# Patient Record
Sex: FEMALE | Race: WHITE | NOT HISPANIC OR LATINO | Employment: UNEMPLOYED | URBAN - NONMETROPOLITAN AREA
[De-identification: names, ages, dates, MRNs, and addresses within clinical notes are randomized per-mention and may not be internally consistent; named-entity substitution may affect disease eponyms.]

---

## 2017-01-16 ENCOUNTER — OFFICE VISIT (OUTPATIENT)
Dept: CARDIAC SURGERY | Facility: CLINIC | Age: 62
End: 2017-01-16

## 2017-01-16 VITALS
OXYGEN SATURATION: 98 % | DIASTOLIC BLOOD PRESSURE: 89 MMHG | HEART RATE: 106 BPM | SYSTOLIC BLOOD PRESSURE: 127 MMHG | HEIGHT: 61 IN | BODY MASS INDEX: 31.83 KG/M2 | WEIGHT: 168.6 LBS

## 2017-01-16 DIAGNOSIS — K21.9 GASTROESOPHAGEAL REFLUX DISEASE WITHOUT ESOPHAGITIS: ICD-10-CM

## 2017-01-16 DIAGNOSIS — I10 BENIGN ESSENTIAL HTN: ICD-10-CM

## 2017-01-16 DIAGNOSIS — K44.9 HIATAL HERNIA: Primary | ICD-10-CM

## 2017-01-16 DIAGNOSIS — R10.13 EPIGASTRIC PAIN: ICD-10-CM

## 2017-01-16 DIAGNOSIS — J43.1 PANLOBULAR EMPHYSEMA (HCC): ICD-10-CM

## 2017-01-16 DIAGNOSIS — F32.4 MAJOR DEPRESSIVE DISORDER WITH SINGLE EPISODE, IN PARTIAL REMISSION (HCC): ICD-10-CM

## 2017-01-16 PROCEDURE — 99205 OFFICE O/P NEW HI 60 MIN: CPT | Performed by: THORACIC SURGERY (CARDIOTHORACIC VASCULAR SURGERY)

## 2017-01-16 NOTE — LETTER
January 16, 2017     Ge Jean MD  [address] [place] [id number]    Patient: Mary Nelson   YOB: 1955   Date of Visit: 1/16/2017       Dear Dr. Ted MD:    Thank you for referring Mary Nelson to me for evaluation. Below are the relevant portions of my assessment and plan of care.    If you have questions, please do not hesitate to call me. I look forward to following Mary along with you.         Sincerely,        Yony Mcmillan MD        CC: MD Yony Gupta MD  1/16/2017 12:54 PM  Signed  1/16/2017    Mary Nelson  1955      Chief Complaint:  Abdominal pain, hiatal hernia  HPI:      PCP:  Ge Jean MD  GI:  Dr Hernandez    61 y.o. female with hypertension, hiatal hernia, COPD, GERD, depression..  smokes 1/2 PPD.  Moderate to severe abdominal pain, nausea, vomiting, dysphagia times many years, progressive over past year.  Bleeding ulcer 1995.  EGD demonstrated hiatal hernia.  Mild leg swelling. no recent bleeding, weight loss, or diarrhea.  No TIA, stroke, MI, claudication.  No other associated signs, symptoms or modifying factors.    5/2009 CT Abdomen:  Small hiatal hernia  6/2016 Colonoscopy:  Internal and external hemorrhoids  6/2016 EGD:  Mildly severe esophagitis.  Gastritis.  Normal duodenum.  Medium-sized hiatal hernia.  10/2016 CT Abdomen:  Large central hiatal hernia (40% stomach in chest)    11/2015 JOAO:  EF 60%.  Early diastolic dysfunction.  Trace MR and TR.  11/2015 Lexiscan:  EF 85% no ischemia.    The following portions of the patient's history were reviewed and updated as appropriate: allergies, current medications, past family history, past medical history, past social history, past surgical history and problem list.  Recent images independently reviewed.  Available laboratory values reviewed.    PMH:  Past Medical History   Diagnosis Date   • COPD (chronic obstructive  pulmonary disease)    • Depression    • Epigastric pain    • GERD (gastroesophageal reflux disease)    • Hiatal hernia    • HTN (hypertension)        ALLERGIES:  Allergies   Allergen Reactions   • Codeine          MEDICATIONS:    Current Outpatient Prescriptions:   •  albuterol (VENTOLIN HFA) 108 (90 BASE) MCG/ACT inhaler, Inhale 2 puffs., Disp: , Rfl:   •  AMITRIPTYLINE HCL PO, Take  by mouth daily., Disp: , Rfl:   •  citalopram (CELEXA) 40 MG tablet, Take 40 mg by mouth., Disp: , Rfl:   •  DICLOFENAC PO, Take  by mouth., Disp: , Rfl:   •  losartan (COZAAR) 25 MG tablet, Take 25 mg by mouth daily., Disp: , Rfl:   •  lovastatin (MEVACOR) 20 MG tablet, Take 20 mg by mouth., Disp: , Rfl:   •  omeprazole (priLOSEC) 40 MG capsule, , Disp: , Rfl:   •  risperiDONE (RISPERDAL) 0.25 MG tablet, Take 0.25 mg by mouth., Disp: , Rfl:   •  sucralfate (CARAFATE) 1 GM/10ML suspension, Take 10 mL by mouth 4 (Four) Times a Day., Disp: 4 g, Rfl: 5  •  TIZANIDINE HCL PO, Take  by mouth., Disp: , Rfl:   •  TRAZODONE HCL PO, Take  by mouth., Disp: , Rfl:     Review of Systems   Review of Systems   Constitution: Positive for malaise/fatigue. Negative for night sweats and weight loss.   HENT: Negative for hearing loss, hoarse voice and stridor.    Eyes: Negative for vision loss in left eye, vision loss in right eye and visual disturbance.   Cardiovascular: Positive for chest pain. Negative for leg swelling and palpitations.   Respiratory: Positive for cough and shortness of breath. Negative for hemoptysis.    Hematologic/Lymphatic: Negative for adenopathy and bleeding problem. Bruises/bleeds easily.   Skin: Negative for color change, poor wound healing and rash.   Musculoskeletal: Negative for arthritis, back pain, muscle weakness and neck pain.   Gastrointestinal: Positive for bloating, abdominal pain, dysphagia, heartburn, nausea and vomiting.   Neurological: Positive for dizziness. Negative for numbness and seizures.    Psychiatric/Behavioral: Positive for depression. Negative for altered mental status and memory loss. The patient is nervous/anxious.        Physical Exam   Physical Exam   Constitutional: She is oriented to person, place, and time. She is active and cooperative. She does not appear ill. No distress.   HENT:   Head: Atraumatic.   Right Ear: Hearing normal.   Left Ear: Hearing normal.   Nose: No nasal deformity. No epistaxis.   Mouth/Throat: She does not have dentures. Normal dentition.   Eyes: Conjunctivae and lids are normal. Right pupil is round and reactive. Left pupil is round and reactive.   Neck: No JVD present. Carotid bruit is not present. No tracheal deviation present. No thyroid mass and no thyromegaly present.   Cardiovascular: Normal rate and regular rhythm.    No murmur heard.  Pulses:       Carotid pulses are 2+ on the right side, and 2+ on the left side.       Radial pulses are 2+ on the right side, and 2+ on the left side.        Dorsalis pedis pulses are 2+ on the right side, and 2+ on the left side.        Posterior tibial pulses are 2+ on the right side, and 2+ on the left side.   Pulmonary/Chest: Effort normal and breath sounds normal.   Abdominal: Soft. She exhibits no distension and no mass. There is no splenomegaly or hepatomegaly. There is generalized tenderness.   Musculoskeletal: She exhibits no deformity.   Gait normal.    Lymphadenopathy:     She has no cervical adenopathy.        Right: No supraclavicular adenopathy present.        Left: No supraclavicular adenopathy present.   Neurological: She is alert and oriented to person, place, and time. She has normal strength.   Skin: Skin is warm and dry. No cyanosis or erythema. No pallor.   No venous staining   Psychiatric: She has a normal mood and affect. Her speech is normal. Judgment and thought content normal.     Results for BRADLEY WALKER ALFONSO (MRN [id number]) as of 1/16/2017 12:39   Ref. Range 11/19/2015 05:35   Creatinine Latest  Ref Range: 0.5 - 1.0 mg/dl 0.8   BUN Latest Ref Range: 7 - 21 mg/dl 12     ASSESSMENT:  Diagnoses and all orders for this visit:    Hiatal hernia  -     FL esophagram complete; Future  -     Ambulatory Referral to General Surgery    Epigastric pain    Panlobular emphysema    Gastroesophageal reflux disease without esophagitis    Benign essential HTN    Major depressive disorder with single episode, in partial remission    PLAN:  Detailed discussion with Ms Nelson regarding her situation and options.  Abdominal pain, difficulty swallowing, vomiting.  significant GERD.  evaluation for repair of hiatal hernia and anti-reflux procedure is advisable (open combined thoracic/abdominal vs laparoscopic).    Risks:  bleeding, infection, transfusion, pulmonary, renal dysfunction.  Benefits:  relief of symptoms, reduction in bleeding recurrence.  Options:  continued medical therapy discussed.  Understands and wishes to proceed.    Cine esophagram (esophageal function)  Appt Dr Wilson    Recommended regular physical activity, progressive walking program.  Continue current medications as directed.  Will Obtain relevant old records.    Thank you for the opportunity to participate in this patient's care.    Copy to primary care provider.    EMR Dragon/Transcription disclaimer:   Much of this encounter note is an electronic transcription/translation of spoken language to printed text. The electronic translation of spoken language may permit erroneous, or at times, nonsensical words or phrases to be inadvertently transcribed; Although I have reviewed the note for such errors, some may still exist.

## 2017-01-16 NOTE — Clinical Note
January 16, 2017     Ge Jean MD  [address] [place] [id number]    Patient: Mary Nelson   YOB: 1955   Date of Visit: 1/16/2017       Dear Dr. Ted MD:    Mary Nelson was in my office today. Below are the relevant portions of my assessment and plan of care.           If you have questions, please do not hesitate to call me. I look forward to following Mary along with you.         Sincerely,        Yony Mcmillan MD        CC: No Recipients

## 2017-01-16 NOTE — PROGRESS NOTES
1/16/2017    Mary Nelson  1955      Chief Complaint:  Abdominal pain, hiatal hernia  HPI:      PCP:  Ge Jean MD  GI:  Dr Hernandez    61 y.o. female with hypertension, hiatal hernia, COPD, GERD, depression..  smokes 1/2 PPD.  Moderate to severe abdominal pain, nausea, vomiting, dysphagia times many years, progressive over past year.  Bleeding ulcer 1995.  EGD demonstrated hiatal hernia.  Mild leg swelling. no recent bleeding, weight loss, or diarrhea.  No TIA, stroke, MI, claudication.  No other associated signs, symptoms or modifying factors.    5/2009 CT Abdomen:  Small hiatal hernia  6/2016 Colonoscopy:  Internal and external hemorrhoids  6/2016 EGD:  Mildly severe esophagitis.  Gastritis.  Normal duodenum.  Medium-sized hiatal hernia.  10/2016 CT Abdomen:  Large central hiatal hernia (40% stomach in chest)    11/2015 JOAO:  EF 60%.  Early diastolic dysfunction.  Trace MR and TR.  11/2015 Lexiscan:  EF 85% no ischemia.    The following portions of the patient's history were reviewed and updated as appropriate: allergies, current medications, past family history, past medical history, past social history, past surgical history and problem list.  Recent images independently reviewed.  Available laboratory values reviewed.    PMH:  Past Medical History   Diagnosis Date   • COPD (chronic obstructive pulmonary disease)    • Depression    • Epigastric pain    • GERD (gastroesophageal reflux disease)    • Hiatal hernia    • HTN (hypertension)        ALLERGIES:  Allergies   Allergen Reactions   • Codeine          MEDICATIONS:    Current Outpatient Prescriptions:   •  albuterol (VENTOLIN HFA) 108 (90 BASE) MCG/ACT inhaler, Inhale 2 puffs., Disp: , Rfl:   •  AMITRIPTYLINE HCL PO, Take  by mouth daily., Disp: , Rfl:   •  citalopram (CELEXA) 40 MG tablet, Take 40 mg by mouth., Disp: , Rfl:   •  DICLOFENAC PO, Take  by mouth., Disp: , Rfl:   •  losartan (COZAAR) 25 MG tablet, Take 25 mg by mouth  daily., Disp: , Rfl:   •  lovastatin (MEVACOR) 20 MG tablet, Take 20 mg by mouth., Disp: , Rfl:   •  omeprazole (priLOSEC) 40 MG capsule, , Disp: , Rfl:   •  risperiDONE (RISPERDAL) 0.25 MG tablet, Take 0.25 mg by mouth., Disp: , Rfl:   •  sucralfate (CARAFATE) 1 GM/10ML suspension, Take 10 mL by mouth 4 (Four) Times a Day., Disp: 4 g, Rfl: 5  •  TIZANIDINE HCL PO, Take  by mouth., Disp: , Rfl:   •  TRAZODONE HCL PO, Take  by mouth., Disp: , Rfl:     Review of Systems   Review of Systems   Constitution: Positive for malaise/fatigue. Negative for night sweats and weight loss.   HENT: Negative for hearing loss, hoarse voice and stridor.    Eyes: Negative for vision loss in left eye, vision loss in right eye and visual disturbance.   Cardiovascular: Positive for chest pain. Negative for leg swelling and palpitations.   Respiratory: Positive for cough and shortness of breath. Negative for hemoptysis.    Hematologic/Lymphatic: Negative for adenopathy and bleeding problem. Bruises/bleeds easily.   Skin: Negative for color change, poor wound healing and rash.   Musculoskeletal: Negative for arthritis, back pain, muscle weakness and neck pain.   Gastrointestinal: Positive for bloating, abdominal pain, dysphagia, heartburn, nausea and vomiting.   Neurological: Positive for dizziness. Negative for numbness and seizures.   Psychiatric/Behavioral: Positive for depression. Negative for altered mental status and memory loss. The patient is nervous/anxious.        Physical Exam   Physical Exam   Constitutional: She is oriented to person, place, and time. She is active and cooperative. She does not appear ill. No distress.   HENT:   Head: Atraumatic.   Right Ear: Hearing normal.   Left Ear: Hearing normal.   Nose: No nasal deformity. No epistaxis.   Mouth/Throat: She does not have dentures. Normal dentition.   Eyes: Conjunctivae and lids are normal. Right pupil is round and reactive. Left pupil is round and reactive.   Neck: No JVD  present. Carotid bruit is not present. No tracheal deviation present. No thyroid mass and no thyromegaly present.   Cardiovascular: Normal rate and regular rhythm.    No murmur heard.  Pulses:       Carotid pulses are 2+ on the right side, and 2+ on the left side.       Radial pulses are 2+ on the right side, and 2+ on the left side.        Dorsalis pedis pulses are 2+ on the right side, and 2+ on the left side.        Posterior tibial pulses are 2+ on the right side, and 2+ on the left side.   Pulmonary/Chest: Effort normal and breath sounds normal.   Abdominal: Soft. She exhibits no distension and no mass. There is no splenomegaly or hepatomegaly. There is generalized tenderness.   Musculoskeletal: She exhibits no deformity.   Gait normal.    Lymphadenopathy:     She has no cervical adenopathy.        Right: No supraclavicular adenopathy present.        Left: No supraclavicular adenopathy present.   Neurological: She is alert and oriented to person, place, and time. She has normal strength.   Skin: Skin is warm and dry. No cyanosis or erythema. No pallor.   No venous staining   Psychiatric: She has a normal mood and affect. Her speech is normal. Judgment and thought content normal.     Results for WALKER NELSON (MRN [id number]) as of 1/16/2017 12:39   Ref. Range 11/19/2015 05:35   Creatinine Latest Ref Range: 0.5 - 1.0 mg/dl 0.8   BUN Latest Ref Range: 7 - 21 mg/dl 12     ASSESSMENT:  Diagnoses and all orders for this visit:    Hiatal hernia  -     FL esophagram complete; Future  -     Ambulatory Referral to General Surgery    Epigastric pain    Panlobular emphysema    Gastroesophageal reflux disease without esophagitis    Benign essential HTN    Major depressive disorder with single episode, in partial remission    PLAN:  Detailed discussion with Ms Nelson regarding her situation and options.  Abdominal pain, difficulty swallowing, vomiting.  significant GERD.  evaluation for repair of hiatal hernia and  anti-reflux procedure is advisable (open combined thoracic/abdominal vs laparoscopic).    Risks:  bleeding, infection, transfusion, pulmonary, renal dysfunction.  Benefits:  relief of symptoms, reduction in bleeding recurrence.  Options:  continued medical therapy discussed.  Understands and wishes to proceed.    Cine esophagram (esophageal function)  Appt Dr Wilson    Recommended regular physical activity, progressive walking program.  Continue current medications as directed.  Will Obtain relevant old records.    Thank you for the opportunity to participate in this patient's care.    Copy to primary care provider.    EMR Dragon/Transcription disclaimer:   Much of this encounter note is an electronic transcription/translation of spoken language to printed text. The electronic translation of spoken language may permit erroneous, or at times, nonsensical words or phrases to be inadvertently transcribed; Although I have reviewed the note for such errors, some may still exist.

## 2017-01-23 ENCOUNTER — HOSPITAL ENCOUNTER (OUTPATIENT)
Dept: GENERAL RADIOLOGY | Facility: HOSPITAL | Age: 62
Discharge: HOME | End: 2017-01-23
Admitting: THORACIC SURGERY (CARDIOTHORACIC VASCULAR SURGERY)

## 2017-01-23 ENCOUNTER — APPOINTMENT (OUTPATIENT)
Dept: GENERAL RADIOLOGY | Facility: HOSPITAL | Age: 62
End: 2017-01-23

## 2017-01-23 DIAGNOSIS — K44.9 HIATAL HERNIA: ICD-10-CM

## 2017-01-23 PROCEDURE — 74220 X-RAY XM ESOPHAGUS 1CNTRST: CPT

## 2017-01-26 ENCOUNTER — CONSULT (OUTPATIENT)
Dept: SURGERY | Facility: CLINIC | Age: 62
End: 2017-01-26

## 2017-01-26 ENCOUNTER — APPOINTMENT (OUTPATIENT)
Dept: PREADMISSION TESTING | Facility: HOSPITAL | Age: 62
End: 2017-01-26

## 2017-01-26 VITALS
DIASTOLIC BLOOD PRESSURE: 84 MMHG | WEIGHT: 170 LBS | BODY MASS INDEX: 32.1 KG/M2 | HEIGHT: 61 IN | SYSTOLIC BLOOD PRESSURE: 110 MMHG

## 2017-01-26 DIAGNOSIS — K44.9 HIATAL HERNIA WITH GASTROESOPHAGEAL REFLUX: ICD-10-CM

## 2017-01-26 DIAGNOSIS — K21.9 HIATAL HERNIA WITH GASTROESOPHAGEAL REFLUX: Primary | ICD-10-CM

## 2017-01-26 DIAGNOSIS — K44.9 HIATAL HERNIA WITH GASTROESOPHAGEAL REFLUX: Primary | ICD-10-CM

## 2017-01-26 DIAGNOSIS — K21.9 HIATAL HERNIA WITH GASTROESOPHAGEAL REFLUX: ICD-10-CM

## 2017-01-26 LAB
ANION GAP SERPL CALCULATED.3IONS-SCNC: 9 MMOL/L (ref 5–15)
BUN BLD-MCNC: 9 MG/DL (ref 7–21)
BUN/CREAT SERPL: 11.7 (ref 7–25)
CALCIUM SPEC-SCNC: 9.1 MG/DL (ref 8.4–10.2)
CHLORIDE SERPL-SCNC: 103 MMOL/L (ref 95–110)
CO2 SERPL-SCNC: 27 MMOL/L (ref 22–31)
CREAT BLD-MCNC: 0.77 MG/DL (ref 0.5–1)
DEPRECATED RDW RBC AUTO: 44.7 FL (ref 36.4–46.3)
ERYTHROCYTE [DISTWIDTH] IN BLOOD BY AUTOMATED COUNT: 14.2 % (ref 11.5–14.5)
GFR SERPL CREATININE-BSD FRML MDRD: 77 ML/MIN/1.73 (ref 51–120)
GLUCOSE BLD-MCNC: 94 MG/DL (ref 60–100)
HCT VFR BLD AUTO: 36.3 % (ref 35–45)
HGB BLD-MCNC: 11.6 G/DL (ref 12–15.5)
MCH RBC QN AUTO: 27.9 PG (ref 26.5–34)
MCHC RBC AUTO-ENTMCNC: 32 G/DL (ref 31.4–36)
MCV RBC AUTO: 87.3 FL (ref 80–98)
MRSA DNA SPEC QL NAA+PROBE: NEGATIVE
PLATELET # BLD AUTO: 224 10*3/MM3 (ref 150–450)
PMV BLD AUTO: 11 FL (ref 8–12)
POTASSIUM BLD-SCNC: 4 MMOL/L (ref 3.5–5.1)
RBC # BLD AUTO: 4.16 10*6/MM3 (ref 3.77–5.16)
SODIUM BLD-SCNC: 139 MMOL/L (ref 137–145)
WBC NRBC COR # BLD: 7.72 10*3/MM3 (ref 3.2–9.8)

## 2017-01-26 PROCEDURE — 93010 ELECTROCARDIOGRAM REPORT: CPT | Performed by: INTERNAL MEDICINE

## 2017-01-26 PROCEDURE — 36415 COLL VENOUS BLD VENIPUNCTURE: CPT

## 2017-01-26 PROCEDURE — 85027 COMPLETE CBC AUTOMATED: CPT | Performed by: SURGERY

## 2017-01-26 PROCEDURE — 93005 ELECTROCARDIOGRAM TRACING: CPT

## 2017-01-26 PROCEDURE — 80048 BASIC METABOLIC PNL TOTAL CA: CPT | Performed by: SURGERY

## 2017-01-26 PROCEDURE — 87641 MR-STAPH DNA AMP PROBE: CPT | Performed by: SURGERY

## 2017-01-26 PROCEDURE — 99204 OFFICE O/P NEW MOD 45 MIN: CPT | Performed by: SURGERY

## 2017-01-26 RX ORDER — SODIUM CHLORIDE 9 MG/ML
100 INJECTION, SOLUTION INTRAVENOUS CONTINUOUS
Status: CANCELLED | OUTPATIENT
Start: 2017-01-26

## 2017-01-26 RX ORDER — SODIUM CHLORIDE 0.9 % (FLUSH) 0.9 %
1-10 SYRINGE (ML) INJECTION AS NEEDED
Status: CANCELLED | OUTPATIENT
Start: 2017-01-26

## 2017-01-26 NOTE — MR AVS SNAPSHOT
Mary Nelson   2017 12:00 PM   Appointment    Provider:  MICHELLE Saha   Department:  [facility]   Dept Phone:  [phone]                Your Full Care Plan           To Do List     2017 11:00 AM     Appointment with Loco Martinez MD at [facility]STRO[facility] ([phone])   Arrive 15 minutes prior to appointment.   [address] [place] [address] [address]  [facility] [address] [place] [phone]            Your Updated Medication List          This list is accurate as of: 17 12:50 PM.  Always use your most recent med list.                AMITRIPTYLINE HCL PO       CELEXA 40 MG tablet   Generic drug:  citalopram       DICLOFENAC PO       losartan 25 MG tablet   Commonly known as:  COZAAR       lovastatin 20 MG tablet   Commonly known as:  MEVACOR       omeprazole 40 MG capsule   Commonly known as:  priLOSEC       RISPERDAL 0.25 MG tablet   Generic drug:  risperiDONE       sucralfate 1 GM/10ML suspension   Commonly known as:  CARAFATE   Take 10 mL by mouth 4 (Four) Times a Day.       TIZANIDINE HCL PO       TRAZODONE HCL PO       VENTOLIN  (90 BASE) MCG/ACT inhaler   Generic drug:  albuterol               LRN Signup     [facility] LRN allows you to send messages to your doctor, view your test results, renew your prescriptions, schedule appointments, and more. To sign up, go to [employer] and click on the Sign Up Now link in the New User? box. Enter your LRN Activation Code exactly as it appears below along with the last four digits of your Social Security Number and your Date of Birth () to complete the sign-up process. If you do not sign up before the expiration date, you must request a new code.    LRN Activation Code: [email or web address]  Expires: 2017 12:50 PM    If you have questions, you can email [email or web address] or call [phone] to talk to our LRN staff.  Remember, MyChart is NOT to be used for urgent needs. For medical emergencies, dial 911.               Other Info from Your Visit           Allergies     Codeine Swelling      Vital Signs     Smoking Status                   Current Every Day Smoker             Discharge Instructions       .pat  .chg       SYMPTOMS OF A STROKE    Call 911 or have someone take you to the Emergency Department if you have any of the following:    · Sudden numbness or weakness of your face, arm or leg especially on one side of the body  · Sudden confusion, diffiiculty speaking or trouble understanding   · Changes in your vision or loss of sight in one eye  · Sudden severe headache with no known cause  · sudden dizziness, trouble walking, loss of balance or coordination    It is important to seek emergency care right away if you have further stroke symptoms. If you get emergency help quickly, the powerful clot-dissolving medicines can reduce the disabilities caused by a stroke.     For more information:    American Stroke Association  8-197-8-STROKE  www.strokeassociation.org           IF YOU SMOKE OR USE TOBACCO PLEASE READ THE FOLLOWING:    Why is smoking bad for me?  Smoking increases the risk of heart disease, lung disease, vascular disease, stroke, and cancer.     If you smoke, STOP!    If you would like more information on quitting smoking, please visit the [employer] website: [email or web address]   This link will provide additional resources including the QUIT line and the Beat the Pack support groups.     For more information:    American Cancer Society  ([phone] 616-7745    American Heart Association  7-726-354-7761

## 2017-01-31 NOTE — PROGRESS NOTES
CHIEF COMPLAINT:    Reflux, upper abdominal discomfort-referred by Dr. Mcmillan    HISTORY OF PRESENT ILLNESS:    Mary Nelson is a 61 y.o. female who is referred by Dr. Mcmillan for a large hiatal hernia.  She was initially sent to him for evaluation but her felt that laparoscopic trans-abdominal repair was more appropriate.    She describes 8 years of heartburn with pain in the epigastrium and burning after meals.  Over the past 1 year this has progressed to occasional regurgitation of food as well.  She has no radiation of the pain and although acid reducing medication was initially effective it has not helped with the regurgitation or pain now.  She has undergone EGD showing hiatal hernia and had CT demonstrating what appears to be a para-esophageal hernia.  She also recently had an UGI done showing the same.  Given this she would like to undergo surgical repair.    Past Medical History   Diagnosis Date   • Arthritis    • COPD (chronic obstructive pulmonary disease)    • Depression    • Epigastric pain    • GERD (gastroesophageal reflux disease)    • Head injury 1990   • Hiatal hernia    • HTN (hypertension)        Past Surgical History   Procedure Laterality Date   • Endoscopy and colonoscopy  06/27/2016     External and internal hemorrhoids. No specimens collected   • Transesophageal echocardiogram ([facility])  11/18/2015     With color flow-Ef of 60%.Early diastolic dysfunction.Normal RV size and function.Trace to mild mitral and trace tricuspid regurg.No pericardial effusion.   • Esophagoscopy / egd  06/27/2016     With biopsy-Mildly severe esophagitis. Gastritis. Normal examined duodenum. Biopsied. Medium-sized hiatus hernia. Several biopsies were obtained in the lower third of the esophagus. Several biopsies were obtained in the gastric antrum   • Colonoscopy  06/27/2016   • Tubal abdominal ligation  1984         Current Outpatient Prescriptions:   •  albuterol (VENTOLIN HFA) 108 (90 BASE) MCG/ACT  inhaler, Inhale 2 puffs., Disp: , Rfl:   •  AMITRIPTYLINE HCL PO, Take 20 mg by mouth Every Night., Disp: , Rfl:   •  citalopram (CELEXA) 40 MG tablet, Take 40 mg by mouth., Disp: , Rfl:   •  DICLOFENAC PO, Take  by mouth Daily., Disp: , Rfl:   •  losartan (COZAAR) 25 MG tablet, Take 25 mg by mouth daily., Disp: , Rfl:   •  lovastatin (MEVACOR) 20 MG tablet, Take 20 mg by mouth., Disp: , Rfl:   •  omeprazole (priLOSEC) 40 MG capsule, , Disp: , Rfl:   •  risperiDONE (RISPERDAL) 0.25 MG tablet, Take 0.25 mg by mouth Daily., Disp: , Rfl:   •  sucralfate (CARAFATE) 1 GM/10ML suspension, Take 10 mL by mouth 4 (Four) Times a Day., Disp: 4 g, Rfl: 5  •  TIZANIDINE HCL PO, Take  by mouth 2 (Two) Times a Day As Needed., Disp: , Rfl:   •  TRAZODONE HCL PO, Take  by mouth Every Night. Dosage unknown, Disp: , Rfl:     Allergies   Allergen Reactions   • Codeine Swelling       Family History   Problem Relation Age of Onset   • Hypertension Other        Social History     Social History   • Marital status:      Spouse name: N/A   • Number of children: N/A   • Years of education: N/A     Occupational History   • Not on file.     Social History Main Topics   • Smoking status: Current Every Day Smoker     Packs/day: 1.00     Years: 43.00     Types: Cigarettes   • Smokeless tobacco: Never Used   • Alcohol use No   • Drug use: No   • Sexual activity: Defer     Other Topics Concern   • Not on file     Social History Narrative       Review of Systems   Constitutional: Negative for appetite change, chills, fever and unexpected weight change.   HENT: Positive for trouble swallowing. Negative for hearing loss and nosebleeds.    Eyes: Negative for visual disturbance.   Respiratory: Positive for shortness of breath. Negative for apnea, cough, choking, chest tightness, wheezing and stridor.    Cardiovascular: Negative for chest pain, palpitations and leg swelling.   Gastrointestinal: Positive for constipation, nausea and vomiting.  "Negative for abdominal distention, abdominal pain, blood in stool and diarrhea.        Regurgitation   Endocrine: Negative for cold intolerance, heat intolerance, polydipsia, polyphagia and polyuria.   Genitourinary: Negative for difficulty urinating, dysuria, frequency, hematuria and urgency.   Musculoskeletal: Positive for arthralgias and back pain. Negative for myalgias and neck pain.   Skin: Negative for color change, pallor and rash.   Allergic/Immunologic: Negative for immunocompromised state.   Neurological: Positive for headaches. Negative for dizziness, seizures, syncope, light-headedness and numbness.   Hematological: Negative for adenopathy.   Psychiatric/Behavioral: Negative for suicidal ideas. The patient is not nervous/anxious.        Objective     Visit Vitals   • /84   • Ht 61\" (154.9 cm)   • Wt 170 lb (77.1 kg)   • BMI 32.12 kg/m2       Physical Exam   Constitutional: She is oriented to person, place, and time. She appears well-developed and well-nourished. No distress.   HENT:   Head: Normocephalic and atraumatic.   Eyes: Conjunctivae and EOM are normal. Pupils are equal, round, and reactive to light. Right eye exhibits no discharge. Left eye exhibits no discharge.   Neck: Normal range of motion. Neck supple. No JVD present. No tracheal deviation present. No thyromegaly present.   Cardiovascular: Normal rate, regular rhythm and normal heart sounds.  Exam reveals no gallop and no friction rub.    No murmur heard.  Pulmonary/Chest: Effort normal and breath sounds normal. No respiratory distress. She has no wheezes. She has no rales. She exhibits no tenderness.   Abdominal: Soft. She exhibits no distension and no mass. There is no tenderness. There is no rebound and no guarding. No hernia.   Musculoskeletal: Normal range of motion. She exhibits no edema, tenderness or deformity.   Neurological: She is alert and oriented to person, place, and time. No cranial nerve deficit.   Skin: Skin is warm " and dry. No rash noted. She is not diaphoretic. No erythema. No pallor.   Psychiatric: She has a normal mood and affect. Her behavior is normal. Judgment and thought content normal.       DIAGNOSTIC DATA:    Reviewed CT showing paraesophageal hernia, UGI showing the same, EGD showing esophagitis and hiatal hernia as well.  Preop labs ordred.    ASSESSMENT:    Paraesophageal hernia with reflux    PLAN:    We had an extensive discussion today regarding operative repair of her hiatal hernia.  Risks and benefits of Laparoscopic Hiatal hernia repair with Nissen fundoplication and possible open operation were discussed and she wishes to proceed.  Scheduled for 2/1/17.          This document has been electronically signed by Ced Wilson MD on January 31, 2017 3:39 PM

## 2017-02-01 ENCOUNTER — ANESTHESIA (OUTPATIENT)
Dept: PERIOP | Facility: HOSPITAL | Age: 62
End: 2017-02-01

## 2017-02-01 ENCOUNTER — HOSPITAL ENCOUNTER (OUTPATIENT)
Facility: HOSPITAL | Age: 62
Discharge: HOME | End: 2017-02-03
Attending: SURGERY | Admitting: SURGERY

## 2017-02-01 ENCOUNTER — ANESTHESIA EVENT (OUTPATIENT)
Dept: PERIOP | Facility: HOSPITAL | Age: 62
End: 2017-02-01

## 2017-02-01 DIAGNOSIS — K21.9 HIATAL HERNIA WITH GASTROESOPHAGEAL REFLUX: ICD-10-CM

## 2017-02-01 DIAGNOSIS — K44.9 HIATAL HERNIA WITH GASTROESOPHAGEAL REFLUX: ICD-10-CM

## 2017-02-01 PROCEDURE — 25010000002 HYDROMORPHONE PER 4 MG: Performed by: SURGERY

## 2017-02-01 PROCEDURE — 25010000002 MIDAZOLAM PER 1 MG: Performed by: NURSE ANESTHETIST, CERTIFIED REGISTERED

## 2017-02-01 PROCEDURE — 25010000002 ONDANSETRON PER 1 MG: Performed by: NURSE ANESTHETIST, CERTIFIED REGISTERED

## 2017-02-01 PROCEDURE — 25010000002 FENTANYL CITRATE (PF) 100 MCG/2ML SOLUTION: Performed by: NURSE ANESTHETIST, CERTIFIED REGISTERED

## 2017-02-01 PROCEDURE — 94640 AIRWAY INHALATION TREATMENT: CPT

## 2017-02-01 PROCEDURE — 25010000002 PROPOFOL 10 MG/ML EMULSION: Performed by: NURSE ANESTHETIST, CERTIFIED REGISTERED

## 2017-02-01 PROCEDURE — 25010000002 NEOSTIGMINE PER 0.5 MG: Performed by: NURSE ANESTHETIST, CERTIFIED REGISTERED

## 2017-02-01 PROCEDURE — 94799 UNLISTED PULMONARY SVC/PX: CPT

## 2017-02-01 PROCEDURE — 25010000002 SUCCINYLCHOLINE PER 20 MG: Performed by: NURSE ANESTHETIST, CERTIFIED REGISTERED

## 2017-02-01 PROCEDURE — 25010000002 PHENYLEPHRINE PER 1 ML: Performed by: NURSE ANESTHETIST, CERTIFIED REGISTERED

## 2017-02-01 PROCEDURE — 43281 LAP PARAESOPHAG HERN REPAIR: CPT | Performed by: SURGERY

## 2017-02-01 PROCEDURE — 25010000002 DEXAMETHASONE PER 1 MG: Performed by: NURSE ANESTHETIST, CERTIFIED REGISTERED

## 2017-02-01 PROCEDURE — 25010000002 HYDROMORPHONE PER 4 MG: Performed by: NURSE ANESTHETIST, CERTIFIED REGISTERED

## 2017-02-01 PROCEDURE — 25010000003 CEFAZOLIN PER 500 MG: Performed by: SURGERY

## 2017-02-01 RX ORDER — ONDANSETRON 2 MG/ML
INJECTION INTRAMUSCULAR; INTRAVENOUS AS NEEDED
Status: DISCONTINUED | OUTPATIENT
Start: 2017-02-01 | End: 2017-02-01 | Stop reason: SURG

## 2017-02-01 RX ORDER — DIPHENHYDRAMINE HYDROCHLORIDE 50 MG/ML
12.5 INJECTION INTRAMUSCULAR; INTRAVENOUS
Status: DISCONTINUED | OUTPATIENT
Start: 2017-02-01 | End: 2017-02-01 | Stop reason: HOSPADM

## 2017-02-01 RX ORDER — NALOXONE HCL 0.4 MG/ML
0.2 VIAL (ML) INJECTION AS NEEDED
Status: DISCONTINUED | OUTPATIENT
Start: 2017-02-01 | End: 2017-02-01 | Stop reason: HOSPADM

## 2017-02-01 RX ORDER — RISPERIDONE 0.25 MG/1
0.25 TABLET ORAL 2 TIMES DAILY
Status: DISCONTINUED | OUTPATIENT
Start: 2017-02-01 | End: 2017-02-03 | Stop reason: HOSPADM

## 2017-02-01 RX ORDER — MORPHINE SULFATE 2 MG/ML
2 INJECTION, SOLUTION INTRAMUSCULAR; INTRAVENOUS
Status: DISCONTINUED | OUTPATIENT
Start: 2017-02-01 | End: 2017-02-01 | Stop reason: HOSPADM

## 2017-02-01 RX ORDER — SUCCINYLCHOLINE CHLORIDE 20 MG/ML
INJECTION INTRAMUSCULAR; INTRAVENOUS AS NEEDED
Status: DISCONTINUED | OUTPATIENT
Start: 2017-02-01 | End: 2017-02-01 | Stop reason: SURG

## 2017-02-01 RX ORDER — PROMETHAZINE HYDROCHLORIDE 25 MG/ML
12.5 INJECTION, SOLUTION INTRAMUSCULAR; INTRAVENOUS EVERY 4 HOURS PRN
Status: DISCONTINUED | OUTPATIENT
Start: 2017-02-01 | End: 2017-02-03 | Stop reason: HOSPADM

## 2017-02-01 RX ORDER — LIDOCAINE HYDROCHLORIDE 20 MG/ML
INJECTION, SOLUTION INFILTRATION; PERINEURAL AS NEEDED
Status: DISCONTINUED | OUTPATIENT
Start: 2017-02-01 | End: 2017-02-01 | Stop reason: SURG

## 2017-02-01 RX ORDER — DEXAMETHASONE SODIUM PHOSPHATE 4 MG/ML
8 INJECTION, SOLUTION INTRA-ARTICULAR; INTRALESIONAL; INTRAMUSCULAR; INTRAVENOUS; SOFT TISSUE ONCE AS NEEDED
Status: DISCONTINUED | OUTPATIENT
Start: 2017-02-01 | End: 2017-02-01 | Stop reason: HOSPADM

## 2017-02-01 RX ORDER — METOCLOPRAMIDE HYDROCHLORIDE 5 MG/ML
10 INJECTION INTRAMUSCULAR; INTRAVENOUS ONCE AS NEEDED
Status: DISCONTINUED | OUTPATIENT
Start: 2017-02-01 | End: 2017-02-01 | Stop reason: HOSPADM

## 2017-02-01 RX ORDER — SODIUM CHLORIDE 0.9 % (FLUSH) 0.9 %
1-10 SYRINGE (ML) INJECTION AS NEEDED
Status: DISCONTINUED | OUTPATIENT
Start: 2017-02-01 | End: 2017-02-01 | Stop reason: HOSPADM

## 2017-02-01 RX ORDER — ACETAMINOPHEN 650 MG/1
650 SUPPOSITORY RECTAL ONCE AS NEEDED
Status: DISCONTINUED | OUTPATIENT
Start: 2017-02-01 | End: 2017-02-01 | Stop reason: HOSPADM

## 2017-02-01 RX ORDER — CITALOPRAM 40 MG/1
40 TABLET ORAL DAILY
Status: DISCONTINUED | OUTPATIENT
Start: 2017-02-01 | End: 2017-02-03 | Stop reason: HOSPADM

## 2017-02-01 RX ORDER — FENTANYL CITRATE 50 UG/ML
INJECTION, SOLUTION INTRAMUSCULAR; INTRAVENOUS AS NEEDED
Status: DISCONTINUED | OUTPATIENT
Start: 2017-02-01 | End: 2017-02-01 | Stop reason: SURG

## 2017-02-01 RX ORDER — BUPIVACAINE HYDROCHLORIDE AND EPINEPHRINE 5; 5 MG/ML; UG/ML
INJECTION, SOLUTION EPIDURAL; INTRACAUDAL; PERINEURAL AS NEEDED
Status: DISCONTINUED | OUTPATIENT
Start: 2017-02-01 | End: 2017-02-03 | Stop reason: HOSPADM

## 2017-02-01 RX ORDER — ALBUTEROL SULFATE 90 UG/1
2 AEROSOL, METERED RESPIRATORY (INHALATION)
Status: DISCONTINUED | OUTPATIENT
Start: 2017-02-01 | End: 2017-02-01 | Stop reason: CLARIF

## 2017-02-01 RX ORDER — HYDRALAZINE HYDROCHLORIDE 20 MG/ML
5 INJECTION INTRAMUSCULAR; INTRAVENOUS
Status: DISCONTINUED | OUTPATIENT
Start: 2017-02-01 | End: 2017-02-01 | Stop reason: HOSPADM

## 2017-02-01 RX ORDER — ONDANSETRON 2 MG/ML
4 INJECTION INTRAMUSCULAR; INTRAVENOUS EVERY 6 HOURS PRN
Status: DISCONTINUED | OUTPATIENT
Start: 2017-02-01 | End: 2017-02-03 | Stop reason: HOSPADM

## 2017-02-01 RX ORDER — ACETAMINOPHEN 325 MG/1
650 TABLET ORAL EVERY 4 HOURS PRN
Status: DISCONTINUED | OUTPATIENT
Start: 2017-02-01 | End: 2017-02-03 | Stop reason: HOSPADM

## 2017-02-01 RX ORDER — ACETAMINOPHEN 325 MG/1
650 TABLET ORAL ONCE AS NEEDED
Status: DISCONTINUED | OUTPATIENT
Start: 2017-02-01 | End: 2017-02-01 | Stop reason: HOSPADM

## 2017-02-01 RX ORDER — DEXAMETHASONE SODIUM PHOSPHATE 4 MG/ML
INJECTION, SOLUTION INTRA-ARTICULAR; INTRALESIONAL; INTRAMUSCULAR; INTRAVENOUS; SOFT TISSUE AS NEEDED
Status: DISCONTINUED | OUTPATIENT
Start: 2017-02-01 | End: 2017-02-01 | Stop reason: SURG

## 2017-02-01 RX ORDER — PRAVASTATIN SODIUM 20 MG
20 TABLET ORAL DAILY
Status: DISCONTINUED | OUTPATIENT
Start: 2017-02-01 | End: 2017-02-03 | Stop reason: HOSPADM

## 2017-02-01 RX ORDER — LABETALOL HYDROCHLORIDE 5 MG/ML
5 INJECTION, SOLUTION INTRAVENOUS
Status: DISCONTINUED | OUTPATIENT
Start: 2017-02-01 | End: 2017-02-01 | Stop reason: HOSPADM

## 2017-02-01 RX ORDER — GLYCOPYRROLATE 0.2 MG/ML
INJECTION INTRAMUSCULAR; INTRAVENOUS AS NEEDED
Status: DISCONTINUED | OUTPATIENT
Start: 2017-02-01 | End: 2017-02-01 | Stop reason: SURG

## 2017-02-01 RX ORDER — MIDAZOLAM HYDROCHLORIDE 1 MG/ML
INJECTION INTRAMUSCULAR; INTRAVENOUS AS NEEDED
Status: DISCONTINUED | OUTPATIENT
Start: 2017-02-01 | End: 2017-02-01 | Stop reason: SURG

## 2017-02-01 RX ORDER — SODIUM CHLORIDE 9 MG/ML
125 INJECTION, SOLUTION INTRAVENOUS CONTINUOUS
Status: DISCONTINUED | OUTPATIENT
Start: 2017-02-01 | End: 2017-02-03 | Stop reason: HOSPADM

## 2017-02-01 RX ORDER — ONDANSETRON 2 MG/ML
4 INJECTION INTRAMUSCULAR; INTRAVENOUS ONCE AS NEEDED
Status: DISCONTINUED | OUTPATIENT
Start: 2017-02-01 | End: 2017-02-01 | Stop reason: HOSPADM

## 2017-02-01 RX ORDER — LOSARTAN POTASSIUM 25 MG/1
25 TABLET ORAL DAILY
Status: DISCONTINUED | OUTPATIENT
Start: 2017-02-01 | End: 2017-02-03 | Stop reason: HOSPADM

## 2017-02-01 RX ORDER — ONDANSETRON 4 MG/1
4 TABLET, FILM COATED ORAL EVERY 6 HOURS PRN
Status: DISCONTINUED | OUTPATIENT
Start: 2017-02-01 | End: 2017-02-03 | Stop reason: HOSPADM

## 2017-02-01 RX ORDER — TRAZODONE HYDROCHLORIDE 50 MG/1
50 TABLET ORAL NIGHTLY
Status: DISCONTINUED | OUTPATIENT
Start: 2017-02-01 | End: 2017-02-03 | Stop reason: HOSPADM

## 2017-02-01 RX ORDER — AMITRIPTYLINE HYDROCHLORIDE 10 MG/1
20 TABLET, FILM COATED ORAL NIGHTLY
Status: DISCONTINUED | OUTPATIENT
Start: 2017-02-01 | End: 2017-02-03 | Stop reason: HOSPADM

## 2017-02-01 RX ORDER — ONDANSETRON 4 MG/1
4 TABLET, ORALLY DISINTEGRATING ORAL EVERY 6 HOURS PRN
Status: DISCONTINUED | OUTPATIENT
Start: 2017-02-01 | End: 2017-02-03 | Stop reason: HOSPADM

## 2017-02-01 RX ORDER — SODIUM CHLORIDE, SODIUM LACTATE, POTASSIUM CHLORIDE, CALCIUM CHLORIDE 600; 310; 30; 20 MG/100ML; MG/100ML; MG/100ML; MG/100ML
9 INJECTION, SOLUTION INTRAVENOUS CONTINUOUS
Status: DISCONTINUED | OUTPATIENT
Start: 2017-02-01 | End: 2017-02-01

## 2017-02-01 RX ORDER — ALBUTEROL SULFATE 2.5 MG/3ML
2.5 SOLUTION RESPIRATORY (INHALATION)
Status: DISCONTINUED | OUTPATIENT
Start: 2017-02-01 | End: 2017-02-03 | Stop reason: HOSPADM

## 2017-02-01 RX ORDER — FLUMAZENIL 0.1 MG/ML
0.2 INJECTION INTRAVENOUS AS NEEDED
Status: DISCONTINUED | OUTPATIENT
Start: 2017-02-01 | End: 2017-02-01 | Stop reason: HOSPADM

## 2017-02-01 RX ORDER — BUPIVACAINE HYDROCHLORIDE AND EPINEPHRINE 5; 5 MG/ML; UG/ML
INJECTION, SOLUTION EPIDURAL; INTRACAUDAL; PERINEURAL
Status: DISPENSED
Start: 2017-02-01 | End: 2017-02-01

## 2017-02-01 RX ORDER — NALOXONE HCL 0.4 MG/ML
0.1 VIAL (ML) INJECTION
Status: DISCONTINUED | OUTPATIENT
Start: 2017-02-01 | End: 2017-02-03 | Stop reason: HOSPADM

## 2017-02-01 RX ORDER — SODIUM CHLORIDE 9 MG/ML
100 INJECTION, SOLUTION INTRAVENOUS CONTINUOUS
Status: DISCONTINUED | OUTPATIENT
Start: 2017-02-01 | End: 2017-02-01

## 2017-02-01 RX ORDER — ROCURONIUM BROMIDE 10 MG/ML
INJECTION, SOLUTION INTRAVENOUS AS NEEDED
Status: DISCONTINUED | OUTPATIENT
Start: 2017-02-01 | End: 2017-02-01 | Stop reason: SURG

## 2017-02-01 RX ORDER — PROMETHAZINE HYDROCHLORIDE 25 MG/ML
12.5 INJECTION, SOLUTION INTRAMUSCULAR; INTRAVENOUS EVERY 6 HOURS PRN
Status: DISCONTINUED | OUTPATIENT
Start: 2017-02-01 | End: 2017-02-03 | Stop reason: HOSPADM

## 2017-02-01 RX ORDER — PROPOFOL 10 MG/ML
VIAL (ML) INTRAVENOUS AS NEEDED
Status: DISCONTINUED | OUTPATIENT
Start: 2017-02-01 | End: 2017-02-01 | Stop reason: SURG

## 2017-02-01 RX ORDER — SODIUM CHLORIDE 9 MG/ML
INJECTION, SOLUTION INTRAVENOUS
Status: COMPLETED
Start: 2017-02-01 | End: 2017-02-01

## 2017-02-01 RX ADMIN — HYDROMORPHONE HYDROCHLORIDE 0.5 MG: 1 INJECTION, SOLUTION INTRAMUSCULAR; INTRAVENOUS; SUBCUTANEOUS at 18:37

## 2017-02-01 RX ADMIN — PHENYLEPHRINE HYDROCHLORIDE 100 MCG: 10 INJECTION INTRAVENOUS at 08:46

## 2017-02-01 RX ADMIN — FENTANYL CITRATE 50 MCG: 50 INJECTION, SOLUTION INTRAMUSCULAR; INTRAVENOUS at 07:50

## 2017-02-01 RX ADMIN — SODIUM CHLORIDE 100 ML/HR: 900 INJECTION, SOLUTION INTRAVENOUS at 05:54

## 2017-02-01 RX ADMIN — HYDROCODONE BITARTRATE AND ACETAMINOPHEN 15 ML: 2.5; 108 SOLUTION ORAL at 16:20

## 2017-02-01 RX ADMIN — SODIUM CHLORIDE: 900 INJECTION, SOLUTION INTRAVENOUS at 10:20

## 2017-02-01 RX ADMIN — ROCURONIUM BROMIDE 10 MG: 10 INJECTION INTRAVENOUS at 09:40

## 2017-02-01 RX ADMIN — SODIUM CHLORIDE 125 ML/HR: 900 INJECTION, SOLUTION INTRAVENOUS at 18:25

## 2017-02-01 RX ADMIN — ROCURONIUM BROMIDE 45 MG: 10 INJECTION INTRAVENOUS at 07:27

## 2017-02-01 RX ADMIN — HYDROMORPHONE HYDROCHLORIDE 0.5 MG: 1 INJECTION, SOLUTION INTRAMUSCULAR; INTRAVENOUS; SUBCUTANEOUS at 10:59

## 2017-02-01 RX ADMIN — AMITRIPTYLINE HYDROCHLORIDE 20 MG: 10 TABLET, FILM COATED ORAL at 20:56

## 2017-02-01 RX ADMIN — SODIUM CHLORIDE 100 ML/HR: 9 INJECTION, SOLUTION INTRAVENOUS at 05:54

## 2017-02-01 RX ADMIN — RISPERIDONE 0.25 MG: 0.25 TABLET, FILM COATED ORAL at 14:51

## 2017-02-01 RX ADMIN — SUCCINYLCHOLINE CHLORIDE 100 MG: 20 INJECTION, SOLUTION INTRAMUSCULAR; INTRAVENOUS at 07:18

## 2017-02-01 RX ADMIN — SODIUM CHLORIDE 125 ML/HR: 900 INJECTION, SOLUTION INTRAVENOUS at 14:55

## 2017-02-01 RX ADMIN — ONDANSETRON 4 MG: 2 INJECTION INTRAMUSCULAR; INTRAVENOUS at 10:15

## 2017-02-01 RX ADMIN — EPHEDRINE SULFATE 10 MG: 50 INJECTION INTRAMUSCULAR; INTRAVENOUS; SUBCUTANEOUS at 08:35

## 2017-02-01 RX ADMIN — MIDAZOLAM 2 MG: 1 INJECTION INTRAMUSCULAR; INTRAVENOUS at 07:04

## 2017-02-01 RX ADMIN — SODIUM CHLORIDE: 900 INJECTION, SOLUTION INTRAVENOUS at 08:41

## 2017-02-01 RX ADMIN — HYDROMORPHONE HYDROCHLORIDE 0.5 MG: 1 INJECTION, SOLUTION INTRAMUSCULAR; INTRAVENOUS; SUBCUTANEOUS at 13:10

## 2017-02-01 RX ADMIN — SODIUM CHLORIDE: 900 INJECTION, SOLUTION INTRAVENOUS at 08:35

## 2017-02-01 RX ADMIN — PRAVASTATIN SODIUM 20 MG: 20 TABLET ORAL at 14:54

## 2017-02-01 RX ADMIN — LIDOCAINE HYDROCHLORIDE 80 MG: 20 INJECTION, SOLUTION INFILTRATION; PERINEURAL at 07:18

## 2017-02-01 RX ADMIN — ROCURONIUM BROMIDE 5 MG: 10 INJECTION INTRAVENOUS at 07:18

## 2017-02-01 RX ADMIN — FENTANYL CITRATE 25 MCG: 50 INJECTION, SOLUTION INTRAMUSCULAR; INTRAVENOUS at 07:18

## 2017-02-01 RX ADMIN — ALBUTEROL SULFATE 2.5 MG: 2.5 SOLUTION RESPIRATORY (INHALATION) at 21:19

## 2017-02-01 RX ADMIN — LOSARTAN POTASSIUM 25 MG: 25 TABLET ORAL at 14:51

## 2017-02-01 RX ADMIN — CITALOPRAM HYDROBROMIDE 40 MG: 40 TABLET ORAL at 14:52

## 2017-02-01 RX ADMIN — DEXAMETHASONE SODIUM PHOSPHATE 4 MG: 4 INJECTION, SOLUTION INTRAMUSCULAR; INTRAVENOUS at 10:16

## 2017-02-01 RX ADMIN — CEFAZOLIN SODIUM 2 G: 1 INJECTION, POWDER, FOR SOLUTION INTRAMUSCULAR; INTRAVENOUS at 07:23

## 2017-02-01 RX ADMIN — ROCURONIUM BROMIDE 15 MG: 10 INJECTION INTRAVENOUS at 08:35

## 2017-02-01 RX ADMIN — FENTANYL CITRATE 25 MCG: 50 INJECTION, SOLUTION INTRAMUSCULAR; INTRAVENOUS at 09:15

## 2017-02-01 RX ADMIN — RISPERIDONE 0.25 MG: 0.25 TABLET, FILM COATED ORAL at 20:57

## 2017-02-01 RX ADMIN — GLYCOPYRROLATE 0.4 MG: 0.2 INJECTION, SOLUTION INTRAMUSCULAR; INTRAVENOUS at 10:23

## 2017-02-01 RX ADMIN — EPHEDRINE SULFATE 5 MG: 50 INJECTION INTRAMUSCULAR; INTRAVENOUS; SUBCUTANEOUS at 07:40

## 2017-02-01 RX ADMIN — PROPOFOL 120 MG: 10 INJECTION, EMULSION INTRAVENOUS at 07:18

## 2017-02-01 RX ADMIN — TRAZODONE HYDROCHLORIDE 50 MG: 50 TABLET ORAL at 20:57

## 2017-02-01 RX ADMIN — NEOSTIGMINE METHYLSULFATE 3 MG: 1 INJECTION, SOLUTION INTRAMUSCULAR; INTRAVENOUS; SUBCUTANEOUS at 10:23

## 2017-02-01 RX ADMIN — HYDROMORPHONE HYDROCHLORIDE 0.5 MG: 1 INJECTION, SOLUTION INTRAMUSCULAR; INTRAVENOUS; SUBCUTANEOUS at 11:25

## 2017-02-01 NOTE — INTERVAL H&P NOTE
H&P reviewed. The patient was examined and there are no changes to the H&P.             This document has been electronically signed by Ced Wilson MD on February 1, 2017 6:53 AM

## 2017-02-01 NOTE — ANESTHESIA POSTPROCEDURE EVALUATION
Patient: Mary Nelson    Procedure Summary     Date Anesthesia Start Anesthesia Stop Room / Location    02/01/17 0709 1043  [facility] / [facility]       Procedure Diagnosis Surgeon Provider    LAPAROSCOPIC NISSEN FUNDOPLICATION,  HIATAL HERNIA REPAIR   (N/A Abdomen) Hiatal hernia with gastroesophageal reflux  (Hiatal hernia with gastroesophageal reflux [K21.9, K44.9]) Ced Wilson MD No responsible provider of record.          Anesthesia Type: general  Last vitals  /65 (02/01/17 1050)    Temp 97.1 °F (36.2 °C) (02/01/17 1035)    Pulse 77 (02/01/17 1050)   Resp 16 (02/01/17 1050)    SpO2 94 % (02/01/17 1050)      Post Anesthesia Care and Evaluation    Patient location during evaluation: bedside  Patient participation: complete - patient participated  Level of consciousness: awake and alert  Pain score: 0  Pain management: adequate  Airway patency: patent  Anesthetic complications: No anesthetic complications  PONV Status: none  Cardiovascular status: acceptable  Respiratory status: acceptable  Hydration status: acceptable

## 2017-02-01 NOTE — PLAN OF CARE
Problem: Patient Care Overview (Adult)  Goal: Plan of Care Review  Outcome: Ongoing (interventions implemented as appropriate)    02/01/17 1521   Coping/Psychosocial Response Interventions   Plan Of Care Reviewed With patient   Patient Care Overview   Progress improving   Outcome Evaluation   Outcome Summary/Follow up Plan pt able to tolerate clears; swallows pills whole and without difficulty; no nausea or vomitting; pain well controlled at this time       Goal: Adult Individualization and Mutuality  Outcome: Ongoing (interventions implemented as appropriate)  Goal: Discharge Needs Assessment  Outcome: Ongoing (interventions implemented as appropriate)    Problem: Perioperative Period (Adult)  Goal: Signs and Symptoms of Listed Potential Problems Will be Absent or Manageable (Perioperative Period)  Outcome: Ongoing (interventions implemented as appropriate)

## 2017-02-01 NOTE — PLAN OF CARE
Problem: Patient Care Overview (Adult)  Goal: Plan of Care Review  Outcome: Ongoing (interventions implemented as appropriate)    02/01/17 1119   Coping/Psychosocial Response Interventions   Plan Of Care Reviewed With patient   Patient Care Overview   Progress improving   Outcome Evaluation   Outcome Summary/Follow up Plan vss, dc per anesthesia protocol         Problem: Perioperative Period (Adult)  Goal: Signs and Symptoms of Listed Potential Problems Will be Absent or Manageable (Perioperative Period)  Outcome: Ongoing (interventions implemented as appropriate)

## 2017-02-01 NOTE — ANESTHESIA PROCEDURE NOTES
Airway  Urgency: elective      General Information and Staff    Patient location during procedure: OR  CRNA: MOISES PARKER    Indications and Patient Condition  Indications for airway management: airway protection    Preoxygenated: yes  Mask difficulty assessment: 0 - not attempted    Final Airway Details  Final airway type: endotracheal airway      Successful airway: ETT  Cuffed: yes   Successful intubation technique: direct laryngoscopy  Facilitating devices/methods: intubating stylet  Endotracheal tube insertion site: oral  Blade: Pearl  Blade size: #3  ETT size: 7.0 mm  Cormack-Lehane Classification: grade I - full view of glottis  Placement verified by: chest auscultation and capnometry   Cuff volume (mL): 5  Measured from: lips  ETT to lips (cm): 18  Number of attempts at approach: 1

## 2017-02-01 NOTE — OP NOTE
NISSEN FUNDOPLICATION LAPAROSCOPIC  Procedure Note    Mary Nelson  2/1/2017    Pre-op Diagnosis:   Hiatal hernia with gastroesophageal reflux [K21.9, K44.9]    Post-op Diagnosis:     Post-Op Diagnosis Codes:     * Hiatal hernia with gastroesophageal reflux [K21.9, K44.9]    Procedure/CPT® Codes:      Procedure(s):  LAPAROSCOPIC NISSEN FUNDOPLICATION,  HIATAL HERNIA REPAIR      Surgeon(s):  MD Donny uGpta MD    Anesthesia: General    Staff:   Circulator: Debora Gurrola RN; Celeste Deshpande RN  Scrub Person: Kal Iqbal  Assistant: Aileen Conley  Other: Steffi Kern RN    Estimated Blood Loss: 150 mL  IVF: 2200cc Crystalloid  Specimens:                * No specimens in log *      Drains:   Urethral Catheter 02/01/17 0720 silastic 10 10 (Active)   Daily Indications Selected surgeries ( tract, abdomen) 2/1/2017 10:35 AM   Securement secured to upper leg with adhesive device 2/1/2017 10:35 AM           Findings: Type 3 Hiatal Hernia    Complications: none    Indication: See H and P, GERD with Hiatal hernia    Operative Note:    Patient was seen and consented in same day surgery.  She was then brought to the OR and placed in supine position on the table.  General anesthetic was administered and she was orotracheally intubated without issue.  A mcgregor catheter was placed and secured by nursing.  A preoperative briefing was performed and the abdomen was prepped and draped with the arms tucked.    A timeout was then performed and the costal margin and xiphoid were marked on the skin.  Local anesthetic was then injected below the costal margin in the left anterior axillary line.  A small skin incision was made and a 5mm optical viewing trocar with the camera inserted was used to enter the abdomen without difficulty.  The abdomen was then insufflated to 15mmHg without issue and the area below port insertion was inspected and found to be without injury.  Two 11mm ports were then  placed under direct vision to the left and right of the umbilicus after injection of local.  The camera was moved to one of these ports and a 5mm port was then placed directly below the xiphoid process.  This port was then removed and replaced with the Arline liver retractor.  Once the retractor was in place I verified that we had a good view of the hiatus.  An additional 5mm port was then placed in the RUQ in the subcostal margin.    The patient was then placed in steep reverse [place] positioing which allowed for greater visualization of the hiatus.  The lesser curve of the stomach was identified and grasped elevating it laterally.  The pars flaccida was then visible.  It was elevated upward with a grasper and divided using the harmonic up to the level of the right ethan of the diaphragm.  The stomach was noted to be herniated through a moderate sized hiatal defect.  It was gently reduced down in to the abdomen and we continued dividing the medial tissues until the hernia sac was encountered anteriorly.  No accessory or placed left hepatic vessel was noted during this dissection.  Once this was done the right ethan of the diaphragm was well visualized.     We then turned our attention to the greater curvature of the stomach.  Approximately 1/3 down its length it was grasped and the adjacent omentum was retracted laterally.  The clear space was then identified and we used the harmonic to divide the tissues away from the stomach up to the short gastric vessels.  In order to visualize this area further cephalad retraction on the stomach was needed.  Once the short gastrics were visualized they were sequentially divided using the Harmonic as well, and hemostasis appeared to be good.  I was then able to visualize the left ethan well.    The hernia sac anteriorly was then grasped and pulled down through the hiatus. It was blunting dissected away from the mediastinal tissues until it was completely reduced in to the  abdominal cavity.  The anterior attachments at the phreno-esophageal ligament were then divided completely freeing the sac from the hiatus.  At this point we were able to visualize the entire hiatus well.    Blunt dissection was used to mobilize the esophagus for several centimeters up into the chest.  At this point we were able to relax our downward retraction on the stomach and verified that it remained intra-abdominal as did a few cm of esophagus.      We then began right to left blunt dissection posterior to the esophagus and stomach.  Visualization on the patient's left side was aided by placing a prolene suture through the omental fat in this region to allow persistent downward and lateral retraction.  Blunt dissection with graspers was then carried out from right to left creating a window behind the stomach and esophagus.  Once the grasper was visualized in the LUQ a penrose drain was inserted and brought around and posterior to the upper stomach.  This allowed for cephalad retraction on the stomach and esophagus and we then used the harmonic and blunt dissection to complete our mobilization of the distal esophagus, taking care not to injure the visualized Vagus nerves anteriorly and posteriorly.    Once this mobilization was completed it became apparent that there was a small capsular injury to the spleen from retraction of the omentum, this retraction was relaxed and Surgicel was placed and pressure held until hemostasis appeared satisfactory.    Attention was then given to the hiatal closure.  From the patient's right we were able to visualize both the right and left crura.  0 Ethibond sutures were then placed in interrupted fashion to close the hiatus posteriorly with a 56 Faro[place] Bougie in place.  Once we were satisfied with the hiatal closure attention was turned to performing the fundoplication.  The window posterior to the esophagus and stomach was used to pass a grasper from right to left.  The upper  portion of the fundus was then grasped at its posterior aspect and brought behind and around.  A 'shoe-shine' maneuver was performed confirming correct orientation and positioning of the wrap.  Three 0 Ethibond sutures were then placed from fundus to fundus to create the wrap around the Bougie.  Once suture also incorporated a small bite of esophagus.  Once this was done the Bougie was removed.  We examined the wrap and were satisfied with its position and orientation.  The spleen was examined and appeared to be hemostatic.      The Arline retractor was then removed as was the penrose drain.  Counts were confirmed to be correct and the two 11mm ports were removed and the fascia closed with 0 Vicryl.  The abdomen was reinsufflated and the closures were examined and found to be good.  The abdomen was then desufflated and the 5mm ports removed.  All skin incisions were closed with 4-0 Vicryl.  Skin affix was placed over the incisions.      The patient was extubated and returned to recovery in good condition.          This document has been electronically signed by Ced Wilson MD on February 1, 2017 11:28 AM        Ced Wilson MD     Date: 2/1/2017  Time: 10:59 AM

## 2017-02-01 NOTE — PLAN OF CARE
Problem: Patient Care Overview (Adult)  Goal: Adult Individualization and Mutuality  Outcome: Ongoing (interventions implemented as appropriate)    02/01/17 0557   Individualization   Patient Specific Preferences pt goes by Melina

## 2017-02-01 NOTE — ANESTHESIA PREPROCEDURE EVALUATION
Anesthesia Evaluation     Patient summary reviewed and Nursing notes reviewed    Airway   Mallampati: II  TM distance: >3 FB  Neck ROM: full  no difficulty expected  Dental - normal exam     Pulmonary - normal exam   (+) COPD,   Cardiovascular - normal exam  Exercise tolerance: good (4-7 METS)  (+) hypertension,     Rhythm: regular  Rate: normal    Neuro/Psych  (+) psychiatric history Depression,    GI/Hepatic/Renal/Endo    (+) obesity,  hiatal hernia, GERD,     Musculoskeletal     Abdominal  - normal exam   Substance History - negative use     OB/GYN          Other   (+) arthritis                          Anesthesia Plan    ASA 2     general     intravenous induction   Anesthetic plan and risks discussed with patient.

## 2017-02-02 LAB
ANION GAP SERPL CALCULATED.3IONS-SCNC: 9 MMOL/L (ref 5–15)
BASOPHILS # BLD AUTO: 0.01 10*3/MM3 (ref 0–0.2)
BASOPHILS NFR BLD AUTO: 0.1 % (ref 0–2)
BUN BLD-MCNC: 7 MG/DL (ref 7–21)
BUN/CREAT SERPL: 10.1 (ref 7–25)
CALCIUM SPEC-SCNC: 8.1 MG/DL (ref 8.4–10.2)
CHLORIDE SERPL-SCNC: 107 MMOL/L (ref 95–110)
CO2 SERPL-SCNC: 24 MMOL/L (ref 22–31)
CREAT BLD-MCNC: 0.69 MG/DL (ref 0.5–1)
DEPRECATED RDW RBC AUTO: 46.8 FL (ref 36.4–46.3)
EOSINOPHIL # BLD AUTO: 0 10*3/MM3 (ref 0–0.7)
EOSINOPHIL NFR BLD AUTO: 0 % (ref 0–7)
ERYTHROCYTE [DISTWIDTH] IN BLOOD BY AUTOMATED COUNT: 14.3 % (ref 11.5–14.5)
GFR SERPL CREATININE-BSD FRML MDRD: 87 ML/MIN/1.73 (ref 51–120)
GLUCOSE BLD-MCNC: 112 MG/DL (ref 60–100)
HCT VFR BLD AUTO: 35.2 % (ref 35–45)
HGB BLD-MCNC: 11.4 G/DL (ref 12–15.5)
IMM GRANULOCYTES # BLD: 0.03 10*3/MM3 (ref 0–0.02)
IMM GRANULOCYTES NFR BLD: 0.2 % (ref 0–0.5)
LYMPHOCYTES # BLD AUTO: 1.14 10*3/MM3 (ref 0.6–4.2)
LYMPHOCYTES NFR BLD AUTO: 9.3 % (ref 10–50)
MCH RBC QN AUTO: 28.6 PG (ref 26.5–34)
MCHC RBC AUTO-ENTMCNC: 32.4 G/DL (ref 31.4–36)
MCV RBC AUTO: 88.2 FL (ref 80–98)
MONOCYTES # BLD AUTO: 0.52 10*3/MM3 (ref 0–0.9)
MONOCYTES NFR BLD AUTO: 4.2 % (ref 0–12)
NEUTROPHILS # BLD AUTO: 10.6 10*3/MM3 (ref 2–8.6)
NEUTROPHILS NFR BLD AUTO: 86.2 % (ref 37–80)
PLATELET # BLD AUTO: 156 10*3/MM3 (ref 150–450)
PMV BLD AUTO: 10.6 FL (ref 8–12)
POTASSIUM BLD-SCNC: 4.1 MMOL/L (ref 3.5–5.1)
RBC # BLD AUTO: 3.99 10*6/MM3 (ref 3.77–5.16)
RBC MORPH BLD: NORMAL
SMALL PLATELETS BLD QL SMEAR: ADEQUATE
SODIUM BLD-SCNC: 140 MMOL/L (ref 137–145)
WBC MORPH BLD: NORMAL
WBC NRBC COR # BLD: 12.3 10*3/MM3 (ref 3.2–9.8)

## 2017-02-02 PROCEDURE — 94640 AIRWAY INHALATION TREATMENT: CPT

## 2017-02-02 PROCEDURE — 85025 COMPLETE CBC W/AUTO DIFF WBC: CPT | Performed by: SURGERY

## 2017-02-02 PROCEDURE — 94760 N-INVAS EAR/PLS OXIMETRY 1: CPT

## 2017-02-02 PROCEDURE — 85007 BL SMEAR W/DIFF WBC COUNT: CPT | Performed by: SURGERY

## 2017-02-02 PROCEDURE — 99024 POSTOP FOLLOW-UP VISIT: CPT | Performed by: SURGERY

## 2017-02-02 PROCEDURE — 94799 UNLISTED PULMONARY SVC/PX: CPT

## 2017-02-02 PROCEDURE — 25010000002 ENOXAPARIN PER 10 MG: Performed by: SURGERY

## 2017-02-02 PROCEDURE — 80048 BASIC METABOLIC PNL TOTAL CA: CPT | Performed by: SURGERY

## 2017-02-02 PROCEDURE — 25010000002 HYDROMORPHONE PER 4 MG: Performed by: SURGERY

## 2017-02-02 RX ADMIN — ALBUTEROL SULFATE 2.5 MG: 2.5 SOLUTION RESPIRATORY (INHALATION) at 13:30

## 2017-02-02 RX ADMIN — ENOXAPARIN SODIUM 40 MG: 40 INJECTION SUBCUTANEOUS at 09:20

## 2017-02-02 RX ADMIN — HYDROCODONE BITARTRATE AND ACETAMINOPHEN 15 ML: 2.5; 108 SOLUTION ORAL at 05:09

## 2017-02-02 RX ADMIN — CITALOPRAM HYDROBROMIDE 40 MG: 40 TABLET ORAL at 09:20

## 2017-02-02 RX ADMIN — TRAZODONE HYDROCHLORIDE 50 MG: 50 TABLET ORAL at 20:26

## 2017-02-02 RX ADMIN — AMITRIPTYLINE HYDROCHLORIDE 20 MG: 10 TABLET, FILM COATED ORAL at 20:26

## 2017-02-02 RX ADMIN — ALBUTEROL SULFATE 2.5 MG: 2.5 SOLUTION RESPIRATORY (INHALATION) at 17:12

## 2017-02-02 RX ADMIN — ALBUTEROL SULFATE 2.5 MG: 2.5 SOLUTION RESPIRATORY (INHALATION) at 08:01

## 2017-02-02 RX ADMIN — LOSARTAN POTASSIUM 25 MG: 25 TABLET ORAL at 09:20

## 2017-02-02 RX ADMIN — SODIUM CHLORIDE 125 ML/HR: 900 INJECTION, SOLUTION INTRAVENOUS at 12:07

## 2017-02-02 RX ADMIN — ALBUTEROL SULFATE 2.5 MG: 2.5 SOLUTION RESPIRATORY (INHALATION) at 20:08

## 2017-02-02 RX ADMIN — SODIUM CHLORIDE 125 ML/HR: 900 INJECTION, SOLUTION INTRAVENOUS at 04:03

## 2017-02-02 RX ADMIN — HYDROCODONE BITARTRATE AND ACETAMINOPHEN 15 ML: 2.5; 108 SOLUTION ORAL at 23:07

## 2017-02-02 RX ADMIN — RISPERIDONE 0.25 MG: 0.25 TABLET, FILM COATED ORAL at 09:20

## 2017-02-02 RX ADMIN — RISPERIDONE 0.25 MG: 0.25 TABLET, FILM COATED ORAL at 17:41

## 2017-02-02 RX ADMIN — HYDROMORPHONE HYDROCHLORIDE 0.5 MG: 1 INJECTION, SOLUTION INTRAMUSCULAR; INTRAVENOUS; SUBCUTANEOUS at 13:36

## 2017-02-02 RX ADMIN — SODIUM CHLORIDE 125 ML/HR: 900 INJECTION, SOLUTION INTRAVENOUS at 19:20

## 2017-02-02 RX ADMIN — PRAVASTATIN SODIUM 20 MG: 20 TABLET ORAL at 09:20

## 2017-02-02 RX ADMIN — HYDROCODONE BITARTRATE AND ACETAMINOPHEN 15 ML: 2.5; 108 SOLUTION ORAL at 17:39

## 2017-02-02 RX ADMIN — HYDROCODONE BITARTRATE AND ACETAMINOPHEN 15 ML: 2.5; 108 SOLUTION ORAL at 10:46

## 2017-02-02 NOTE — PLAN OF CARE
Problem: Patient Care Overview (Adult)  Goal: Plan of Care Review  Outcome: Ongoing (interventions implemented as appropriate)    02/02/17 1548   Coping/Psychosocial Response Interventions   Plan Of Care Reviewed With patient   Patient Care Overview   Progress improving   Outcome Evaluation   Outcome Summary/Follow up Plan pt ambulating well; tolerating full liquid diet and no bm today; tolerating po pain meds well       Goal: Adult Individualization and Mutuality  Outcome: Ongoing (interventions implemented as appropriate)  Goal: Discharge Needs Assessment  Outcome: Ongoing (interventions implemented as appropriate)    Problem: Perioperative Period (Adult)  Goal: Signs and Symptoms of Listed Potential Problems Will be Absent or Manageable (Perioperative Period)  Outcome: Ongoing (interventions implemented as appropriate)

## 2017-02-02 NOTE — PLAN OF CARE
Problem: Patient Care Overview (Adult)  Goal: Plan of Care Review  Outcome: Ongoing (interventions implemented as appropriate)    02/02/17 0316   Coping/Psychosocial Response Interventions   Plan Of Care Reviewed With patient   Patient Care Overview   Progress improving   Outcome Evaluation   Outcome Summary/Follow up Plan pt. ambulating in hallway,pain control,no nausea or vomiting       Goal: Adult Individualization and Mutuality  Outcome: Ongoing (interventions implemented as appropriate)  Goal: Discharge Needs Assessment  Outcome: Ongoing (interventions implemented as appropriate)    Problem: Perioperative Period (Adult)  Goal: Signs and Symptoms of Listed Potential Problems Will be Absent or Manageable (Perioperative Period)  Outcome: Ongoing (interventions implemented as appropriate)

## 2017-02-02 NOTE — PROGRESS NOTES
GENERAL SURGERY PROGRESS NOTE  Chief Complaint:  Surgery Follow up   LOS: 0 days       Subjective     Interval History:     Tolerated clears, no OOB        Objective     Vital Signs  Temp:  [94.9 °F (34.9 °C)-98.2 °F (36.8 °C)] 98.2 °F (36.8 °C)  Heart Rate:  [76-98] 84  Resp:  [16-18] 18  BP: (106-135)/(62-80) 132/69    Physical Exam:   Abdomen soft, incisions clean  Labs:  Lab Results (last 24 hours)     Procedure Component Value Units Date/Time    Scan Slide [[id number]] Collected:  02/02/17 0526    Specimen:  Blood Updated:  02/02/17 0604    Basic Metabolic Panel [[id number]]  (Abnormal) Collected:  02/02/17 0526    Specimen:  Blood Updated:  02/02/17 0613     Glucose 112 (H) mg/dL      BUN 7 mg/dL      Creatinine 0.69 mg/dL      Sodium 140 mmol/L      Potassium 4.1 mmol/L      Chloride 107 mmol/L      CO2 24.0 mmol/L      Calcium 8.1 (L) mg/dL      eGFR Non African Amer 87 mL/min/1.73      BUN/Creatinine Ratio 10.1      Anion Gap 9.0 mmol/L     CBC & Differential [[id number]] Collected:  02/02/17 0526    Specimen:  Blood Updated:  02/02/17 0639    Narrative:       The following orders were created for panel order CBC & Differential.  Procedure                               Abnormality         Status                     ---------                               -----------         ------                     Scan Slide[[id number]]                                        In process                 CBC Auto Differential[[id number]]         Abnormal            Final result                 Please view results for these tests on the individual orders.    CBC Auto Differential [[id number]]  (Abnormal) Collected:  02/02/17 0526    Specimen:  Blood Updated:  02/02/17 0639     WBC 12.30 (H) 10*3/mm3      RBC 3.99 10*6/mm3      Hemoglobin 11.4 (L) g/dL      Hematocrit 35.2 %      MCV 88.2 fL      MCH 28.6 pg      MCHC 32.4 g/dL      RDW 14.3 %      RDW-SD 46.8 (H) fl      MPV 10.6 fL      Platelets 156 10*3/mm3      Neutrophil % 86.2 (H)  %      Lymphocyte % 9.3 (L) %      Monocyte % 4.2 %      Eosinophil % 0.0 %      Basophil % 0.1 %      Immature Grans % 0.2 %      Neutrophils, Absolute 10.60 (H) 10*3/mm3      Lymphocytes, Absolute 1.14 10*3/mm3      Monocytes, Absolute 0.52 10*3/mm3      Eosinophils, Absolute 0.00 10*3/mm3      Basophils, Absolute 0.01 10*3/mm3      Immature Grans, Absolute 0.03 (H) 10*3/mm3            Results Review:     Labs and imaging for today were reviewed.        Assessment/Plan     Mary Nelson is a 61 y.o. female who is POD #1 Lap Nissen      Advance to fulls  PPI/LMWH  Needs to ambulate  D/C Hernández this AM.          This document has been electronically signed by Ced Wilson MD on February 2, 2017 7:31 AM        Ced Wilson MD  02/02/17  7:31 AM

## 2017-02-02 NOTE — PROGRESS NOTES
Discharge Planning Assessment  [facility]     Patient Name: Mary Nelson  MRN: [id number]  Today's Date: 2/2/2017    Admit Date: 2/1/2017          Discharge Needs Assessment       02/02/17 1034    Living Environment    Lives With spouse    Living Arrangements house    Provides Primary Care For no one    Quality Of Family Relationships supportive    Able to Return to Prior Living Arrangements yes    Discharge Needs Assessment    Concerns To Be Addressed no discharge needs identified    Readmission Within The Last 30 Days no previous admission in last 30 days    Anticipated Changes Related to Illness none    Equipment Currently Used at Home none    Equipment Needed After Discharge none    Transportation Available family or friend will provide            Discharge Plan       02/02/17 1036    Case Management/Social Work Plan    Plan plans to return home     Patient/Family In Agreement With Plan yes        Discharge Placement     No information found                Demographic Summary       02/02/17 1031    Referral Information    Admission Type inpatient    Arrived From home or self-care    Referral Source physician    Reason For Consult decision making concerns    Primary Care Physician Information    Name Dr Jean            Functional Status       02/02/17 1032    Functional Status Current    Ambulation 0-->independent    Transferring 0-->independent    Toileting 0-->independent    Bathing 0-->independent    Dressing 0-->independent    Eating 0-->independent    Communication 0-->understands/communicates without difficulty    Swallowing (if score 2 or more for any item, consult Rehab Services) 0-->swallows foods/liquids without difficulty    Functional Status Prior    Ambulation 0-->independent    Transferring 0-->independent    Toileting 0-->independent    Bathing 0-->independent    Dressing 0-->independent    Eating 0-->independent    Communication 0-->understands/communicates without difficulty     Swallowing 0-->swallows foods/liquids without difficulty    IADL    Medications independent    Meal Preparation independent    Housekeeping independent    Laundry independent    Shopping independent    Oral Care independent    Activity Tolerance    Current Activity Limitations lifting restrictions    Usual Activity Tolerance good    Current Activity Tolerance good    Cognitive/Perceptual/Developmental    Current Mental Status/Cognitive Functioning no deficits noted    Recent Changes in Mental Status/Cognitive Functioning no changes    Employment/Financial    Employment/Finance Comments pt does not work            Psychosocial     None            Abuse/Neglect     None            Legal     None            Substance Abuse     None            Patient Forms     None          Jackie Desai

## 2017-02-03 VITALS
DIASTOLIC BLOOD PRESSURE: 66 MMHG | HEART RATE: 114 BPM | RESPIRATION RATE: 18 BRPM | TEMPERATURE: 97.8 F | BODY MASS INDEX: 31.3 KG/M2 | OXYGEN SATURATION: 98 % | HEIGHT: 61 IN | SYSTOLIC BLOOD PRESSURE: 117 MMHG | WEIGHT: 165.79 LBS

## 2017-02-03 PROCEDURE — 25010000004 PNEUMOCOCCAL VAC POLYVALENT PER 0.5 ML: Performed by: SURGERY

## 2017-02-03 PROCEDURE — 94760 N-INVAS EAR/PLS OXIMETRY 1: CPT

## 2017-02-03 PROCEDURE — 99024 POSTOP FOLLOW-UP VISIT: CPT | Performed by: SURGERY

## 2017-02-03 PROCEDURE — 90732 PPSV23 VACC 2 YRS+ SUBQ/IM: CPT | Performed by: SURGERY

## 2017-02-03 PROCEDURE — 94799 UNLISTED PULMONARY SVC/PX: CPT

## 2017-02-03 PROCEDURE — 90682 RIV4 VACC RECOMBINANT DNA IM: CPT | Performed by: SURGERY

## 2017-02-03 PROCEDURE — 94640 AIRWAY INHALATION TREATMENT: CPT

## 2017-02-03 PROCEDURE — G0009 ADMIN PNEUMOCOCCAL VACCINE: HCPCS | Performed by: SURGERY

## 2017-02-03 PROCEDURE — 25010000002 ENOXAPARIN PER 10 MG: Performed by: SURGERY

## 2017-02-03 PROCEDURE — 25010000004 INFLUENZA VAC SPLIT QUAD SUSPENSION: Performed by: SURGERY

## 2017-02-03 RX ADMIN — INFLUENZA A VIRUS A/CALIFORNIA/7/2009 X-179A (H1N1) ANTIGEN (FORMALDEHYDE INACTIVATED), INFLUENZA A VIRUS A/HONG KONG/4801/2014 X-263B (H3N2) ANTIGEN (FORMALDEHYDE INACTIVATED), INFLUENZA B VIRUS B/PHUKET/3073/2013 ANTIGEN (FORMALDEHYDE INACTIVATED), AND INFLUENZA B VIRUS B/BRISBANE/60/2008 ANTIGEN (FORMALDEHYDE INACTIVATED) 0.5 ML: 15; 15; 15; 15 INJECTION, SUSPENSION INTRAMUSCULAR at 12:29

## 2017-02-03 RX ADMIN — PRAVASTATIN SODIUM 20 MG: 20 TABLET ORAL at 09:28

## 2017-02-03 RX ADMIN — CITALOPRAM HYDROBROMIDE 40 MG: 40 TABLET ORAL at 09:28

## 2017-02-03 RX ADMIN — PNEUMOCOCCAL VACCINE POLYVALENT 0.5 ML
25; 25; 25; 25; 25; 25; 25; 25; 25; 25; 25; 25; 25; 25; 25; 25; 25; 25; 25; 25; 25; 25; 25 INJECTION, SOLUTION INTRAMUSCULAR; SUBCUTANEOUS at 12:27

## 2017-02-03 RX ADMIN — ENOXAPARIN SODIUM 40 MG: 40 INJECTION SUBCUTANEOUS at 09:28

## 2017-02-03 RX ADMIN — HYDROCODONE BITARTRATE AND ACETAMINOPHEN 15 ML: 2.5; 108 SOLUTION ORAL at 09:28

## 2017-02-03 RX ADMIN — ALBUTEROL SULFATE 2.5 MG: 2.5 SOLUTION RESPIRATORY (INHALATION) at 08:05

## 2017-02-03 RX ADMIN — HYDROCODONE BITARTRATE AND ACETAMINOPHEN 15 ML: 2.5; 108 SOLUTION ORAL at 04:57

## 2017-02-03 RX ADMIN — SODIUM CHLORIDE 125 ML/HR: 900 INJECTION, SOLUTION INTRAVENOUS at 04:37

## 2017-02-03 RX ADMIN — LOSARTAN POTASSIUM 25 MG: 25 TABLET ORAL at 09:28

## 2017-02-03 RX ADMIN — RISPERIDONE 0.25 MG: 0.25 TABLET, FILM COATED ORAL at 09:28

## 2017-02-03 NOTE — PLAN OF CARE
Problem: Patient Care Overview (Adult)  Goal: Plan of Care Review  Outcome: Ongoing (interventions implemented as appropriate)    02/03/17 0228   Coping/Psychosocial Response Interventions   Plan Of Care Reviewed With patient   Patient Care Overview   Progress progress toward functional goals as expected   Outcome Evaluation   Outcome Summary/Follow up Plan Pt verbalizes increased soreness; no BM at this time; tolerating full liquid diet well       Goal: Adult Individualization and Mutuality  Outcome: Ongoing (interventions implemented as appropriate)  Goal: Discharge Needs Assessment  Outcome: Ongoing (interventions implemented as appropriate)    Problem: Perioperative Period (Adult)  Goal: Signs and Symptoms of Listed Potential Problems Will be Absent or Manageable (Perioperative Period)  Outcome: Ongoing (interventions implemented as appropriate)

## 2017-02-03 NOTE — PROGRESS NOTES
GENERAL SURGERY PROGRESS NOTE  Chief Complaint:  Surgery Follow up   LOS: 0 days       Subjective     Interval History:     Tolerating fulls. Ambulating well. Feels well, some soreness      Objective     Vital Signs  Temp:  [96.2 °F (35.7 °C)-99.7 °F (37.6 °C)] 99.2 °F (37.3 °C)  Heart Rate:  [] 100  Resp:  [16-20] 18  BP: (105-129)/(56-79) 119/79    Physical Exam:   Abdomen soft, incisions clean       Assessment/Plan     Mary Nelson is a 61 y.o. female who is s/p Lap Nissen      Continue fulls.  Will plan for d/c today.          This document has been electronically signed by Ced Wilson MD on February 3, 2017 7:44 AM        Ced Wilson MD  02/03/17  7:44 AM

## 2017-02-03 NOTE — DISCHARGE INSTR - APPOINTMENTS
Ge Jean MD     PCP - General Family Medicine [phone]  [facility]  [address] [place] [address]           Follow up with Dr. Jean Thursday February 9th 2017 1:00PM

## 2017-02-03 NOTE — PLAN OF CARE
Problem: Patient Care Overview (Adult)  Goal: Plan of Care Review  Outcome: Outcome(s) achieved Date Met:  02/03/17 02/03/17 1039 02/03/17 1043   Coping/Psychosocial Response Interventions   Plan Of Care Reviewed With --  patient   Patient Care Overview   Progress --  improving   Outcome Evaluation   Outcome Summary/Follow up Plan Pt discharged home with follow up with md --

## 2017-02-03 NOTE — DISCHARGE SUMMARY
Discharge Summary  Date: 02/03/17  Service: General Surgery  Attending: Ced Wilson    Procedures: Laparoscopic Nissen Fundoplication  Consults: none  Discharge Diagnoses: Hiatal Hernia with Reflux  Hospital Course: 62 yo admitted after Laparoscopic Nissen and hiatal hernia repair.  She did well initially with clear liquids and was advanced to full liquids which she tolerated and is being discharged on.  She was ambulatory and her pain was well controlled with PO medications.    Discharge Medications:     Your medication list       As of 2/3/2017 12:35 PM      START taking these medications       Instructions Last Dose Given Next Dose Due    HYDROcodone-acetaminophen 7.5-325 MG/15ML solution   Commonly known as:  HYCET        Take 15 mL by mouth Every 4 (Four) Hours As Needed (pain).           CONTINUE taking these medications       Instructions Last Dose Given Next Dose Due    AMITRIPTYLINE HCL PO              CELEXA 40 MG tablet   Generic drug:  citalopram              DICLOFENAC PO              losartan 25 MG tablet   Commonly known as:  COZAAR              lovastatin 20 MG tablet   Commonly known as:  MEVACOR              omeprazole 40 MG capsule   Commonly known as:  priLOSEC              RISPERDAL 0.25 MG tablet   Generic drug:  risperiDONE              TRAZODONE HCL PO              VENTOLIN  (90 BASE) MCG/ACT inhaler   Generic drug:  albuterol                   Where to Get Your Medications      You can get these medications from any pharmacy     Bring a paper prescription for each of these medications    • HYDROcodone-acetaminophen 7.5-325 MG/15ML solution             Activity Instructions     Discharge Activity       1) No driving while on pain medicine  2) May shower   3) Do not lift / push / pull more then 15 lbs.                   Scheduled Appointments     Feb 10, 2017  2:05 PM CST   Post-Op with Ced Wilson MD   [facility] GENERAL SURGERY (--)    [address] [place] [address]  Dr  [address]  [facility] [id number] [phone]            Mar 21, 2017 11:00 AM CDT   Office Visit with Loco Martinez MD   [facility] GASTROENTEROLOGY (--)    [address] [place] [address] [address] [place] [id number] [phone]           Arrive 15 minutes prior to appointment.            Additional instructions:       Ge Jean MD     PCP - General Family Medicine [phone]  [facility]  [address] [place] [address]           Follow up with Dr. Jean Thursday February 9th 2017 1:00PM                          This document has been electronically signed by Ced Wilson MD on February 3, 2017 12:47 PM

## 2017-02-03 NOTE — PLAN OF CARE
Problem: Patient Care Overview (Adult)  Goal: Plan of Care Review  Outcome: Outcome(s) achieved Date Met:  02/03/17

## 2017-02-03 NOTE — PLAN OF CARE
Problem: Patient Care Overview (Adult)  Goal: Plan of Care Review  Outcome: Outcome(s) achieved Date Met:  02/03/17 02/03/17 1039   Coping/Psychosocial Response Interventions   Plan Of Care Reviewed With patient   Patient Care Overview   Progress improving   Outcome Evaluation   Outcome Summary/Follow up Plan Pt discharged home with follow up with md       Goal: Adult Individualization and Mutuality  Outcome: Outcome(s) achieved Date Met:  02/03/17  Goal: Discharge Needs Assessment  Outcome: Outcome(s) achieved Date Met:  02/03/17    Problem: Perioperative Period (Adult)  Goal: Signs and Symptoms of Listed Potential Problems Will be Absent or Manageable (Perioperative Period)  Outcome: Outcome(s) achieved Date Met:  02/03/17

## 2017-02-13 ENCOUNTER — OFFICE VISIT (OUTPATIENT)
Dept: SURGERY | Facility: CLINIC | Age: 62
End: 2017-02-13

## 2017-02-13 VITALS
SYSTOLIC BLOOD PRESSURE: 108 MMHG | BODY MASS INDEX: 30.87 KG/M2 | WEIGHT: 163.5 LBS | DIASTOLIC BLOOD PRESSURE: 68 MMHG | HEIGHT: 61 IN

## 2017-02-13 DIAGNOSIS — Z09 FOLLOW UP: Primary | ICD-10-CM

## 2017-02-13 PROCEDURE — 99024 POSTOP FOLLOW-UP VISIT: CPT | Performed by: SURGERY

## 2017-02-13 RX ORDER — TIZANIDINE 4 MG/1
4 TABLET ORAL 3 TIMES DAILY
COMMUNITY
Start: 2016-08-26 | End: 2022-10-13

## 2017-02-13 RX ORDER — TRAZODONE HYDROCHLORIDE 50 MG/1
1 TABLET ORAL NIGHTLY
COMMUNITY
Start: 2016-02-10 | End: 2018-12-26

## 2017-02-13 RX ORDER — AMITRIPTYLINE HYDROCHLORIDE 50 MG/1
50 TABLET, FILM COATED ORAL NIGHTLY
COMMUNITY
Start: 2016-03-22 | End: 2017-03-23

## 2017-02-13 RX ORDER — OMEPRAZOLE 40 MG/1
40 CAPSULE, DELAYED RELEASE ORAL DAILY
COMMUNITY
Start: 2016-07-19 | End: 2019-11-18 | Stop reason: HOSPADM

## 2017-02-13 RX ORDER — LISINOPRIL 10 MG/1
10 TABLET ORAL DAILY
COMMUNITY
Start: 2017-02-09 | End: 2018-02-05

## 2017-02-13 RX ORDER — LOVASTATIN 20 MG/1
1 TABLET ORAL NIGHTLY
COMMUNITY
Start: 2017-01-31 | End: 2017-02-13 | Stop reason: SDUPTHER

## 2017-02-13 RX ORDER — ALBUTEROL SULFATE 90 UG/1
2 AEROSOL, METERED RESPIRATORY (INHALATION) 4 TIMES DAILY
Status: ON HOLD | COMMUNITY
Start: 2016-08-23 | End: 2019-03-07

## 2017-02-13 RX ORDER — DICLOFENAC SODIUM 75 MG/1
75 TABLET, DELAYED RELEASE ORAL 2 TIMES DAILY
COMMUNITY
Start: 2016-03-22 | End: 2017-04-20 | Stop reason: HOSPADM

## 2017-02-13 RX ORDER — RISPERIDONE 0.25 MG/1
0.25 TABLET ORAL 2 TIMES DAILY
COMMUNITY
Start: 2016-07-19 | End: 2017-02-13 | Stop reason: SDUPTHER

## 2017-02-13 RX ORDER — ACETAMINOPHEN 500 MG
500 TABLET ORAL EVERY 6 HOURS PRN
COMMUNITY
Start: 2017-02-09 | End: 2017-04-20 | Stop reason: HOSPADM

## 2017-02-13 RX ORDER — LOSARTAN POTASSIUM 25 MG/1
1 TABLET ORAL DAILY
COMMUNITY
Start: 2017-01-06 | End: 2017-02-13 | Stop reason: SDUPTHER

## 2017-02-13 RX ORDER — CITALOPRAM 40 MG/1
40 TABLET ORAL DAILY
COMMUNITY
Start: 2016-07-19 | End: 2020-09-30

## 2017-02-27 ENCOUNTER — APPOINTMENT (OUTPATIENT)
Dept: GENERAL RADIOLOGY | Facility: HOSPITAL | Age: 62
End: 2017-02-27

## 2017-02-27 ENCOUNTER — OFFICE VISIT (OUTPATIENT)
Dept: SURGERY | Facility: CLINIC | Age: 62
End: 2017-02-27

## 2017-02-27 ENCOUNTER — HOSPITAL ENCOUNTER (OUTPATIENT)
Facility: HOSPITAL | Age: 62
Setting detail: OBSERVATION
Discharge: HOME | End: 2017-02-28
Attending: EMERGENCY MEDICINE | Admitting: INTERNAL MEDICINE

## 2017-02-27 VITALS
DIASTOLIC BLOOD PRESSURE: 70 MMHG | SYSTOLIC BLOOD PRESSURE: 120 MMHG | WEIGHT: 169 LBS | BODY MASS INDEX: 31.91 KG/M2 | HEIGHT: 61 IN

## 2017-02-27 DIAGNOSIS — R07.9 CHEST PAIN, UNSPECIFIED TYPE: Primary | ICD-10-CM

## 2017-02-27 DIAGNOSIS — Z09 FOLLOW UP: ICD-10-CM

## 2017-02-27 DIAGNOSIS — R07.89 OTHER CHEST PAIN: Primary | ICD-10-CM

## 2017-02-27 LAB
ALBUMIN SERPL-MCNC: 4.2 G/DL (ref 3.4–4.8)
ALBUMIN/GLOB SERPL: 1.4 G/DL (ref 1.1–1.8)
ALP SERPL-CCNC: 140 U/L (ref 38–126)
ALT SERPL W P-5'-P-CCNC: 64 U/L (ref 9–52)
ANION GAP SERPL CALCULATED.3IONS-SCNC: 9 MMOL/L (ref 5–15)
AST SERPL-CCNC: 46 U/L (ref 14–36)
BASOPHILS # BLD AUTO: 0.02 10*3/MM3 (ref 0–0.2)
BASOPHILS NFR BLD AUTO: 0.4 % (ref 0–2)
BILIRUB SERPL-MCNC: 0.4 MG/DL (ref 0.2–1.3)
BUN BLD-MCNC: 16 MG/DL (ref 7–21)
BUN/CREAT SERPL: 15.1 (ref 7–25)
CALCIUM SPEC-SCNC: 9.1 MG/DL (ref 8.4–10.2)
CHLORIDE SERPL-SCNC: 100 MMOL/L (ref 95–110)
CO2 SERPL-SCNC: 27 MMOL/L (ref 22–31)
CREAT BLD-MCNC: 1.06 MG/DL (ref 0.5–1)
DEPRECATED RDW RBC AUTO: 47.9 FL (ref 36.4–46.3)
EOSINOPHIL # BLD AUTO: 0.08 10*3/MM3 (ref 0–0.7)
EOSINOPHIL NFR BLD AUTO: 1.6 % (ref 0–7)
ERYTHROCYTE [DISTWIDTH] IN BLOOD BY AUTOMATED COUNT: 14.8 % (ref 11.5–14.5)
GFR SERPL CREATININE-BSD FRML MDRD: 53 ML/MIN/1.73 (ref 51–120)
GLOBULIN UR ELPH-MCNC: 3.1 GM/DL (ref 2.3–3.5)
GLUCOSE BLD-MCNC: 93 MG/DL (ref 60–100)
HCT VFR BLD AUTO: 37.5 % (ref 35–45)
HGB BLD-MCNC: 11.8 G/DL (ref 12–15.5)
HOLD SPECIMEN: NORMAL
HOLD SPECIMEN: NORMAL
IMM GRANULOCYTES # BLD: 0.02 10*3/MM3 (ref 0–0.02)
IMM GRANULOCYTES NFR BLD: 0.4 % (ref 0–0.5)
LYMPHOCYTES # BLD AUTO: 1.37 10*3/MM3 (ref 0.6–4.2)
LYMPHOCYTES NFR BLD AUTO: 26.9 % (ref 10–50)
MCH RBC QN AUTO: 28.1 PG (ref 26.5–34)
MCHC RBC AUTO-ENTMCNC: 31.5 G/DL (ref 31.4–36)
MCV RBC AUTO: 89.3 FL (ref 80–98)
MONOCYTES # BLD AUTO: 0.29 10*3/MM3 (ref 0–0.9)
MONOCYTES NFR BLD AUTO: 5.7 % (ref 0–12)
NEUTROPHILS # BLD AUTO: 3.31 10*3/MM3 (ref 2–8.6)
NEUTROPHILS NFR BLD AUTO: 65 % (ref 37–80)
NT-PROBNP SERPL-MCNC: 42.4 PG/ML (ref 0–900)
PLATELET # BLD AUTO: 216 10*3/MM3 (ref 150–450)
PMV BLD AUTO: 10.6 FL (ref 8–12)
POTASSIUM BLD-SCNC: 5.1 MMOL/L (ref 3.5–5.1)
PROT SERPL-MCNC: 7.3 G/DL (ref 6.3–8.6)
RBC # BLD AUTO: 4.2 10*6/MM3 (ref 3.77–5.16)
SODIUM BLD-SCNC: 136 MMOL/L (ref 137–145)
TROPONIN I SERPL-MCNC: <0.012 NG/ML
TROPONIN I SERPL-MCNC: <0.012 NG/ML
WBC NRBC COR # BLD: 5.09 10*3/MM3 (ref 3.2–9.8)
WHOLE BLOOD HOLD SPECIMEN: NORMAL
WHOLE BLOOD HOLD SPECIMEN: NORMAL

## 2017-02-27 PROCEDURE — 93005 ELECTROCARDIOGRAM TRACING: CPT

## 2017-02-27 PROCEDURE — G0378 HOSPITAL OBSERVATION PER HR: HCPCS

## 2017-02-27 PROCEDURE — 80053 COMPREHEN METABOLIC PANEL: CPT | Performed by: EMERGENCY MEDICINE

## 2017-02-27 PROCEDURE — 99284 EMERGENCY DEPT VISIT MOD MDM: CPT

## 2017-02-27 PROCEDURE — 99242 OFF/OP CONSLTJ NEW/EST SF 20: CPT | Performed by: INTERNAL MEDICINE

## 2017-02-27 PROCEDURE — 93005 ELECTROCARDIOGRAM TRACING: CPT | Performed by: EMERGENCY MEDICINE

## 2017-02-27 PROCEDURE — 71010 HC CHEST PA OR AP: CPT

## 2017-02-27 PROCEDURE — 99024 POSTOP FOLLOW-UP VISIT: CPT | Performed by: SURGERY

## 2017-02-27 PROCEDURE — 85025 COMPLETE CBC W/AUTO DIFF WBC: CPT | Performed by: EMERGENCY MEDICINE

## 2017-02-27 PROCEDURE — 84484 ASSAY OF TROPONIN QUANT: CPT | Performed by: EMERGENCY MEDICINE

## 2017-02-27 PROCEDURE — 83880 ASSAY OF NATRIURETIC PEPTIDE: CPT | Performed by: EMERGENCY MEDICINE

## 2017-02-27 RX ORDER — TRAZODONE HYDROCHLORIDE 50 MG/1
50 TABLET ORAL NIGHTLY
Status: DISCONTINUED | OUTPATIENT
Start: 2017-02-27 | End: 2017-02-28 | Stop reason: HOSPADM

## 2017-02-27 RX ORDER — TIZANIDINE 4 MG/1
TABLET ORAL
COMMUNITY
Start: 2017-02-21 | End: 2017-02-27

## 2017-02-27 RX ORDER — DICLOFENAC SODIUM 75 MG/1
75 TABLET, DELAYED RELEASE ORAL
COMMUNITY
Start: 2017-02-21 | End: 2017-02-27

## 2017-02-27 RX ORDER — LOVASTATIN 20 MG/1
TABLET ORAL
COMMUNITY
Start: 2017-02-21 | End: 2017-02-27

## 2017-02-27 RX ORDER — ASPIRIN 325 MG
325 TABLET ORAL ONCE
Status: COMPLETED | OUTPATIENT
Start: 2017-02-27 | End: 2017-02-27

## 2017-02-27 RX ORDER — ALBUTEROL SULFATE 2.5 MG/3ML
2.5 SOLUTION RESPIRATORY (INHALATION) 4 TIMES DAILY
Status: DISCONTINUED | OUTPATIENT
Start: 2017-02-27 | End: 2017-02-28 | Stop reason: HOSPADM

## 2017-02-27 RX ORDER — PANTOPRAZOLE SODIUM 40 MG/1
40 TABLET, DELAYED RELEASE ORAL
Status: DISCONTINUED | OUTPATIENT
Start: 2017-02-28 | End: 2017-02-28 | Stop reason: HOSPADM

## 2017-02-27 RX ORDER — CITALOPRAM 40 MG/1
40 TABLET ORAL DAILY
Status: DISCONTINUED | OUTPATIENT
Start: 2017-02-28 | End: 2017-02-28 | Stop reason: HOSPADM

## 2017-02-27 RX ORDER — PRAVASTATIN SODIUM 20 MG
20 TABLET ORAL DAILY
Status: DISCONTINUED | OUTPATIENT
Start: 2017-02-28 | End: 2017-02-28 | Stop reason: HOSPADM

## 2017-02-27 RX ORDER — TRAZODONE HYDROCHLORIDE 50 MG/1
TABLET ORAL
COMMUNITY
Start: 2017-02-21 | End: 2017-02-27

## 2017-02-27 RX ORDER — CITALOPRAM 40 MG/1
40 TABLET ORAL
COMMUNITY
Start: 2017-02-21 | End: 2017-02-27

## 2017-02-27 RX ORDER — SODIUM CHLORIDE 0.9 % (FLUSH) 0.9 %
10 SYRINGE (ML) INJECTION AS NEEDED
Status: DISCONTINUED | OUTPATIENT
Start: 2017-02-27 | End: 2017-02-28 | Stop reason: HOSPADM

## 2017-02-27 RX ORDER — LISINOPRIL 10 MG/1
10 TABLET ORAL DAILY
Status: DISCONTINUED | OUTPATIENT
Start: 2017-02-28 | End: 2017-02-28 | Stop reason: HOSPADM

## 2017-02-27 RX ADMIN — ASPIRIN 325 MG: 325 TABLET, COATED ORAL at 11:19

## 2017-02-27 NOTE — PROGRESS NOTES
CHIEF COMPLAINT:    Chief Complaint   Patient presents with   • Post-op     P.O. Lap. Nissen fundoplication, and hiatal hernia repair on2/1/17.       HISTORY OF PRESENT ILLNESS:    Mary Nelson is a 61 y.o. female who underwent laparoscopic nissen on 2/1/17.  She was doing well at her last visit.  She has liberalized her diet at home.  Based on our discussion today it does not sound like she has been compliant with a post-nissen diet.  She says she has been eating chili dogs and having Pepsi at home.  She developed some midsternal pain about 1 and a half to 2 days ago.  She says it is a sharp midsternal pain, not related to food and with no radiation of the pain.  She is unsure what brings it on or relieves it.    EXAM:  Vitals:    02/27/17 0948   BP: 120/70         Abdomen soft, incisions well healed    ASSESSMENT:    Chest Pain  Post Nissen    PLAN:    Patient is having new, subacute chest pain today which is worsening.  This could be due to noncompliance with diet, but she denies any relation to food, and no regurgitation or other issues with taking PO.  I initially ordered labs and an EKG, but we have since advised the patient to go to the ER for further urgent workup of her chest pain.  She was sent to the ER from our office.          This document has been electronically signed by Ced Wilson MD on February 27, 2017 10:20 AM

## 2017-02-28 ENCOUNTER — APPOINTMENT (OUTPATIENT)
Dept: NUCLEAR MEDICINE | Facility: HOSPITAL | Age: 62
End: 2017-02-28

## 2017-02-28 ENCOUNTER — APPOINTMENT (OUTPATIENT)
Dept: CARDIOLOGY | Facility: HOSPITAL | Age: 62
End: 2017-02-28

## 2017-02-28 VITALS
RESPIRATION RATE: 18 BRPM | TEMPERATURE: 97.6 F | BODY MASS INDEX: 32.55 KG/M2 | HEIGHT: 61 IN | SYSTOLIC BLOOD PRESSURE: 98 MMHG | WEIGHT: 172.38 LBS | HEART RATE: 93 BPM | OXYGEN SATURATION: 98 % | DIASTOLIC BLOOD PRESSURE: 66 MMHG

## 2017-02-28 PROBLEM — R07.9 CHEST PAIN: Status: RESOLVED | Noted: 2017-02-27 | Resolved: 2017-02-28

## 2017-02-28 LAB
ANION GAP SERPL CALCULATED.3IONS-SCNC: 10 MMOL/L (ref 5–15)
BH CV STRESS BP STAGE 1: 92
BH CV STRESS COMMENTS STAGE 1: NORMAL
BH CV STRESS DOSE REGADENOSON STAGE 1: 0.4
BH CV STRESS DURATION MIN STAGE 1: 0
BH CV STRESS DURATION SEC STAGE 1: 10
BH CV STRESS HR STAGE 1: 92
BH CV STRESS PROTOCOL 1: NORMAL
BH CV STRESS RECOVERY BP: NORMAL MMHG
BH CV STRESS RECOVERY HR: 98 BPM
BH CV STRESS STAGE 1: 1
BUN BLD-MCNC: 16 MG/DL (ref 7–21)
BUN/CREAT SERPL: 15.5 (ref 7–25)
CALCIUM SPEC-SCNC: 9.6 MG/DL (ref 8.4–10.2)
CHLORIDE SERPL-SCNC: 102 MMOL/L (ref 95–110)
CO2 SERPL-SCNC: 26 MMOL/L (ref 22–31)
CREAT BLD-MCNC: 1.03 MG/DL (ref 0.5–1)
DEPRECATED RDW RBC AUTO: 46.6 FL (ref 36.4–46.3)
EOSINOPHIL # BLD MANUAL: 0.16 10*3/MM3 (ref 0–0.7)
EOSINOPHIL NFR BLD MANUAL: 2 % (ref 0–7)
ERYTHROCYTE [DISTWIDTH] IN BLOOD BY AUTOMATED COUNT: 14.3 % (ref 11.5–14.5)
GFR SERPL CREATININE-BSD FRML MDRD: 55 ML/MIN/1.73 (ref 51–120)
GLUCOSE BLD-MCNC: 102 MG/DL (ref 60–100)
HCT VFR BLD AUTO: 38.3 % (ref 35–45)
HGB BLD-MCNC: 12.3 G/DL (ref 12–15.5)
LYMPHOCYTES # BLD MANUAL: 2.27 10*3/MM3 (ref 0.6–4.2)
LYMPHOCYTES NFR BLD MANUAL: 2 % (ref 0–12)
LYMPHOCYTES NFR BLD MANUAL: 28 % (ref 10–50)
MAXIMAL PREDICTED HEART RATE: 161 BPM
MCH RBC QN AUTO: 28.4 PG (ref 26.5–34)
MCHC RBC AUTO-ENTMCNC: 32.1 G/DL (ref 31.4–36)
MCV RBC AUTO: 88.5 FL (ref 80–98)
METAMYELOCYTES NFR BLD MANUAL: 1 % (ref 0–0)
MONOCYTES # BLD AUTO: 0.16 10*3/MM3 (ref 0–0.9)
NEUTROPHILS # BLD AUTO: 5.43 10*3/MM3 (ref 2–8.6)
NEUTROPHILS NFR BLD MANUAL: 67 % (ref 37–80)
PERCENT MAX PREDICTED HR: 67.7 %
PLATELET # BLD AUTO: 207 10*3/MM3 (ref 150–450)
PMV BLD AUTO: 10.7 FL (ref 8–12)
POTASSIUM BLD-SCNC: 5 MMOL/L (ref 3.5–5.1)
RBC # BLD AUTO: 4.33 10*6/MM3 (ref 3.77–5.16)
RBC MORPH BLD: NORMAL
SMALL PLATELETS BLD QL SMEAR: ADEQUATE
SODIUM BLD-SCNC: 138 MMOL/L (ref 137–145)
STRESS BASELINE BP: NORMAL MMHG
STRESS BASELINE HR: 92 BPM
STRESS PERCENT HR: 80 %
STRESS POST ESTIMATED WORKLOAD: 1 METS
STRESS POST PEAK BP: NORMAL MMHG
STRESS POST PEAK HR: 109 BPM
STRESS TARGET HR: 137 BPM
TROPONIN I SERPL-MCNC: <0.012 NG/ML
TROPONIN I SERPL-MCNC: <0.012 NG/ML
WBC MORPH BLD: NORMAL
WBC NRBC COR # BLD: 8.1 10*3/MM3 (ref 3.2–9.8)

## 2017-02-28 PROCEDURE — 93018 CV STRESS TEST I&R ONLY: CPT | Performed by: INTERNAL MEDICINE

## 2017-02-28 PROCEDURE — 85007 BL SMEAR W/DIFF WBC COUNT: CPT | Performed by: FAMILY MEDICINE

## 2017-02-28 PROCEDURE — G0378 HOSPITAL OBSERVATION PER HR: HCPCS

## 2017-02-28 PROCEDURE — 84484 ASSAY OF TROPONIN QUANT: CPT | Performed by: FAMILY MEDICINE

## 2017-02-28 PROCEDURE — A9500 TC99M SESTAMIBI: HCPCS | Performed by: INTERNAL MEDICINE

## 2017-02-28 PROCEDURE — 78452 HT MUSCLE IMAGE SPECT MULT: CPT | Performed by: INTERNAL MEDICINE

## 2017-02-28 PROCEDURE — 93017 CV STRESS TEST TRACING ONLY: CPT

## 2017-02-28 PROCEDURE — 93016 CV STRESS TEST SUPVJ ONLY: CPT | Performed by: INTERNAL MEDICINE

## 2017-02-28 PROCEDURE — 80048 BASIC METABOLIC PNL TOTAL CA: CPT | Performed by: FAMILY MEDICINE

## 2017-02-28 PROCEDURE — 0 TECHNETIUM SESTAMIBI: Performed by: INTERNAL MEDICINE

## 2017-02-28 PROCEDURE — 85027 COMPLETE CBC AUTOMATED: CPT | Performed by: FAMILY MEDICINE

## 2017-02-28 PROCEDURE — 25010000002 REGADENOSON 0.4 MG/5ML SOLUTION: Performed by: INTERNAL MEDICINE

## 2017-02-28 PROCEDURE — 78452 HT MUSCLE IMAGE SPECT MULT: CPT

## 2017-02-28 PROCEDURE — 94799 UNLISTED PULMONARY SVC/PX: CPT

## 2017-02-28 RX ORDER — SODIUM CHLORIDE 0.9 % (FLUSH) 0.9 %
1-10 SYRINGE (ML) INJECTION AS NEEDED
Status: DISCONTINUED | OUTPATIENT
Start: 2017-02-28 | End: 2017-02-28 | Stop reason: HOSPADM

## 2017-02-28 RX ORDER — 0.9 % SODIUM CHLORIDE 0.9 %
10 VIAL (ML) INJECTION AS NEEDED
Status: DISCONTINUED | OUTPATIENT
Start: 2017-02-28 | End: 2017-02-28 | Stop reason: HOSPADM

## 2017-02-28 RX ORDER — ASPIRIN 81 MG/1
324 TABLET, CHEWABLE ORAL ONCE
Status: COMPLETED | OUTPATIENT
Start: 2017-02-28 | End: 2017-02-28

## 2017-02-28 RX ORDER — ASPIRIN 81 MG/1
81 TABLET ORAL DAILY
Status: DISCONTINUED | OUTPATIENT
Start: 2017-03-01 | End: 2017-02-28 | Stop reason: HOSPADM

## 2017-02-28 RX ORDER — CLOPIDOGREL BISULFATE 75 MG/1
75 TABLET ORAL DAILY
Status: DISCONTINUED | OUTPATIENT
Start: 2017-02-28 | End: 2017-02-28 | Stop reason: HOSPADM

## 2017-02-28 RX ORDER — CARVEDILOL 3.12 MG/1
3.12 TABLET ORAL 2 TIMES DAILY
Status: DISCONTINUED | OUTPATIENT
Start: 2017-02-28 | End: 2017-02-28 | Stop reason: HOSPADM

## 2017-02-28 RX ADMIN — Medication 1 DOSE: at 11:33

## 2017-02-28 RX ADMIN — Medication 1 DOSE: at 10:05

## 2017-02-28 RX ADMIN — CITALOPRAM HYDROBROMIDE 40 MG: 40 TABLET ORAL at 09:32

## 2017-02-28 RX ADMIN — SODIUM CHLORIDE 10 ML: 9 INJECTION, SOLUTION INTRAMUSCULAR; INTRAVENOUS; SUBCUTANEOUS at 11:34

## 2017-02-28 RX ADMIN — CARVEDILOL 3.12 MG: 3.12 TABLET, FILM COATED ORAL at 09:32

## 2017-02-28 RX ADMIN — ASPIRIN 81 MG 324 MG: 81 TABLET ORAL at 06:19

## 2017-02-28 RX ADMIN — PANTOPRAZOLE SODIUM 40 MG: 40 TABLET, DELAYED RELEASE ORAL at 06:19

## 2017-02-28 RX ADMIN — TRAZODONE HYDROCHLORIDE 50 MG: 50 TABLET ORAL at 00:17

## 2017-02-28 RX ADMIN — CLOPIDOGREL BISULFATE 75 MG: 75 TABLET ORAL at 09:32

## 2017-02-28 RX ADMIN — REGADENOSON 0.4 MG: 0.08 INJECTION, SOLUTION INTRAVENOUS at 11:33

## 2017-02-28 RX ADMIN — ALBUTEROL SULFATE 2.5 MG: 2.5 SOLUTION RESPIRATORY (INHALATION) at 08:00

## 2017-02-28 RX ADMIN — LISINOPRIL 10 MG: 10 TABLET ORAL at 09:32

## 2017-02-28 RX ADMIN — PRAVASTATIN SODIUM 20 MG: 20 TABLET ORAL at 09:32

## 2017-03-02 ENCOUNTER — HOSPITAL ENCOUNTER (OUTPATIENT)
Dept: GENERAL RADIOLOGY | Facility: HOSPITAL | Age: 62
Discharge: HOME | End: 2017-03-02
Admitting: SURGERY

## 2017-03-02 DIAGNOSIS — R07.9 CHEST PAIN, UNSPECIFIED TYPE: ICD-10-CM

## 2017-03-02 PROCEDURE — 74241: CPT

## 2017-03-06 ENCOUNTER — OFFICE VISIT (OUTPATIENT)
Dept: SURGERY | Facility: CLINIC | Age: 62
End: 2017-03-06

## 2017-03-06 VITALS
BODY MASS INDEX: 31.91 KG/M2 | WEIGHT: 169 LBS | SYSTOLIC BLOOD PRESSURE: 122 MMHG | DIASTOLIC BLOOD PRESSURE: 80 MMHG | HEIGHT: 61 IN

## 2017-03-06 DIAGNOSIS — Z09 FOLLOW UP: Primary | ICD-10-CM

## 2017-03-06 PROCEDURE — 99024 POSTOP FOLLOW-UP VISIT: CPT | Performed by: SURGERY

## 2017-03-07 NOTE — PROGRESS NOTES
CHIEF COMPLAINT:    Chief Complaint   Patient presents with   • Follow-up     UGI results       HISTORY OF PRESENT ILLNESS:    Mary Nelson is a 61 y.o. female who underwent lap Nissen last month.  She came in last week complaining of chest pain.  She was sent to ER and had a cardiac evaluation which was negative.  She has since undergone an UGI which showed no evidence of leak.  She reports she feels well today. No further chest pain. Is eating and drinking well at home, having regular bowel function. Reports no fevers or chills.      EXAM:  Vitals:    03/06/17 0946   BP: 122/80         Abdomen soft, incisions healed    ASSESSMENT:    S/p Lap Nissen    PLAN:    Overall doing well.  Will see in one month to recheck.  Emphasized importance of adhering to post Nissen diet.          This document has been electronically signed by Ced Wilson MD on March 7, 2017 8:37 AM

## 2017-03-15 ENCOUNTER — OFFICE VISIT (OUTPATIENT)
Dept: CARDIOLOGY | Facility: CLINIC | Age: 62
End: 2017-03-15

## 2017-03-15 VITALS
HEART RATE: 68 BPM | SYSTOLIC BLOOD PRESSURE: 142 MMHG | HEIGHT: 61 IN | WEIGHT: 172 LBS | DIASTOLIC BLOOD PRESSURE: 90 MMHG | BODY MASS INDEX: 32.47 KG/M2

## 2017-03-15 DIAGNOSIS — R94.39 ABNORMAL NUCLEAR STRESS TEST: Primary | ICD-10-CM

## 2017-03-15 DIAGNOSIS — I10 HTN (HYPERTENSION), BENIGN: Primary | ICD-10-CM

## 2017-03-15 RX ORDER — ONDANSETRON 2 MG/ML
4 INJECTION INTRAMUSCULAR; INTRAVENOUS EVERY 6 HOURS PRN
Status: CANCELLED | OUTPATIENT
Start: 2017-04-19

## 2017-03-15 RX ORDER — SODIUM CHLORIDE 9 MG/ML
3 INJECTION, SOLUTION INTRAVENOUS CONTINUOUS
Status: CANCELLED | OUTPATIENT
Start: 2017-04-19

## 2017-03-20 NOTE — PROGRESS NOTES
CHIEF COMPLAINT:    Chief Complaint   Patient presents with   • Post-op     Lap Nissen Fundoplication , Hiatal Hernia Repair 2/1/17       HISTORY OF PRESENT ILLNESS:    Mary Nelson is a 61 y.o. female who underwent Lap Nissen with hiatal hernia repair on 2/1/17.  She has done well at home.  Is eating and drinking well at home, having regular bowel function. Reports no fevers or chills.  Still some pain near her surgical wounds, relieved by pain medication.    EXAM:  Vitals:    02/13/17 0942   BP: 108/68         Abdomen soft, incisions healing well.    ASSESSMENT:    S/p Lap Nissen    PLAN:    Overall she is doing well.  Again she was instructed in post nissen diet.  Will see back in two weeks to recheck.          This document has been electronically signed by Ced Wilson MD on March 20, 2017 7:58 AM

## 2017-03-21 ENCOUNTER — OFFICE VISIT (OUTPATIENT)
Dept: GASTROENTEROLOGY | Facility: CLINIC | Age: 62
End: 2017-03-21

## 2017-03-21 VITALS
WEIGHT: 171.7 LBS | HEIGHT: 61 IN | SYSTOLIC BLOOD PRESSURE: 136 MMHG | HEART RATE: 97 BPM | BODY MASS INDEX: 32.42 KG/M2 | DIASTOLIC BLOOD PRESSURE: 84 MMHG

## 2017-03-21 DIAGNOSIS — R10.13 EPIGASTRIC PAIN: Primary | ICD-10-CM

## 2017-03-21 PROCEDURE — 99213 OFFICE O/P EST LOW 20 MIN: CPT | Performed by: INTERNAL MEDICINE

## 2017-03-21 NOTE — PROGRESS NOTES
Patient appointment was made from the hospital for Dr. Olmos. When he went into the room, he knew it was a patient of Dr. Erazo's. An appointment was made for Dr. Erazo and no charges were assessed.

## 2017-03-21 NOTE — PROGRESS NOTES
[facility]      Chief Complaint:   Chief Complaint   Patient presents with   • GI Problem     6 week follow up       Subjective     HPI:   Patient with recent repair of hiatal hernia for epigastric abdominal pain.  Patient states symptoms are markedly improved since having the surgery completed.  Patient states she does have a full feeling at times in her chest but otherwise states that her eating habits are normal and she is gaining weight.    Plan; patient has an evaluation with cardiology this week to evaluate for any coronary artery disease.  At this time no GI intervention is needed patient can take a proton pump inhibitor but this is as needed.    Past Medical History:   Past Medical History:   Diagnosis Date   • Arthritis    • COPD (chronic obstructive pulmonary disease)    • Depression    • Epigastric pain    • GERD (gastroesophageal reflux disease)    • Head injury 1990   • Hiatal hernia    • HTN (hypertension)        Family History:  Family History   Problem Relation Age of Onset   • Hypertension Other        Social History:   reports that she has quit smoking. Her smoking use included Cigarettes. She has a 43.00 pack-year smoking history. She has never used smokeless tobacco. She reports that she does not drink alcohol or use illicit drugs.    Medications:   Current Outpatient Prescriptions   Medication Sig Dispense Refill   • acetaminophen (TYLENOL) 500 MG tablet Take 500 mg by mouth Every 6 (Six) Hours As Needed.     • albuterol (VENTOLIN HFA) 108 (90 BASE) MCG/ACT inhaler Inhale 2 puffs 4 (Four) Times a Day.     • citalopram (CELEXA) 40 MG tablet Take 40 mg by mouth Daily.     • diclofenac (VOLTAREN) 75 MG EC tablet Take 75 mg by mouth 2 (Two) Times a Day.     • lisinopril (PRINIVIL,ZESTRIL) 10 MG tablet Take 10 mg by mouth Daily.     • losartan (COZAAR) 25 MG tablet Take 25 mg by mouth daily.     • lovastatin (MEVACOR) 20 MG tablet Take 20 mg by mouth.     • omeprazole  "(PRILOSEC) 40 MG capsule Take 40 mg by mouth Daily.     • risperiDONE (RISPERDAL) 0.25 MG tablet Take 0.25 mg by mouth 2 (Two) Times a Day.     • tiZANidine (ZANAFLEX) 4 MG tablet Take 4 mg by mouth Every 6 (Six) Hours As Needed.     • traZODone (DESYREL) 50 MG tablet Take 1 tablet by mouth Every Night.       No current facility-administered medications for this visit.        Allergies:  Codeine    ROS:    Review of Systems   HENT: Negative for congestion, sore throat and trouble swallowing.    Respiratory: Negative for apnea, cough, choking, chest tightness, shortness of breath, wheezing and stridor.    Cardiovascular: Negative for chest pain, palpitations and leg swelling.   Gastrointestinal: Negative for abdominal distention, abdominal pain, anal bleeding, blood in stool, constipation, diarrhea, nausea, rectal pain and vomiting.   Skin: Negative for color change, pallor, rash and wound.   Neurological: Negative for dizziness, syncope, weakness and headaches.   Psychiatric/Behavioral: Negative for agitation, behavioral problems, confusion and decreased concentration.     Objective     Blood pressure 136/84, pulse 97, height 61\" (154.9 cm), weight 171 lb 11.2 oz (77.9 kg).    Physical Exam   Constitutional: She is oriented to person, place, and time. She appears well-developed and well-nourished. No distress.   HENT:   Head: Normocephalic and atraumatic.   Cardiovascular: Normal rate, regular rhythm, normal heart sounds and intact distal pulses.  Exam reveals no gallop and no friction rub.    No murmur heard.  Pulmonary/Chest: Breath sounds normal. No respiratory distress. She has no wheezes. She has no rales. She exhibits no tenderness.   Abdominal: Soft. Bowel sounds are normal. She exhibits no distension and no mass. There is no tenderness. There is no rebound and no guarding. No hernia.   Musculoskeletal: Normal range of motion. She exhibits no edema.   Neurological: She is alert and oriented to person, place, " and time.   Skin: Skin is warm and dry. No rash noted. She is not diaphoretic. No erythema. No pallor.   Psychiatric: She has a normal mood and affect. Her behavior is normal. Judgment and thought content normal.        Assessment/Plan   Mary was seen today for gi problem.    Diagnoses and all orders for this visit:    Epigastric pain        * Surgery not found *     Diagnosis Plan   1. Epigastric pain         Anticipated Surgical Procedure:  No orders of the defined types were placed in this encounter.      The risks, benefits, and alternatives of this procedure have been discussed with the patient or the responsible party- the patient understands and agrees to proceed.

## 2017-04-07 ENCOUNTER — OFFICE VISIT (OUTPATIENT)
Dept: ORTHOPEDIC SURGERY | Facility: CLINIC | Age: 62
End: 2017-04-07

## 2017-04-07 VITALS — BODY MASS INDEX: 32.1 KG/M2 | HEIGHT: 61 IN | WEIGHT: 170 LBS

## 2017-04-07 DIAGNOSIS — M25.562 CHRONIC PAIN OF BOTH KNEES: Primary | ICD-10-CM

## 2017-04-07 DIAGNOSIS — G89.29 CHRONIC PAIN OF BOTH KNEES: Primary | ICD-10-CM

## 2017-04-07 DIAGNOSIS — M25.561 CHRONIC PAIN OF BOTH KNEES: Primary | ICD-10-CM

## 2017-04-07 PROCEDURE — 73560 X-RAY EXAM OF KNEE 1 OR 2: CPT | Performed by: ORTHOPAEDIC SURGERY

## 2017-04-07 PROCEDURE — 99203 OFFICE O/P NEW LOW 30 MIN: CPT | Performed by: ORTHOPAEDIC SURGERY

## 2017-04-07 RX ORDER — AMITRIPTYLINE HYDROCHLORIDE 50 MG/1
TABLET, FILM COATED ORAL
Status: ON HOLD | COMMUNITY
Start: 2017-02-21 | End: 2017-04-19 | Stop reason: SDUPTHER

## 2017-04-07 NOTE — PROGRESS NOTES
Mary Nelson is a 61 y.o. female   Primary provider:  Ge Jean MD       Chief Complaint   Patient presents with   • Right Knee - Pain   • Left Knee - Pain       HISTORY OF PRESENT ILLNESS: xrays done today in office.     Pain   This is a chronic problem. The problem occurs constantly. The problem has been unchanged. Associated symptoms include chest pain, headaches and vomiting. Pertinent negatives include no abdominal pain, chills, congestion, coughing, diaphoresis, fatigue, fever, joint swelling, nausea, neck pain, numbness, rash or weakness. Associated symptoms comments: Sharp. . The symptoms are aggravated by walking.     Worsening knee pain, occasional locking and catching.       CONCURRENT MEDICAL HISTORY:    Past Medical History:   Diagnosis Date   • Arthritis    • COPD (chronic obstructive pulmonary disease)    • Depression    • Epigastric pain    • GERD (gastroesophageal reflux disease)    • Head injury 1990   • Hiatal hernia    • HTN (hypertension)        Allergies   Allergen Reactions   • Codeine Swelling         Current Outpatient Prescriptions:   •  amitriptyline (ELAVIL) 50 MG tablet, TAKE 1 TABLET BY MOUTH EVERY EVENING, Disp: , Rfl:   •  citalopram (CELEXA) 40 MG tablet, Take 40 mg by mouth Daily., Disp: , Rfl:   •  diclofenac (VOLTAREN) 75 MG EC tablet, Take 75 mg by mouth 2 (Two) Times a Day., Disp: , Rfl:   •  lisinopril (PRINIVIL,ZESTRIL) 10 MG tablet, Take 10 mg by mouth Daily., Disp: , Rfl:   •  losartan (COZAAR) 25 MG tablet, Take 25 mg by mouth daily., Disp: , Rfl:   •  lovastatin (MEVACOR) 20 MG tablet, Take 20 mg by mouth., Disp: , Rfl:   •  omeprazole (PRILOSEC) 40 MG capsule, Take 40 mg by mouth Daily., Disp: , Rfl:   •  risperiDONE (RISPERDAL) 0.25 MG tablet, Take 0.25 mg by mouth 2 (Two) Times a Day., Disp: , Rfl:   •  tiZANidine (ZANAFLEX) 4 MG tablet, Take 4 mg by mouth Every 6 (Six) Hours As Needed., Disp: , Rfl:   •  traZODone (DESYREL) 50 MG tablet, Take 1  tablet by mouth Every Night., Disp: , Rfl:   •  acetaminophen (TYLENOL) 500 MG tablet, Take 500 mg by mouth Every 6 (Six) Hours As Needed., Disp: , Rfl:   •  albuterol (VENTOLIN HFA) 108 (90 BASE) MCG/ACT inhaler, Inhale 2 puffs 4 (Four) Times a Day., Disp: , Rfl:     Past Surgical History:   Procedure Laterality Date   • ABDOMINAL SURGERY     • COLONOSCOPY  06/27/2016   • ENDOSCOPY AND COLONOSCOPY  06/27/2016    External and internal hemorrhoids. No specimens collected   • ESOPHAGOSCOPY / EGD  06/27/2016    With biopsy-Mildly severe esophagitis. Gastritis. Normal examined duodenum. Biopsied. Medium-sized hiatus hernia. Several biopsies were obtained in the lower third of the esophagus. Several biopsies were obtained in the gastric antrum   • NISSEN FUNDOPLICATION N/A 2/1/2017    Procedure: LAPAROSCOPIC NISSEN FUNDOPLICATION,  HIATAL HERNIA REPAIR  ;  Surgeon: Ced Wilson MD;  Location: [facility];  Service:    • TRANSESOPHAGEAL ECHOCARDIOGRAM (JOAO)  11/18/2015    With color flow-Ef of 60%.Early diastolic dysfunction.Normal RV size and function.Trace to mild mitral and trace tricuspid regurg.No pericardial effusion.   • TUBAL ABDOMINAL LIGATION  1984       Family History   Problem Relation Age of Onset   • Hypertension Other    • Heart disease Father    • Hypertension Father    • COPD Father    • Arthritis Father        Social History     Social History   • Marital status:      Spouse name: N/A   • Number of children: N/A   • Years of education: N/A     Occupational History   • Not on file.     Social History Main Topics   • Smoking status: Former Smoker     Packs/day: 1.00     Years: 43.00     Types: Cigarettes   • Smokeless tobacco: Never Used   • Alcohol use No   • Drug use: No   • Sexual activity: Defer     Other Topics Concern   • Not on file     Social History Narrative        Review of Systems   Constitutional: Negative for appetite change, chills, diaphoresis, fatigue, fever and unexpected  "weight change.   HENT: Negative for congestion, dental problem, facial swelling, hearing loss, nosebleeds, postnasal drip, trouble swallowing and voice change.    Eyes: Negative.  Negative for pain, discharge, redness and itching.   Respiratory: Negative.  Negative for cough, choking, chest tightness, shortness of breath, wheezing and stridor.    Cardiovascular: Positive for chest pain. Negative for palpitations and leg swelling.   Gastrointestinal: Positive for vomiting. Negative for abdominal pain, blood in stool, constipation, diarrhea and nausea.   Endocrine: Negative.  Negative for cold intolerance and heat intolerance.   Genitourinary: Negative.  Negative for dysuria, frequency, hematuria and urgency.   Musculoskeletal: Negative.  Negative for gait problem, joint swelling, neck pain and neck stiffness.   Skin: Negative.  Negative for pallor and rash.   Allergic/Immunologic: Negative.    Neurological: Positive for headaches. Negative for syncope, facial asymmetry, speech difficulty, weakness and numbness.   Hematological: Negative.    Psychiatric/Behavioral: Positive for sleep disturbance. Negative for agitation, behavioral problems, confusion, decreased concentration, dysphoric mood and hallucinations. The patient is not nervous/anxious and is not hyperactive.    All other systems reviewed and are negative.      PHYSICAL EXAMINATION:       Ht 61\" (154.9 cm)  Wt 170 lb (77.1 kg)  BMI 32.12 kg/m2    Physical Exam   Constitutional: She is oriented to person, place, and time. She appears well-developed and well-nourished. No distress.   HENT:   Head: Normocephalic.   Right Ear: External ear normal.   Left Ear: External ear normal.   Mouth/Throat: No oropharyngeal exudate.   Eyes: EOM are normal. Pupils are equal, round, and reactive to light. Right eye exhibits no discharge. Left eye exhibits no discharge. No scleral icterus.   Neck: Normal range of motion. No JVD present. No tracheal deviation present. No " thyromegaly present.   Cardiovascular: Normal rate, regular rhythm, normal heart sounds and intact distal pulses.  Exam reveals no gallop and no friction rub.    No murmur heard.  Pulmonary/Chest: Effort normal and breath sounds normal. No respiratory distress. She has no wheezes. She has no rales. She exhibits no tenderness.   Abdominal: Soft. Bowel sounds are normal. She exhibits no distension and no mass. There is no tenderness. There is no guarding.   Musculoskeletal: Normal range of motion. She exhibits no edema or deformity.        Left knee: She exhibits no effusion.        Thoracic back: She exhibits normal range of motion, no tenderness, no bony tenderness, no swelling, no edema, no deformity, no laceration, no pain, no spasm and normal pulse.        Lumbar back: Normal. She exhibits normal range of motion, no tenderness, no bony tenderness, no swelling, no edema, no deformity, no laceration, no pain, no spasm and normal pulse.   Lymphadenopathy:     She has no cervical adenopathy.   Neurological: She is alert and oriented to person, place, and time. She has normal reflexes. She displays normal reflexes. No cranial nerve deficit. She exhibits normal muscle tone. Coordination normal.   Skin: Skin is warm and dry. No rash noted. She is not diaphoretic. No erythema.   Psychiatric: She has a normal mood and affect. Her behavior is normal. Thought content normal.       GAIT:     []  Normal  []  Antalgic    Assistive device: []  None  []  Walker     []  Crutches  []  Cane     []  Wheelchair  []  Stretcher    Left Knee Exam     Tenderness   The patient is experiencing tenderness in the medial joint line.    Range of Motion   Extension: 0   Flexion: 110     Tests   Julius:  Medial - negative Lateral - negative  Lachman:  Anterior - negative    Posterior - negative  Drawer:       Anterior - negative     Posterior - negative  Varus: negative  Valgus: negative    Other   Erythema: absent  Scars: absent  Sensation:  normal  Pulse: present  Swelling: mild  Effusion: no effusion present                X-ray knee left 1 or 2 views7/19/2016  [employer]  Result Impression      Moderate degenerative change medial compartment left knee.   Result Narrative   Indication:  Pain in left knee for several months.  No specific injury.    Left Knee, Two Views:  The knee is normally aligned and no fracture or other bony abnormality is seen.  There is narrowing and mild sclerosis in the medial compartment of the knee.   X-ray knee right 1 or 2 views7/19/2016  [employer]  Result Impression      Negative.   Result Narrative   Indication:  Pain for several months in right knee.    Right Knee, Two Views:  The right knee is normally aligned and no fracture or other bony abnormality is seen.  The joint surfaces are smooth.         No results found.     Xray right and left knee shows moderate joint space narrowing, medial compartment right and left knees.        ASSESSMENT:    Diagnoses and all orders for this visit:    Chronic pain of both knees  -     XR Knee 1 or 2 View Bilateral  -     XR Knee Bilateral AP Standing    Other orders  -     amitriptyline (ELAVIL) 50 MG tablet; TAKE 1 TABLET BY MOUTH EVERY EVENING          PLAN        Grover Raymond MD

## 2017-04-19 ENCOUNTER — HOSPITAL ENCOUNTER (OUTPATIENT)
Facility: HOSPITAL | Age: 62
Discharge: HOME | End: 2017-04-20
Attending: INTERNAL MEDICINE | Admitting: INTERNAL MEDICINE

## 2017-04-19 DIAGNOSIS — R94.39 ABNORMAL NUCLEAR STRESS TEST: ICD-10-CM

## 2017-04-19 LAB
ALBUMIN SERPL-MCNC: 3.9 G/DL (ref 3.4–4.8)
ALBUMIN/GLOB SERPL: 1.3 G/DL (ref 1.1–1.8)
ALP SERPL-CCNC: 118 U/L (ref 38–126)
ALT SERPL W P-5'-P-CCNC: 40 U/L (ref 9–52)
ANION GAP SERPL CALCULATED.3IONS-SCNC: 11 MMOL/L (ref 5–15)
AST SERPL-CCNC: 36 U/L (ref 14–36)
BASOPHILS # BLD AUTO: 0.02 10*3/MM3 (ref 0–0.2)
BASOPHILS NFR BLD AUTO: 0.4 % (ref 0–2)
BILIRUB SERPL-MCNC: 0.4 MG/DL (ref 0.2–1.3)
BUN BLD-MCNC: 8 MG/DL (ref 7–21)
BUN/CREAT SERPL: 9.9 (ref 7–25)
CALCIUM SPEC-SCNC: 8.7 MG/DL (ref 8.4–10.2)
CHLORIDE SERPL-SCNC: 106 MMOL/L (ref 95–110)
CO2 SERPL-SCNC: 22 MMOL/L (ref 22–31)
CREAT BLD-MCNC: 0.81 MG/DL (ref 0.5–1)
DEPRECATED RDW RBC AUTO: 45.9 FL (ref 36.4–46.3)
EOSINOPHIL # BLD AUTO: 0.09 10*3/MM3 (ref 0–0.7)
EOSINOPHIL NFR BLD AUTO: 1.6 % (ref 0–7)
ERYTHROCYTE [DISTWIDTH] IN BLOOD BY AUTOMATED COUNT: 14.4 % (ref 11.5–14.5)
GFR SERPL CREATININE-BSD FRML MDRD: 72 ML/MIN/1.73 (ref 60–120)
GLOBULIN UR ELPH-MCNC: 3 GM/DL (ref 2.3–3.5)
GLUCOSE BLD-MCNC: 94 MG/DL (ref 60–100)
HCT VFR BLD AUTO: 35.8 % (ref 35–45)
HGB BLD-MCNC: 11.7 G/DL (ref 12–15.5)
IMM GRANULOCYTES # BLD: 0.02 10*3/MM3 (ref 0–0.02)
IMM GRANULOCYTES NFR BLD: 0.4 % (ref 0–0.5)
INR PPP: 0.9 (ref 0.8–1.2)
LYMPHOCYTES # BLD AUTO: 1.38 10*3/MM3 (ref 0.6–4.2)
LYMPHOCYTES NFR BLD AUTO: 25.2 % (ref 10–50)
MCH RBC QN AUTO: 28.6 PG (ref 26.5–34)
MCHC RBC AUTO-ENTMCNC: 32.7 G/DL (ref 31.4–36)
MCV RBC AUTO: 87.5 FL (ref 80–98)
MONOCYTES # BLD AUTO: 0.31 10*3/MM3 (ref 0–0.9)
MONOCYTES NFR BLD AUTO: 5.7 % (ref 0–12)
NEUTROPHILS # BLD AUTO: 3.65 10*3/MM3 (ref 2–8.6)
NEUTROPHILS NFR BLD AUTO: 66.7 % (ref 37–80)
PLATELET # BLD AUTO: 185 10*3/MM3 (ref 150–450)
PMV BLD AUTO: 10 FL (ref 8–12)
POTASSIUM BLD-SCNC: 4 MMOL/L (ref 3.5–5.1)
PROT SERPL-MCNC: 6.9 G/DL (ref 6.3–8.6)
PROTHROMBIN TIME: 12 SECONDS (ref 11.1–15.3)
RBC # BLD AUTO: 4.09 10*6/MM3 (ref 3.77–5.16)
SODIUM BLD-SCNC: 139 MMOL/L (ref 137–145)
WBC NRBC COR # BLD: 5.47 10*3/MM3 (ref 3.2–9.8)

## 2017-04-19 PROCEDURE — C1760 CLOSURE DEV, VASC: HCPCS | Performed by: INTERNAL MEDICINE

## 2017-04-19 PROCEDURE — 93454 CORONARY ARTERY ANGIO S&I: CPT | Performed by: INTERNAL MEDICINE

## 2017-04-19 PROCEDURE — 25010000002 FENTANYL CITRATE (PF) 100 MCG/2ML SOLUTION: Performed by: INTERNAL MEDICINE

## 2017-04-19 PROCEDURE — C1725 CATH, TRANSLUMIN NON-LASER: HCPCS | Performed by: INTERNAL MEDICINE

## 2017-04-19 PROCEDURE — C1894 INTRO/SHEATH, NON-LASER: HCPCS | Performed by: INTERNAL MEDICINE

## 2017-04-19 PROCEDURE — 93571 IV DOP VEL&/PRESS C FLO 1ST: CPT | Performed by: INTERNAL MEDICINE

## 2017-04-19 PROCEDURE — 25010000002 HEPARIN (PORCINE) PER 1000 UNITS: Performed by: INTERNAL MEDICINE

## 2017-04-19 PROCEDURE — 92928 PRQ TCAT PLMT NTRAC ST 1 LES: CPT | Performed by: INTERNAL MEDICINE

## 2017-04-19 PROCEDURE — 85025 COMPLETE CBC W/AUTO DIFF WBC: CPT | Performed by: INTERNAL MEDICINE

## 2017-04-19 PROCEDURE — C1769 GUIDE WIRE: HCPCS | Performed by: INTERNAL MEDICINE

## 2017-04-19 PROCEDURE — 80053 COMPREHEN METABOLIC PANEL: CPT | Performed by: INTERNAL MEDICINE

## 2017-04-19 PROCEDURE — C1874 STENT, COATED/COV W/DEL SYS: HCPCS | Performed by: INTERNAL MEDICINE

## 2017-04-19 PROCEDURE — 25010000002 ADENOSINE (DIAGNOSTIC) PER 30 MG: Performed by: INTERNAL MEDICINE

## 2017-04-19 PROCEDURE — 85610 PROTHROMBIN TIME: CPT | Performed by: INTERNAL MEDICINE

## 2017-04-19 PROCEDURE — C1887 CATHETER, GUIDING: HCPCS | Performed by: INTERNAL MEDICINE

## 2017-04-19 PROCEDURE — 0 IOPAMIDOL PER 1 ML: Performed by: INTERNAL MEDICINE

## 2017-04-19 PROCEDURE — 25010000002 MIDAZOLAM PER 1 MG: Performed by: INTERNAL MEDICINE

## 2017-04-19 PROCEDURE — 25010000002 PROTAMINE SULFATE PER 10 MG: Performed by: INTERNAL MEDICINE

## 2017-04-19 DEVICE — XIENCE ALPINE EVEROLIMUS ELUTING CORONARY STENT SYSTEM 2.50 MM X 23 MM / RAPID-EXCHANGE
Type: IMPLANTABLE DEVICE | Status: FUNCTIONAL
Brand: XIENCE ALPINE

## 2017-04-19 RX ORDER — LOSARTAN POTASSIUM 25 MG/1
25 TABLET ORAL DAILY
Status: DISCONTINUED | OUTPATIENT
Start: 2017-04-20 | End: 2017-04-20 | Stop reason: HOSPADM

## 2017-04-19 RX ORDER — NITROGLYCERIN 5 MG/ML
INJECTION, SOLUTION INTRAVENOUS AS NEEDED
Status: DISCONTINUED | OUTPATIENT
Start: 2017-04-19 | End: 2017-04-19 | Stop reason: HOSPADM

## 2017-04-19 RX ORDER — ONDANSETRON 2 MG/ML
4 INJECTION INTRAMUSCULAR; INTRAVENOUS EVERY 6 HOURS PRN
Status: DISCONTINUED | OUTPATIENT
Start: 2017-04-19 | End: 2017-04-19 | Stop reason: SDUPTHER

## 2017-04-19 RX ORDER — ONDANSETRON 2 MG/ML
4 INJECTION INTRAMUSCULAR; INTRAVENOUS EVERY 6 HOURS PRN
Status: DISCONTINUED | OUTPATIENT
Start: 2017-04-19 | End: 2017-04-19 | Stop reason: HOSPADM

## 2017-04-19 RX ORDER — MIDAZOLAM HYDROCHLORIDE 1 MG/ML
INJECTION INTRAMUSCULAR; INTRAVENOUS AS NEEDED
Status: DISCONTINUED | OUTPATIENT
Start: 2017-04-19 | End: 2017-04-19 | Stop reason: HOSPADM

## 2017-04-19 RX ORDER — TRAZODONE HYDROCHLORIDE 50 MG/1
50 TABLET ORAL NIGHTLY
Status: DISCONTINUED | OUTPATIENT
Start: 2017-04-19 | End: 2017-04-20 | Stop reason: HOSPADM

## 2017-04-19 RX ORDER — PROMETHAZINE HYDROCHLORIDE 25 MG/ML
12.5 INJECTION, SOLUTION INTRAMUSCULAR; INTRAVENOUS EVERY 6 HOURS PRN
Status: DISCONTINUED | OUTPATIENT
Start: 2017-04-19 | End: 2017-04-20 | Stop reason: HOSPADM

## 2017-04-19 RX ORDER — LIDOCAINE HYDROCHLORIDE 20 MG/ML
INJECTION, SOLUTION INFILTRATION; PERINEURAL AS NEEDED
Status: DISCONTINUED | OUTPATIENT
Start: 2017-04-19 | End: 2017-04-19 | Stop reason: HOSPADM

## 2017-04-19 RX ORDER — PANTOPRAZOLE SODIUM 40 MG/1
40 TABLET, DELAYED RELEASE ORAL
Status: DISCONTINUED | OUTPATIENT
Start: 2017-04-20 | End: 2017-04-20 | Stop reason: HOSPADM

## 2017-04-19 RX ORDER — HEPARIN SODIUM 1000 [USP'U]/ML
INJECTION, SOLUTION INTRAVENOUS; SUBCUTANEOUS AS NEEDED
Status: DISCONTINUED | OUTPATIENT
Start: 2017-04-19 | End: 2017-04-19 | Stop reason: HOSPADM

## 2017-04-19 RX ORDER — RISPERIDONE 0.25 MG/1
0.25 TABLET ORAL 2 TIMES DAILY
Status: DISCONTINUED | OUTPATIENT
Start: 2017-04-19 | End: 2017-04-20 | Stop reason: HOSPADM

## 2017-04-19 RX ORDER — AMITRIPTYLINE HYDROCHLORIDE 50 MG/1
50 TABLET, FILM COATED ORAL NIGHTLY
COMMUNITY
End: 2020-04-08

## 2017-04-19 RX ORDER — TIZANIDINE 4 MG/1
4 TABLET ORAL EVERY 6 HOURS PRN
Status: DISCONTINUED | OUTPATIENT
Start: 2017-04-19 | End: 2017-04-20 | Stop reason: HOSPADM

## 2017-04-19 RX ORDER — ALBUTEROL SULFATE 2.5 MG/3ML
2.5 SOLUTION RESPIRATORY (INHALATION) EVERY 6 HOURS PRN
Status: DISCONTINUED | OUTPATIENT
Start: 2017-04-19 | End: 2017-04-20 | Stop reason: HOSPADM

## 2017-04-19 RX ORDER — NICOTINE 21 MG/24HR
1 PATCH, TRANSDERMAL 24 HOURS TRANSDERMAL EVERY 24 HOURS
Status: DISCONTINUED | OUTPATIENT
Start: 2017-04-19 | End: 2017-04-20 | Stop reason: HOSPADM

## 2017-04-19 RX ORDER — FENTANYL CITRATE 50 UG/ML
INJECTION, SOLUTION INTRAMUSCULAR; INTRAVENOUS AS NEEDED
Status: DISCONTINUED | OUTPATIENT
Start: 2017-04-19 | End: 2017-04-19 | Stop reason: HOSPADM

## 2017-04-19 RX ORDER — CITALOPRAM 40 MG/1
40 TABLET ORAL DAILY
Status: DISCONTINUED | OUTPATIENT
Start: 2017-04-20 | End: 2017-04-20 | Stop reason: HOSPADM

## 2017-04-19 RX ORDER — HYDROXYZINE PAMOATE 25 MG/1
25 CAPSULE ORAL 3 TIMES DAILY
Status: DISCONTINUED | OUTPATIENT
Start: 2017-04-19 | End: 2017-04-20 | Stop reason: HOSPADM

## 2017-04-19 RX ORDER — SODIUM CHLORIDE 9 MG/ML
50 INJECTION, SOLUTION INTRAVENOUS CONTINUOUS
Status: DISCONTINUED | OUTPATIENT
Start: 2017-04-19 | End: 2017-04-19

## 2017-04-19 RX ORDER — PROTAMINE SULFATE 10 MG/ML
INJECTION, SOLUTION INTRAVENOUS AS NEEDED
Status: DISCONTINUED | OUTPATIENT
Start: 2017-04-19 | End: 2017-04-19 | Stop reason: HOSPADM

## 2017-04-19 RX ORDER — ACETAMINOPHEN 500 MG
500 TABLET ORAL EVERY 6 HOURS PRN
Status: DISCONTINUED | OUTPATIENT
Start: 2017-04-19 | End: 2017-04-20 | Stop reason: HOSPADM

## 2017-04-19 RX ORDER — ATORVASTATIN CALCIUM 40 MG/1
40 TABLET, FILM COATED ORAL NIGHTLY
Status: DISCONTINUED | OUTPATIENT
Start: 2017-04-19 | End: 2017-04-20 | Stop reason: HOSPADM

## 2017-04-19 RX ORDER — SODIUM CHLORIDE 9 MG/ML
3 INJECTION, SOLUTION INTRAVENOUS CONTINUOUS
Status: DISCONTINUED | OUTPATIENT
Start: 2017-04-19 | End: 2017-04-19

## 2017-04-19 RX ORDER — AMITRIPTYLINE HYDROCHLORIDE 50 MG/1
50 TABLET, FILM COATED ORAL NIGHTLY
Status: DISCONTINUED | OUTPATIENT
Start: 2017-04-19 | End: 2017-04-20 | Stop reason: HOSPADM

## 2017-04-19 RX ORDER — HYDROXYZINE PAMOATE 25 MG/1
25 CAPSULE ORAL 3 TIMES DAILY PRN
COMMUNITY
Start: 2017-03-07 | End: 2023-01-13

## 2017-04-19 RX ORDER — SODIUM CHLORIDE 9 MG/ML
50 INJECTION, SOLUTION INTRAVENOUS CONTINUOUS
Status: DISCONTINUED | OUTPATIENT
Start: 2017-04-19 | End: 2017-04-20 | Stop reason: HOSPADM

## 2017-04-19 RX ADMIN — TRAZODONE HYDROCHLORIDE 50 MG: 50 TABLET ORAL at 21:00

## 2017-04-19 RX ADMIN — NICOTINE 1 PATCH: 21 PATCH TRANSDERMAL at 13:19

## 2017-04-19 RX ADMIN — SODIUM CHLORIDE 125 ML/HR: 9 INJECTION, SOLUTION INTRAVENOUS at 11:59

## 2017-04-19 RX ADMIN — SODIUM CHLORIDE 50 ML/HR: 9 INJECTION, SOLUTION INTRAVENOUS at 07:34

## 2017-04-19 RX ADMIN — TICAGRELOR 90 MG: 90 TABLET ORAL at 17:32

## 2017-04-19 RX ADMIN — HYDROXYZINE PAMOATE 25 MG: 25 CAPSULE ORAL at 15:14

## 2017-04-19 RX ADMIN — ACETAMINOPHEN 500 MG: 500 TABLET ORAL at 17:59

## 2017-04-19 NOTE — H&P
Patient Care Team:  Ge Jean MD as PCP - General    Chief complaint chest pain, abnormal nuclear perfusion imaging stress test      History of Present Illness:   Mary Nelson is a 61 y.o. female presented to the hospital for elective diagnostic coronary angiogram and possible PCI. The chest pain started a few days ago prior to her presentation back in February. Initially it was coming and going. However the intensity of the pain was so severe  10 out of 10 associated with shortness of breath and diaphoresis and fatigue. Patient stated that the pain would last approximately 10-15 minutes. She states that it radiated to her left shoulder. Patient stated the pain is exacerbated with stress and physical exertion. It was relieved after resting for approximately 10-15 minutes.     · Patient's risk factor for coronary artery disease include:  · Hypertension  · Chronic tobacco abuse patient quit smoking cigarettes February 1 of this year  · Family history of myocardial infarction  ·   Patient subsequently underwent a nuclear perfusion imaging stress test which suggested ischemia in the LAD territory.  At this point decision was made to bring patient to the hospital for elective diagnostic coronary angiogram and possible PCI.      .    Review of Systems   Constitutional: Negative.    HENT: Negative.    Eyes: Negative.    Respiratory: Negative.    Cardiovascular: Positive for chest pain. Negative for palpitations and leg swelling.   Gastrointestinal: Negative.    Endocrine: Negative.    Genitourinary: Negative.    Musculoskeletal: Negative.    Skin: Negative.    Neurological: Negative.    Hematological: Negative.    Psychiatric/Behavioral: Negative.         Objective      Vital Signs    blood pressure 132/83 heart rate 82 bpm, temperature 98.3°F, respiration 18    Physical Exam   Constitutional: She is oriented to person, place, and time. She appears well-developed and well-nourished.   HENT:    Head: Normocephalic and atraumatic.   Eyes: EOM are normal. Pupils are equal, round, and reactive to light. Right eye exhibits no discharge.   Neck: Normal range of motion. Neck supple.   Cardiovascular: Normal rate, regular rhythm, normal heart sounds and intact distal pulses.  Exam reveals no gallop and no friction rub.    No murmur heard.  Pulmonary/Chest: Effort normal and breath sounds normal.   Abdominal: Soft. Bowel sounds are normal.   Musculoskeletal: Normal range of motion. She exhibits no edema or deformity.   Neurological: She is oriented to person, place, and time. No cranial nerve deficit.   Skin: Skin is warm and dry.   Psychiatric: She has a normal mood and affect. Her behavior is normal. Judgment and thought content normal.       Results Review:   I reviewed the patient's new clinical results.      Assessment/Plan   Assessment & Plan  1.  Abnormal nuclear perfusion imaging stress test  2.  Chest pain  3.  Essential hypertension    Plan is to proceed today with elective diagnostic coronary angiogram.  I have discussed indication alternatives and all potential complications of diagnostic angiogram with the patient.  Patient has given me permission to proceed.  Patient has verbalized understanding and all her questions were answered to her satisfaction.    I discussed the patients findings and my recommendations with patient    Kevin Erazo MD  04/19/17  7:16 AM    EMR Dragon/Transcription disclaimer:   Much of this encounter note is an electronic transcription/translation of spoken language to printed text. The electronic translation of spoken language may permit erroneous, or at times, nonsensical words or phrases to be inadvertently transcribed; Although I have reviewed the note for such errors, some may still exist.

## 2017-04-19 NOTE — PROGRESS NOTES
Cardiac Rehab Evaluation:  Declines CR due to no transportation.  Agrees that she will continue no smoking and states she walks 2 miles/day at home and that she will try to eat healthier.     Lu Pruitt RN  04/19/17  3:51 PM      Time: 15 minutes

## 2017-04-19 NOTE — PLAN OF CARE
Problem: Patient Care Overview (Adult)  Goal: Plan of Care Review  Outcome: Ongoing (interventions implemented as appropriate)    04/19/17 1651   Coping/Psychosocial Response Interventions   Plan Of Care Reviewed With patient   Patient Care Overview   Progress improving   Outcome Evaluation   Outcome Summary/Follow up Plan no s/s of bleeding or chest pain. tolerated meal fine. c/o of pain when moving in right hip.       Goal: Adult Individualization and Mutuality  Outcome: Ongoing (interventions implemented as appropriate)    Problem: Cardiac Catheterization with/without PCI (Adult)  Goal: Signs and Symptoms of Listed Potential Problems Will be Absent or Manageable (Cardiac Catheterization with/without PCI)  Outcome: Ongoing (interventions implemented as appropriate)

## 2017-04-20 VITALS
BODY MASS INDEX: 33.99 KG/M2 | WEIGHT: 180 LBS | HEIGHT: 61 IN | OXYGEN SATURATION: 99 % | RESPIRATION RATE: 16 BRPM | SYSTOLIC BLOOD PRESSURE: 141 MMHG | DIASTOLIC BLOOD PRESSURE: 82 MMHG | HEART RATE: 83 BPM | TEMPERATURE: 97.2 F

## 2017-04-20 LAB
ANION GAP SERPL CALCULATED.3IONS-SCNC: 7 MMOL/L (ref 5–15)
ARTICHOKE IGE QN: 255 MG/DL (ref 1–129)
BUN BLD-MCNC: 12 MG/DL (ref 7–21)
BUN/CREAT SERPL: 13.6 (ref 7–25)
CALCIUM SPEC-SCNC: 8.5 MG/DL (ref 8.4–10.2)
CHLORIDE SERPL-SCNC: 107 MMOL/L (ref 95–110)
CHOLEST SERPL-MCNC: 356 MG/DL (ref 0–199)
CO2 SERPL-SCNC: 25 MMOL/L (ref 22–31)
CREAT BLD-MCNC: 0.88 MG/DL (ref 0.5–1)
DEPRECATED RDW RBC AUTO: 46 FL (ref 36.4–46.3)
ERYTHROCYTE [DISTWIDTH] IN BLOOD BY AUTOMATED COUNT: 14.5 % (ref 11.5–14.5)
GFR SERPL CREATININE-BSD FRML MDRD: 66 ML/MIN/1.73 (ref 51–120)
GLUCOSE BLD-MCNC: 96 MG/DL (ref 60–100)
HBA1C MFR BLD: 5.87 % (ref 4–5.6)
HCT VFR BLD AUTO: 32.3 % (ref 35–45)
HDLC SERPL-MCNC: 42 MG/DL (ref 60–200)
HGB BLD-MCNC: 10.6 G/DL (ref 12–15.5)
LDLC/HDLC SERPL: 6.31 {RATIO} (ref 0–3.22)
MCH RBC QN AUTO: 28.4 PG (ref 26.5–34)
MCHC RBC AUTO-ENTMCNC: 32.8 G/DL (ref 31.4–36)
MCV RBC AUTO: 86.6 FL (ref 80–98)
PLATELET # BLD AUTO: 182 10*3/MM3 (ref 150–450)
PMV BLD AUTO: 10.5 FL (ref 8–12)
POTASSIUM BLD-SCNC: 4 MMOL/L (ref 3.5–5.1)
RBC # BLD AUTO: 3.73 10*6/MM3 (ref 3.77–5.16)
SODIUM BLD-SCNC: 139 MMOL/L (ref 137–145)
TRIGL SERPL-MCNC: 245 MG/DL (ref 20–199)
WBC NRBC COR # BLD: 6.71 10*3/MM3 (ref 3.2–9.8)

## 2017-04-20 PROCEDURE — 80048 BASIC METABOLIC PNL TOTAL CA: CPT | Performed by: INTERNAL MEDICINE

## 2017-04-20 PROCEDURE — 85027 COMPLETE CBC AUTOMATED: CPT | Performed by: INTERNAL MEDICINE

## 2017-04-20 PROCEDURE — 99212 OFFICE O/P EST SF 10 MIN: CPT | Performed by: INTERNAL MEDICINE

## 2017-04-20 PROCEDURE — 83036 HEMOGLOBIN GLYCOSYLATED A1C: CPT | Performed by: INTERNAL MEDICINE

## 2017-04-20 PROCEDURE — 80061 LIPID PANEL: CPT | Performed by: INTERNAL MEDICINE

## 2017-04-20 RX ORDER — ATORVASTATIN CALCIUM 40 MG/1
40 TABLET, FILM COATED ORAL NIGHTLY
Qty: 30 TABLET | Refills: 6 | Status: SHIPPED | OUTPATIENT
Start: 2017-04-20 | End: 2017-10-09 | Stop reason: SDUPTHER

## 2017-04-20 RX ADMIN — TICAGRELOR 90 MG: 90 TABLET ORAL at 08:08

## 2017-04-20 RX ADMIN — CITALOPRAM HYDROBROMIDE 40 MG: 40 TABLET ORAL at 08:07

## 2017-04-20 RX ADMIN — ACETAMINOPHEN 500 MG: 500 TABLET ORAL at 04:17

## 2017-04-20 RX ADMIN — HYDROXYZINE PAMOATE 25 MG: 25 CAPSULE ORAL at 08:07

## 2017-04-20 RX ADMIN — RISPERIDONE 0.25 MG: 0.25 TABLET, FILM COATED ORAL at 08:08

## 2017-04-20 RX ADMIN — PANTOPRAZOLE SODIUM 40 MG: 40 TABLET, DELAYED RELEASE ORAL at 06:03

## 2017-04-20 NOTE — PLAN OF CARE
Problem: Patient Care Overview (Adult)  Goal: Plan of Care Review  Outcome: Ongoing (interventions implemented as appropriate)    04/20/17 0428   Coping/Psychosocial Response Interventions   Plan Of Care Reviewed With patient   Patient Care Overview   Progress improving   Outcome Evaluation   Outcome Summary/Follow up Plan PT denies CP overnight, Cath site soft and clean         Problem: Cardiac Catheterization with/without PCI (Adult)  Goal: Signs and Symptoms of Listed Potential Problems Will be Absent or Manageable (Cardiac Catheterization with/without PCI)  Outcome: Ongoing (interventions implemented as appropriate)    04/20/17 0428   Cardiac Catheterization with/without PCI   Problems Assessed (Cardiac Catheterization) all   Problems Present (Cardiac Catheterization) none

## 2017-04-20 NOTE — DISCHARGE SUMMARY
Date of Discharge:  4/20/2017    Discharge Diagnosis:   Coronary artery disease involving the left anterior descending coronary artery  Status post cardiac catheterization and successful PCI to the mid LAD with a drug-eluting stent ( 2.5 x 23 mm Xience Alpine stent)  Essential hypertension  Hyperlipidemia    Presenting Problem/History of Present Illness  Abnormal nuclear stress test [R94.39]  Abnormal nuclear stress test [R94.39]       Hospital Course  Patient is a 61 y.o. female presented for elective diagnostic coronary angiogram and possible PCI.  Patient underwent a nuclear perfusion imaging stress test which revealed evidence of ischemia in the LAD territory.  Patient was taken to the Catheterization laboratory and diagnostic angiogram was performed.  The LAD was interrogated again in the catheter lab to determine 0 hemodynamic significance of the lesion.  The FFR was markedly reduced 0.69.  2.5 x 23 mm xience alpine stent was deployed successfully with excellent results.  Patient was subsequently transferred to the stepdown unit for overnight observation.  Patient soon after the stent deployment stated that she feels much better she is not as tired, she has no chest heaviness and has minimal shortness of breath.  This morning she was able to get up and walk on the Holter has no difficulty.  The cardiac catheterization site has healed very well.  There is no evidence of hematoma or bleeding.      Procedures Performed  Procedure(s):  Left Heart Cath  Functional Flow [place]  Percutaneous Coronary Intervention       Consults:   Consults     No orders found for last 30 day(s).          Pertinent Test Results:     Lab Results (last 24 hours)     Procedure Component Value Units Date/Time    CBC Auto Differential [[id number]]  (Abnormal) Collected:  04/19/17 0708    Specimen:  Blood Updated:  04/19/17 0729     WBC 5.47 10*3/mm3      RBC 4.09 10*6/mm3      Hemoglobin 11.7 (L) g/dL      Hematocrit 35.8 %      MCV 87.5 fL       MCH 28.6 pg      MCHC 32.7 g/dL      RDW 14.4 %      RDW-SD 45.9 fl      MPV 10.0 fL      Platelets 185 10*3/mm3      Neutrophil % 66.7 %      Lymphocyte % 25.2 %      Monocyte % 5.7 %      Eosinophil % 1.6 %      Basophil % 0.4 %      Immature Grans % 0.4 %      Neutrophils, Absolute 3.65 10*3/mm3      Lymphocytes, Absolute 1.38 10*3/mm3      Monocytes, Absolute 0.31 10*3/mm3      Eosinophils, Absolute 0.09 10*3/mm3      Basophils, Absolute 0.02 10*3/mm3      Immature Grans, Absolute 0.02 10*3/mm3     CBC and Differential [[id number]] Collected:  04/19/17 0708    Specimen:  Blood Updated:  04/19/17 0729    Narrative:       The following orders were created for panel order CBC and Differential.  Procedure                               Abnormality         Status                     ---------                               -----------         ------                     CBC Auto Differential[[id number]]         Abnormal            Final result                 Please view results for these tests on the individual orders.    Protime-INR [[id number]]  (Normal) Collected:  04/19/17 0708    Specimen:  Blood Updated:  04/19/17 0739     Protime 12.0 Seconds      INR 0.90    Narrative:       Therapeutic range for most indications is 2.0-3.0 INR,  or 2.5-3.5 for mechanical heart valves.    Comprehensive metabolic panel [[id number]]  (Normal) Collected:  04/19/17 0708    Specimen:  Blood Updated:  04/19/17 0746     Glucose 94 mg/dL      BUN 8 mg/dL      Creatinine 0.81 mg/dL      Sodium 139 mmol/L      Potassium 4.0 mmol/L      Chloride 106 mmol/L      CO2 22.0 mmol/L      Calcium 8.7 mg/dL      Total Protein 6.9 g/dL      Albumin 3.90 g/dL      ALT (SGPT) 40 U/L      AST (SGOT) 36 U/L      Alkaline Phosphatase 118 U/L      Total Bilirubin 0.4 mg/dL      eGFR Non African Amer 72 mL/min/1.73      Globulin 3.0 gm/dL      A/G Ratio 1.3 g/dL      BUN/Creatinine Ratio 9.9     Anion Gap 11.0 mmol/L     Hemoglobin A1c [[id number]] Collected:   04/20/17 0543    Specimen:  Blood Updated:  04/20/17 0627    Lipid Panel [[id number]] Collected:  04/20/17 0543    Specimen:  Blood Updated:  04/20/17 0627    CBC (No Diff) [[id number]]  (Abnormal) Collected:  04/20/17 0543    Specimen:  Blood Updated:  04/20/17 0631     WBC 6.71 10*3/mm3      RBC 3.73 (L) 10*6/mm3      Hemoglobin 10.6 (L) g/dL      Hematocrit 32.3 (L) %      MCV 86.6 fL      MCH 28.4 pg      MCHC 32.8 g/dL      RDW 14.5 %      RDW-SD 46.0 fl      MPV 10.5 fL      Platelets 182 10*3/mm3     Basic Metabolic Panel [[id number]]  (Normal) Collected:  04/20/17 0543    Specimen:  Blood Updated:  04/20/17 0646     Glucose 96 mg/dL      BUN 12 mg/dL      Creatinine 0.88 mg/dL      Sodium 139 mmol/L      Potassium 4.0 mmol/L      Chloride 107 mmol/L      CO2 25.0 mmol/L      Calcium 8.5 mg/dL      eGFR Non African Amer 66 mL/min/1.73      BUN/Creatinine Ratio 13.6     Anion Gap 7.0 mmol/L           Imaging Results (last 24 hours)     ** No results found for the last 24 hours. **          ECG/EMG Results (last 24 hours)     ** No results found for the last 24 hours. **          Condition on Discharge:  Stable    Vital Signs  Temp:  [97 °F (36.1 °C)-98.3 °F (36.8 °C)] 97.7 °F (36.5 °C)  Heart Rate:  [66-94] 94  Resp:  [16-20] 16  BP: (123-178)/() 131/79    Physical Exam:   General Appearance:    Alert, oriented, cooperative, in no acute distress   Head:    Normocephalic, atraumatic, without obvious abnormality   Eyes:            Lids and lashes normal, conjunctivae and sclerae normal, no   icterus, no pallor   Ears:    Ears appear intact with no abnormalities noted   Throat:   Mucous membranes pink and moist   Neck:   Supple, trachea midline, no carotid bruit, no JVD   Lungs:     Clear to auscultation, respirations regular, even and                   Unlabored. No wheezes, rales, rhonchi    Heart:    Regular rhythm and normal rate, normal S1 and S2, no            murmur, no gallop, no rub, no click   Abdomen:      Normal bowel sounds, no masses, liver and spleen nonpalpable, soft, non-tender, non-distended, no guarding, no rebound tenderness   Genitalia:    Deferred   Extremities:   Moves all extremities well, no edema, no cyanosis, no              Redness, no clubbing   Pulses:   Pulses palpable and equal bilaterally   Skin:   No bleeding, bruising or rash   Neurologic:   Alert and oriented to person, place, and time. No focal neurological deficits     Discharge Disposition  Home or Self Care    Discharge Medications   Mary Nelson   Home Medication Instructions AUGUSTINE:[id number]    Printed on:04/20/17 6863   Medication Information                      albuterol (VENTOLIN HFA) 108 (90 BASE) MCG/ACT inhaler  Inhale 2 puffs 4 (Four) Times a Day.             amitriptyline (ELAVIL) 50 MG tablet  Take 50 mg by mouth Every Night.             aspirin 81 MG tablet  Take 1 tablet by mouth Daily.             atorvastatin (LIPITOR) 40 MG tablet  Take 1 tablet by mouth Every Night.             citalopram (CELEXA) 40 MG tablet  Take 40 mg by mouth Daily.             hydrOXYzine (VISTARIL) 25 MG capsule  Take 25 mg by mouth 3 (Three) Times a Day.             losartan (COZAAR) 25 MG tablet  Take 25 mg by mouth daily.             omeprazole (PRILOSEC) 40 MG capsule  Take 40 mg by mouth Daily.             risperiDONE (RISPERDAL) 0.25 MG tablet  Take 0.25 mg by mouth 2 (Two) Times a Day.             ticagrelor (BRILINTA) 90 MG tablet tablet  Take 1 tablet by mouth 2 (Two) Times a Day.             tiZANidine (ZANAFLEX) 4 MG tablet  Take 4 mg by mouth Every 6 (Six) Hours As Needed.             traZODone (DESYREL) 50 MG tablet  Take 1 tablet by mouth Every Night.                 Discharge Diet:   Diet Instructions     Advance Diet As Tolerated        cardiac healthy diet        Activity at Discharge:   Activity Instructions     Activity as Tolerated          Follow-up Appointments  Future Appointments  Date Time Provider Department  [place]   4/24/2017 9:00 AM Ced Wilson MD [facility] None   5/8/2017 8:50 AM Grover Raymond MD [facility] None   9/21/2017 9:00 AM Loco Martinez MD [facility] None     Additional Instructions for the Follow-ups that You Need to Schedule     Additional Discharge Follow-Up (Specify Provider)    As directed    To:  Dr Erazo   Follow Up:  1 Month              This document has been electronically signed by Kevin Erazo MD on April 20, 2017 7:18 AM        Kevin Erazo MD  04/20/17  7:11 AM    EMR Dragon/Transcription disclaimer:   Much of this encounter note is an electronic transcription/translation of spoken language to printed text. The electronic translation of spoken language may permit erroneous, or at times, nonsensical words or phrases to be inadvertently transcribed; Although I have reviewed the note for such errors, some may still exist.

## 2017-04-24 NOTE — PROGRESS NOTES
Procedure   Procedures      Left knee ap and lateral- moderate joint space narrowing, medial compartment, alignment is correct, adequate bone quality.

## 2017-04-24 NOTE — PROGRESS NOTES
Procedure   Procedures      Right knee ap and lateral- moderate to severe joint space narrowing of the medial compartment, correct alignment, adequate bone quality.

## 2017-04-27 ENCOUNTER — OFFICE VISIT (OUTPATIENT)
Dept: SURGERY | Facility: CLINIC | Age: 62
End: 2017-04-27

## 2017-04-27 VITALS
HEIGHT: 61 IN | DIASTOLIC BLOOD PRESSURE: 70 MMHG | BODY MASS INDEX: 32.47 KG/M2 | WEIGHT: 172 LBS | SYSTOLIC BLOOD PRESSURE: 122 MMHG

## 2017-04-27 DIAGNOSIS — Z09 FOLLOW UP: Primary | ICD-10-CM

## 2017-04-27 PROCEDURE — 99024 POSTOP FOLLOW-UP VISIT: CPT | Performed by: SURGERY

## 2017-04-27 RX ORDER — DICLOFENAC SODIUM 75 MG/1
75 TABLET, DELAYED RELEASE ORAL 2 TIMES DAILY
COMMUNITY
Start: 2017-04-13 | End: 2018-12-26

## 2017-04-27 NOTE — PROGRESS NOTES
CHIEF COMPLAINT:    Chief Complaint   Patient presents with   • Follow-up     S/P Lap. Nissen on 2/1/17.       HISTORY OF PRESENT ILLNESS:    Mary Nelson is a 61 y.o. female who underwent lap nissen in February.  She was having chest pain during her last visit.  She has undergone cardiac stenting.  She still has some pain related to the cath site. Otherwise she is doing well.  Is eating and drinking well at home, having regular bowel function. Reports no fevers or chills.    No further chest pain or SOA.    EXAM:  Vitals:    04/27/17 0955   BP: 122/70         Abdomen soft, incisions well healed    ASSESSMENT:    S/p Lap Nissen    PLAN:    Overall doing well.  Will see prn.          This document has been electronically signed by Ced Wilson MD on April 27, 2017 10:04 AM

## 2017-06-02 DIAGNOSIS — I25.10 CAD IN NATIVE ARTERY: Primary | ICD-10-CM

## 2017-07-05 ENCOUNTER — OFFICE VISIT (OUTPATIENT)
Dept: CARDIOLOGY | Facility: CLINIC | Age: 62
End: 2017-07-05

## 2017-07-05 ENCOUNTER — HOSPITAL ENCOUNTER (OUTPATIENT)
Facility: HOSPITAL | Age: 62
Discharge: HOME | End: 2017-07-06
Attending: INTERNAL MEDICINE | Admitting: INTERNAL MEDICINE

## 2017-07-05 VITALS
OXYGEN SATURATION: 96 % | DIASTOLIC BLOOD PRESSURE: 82 MMHG | HEIGHT: 61 IN | WEIGHT: 176 LBS | BODY MASS INDEX: 33.23 KG/M2 | HEART RATE: 92 BPM | SYSTOLIC BLOOD PRESSURE: 114 MMHG

## 2017-07-05 DIAGNOSIS — I20.0 UNSTABLE ANGINA PECTORIS (HCC): Primary | ICD-10-CM

## 2017-07-05 DIAGNOSIS — E78.2 MIXED HYPERLIPIDEMIA: ICD-10-CM

## 2017-07-05 DIAGNOSIS — Z71.6 TOBACCO ABUSE COUNSELING: ICD-10-CM

## 2017-07-05 DIAGNOSIS — I25.110 CORONARY ARTERY DISEASE INVOLVING NATIVE CORONARY ARTERY OF NATIVE HEART WITH UNSTABLE ANGINA PECTORIS (HCC): ICD-10-CM

## 2017-07-05 DIAGNOSIS — I25.110 CORONARY ARTERY DISEASE INVOLVING NATIVE CORONARY ARTERY OF NATIVE HEART WITH UNSTABLE ANGINA PECTORIS (HCC): Primary | ICD-10-CM

## 2017-07-05 DIAGNOSIS — I10 ESSENTIAL HYPERTENSION: ICD-10-CM

## 2017-07-05 LAB
ALBUMIN SERPL-MCNC: 3.5 G/DL (ref 3.4–4.8)
ALBUMIN/GLOB SERPL: 1.3 G/DL (ref 1.1–1.8)
ALP SERPL-CCNC: 114 U/L (ref 38–126)
ALT SERPL W P-5'-P-CCNC: 36 U/L (ref 9–52)
ANION GAP SERPL CALCULATED.3IONS-SCNC: 8 MMOL/L (ref 5–15)
AST SERPL-CCNC: 17 U/L (ref 14–36)
BILIRUB SERPL-MCNC: 0.2 MG/DL (ref 0.2–1.3)
BUN BLD-MCNC: 10 MG/DL (ref 7–21)
BUN/CREAT SERPL: 9.5 (ref 7–25)
CALCIUM SPEC-SCNC: 8.8 MG/DL (ref 8.4–10.2)
CHLORIDE SERPL-SCNC: 104 MMOL/L (ref 95–110)
CK MB SERPL-CCNC: 0.29 NG/ML (ref 0–5)
CK SERPL-CCNC: 73 U/L (ref 30–135)
CO2 SERPL-SCNC: 24 MMOL/L (ref 22–31)
CREAT BLD-MCNC: 1.05 MG/DL (ref 0.5–1)
DEPRECATED RDW RBC AUTO: 46.8 FL (ref 36.4–46.3)
ERYTHROCYTE [DISTWIDTH] IN BLOOD BY AUTOMATED COUNT: 14.6 % (ref 11.5–14.5)
GFR SERPL CREATININE-BSD FRML MDRD: 54 ML/MIN/1.73 (ref 60–120)
GLOBULIN UR ELPH-MCNC: 2.7 GM/DL (ref 2.3–3.5)
GLUCOSE BLD-MCNC: 107 MG/DL (ref 60–100)
HCT VFR BLD AUTO: 34.1 % (ref 35–45)
HGB BLD-MCNC: 11 G/DL (ref 12–15.5)
HOLD SPECIMEN: NORMAL
INR PPP: 0.9 (ref 0.8–1.2)
MCH RBC QN AUTO: 28 PG (ref 26.5–34)
MCHC RBC AUTO-ENTMCNC: 32.3 G/DL (ref 31.4–36)
MCV RBC AUTO: 86.8 FL (ref 80–98)
NT-PROBNP SERPL-MCNC: 36.4 PG/ML (ref 0–900)
PLATELET # BLD AUTO: 239 10*3/MM3 (ref 150–450)
PMV BLD AUTO: 9.5 FL (ref 8–12)
POTASSIUM BLD-SCNC: 3.8 MMOL/L (ref 3.5–5.1)
PROT SERPL-MCNC: 6.2 G/DL (ref 6.3–8.6)
PROTHROMBIN TIME: 12 SECONDS (ref 11.1–15.3)
RBC # BLD AUTO: 3.93 10*6/MM3 (ref 3.77–5.16)
SODIUM BLD-SCNC: 136 MMOL/L (ref 137–145)
TROPONIN I SERPL-MCNC: <0.012 NG/ML
WBC NRBC COR # BLD: 7.38 10*3/MM3 (ref 3.2–9.8)

## 2017-07-05 PROCEDURE — 83880 ASSAY OF NATRIURETIC PEPTIDE: CPT | Performed by: INTERNAL MEDICINE

## 2017-07-05 PROCEDURE — 82550 ASSAY OF CK (CPK): CPT | Performed by: INTERNAL MEDICINE

## 2017-07-05 PROCEDURE — 85610 PROTHROMBIN TIME: CPT | Performed by: INTERNAL MEDICINE

## 2017-07-05 PROCEDURE — 80053 COMPREHEN METABOLIC PANEL: CPT | Performed by: INTERNAL MEDICINE

## 2017-07-05 PROCEDURE — 84484 ASSAY OF TROPONIN QUANT: CPT | Performed by: INTERNAL MEDICINE

## 2017-07-05 PROCEDURE — 85027 COMPLETE CBC AUTOMATED: CPT | Performed by: INTERNAL MEDICINE

## 2017-07-05 PROCEDURE — 93010 ELECTROCARDIOGRAM REPORT: CPT | Performed by: INTERNAL MEDICINE

## 2017-07-05 PROCEDURE — 93005 ELECTROCARDIOGRAM TRACING: CPT | Performed by: INTERNAL MEDICINE

## 2017-07-05 PROCEDURE — G0378 HOSPITAL OBSERVATION PER HR: HCPCS

## 2017-07-05 PROCEDURE — 82553 CREATINE MB FRACTION: CPT | Performed by: INTERNAL MEDICINE

## 2017-07-05 PROCEDURE — 99214 OFFICE O/P EST MOD 30 MIN: CPT | Performed by: INTERNAL MEDICINE

## 2017-07-05 RX ORDER — SODIUM CHLORIDE 0.9 % (FLUSH) 0.9 %
1-10 SYRINGE (ML) INJECTION AS NEEDED
Status: DISCONTINUED | OUTPATIENT
Start: 2017-07-05 | End: 2017-07-06 | Stop reason: HOSPADM

## 2017-07-05 RX ORDER — TRAZODONE HYDROCHLORIDE 50 MG/1
50 TABLET ORAL NIGHTLY
Status: DISCONTINUED | OUTPATIENT
Start: 2017-07-05 | End: 2017-07-06 | Stop reason: HOSPADM

## 2017-07-05 RX ORDER — DIAZEPAM 2 MG/1
2 TABLET ORAL EVERY 8 HOURS PRN
COMMUNITY
Start: 2017-05-10 | End: 2018-12-26

## 2017-07-05 RX ORDER — HYDROCHLOROTHIAZIDE 25 MG/1
12.5 TABLET ORAL DAILY
Status: DISCONTINUED | OUTPATIENT
Start: 2017-07-06 | End: 2017-07-06 | Stop reason: HOSPADM

## 2017-07-05 RX ORDER — AMITRIPTYLINE HYDROCHLORIDE 50 MG/1
50 TABLET, FILM COATED ORAL NIGHTLY
Status: DISCONTINUED | OUTPATIENT
Start: 2017-07-05 | End: 2017-07-06 | Stop reason: HOSPADM

## 2017-07-05 RX ORDER — HYDROCHLOROTHIAZIDE 12.5 MG/1
12.5 TABLET ORAL DAILY
COMMUNITY
Start: 2017-06-07 | End: 2018-06-03

## 2017-07-05 RX ORDER — ASPIRIN 81 MG/1
81 TABLET ORAL DAILY
Status: DISCONTINUED | OUTPATIENT
Start: 2017-07-06 | End: 2017-07-06 | Stop reason: HOSPADM

## 2017-07-05 RX ORDER — ACETAMINOPHEN 500 MG
TABLET ORAL
COMMUNITY
Start: 2017-06-19 | End: 2018-12-26

## 2017-07-05 RX ORDER — LISINOPRIL 10 MG/1
10 TABLET ORAL DAILY
Status: DISCONTINUED | OUTPATIENT
Start: 2017-07-06 | End: 2017-07-06 | Stop reason: HOSPADM

## 2017-07-05 RX ORDER — DIAZEPAM 2 MG/1
2 TABLET ORAL EVERY 8 HOURS PRN
Status: DISCONTINUED | OUTPATIENT
Start: 2017-07-05 | End: 2017-07-06 | Stop reason: HOSPADM

## 2017-07-05 RX ORDER — ATORVASTATIN CALCIUM 40 MG/1
40 TABLET, FILM COATED ORAL NIGHTLY
Status: DISCONTINUED | OUTPATIENT
Start: 2017-07-05 | End: 2017-07-06 | Stop reason: HOSPADM

## 2017-07-05 RX ORDER — PANTOPRAZOLE SODIUM 40 MG/1
40 TABLET, DELAYED RELEASE ORAL EVERY MORNING
Status: DISCONTINUED | OUTPATIENT
Start: 2017-07-06 | End: 2017-07-06 | Stop reason: HOSPADM

## 2017-07-05 RX ORDER — RISPERIDONE 0.25 MG/1
0.25 TABLET ORAL 2 TIMES DAILY
Status: DISCONTINUED | OUTPATIENT
Start: 2017-07-05 | End: 2017-07-06 | Stop reason: HOSPADM

## 2017-07-05 RX ORDER — HYDROXYZINE PAMOATE 25 MG/1
25 CAPSULE ORAL 3 TIMES DAILY PRN
Status: DISCONTINUED | OUTPATIENT
Start: 2017-07-05 | End: 2017-07-06 | Stop reason: HOSPADM

## 2017-07-05 RX ORDER — SODIUM CHLORIDE 9 MG/ML
50 INJECTION, SOLUTION INTRAVENOUS CONTINUOUS
Status: DISCONTINUED | OUTPATIENT
Start: 2017-07-06 | End: 2017-07-06 | Stop reason: HOSPADM

## 2017-07-05 RX ADMIN — AMITRIPTYLINE HYDROCHLORIDE 50 MG: 50 TABLET, FILM COATED ORAL at 20:35

## 2017-07-05 RX ADMIN — TICAGRELOR 90 MG: 90 TABLET ORAL at 19:02

## 2017-07-05 RX ADMIN — TRAZODONE HYDROCHLORIDE 50 MG: 50 TABLET ORAL at 20:35

## 2017-07-05 RX ADMIN — ATORVASTATIN CALCIUM 40 MG: 40 TABLET, FILM COATED ORAL at 20:35

## 2017-07-05 RX ADMIN — RISPERIDONE 0.25 MG: 0.25 TABLET, FILM COATED ORAL at 19:02

## 2017-07-05 NOTE — PROGRESS NOTES
Chief complaint : Chest pain    History of Present Illness 61-year-old female who comes today for a follow-up visit.  She has started to experience chest discomfort.  She recently lost her  to cancer.  Since then she has started back also back on his cigarettes.  He been complaining chest discomfort very similar to the one she had prior to the stent placement to the LAD.  Patient stated that she be very intolerant to her medications including aspirin and Brilinta.  Patient stated that the chest discomfort radiates to the back and to her neck and under her ears and down the left arm.  This has been going on for several days.         Review of Systems   Constitution: Negative. Negative for decreased appetite, diaphoresis, weakness, night sweats, weight gain and weight loss.   HENT: Negative for headaches, hearing loss, nosebleeds and sore throat.    Eyes: Negative.  Negative for blurred vision and photophobia.   Cardiovascular: Positive for chest pain. Negative for claudication, dyspnea on exertion, irregular heartbeat, leg swelling, palpitations, paroxysmal nocturnal dyspnea and syncope.   Respiratory: Positive for shortness of breath. Negative for cough, hemoptysis and wheezing.    Endocrine: Negative for cold intolerance, heat intolerance, polydipsia, polyphagia and polyuria.   Hematologic/Lymphatic: Negative.    Skin: Negative for color change, dry skin, flushing, itching and rash.   Musculoskeletal: Negative.  Negative for muscle cramps, muscle weakness and myalgias.   Gastrointestinal: Negative for abdominal pain, change in bowel habit, diarrhea, hematemesis, melena, nausea and vomiting.   Genitourinary: Negative for dysuria, frequency and hematuria.   Neurological: Negative for dizziness, focal weakness, light-headedness, loss of balance, numbness and seizures.   Psychiatric/Behavioral: Negative.  Negative for substance abuse, suicidal ideas and thoughts of violence.   Allergic/Immunologic: Negative.         Past Medical History:   Diagnosis Date   • Anxiety    • Arthritis    • COPD (chronic obstructive pulmonary disease)    • Depression    • Epigastric pain    • GERD (gastroesophageal reflux disease)    • Head injury 1990   • Hiatal hernia    • High cholesterol    • HTN (hypertension)        Family History   Problem Relation Age of Onset   • Hypertension Other    • Heart disease Father    • Hypertension Father    • COPD Father    • Arthritis Father        Suad     reports that she has been smoking Cigarettes.  She has a 43.00 pack-year smoking history. She has never used smokeless tobacco. She reports that she does not drink alcohol or use illicit drugs.    Objective     Vital Signs  Heart Rate:  [92] 92  BP: (114)/(82) 114/82    Physical Exam   Constitutional: She is oriented to person, place, and time. She appears well-developed and well-nourished.   HENT:   Head: Normocephalic and atraumatic.   Eyes: Conjunctivae and EOM are normal. Pupils are equal, round, and reactive to light.   Neck: Neck supple. No JVD present. Carotid bruit is not present. No tracheal deviation and no edema present.   Cardiovascular: Normal rate, regular rhythm, S1 normal, S2 normal, normal heart sounds and intact distal pulses.  Exam reveals no gallop, no S3, no S4 and no friction rub.    No murmur heard.  Pulmonary/Chest: Effort normal and breath sounds normal. She has no wheezes. She has no rales. She exhibits no tenderness.   Abdominal: Bowel sounds are normal. She exhibits no abdominal bruit and no pulsatile midline mass. There is no rebound and no guarding.   Musculoskeletal: Normal range of motion. She exhibits no edema.   Neurological: She is alert and oriented to person, place, and time.   Skin: Skin is warm and dry.   Psychiatric: She has a normal mood and affect.       Procedures    Assessment/Plan     Patient Active Problem List   Diagnosis   • Epigastric pain   • Hiatal hernia   • Major depressive disorder with single  episode, in partial remission   • Benign essential HTN   • Gastroesophageal reflux disease without esophagitis   • Panlobular emphysema   • Hiatal hernia with gastroesophageal reflux   • Abnormal nuclear stress test   • Coronary artery disease involving native coronary artery of native heart with unstable angina pectoris   • Essential hypertension   • Mixed hyperlipidemia   • Tobacco abuse counseling     1. Coronary artery disease involving native coronary artery of native heart with unstable angina pectoris  April 19, 2017:   2.5 x 23 mm Xience Alpine stent to mid LAD    Since patient has started to have chest discomfort at recommended that she gets admitted to the hospital to observation unit today.  We will consider diagnostic coronary angiogram early this week.    2. Essential hypertension  Patient's blood pressure is well-controlled.  We will continue with current medications.    3. Mixed hyperlipidemia  Component      Latest Ref Rng & Units 11/18/2015 4/20/2017   Total Cholesterol      0 - 199 mg/dL 404 (H) 356 (H)   Triglycerides      20 - 199 mg/dL 235 (H) 245 (H)   HDL Cholesterol      60 - 200 mg/dL 46 (L) 42 (L)   LDL Cholesterol      1 - 129 mg/dL 311 (H) 255 (H)   LDL/HDL Ratio      0.00 - 3.22  6.31 (H)   Patient currently is on Lipitor  We will need a fasting lipid panel and liver function test    4. Tobacco abuse counseling  Patient has been counseled regarding tobacco smoking cessation.    I discussed the patients findings and my recommendations with patient.          This document has been electronically signed by Kevin Erazo MD on July 5, 2017 3:35 PM     Kevin Erazo MD  07/05/17  3:32 PM

## 2017-07-06 ENCOUNTER — APPOINTMENT (OUTPATIENT)
Dept: CARDIOLOGY | Facility: HOSPITAL | Age: 62
End: 2017-07-06
Attending: INTERNAL MEDICINE

## 2017-07-06 VITALS
OXYGEN SATURATION: 98 % | BODY MASS INDEX: 33.12 KG/M2 | DIASTOLIC BLOOD PRESSURE: 75 MMHG | WEIGHT: 175.4 LBS | RESPIRATION RATE: 18 BRPM | HEART RATE: 87 BPM | TEMPERATURE: 96 F | HEIGHT: 61 IN | SYSTOLIC BLOOD PRESSURE: 134 MMHG

## 2017-07-06 LAB
ALBUMIN SERPL-MCNC: 3.8 G/DL (ref 3.4–4.8)
ALBUMIN/GLOB SERPL: 1.3 G/DL (ref 1.1–1.8)
ALP SERPL-CCNC: 127 U/L (ref 38–126)
ALT SERPL W P-5'-P-CCNC: 38 U/L (ref 9–52)
ANION GAP SERPL CALCULATED.3IONS-SCNC: 9 MMOL/L (ref 5–15)
AST SERPL-CCNC: 27 U/L (ref 14–36)
BILIRUB SERPL-MCNC: 0.2 MG/DL (ref 0.2–1.3)
BUN BLD-MCNC: 11 MG/DL (ref 7–21)
BUN/CREAT SERPL: 10.7 (ref 7–25)
CALCIUM SPEC-SCNC: 9.1 MG/DL (ref 8.4–10.2)
CHLORIDE SERPL-SCNC: 104 MMOL/L (ref 95–110)
CO2 SERPL-SCNC: 25 MMOL/L (ref 22–31)
CREAT BLD-MCNC: 1.03 MG/DL (ref 0.5–1)
DEPRECATED RDW RBC AUTO: 46 FL (ref 36.4–46.3)
ERYTHROCYTE [DISTWIDTH] IN BLOOD BY AUTOMATED COUNT: 14.4 % (ref 11.5–14.5)
GFR SERPL CREATININE-BSD FRML MDRD: 55 ML/MIN/1.73 (ref 60–120)
GLOBULIN UR ELPH-MCNC: 3 GM/DL (ref 2.3–3.5)
GLUCOSE BLD-MCNC: 107 MG/DL (ref 60–100)
HCT VFR BLD AUTO: 35.9 % (ref 35–45)
HGB BLD-MCNC: 11.4 G/DL (ref 12–15.5)
MCH RBC QN AUTO: 27.8 PG (ref 26.5–34)
MCHC RBC AUTO-ENTMCNC: 31.8 G/DL (ref 31.4–36)
MCV RBC AUTO: 87.6 FL (ref 80–98)
PLATELET # BLD AUTO: 242 10*3/MM3 (ref 150–450)
PMV BLD AUTO: 10.2 FL (ref 8–12)
POTASSIUM BLD-SCNC: 4.3 MMOL/L (ref 3.5–5.1)
PROT SERPL-MCNC: 6.8 G/DL (ref 6.3–8.6)
RBC # BLD AUTO: 4.1 10*6/MM3 (ref 3.77–5.16)
SODIUM BLD-SCNC: 138 MMOL/L (ref 137–145)
TROPONIN I SERPL-MCNC: <0.012 NG/ML
TROPONIN I SERPL-MCNC: <0.012 NG/ML
WBC NRBC COR # BLD: 7.89 10*3/MM3 (ref 3.2–9.8)

## 2017-07-06 PROCEDURE — 85027 COMPLETE CBC AUTOMATED: CPT | Performed by: INTERNAL MEDICINE

## 2017-07-06 PROCEDURE — 25010000002 ONDANSETRON PER 1 MG: Performed by: INTERNAL MEDICINE

## 2017-07-06 PROCEDURE — 84484 ASSAY OF TROPONIN QUANT: CPT | Performed by: INTERNAL MEDICINE

## 2017-07-06 PROCEDURE — C1760 CLOSURE DEV, VASC: HCPCS | Performed by: INTERNAL MEDICINE

## 2017-07-06 PROCEDURE — 25010000002 FENTANYL CITRATE (PF) 100 MCG/2ML SOLUTION: Performed by: INTERNAL MEDICINE

## 2017-07-06 PROCEDURE — 99212 OFFICE O/P EST SF 10 MIN: CPT | Performed by: INTERNAL MEDICINE

## 2017-07-06 PROCEDURE — C1894 INTRO/SHEATH, NON-LASER: HCPCS | Performed by: INTERNAL MEDICINE

## 2017-07-06 PROCEDURE — 80053 COMPREHEN METABOLIC PANEL: CPT | Performed by: INTERNAL MEDICINE

## 2017-07-06 PROCEDURE — 93306 TTE W/DOPPLER COMPLETE: CPT

## 2017-07-06 PROCEDURE — 25010000002 MIDAZOLAM PER 1 MG: Performed by: INTERNAL MEDICINE

## 2017-07-06 PROCEDURE — 93458 L HRT ARTERY/VENTRICLE ANGIO: CPT | Performed by: INTERNAL MEDICINE

## 2017-07-06 PROCEDURE — 0 IOPAMIDOL PER 1 ML: Performed by: INTERNAL MEDICINE

## 2017-07-06 PROCEDURE — 93306 TTE W/DOPPLER COMPLETE: CPT | Performed by: INTERNAL MEDICINE

## 2017-07-06 PROCEDURE — C1769 GUIDE WIRE: HCPCS | Performed by: INTERNAL MEDICINE

## 2017-07-06 RX ORDER — ACETAMINOPHEN 325 MG/1
650 TABLET ORAL EVERY 4 HOURS PRN
Status: DISCONTINUED | OUTPATIENT
Start: 2017-07-06 | End: 2017-07-06 | Stop reason: HOSPADM

## 2017-07-06 RX ORDER — ONDANSETRON 4 MG/1
4 TABLET, FILM COATED ORAL EVERY 6 HOURS PRN
Status: DISCONTINUED | OUTPATIENT
Start: 2017-07-06 | End: 2017-07-06 | Stop reason: HOSPADM

## 2017-07-06 RX ORDER — ONDANSETRON 4 MG/1
4 TABLET, ORALLY DISINTEGRATING ORAL EVERY 6 HOURS PRN
Status: DISCONTINUED | OUTPATIENT
Start: 2017-07-06 | End: 2017-07-06 | Stop reason: HOSPADM

## 2017-07-06 RX ORDER — LIDOCAINE HYDROCHLORIDE 20 MG/ML
INJECTION, SOLUTION INFILTRATION; PERINEURAL AS NEEDED
Status: DISCONTINUED | OUTPATIENT
Start: 2017-07-06 | End: 2017-07-06 | Stop reason: HOSPADM

## 2017-07-06 RX ORDER — ONDANSETRON 2 MG/ML
4 INJECTION INTRAMUSCULAR; INTRAVENOUS EVERY 6 HOURS PRN
Status: DISCONTINUED | OUTPATIENT
Start: 2017-07-06 | End: 2017-07-06 | Stop reason: HOSPADM

## 2017-07-06 RX ORDER — SODIUM CHLORIDE 9 MG/ML
125 INJECTION, SOLUTION INTRAVENOUS CONTINUOUS
Status: DISCONTINUED | OUTPATIENT
Start: 2017-07-06 | End: 2017-07-06 | Stop reason: HOSPADM

## 2017-07-06 RX ORDER — MIDAZOLAM HYDROCHLORIDE 1 MG/ML
INJECTION INTRAMUSCULAR; INTRAVENOUS AS NEEDED
Status: DISCONTINUED | OUTPATIENT
Start: 2017-07-06 | End: 2017-07-06 | Stop reason: HOSPADM

## 2017-07-06 RX ORDER — CALCIUM CARBONATE 200(500)MG
2 TABLET,CHEWABLE ORAL 2 TIMES DAILY PRN
Status: DISCONTINUED | OUTPATIENT
Start: 2017-07-06 | End: 2017-07-06 | Stop reason: HOSPADM

## 2017-07-06 RX ORDER — NITROGLYCERIN 5 MG/ML
INJECTION, SOLUTION INTRAVENOUS AS NEEDED
Status: DISCONTINUED | OUTPATIENT
Start: 2017-07-06 | End: 2017-07-06 | Stop reason: HOSPADM

## 2017-07-06 RX ORDER — FENTANYL CITRATE 50 UG/ML
INJECTION, SOLUTION INTRAMUSCULAR; INTRAVENOUS AS NEEDED
Status: DISCONTINUED | OUTPATIENT
Start: 2017-07-06 | End: 2017-07-06 | Stop reason: HOSPADM

## 2017-07-06 RX ADMIN — LISINOPRIL 10 MG: 10 TABLET ORAL at 10:01

## 2017-07-06 RX ADMIN — SODIUM CHLORIDE 125 ML/HR: 9 INJECTION, SOLUTION INTRAVENOUS at 18:11

## 2017-07-06 RX ADMIN — TICAGRELOR 90 MG: 90 TABLET ORAL at 17:15

## 2017-07-06 RX ADMIN — SODIUM CHLORIDE 50 ML/HR: 9 INJECTION, SOLUTION INTRAVENOUS at 07:52

## 2017-07-06 RX ADMIN — RISPERIDONE 0.25 MG: 0.25 TABLET, FILM COATED ORAL at 17:15

## 2017-07-06 RX ADMIN — ONDANSETRON 4 MG: 2 INJECTION INTRAMUSCULAR; INTRAVENOUS at 10:17

## 2017-07-06 RX ADMIN — SODIUM CHLORIDE 125 ML/HR: 9 INJECTION, SOLUTION INTRAVENOUS at 09:59

## 2017-07-06 RX ADMIN — ACETAMINOPHEN 650 MG: 325 TABLET ORAL at 10:01

## 2017-07-06 NOTE — DISCHARGE SUMMARY
Date of Discharge:  7/6/2017    Discharge Diagnosis:   1.  Unstable angina  2.  Coronary artery disease status post cardiac catheterization  3.  Essential hypertension  4.  Hyperlipidemia  5.  Chronic tobacco abuse syndrome    Presenting Problem/History of Present Illness  Unstable angina pectoris [I20.0]  Unstable angina pectoris [I20.0]  Coronary artery disease involving native coronary artery of native heart with unstable angina pectoris [I25.110]       Hospital Course  Patient is a 61 y.o. female presented with chief complaint of chest pain similar to what she had prior to her stent placement back in April.  Patient was admitted to the hospital with unstable angina.  After she was kept nothing by mouth overnight and myocardial infarction was ruled out she underwent diagnostic coronary angiogram.  The diagnostic coronary angiogram revealed patent LAD stent and nonobstructive coronary disease in the proximal LAD.  She was subsequently brought back to her room in stable condition.  Currently patient appears to be in no apparent distress.  The diagnostic cardiac catheterization site has no evidence of hematoma or bleeding.  Patient continues to have chest discomfort.  She has no shortness of breath orthopnea or PND.  Patient has been counseled regarding risk factor modification including tobacco smoking cessation.      Procedures Performed  Procedure(s):  Left Heart Cath       Consults:   Consults     No orders found for last 30 day(s).          Pertinent Test Results:     Lab Results (last 24 hours)     Procedure Component Value Units Date/Time    Extra Tubes [[id number]] Collected:  07/05/17 1809    Specimen:  Blood from Blood, Venous Line Updated:  07/05/17 2001    Narrative:       The following orders were created for panel order Extra Tubes.  Procedure                               Abnormality         Status                     ---------                               -----------         ------                      Gold Top - SST[[id number]]                                   Final result                 Please view results for these tests on the individual orders.    Gold Top - SST [[id number]] Collected:  07/05/17 1809    Specimen:  Blood Updated:  07/05/17 2001     Extra Tube Hold for add-ons.      Auto resulted.       CBC (No Diff) [[id number]]  (Abnormal) Collected:  07/05/17 2056    Specimen:  Blood Updated:  07/05/17 2109     WBC 7.38 10*3/mm3      RBC 3.93 10*6/mm3      Hemoglobin 11.0 (L) g/dL      Hematocrit 34.1 (L) %      MCV 86.8 fL      MCH 28.0 pg      MCHC 32.3 g/dL      RDW 14.6 (H) %      RDW-SD 46.8 (H) fl      MPV 9.5 fL      Platelets 239 10*3/mm3     Protime-INR [[id number]]  (Normal) Collected:  07/05/17 2056    Specimen:  Blood Updated:  07/05/17 2118     Protime 12.0 Seconds      INR 0.90    Narrative:       Therapeutic range for most indications is 2.0-3.0 INR,  or 2.5-3.5 for mechanical heart valves.    Comprehensive Metabolic Panel [[id number]]  (Abnormal) Collected:  07/05/17 2056    Specimen:  Blood Updated:  07/05/17 2121     Glucose 107 (H) mg/dL      BUN 10 mg/dL      Creatinine 1.05 (H) mg/dL      Sodium 136 (L) mmol/L      Potassium 3.8 mmol/L      Chloride 104 mmol/L      CO2 24.0 mmol/L      Calcium 8.8 mg/dL      Total Protein 6.2 (L) g/dL      Albumin 3.50 g/dL      ALT (SGPT) 36 U/L      AST (SGOT) 17 U/L      Alkaline Phosphatase 114 U/L      Total Bilirubin 0.2 mg/dL      eGFR Non African Amer 54 (L) mL/min/1.73      Globulin 2.7 gm/dL      A/G Ratio 1.3 g/dL      BUN/Creatinine Ratio 9.5     Anion Gap 8.0 mmol/L     Troponin [[id number]]  (Normal) Collected:  07/05/17 2340    Specimen:  Blood Updated:  07/06/17 0045     Troponin I <0.012 ng/mL     CBC (No Diff) [[id number]]  (Abnormal) Collected:  07/06/17 0512    Specimen:  Blood Updated:  07/06/17 0521     WBC 7.89 10*3/mm3      RBC 4.10 10*6/mm3      Hemoglobin 11.4 (L) g/dL      Hematocrit 35.9 %      MCV 87.6 fL      MCH 27.8 pg       MCHC 31.8 g/dL      RDW 14.4 %      RDW-SD 46.0 fl      MPV 10.2 fL      Platelets 242 10*3/mm3     Comprehensive Metabolic Panel [[id number]]  (Abnormal) Collected:  07/06/17 0512    Specimen:  Blood Updated:  07/06/17 0544     Glucose 107 (H) mg/dL      BUN 11 mg/dL      Creatinine 1.03 (H) mg/dL      Sodium 138 mmol/L      Potassium 4.3 mmol/L      Chloride 104 mmol/L      CO2 25.0 mmol/L      Calcium 9.1 mg/dL      Total Protein 6.8 g/dL      Albumin 3.80 g/dL      ALT (SGPT) 38 U/L      AST (SGOT) 27 U/L      Alkaline Phosphatase 127 (H) U/L      Total Bilirubin 0.2 mg/dL      eGFR Non African Amer 55 (L) mL/min/1.73      Globulin 3.0 gm/dL      A/G Ratio 1.3 g/dL      BUN/Creatinine Ratio 10.7     Anion Gap 9.0 mmol/L     Troponin [[id number]]  (Normal) Collected:  07/06/17 0512    Specimen:  Blood Updated:  07/06/17 0546     Troponin I <0.012 ng/mL           Imaging Results (last 24 hours)     ** No results found for the last 24 hours. **          ECG/EMG Results (last 24 hours)     Procedure Component Value Units Date/Time    ECG 12 Lead [[id number]] Collected:  07/05/17 1858     Updated:  07/05/17 1922    Narrative:       Test Reason : chest pain  Blood Pressure : **/** mmHG  Vent. Rate : 086 BPM     Atrial Rate : 086 BPM     P-R Int : 176 ms          QRS Dur : 072 ms      QT Int : 376 ms       P-R-T Axes : 044 -19 045 degrees     QTc Int : 449 ms    Normal sinus rhythm  Inferior-posterior infarct (cited on or before 27-FEB-2017)  Abnormal ECG  When compared with ECG of 27-FEB-2017 10:24,  Nonspecific T wave abnormality has replaced inverted T waves in Inferior  leads    Referred By:             Confirmed By:     Adult Transthoracic Echo Complete [[id number]] Collected:  07/06/17 0854     Updated:  07/06/17 0935      CV ECHO JERRY - RVDD 1.8 cm      IVSd 1.0 cm      LVIDd 3.9 cm      LVIDs 2.6 cm      LVPWd 1.0 cm      IVS/LVPW 0.98     FS 31.6 %      EDV(Teich) 64.0 ml      ESV(Teich) 25.4 ml      EF(Teich)  60.3 %      EDV(cubed) 57.1 ml      ESV(cubed) 18.2 ml      EF(cubed) 68.1 %      LV mass(C)d 125.4 grams      SV(Teich) 38.6 ml      SV(cubed) 38.9 ml      Ao root diam 2.8 cm      Ao root area 6.3 cm^2      ACS 1.5 cm      LA dimension 3.0 cm      LA/Ao 1.1     LVOT diam 2.0 cm      LVOT area 3.1 cm^2      MV E max eliu 109.3 cm/sec      MV A max eliu 140.0 cm/sec      MV E/A 0.78     MV V2 max 145.0 cm/sec      MV max PG 8.4 mmHg      MV V2 mean 91.3 cm/sec      MV mean PG 3.9 mmHg      MV V2 VTI 28.4 cm      MVA(VTI) 2.9 cm^2      MV P1/2t max eliu 138.0 cm/sec      Ao pk eliu 155.0 cm/sec      Ao max PG 9.6 mmHg      Ao max PG (full) 5.2 mmHg      Ao V2 mean 110.3 cm/sec      Ao mean PG 5.3 mmHg      Ao mean PG (full) 2.6 mmHg      Ao V2 VTI 32.3 cm      EMMY(I,A) 2.5 cm^2      EMMY(I,D) 2.5 cm^2      EMMY(V,A) 2.1 cm^2      EMMY(V,D) 2.1 cm^2      LV V1 max PG 4.4 mmHg      LV V1 mean PG 2.7 mmHg      LV V1 max 105.0 cm/sec      LV V1 mean 80.4 cm/sec      LV V1 VTI 26.4 cm      MR max eliu 591.5 cm/sec      MR max .9 mmHg      SV(Ao) 203.9 ml      SV(LVOT) 81.8 ml      PA V2 max 128.2 cm/sec      PA max PG 6.6 mmHg      TR max eliu 197.8 cm/sec      RVSP(TR) 20.7 mmHg      RAP systole 5.0 mmHg      MVA P1/2T LCG 1.6 cm^2     SCANNED EKG [[id number]] Resulted:  07/05/17      Updated:  07/06/17 1248          Condition on Discharge:  Stable    Vital Signs  Temp:  [96 °F (35.6 °C)-97.7 °F (36.5 °C)] 96 °F (35.6 °C)  Heart Rate:  [78-99] 87  Resp:  [16-20] 18  BP: (111-134)/(69-80) 134/75    Physical Exam:   General Appearance:    Alert, oriented, cooperative, in no acute distress   Head:    Normocephalic, atraumatic, without obvious abnormality   Eyes:            Lids and lashes normal, conjunctivae and sclerae normal, no   icterus, no pallor   Ears:    Ears appear intact with no abnormalities noted   Throat:   Mucous membranes pink and moist   Neck:   Supple, trachea midline, no carotid bruit, no JVD   Lungs:      Clear to auscultation, respirations regular, even and                   Unlabored. No wheezes, rales, rhonchi    Heart:    Regular rhythm and normal rate, normal S1 and S2, no            murmur, no gallop, no rub, no click   Abdomen:     Normal bowel sounds, no masses, liver and spleen nonpalpable, soft, non-tender, non-distended, no guarding, no rebound tenderness   Genitalia:    Deferred   Extremities:   Moves all extremities well, no edema, no cyanosis, no              Redness, no clubbing   Pulses:   Pulses palpable and equal bilaterally   Skin:   No bleeding, bruising or rash   Neurologic:   Alert and oriented to person, place, and time. No focal neurological deficits      Discharge Disposition  Home or Self Care    Discharge Medications   Mary Nelson   Home Medication Instructions AUGUSTINE:[id number]    Printed on:07/06/17 5338   Medication Information                      acetaminophen (TYLENOL) 500 MG tablet  TAKE ONE TABLET BY MOUTH EVERY 6 HOURS AS NEEDED FOR PAIN             albuterol (VENTOLIN HFA) 108 (90 BASE) MCG/ACT inhaler  Inhale 2 puffs 4 (Four) Times a Day.             amitriptyline (ELAVIL) 50 MG tablet  Take 50 mg by mouth Every Night.             aspirin 81 MG tablet  Take 1 tablet by mouth Daily.             atorvastatin (LIPITOR) 40 MG tablet  Take 1 tablet by mouth Every Night.             citalopram (CELEXA) 40 MG tablet  Take 40 mg by mouth Daily.             diazePAM (VALIUM) 2 MG tablet  Take 2 mg by mouth Every 8 (Eight) Hours As Needed for Anxiety.             diclofenac (VOLTAREN) 75 MG EC tablet  Take 75 mg by mouth 2 (Two) Times a Day.             hydrochlorothiazide (HYDRODIURIL) 12.5 MG tablet  Take 12.5 mg by mouth Daily.             hydrOXYzine (VISTARIL) 25 MG capsule  Take 25 mg by mouth 3 (Three) Times a Day As Needed for Anxiety.             lisinopril (PRINIVIL,ZESTRIL) 10 MG tablet  Take 10 mg by mouth Daily.             omeprazole (PRILOSEC) 40 MG capsule  Take 40  mg by mouth Daily.             risperiDONE (RISPERDAL) 0.25 MG tablet  Take 0.25 mg by mouth 2 (Two) Times a Day.             ticagrelor (BRILINTA) 90 MG tablet tablet  Take 1 tablet by mouth 2 (Two) Times a Day.             tiZANidine (ZANAFLEX) 4 MG tablet  Take 4 mg by mouth Every 6 (Six) Hours As Needed.             traZODone (DESYREL) 50 MG tablet  Take 1 tablet by mouth Every Night.                 Discharge Diet:     Activity at Discharge:   Activity Instructions     Activity as Tolerated                     Follow-up Appointments  Future Appointments  Date Time Provider Department Center   7/12/2017 9:20 AM Grover Raymond MD [facility] None   9/21/2017 9:00 AM Loco Martinez MD [facility] None     Additional Instructions for the Follow-ups that You Need to Schedule     Discharge Follow-Up With Specified Provider    As directed    To:  Dr Erazo   Follow Up:  1 Month                            This document has been electronically signed by Kevin Erazo MD on July 6, 2017 6:51 PM     Kevin Erazo MD  07/06/17  6:47 PM

## 2017-07-06 NOTE — PLAN OF CARE
Problem: Patient Care Overview (Adult)  Goal: Plan of Care Review  Outcome: Ongoing (interventions implemented as appropriate)    07/06/17 1702   Coping/Psychosocial Response Interventions   Plan Of Care Reviewed With patient   Patient Care Overview   Progress improving       Goal: Adult Individualization and Mutuality  Outcome: Ongoing (interventions implemented as appropriate)  Goal: Discharge Needs Assessment  Outcome: Ongoing (interventions implemented as appropriate)    Problem: Acute Coronary Syndrome (ACS) (Adult)  Goal: Signs and Symptoms of Listed Potential Problems Will be Absent or Manageable (Acute Coronary Syndrome)  Outcome: Ongoing (interventions implemented as appropriate)

## 2017-07-06 NOTE — INTERVAL H&P NOTE
H&P updated. The patient was examined and the following changes are noted:  The risk and benefits of diagnostic cardiac cath and possible stent placement was discussed with patient.  Patient has verbalized understanding.  All her questions were answered to her satisfaction.  She has given us permission to put her on the schedule for this morning.

## 2017-07-06 NOTE — PLAN OF CARE
Problem: Patient Care Overview (Adult)  Goal: Plan of Care Review  Outcome: Ongoing (interventions implemented as appropriate)    07/05/17 1838 07/05/17 2226   Coping/Psychosocial Response Interventions   Plan Of Care Reviewed With --  patient   Patient Care Overview   Progress no change --        Goal: Adult Individualization and Mutuality  Outcome: Ongoing (interventions implemented as appropriate)  Goal: Discharge Needs Assessment  Outcome: Ongoing (interventions implemented as appropriate)    07/05/17 1600 07/05/17 1838 07/06/17 0446   Discharge Needs Assessment   Concerns To Be Addressed --  denies needs/concerns at this time --    Readmission Within The Last 30 Days --  --  no previous admission in last 30 days   Current Health   Anticipated Changes Related to Illness --  --  none   Living Environment   Transportation Available family or friend will provide --  --    Self-Care   Equipment Currently Used at Home none --  --          Problem: Acute Coronary Syndrome (ACS) (Adult)  Goal: Signs and Symptoms of Listed Potential Problems Will be Absent or Manageable (Acute Coronary Syndrome)  Outcome: Ongoing (interventions implemented as appropriate)    07/06/17 0446   Acute Coronary Syndrome (ACS)   Problems Assessed (Acute Coronary Syndrome (ACS)) all   Problems Present (Acute Coronary Syndrome (ACS)) chest pain (angina)

## 2017-07-07 LAB
BH CV ECHO MEAS - ACS: 1.5 CM
BH CV ECHO MEAS - AO MAX PG (FULL): 5.2 MMHG
BH CV ECHO MEAS - AO MAX PG: 9.6 MMHG
BH CV ECHO MEAS - AO MEAN PG (FULL): 2.6 MMHG
BH CV ECHO MEAS - AO MEAN PG: 5.3 MMHG
BH CV ECHO MEAS - AO ROOT AREA: 6.3 CM^2
BH CV ECHO MEAS - AO ROOT DIAM: 2.8 CM
BH CV ECHO MEAS - AO V2 MAX: 155 CM/SEC
BH CV ECHO MEAS - AO V2 MEAN: 110.3 CM/SEC
BH CV ECHO MEAS - AO V2 VTI: 32.3 CM
BH CV ECHO MEAS - AVA(I,A): 2.5 CM^2
BH CV ECHO MEAS - AVA(I,D): 2.5 CM^2
BH CV ECHO MEAS - AVA(V,A): 2.1 CM^2
BH CV ECHO MEAS - AVA(V,D): 2.1 CM^2
BH CV ECHO MEAS - EDV(CUBED): 57.1 ML
BH CV ECHO MEAS - EDV(TEICH): 64 ML
BH CV ECHO MEAS - EF(CUBED): 68.1 %
BH CV ECHO MEAS - EF(TEICH): 60.3 %
BH CV ECHO MEAS - ESV(CUBED): 18.2 ML
BH CV ECHO MEAS - ESV(TEICH): 25.4 ML
BH CV ECHO MEAS - FS: 31.6 %
BH CV ECHO MEAS - IVS/LVPW: 0.98
BH CV ECHO MEAS - IVSD: 1 CM
BH CV ECHO MEAS - LA DIMENSION: 3 CM
BH CV ECHO MEAS - LA/AO: 1.1
BH CV ECHO MEAS - LV MASS(C)D: 125.4 GRAMS
BH CV ECHO MEAS - LV MAX PG: 4.4 MMHG
BH CV ECHO MEAS - LV MEAN PG: 2.7 MMHG
BH CV ECHO MEAS - LV V1 MAX: 105 CM/SEC
BH CV ECHO MEAS - LV V1 MEAN: 80.4 CM/SEC
BH CV ECHO MEAS - LV V1 VTI: 26.4 CM
BH CV ECHO MEAS - LVIDD: 3.9 CM
BH CV ECHO MEAS - LVIDS: 2.6 CM
BH CV ECHO MEAS - LVOT AREA: 3.1 CM^2
BH CV ECHO MEAS - LVOT DIAM: 2 CM
BH CV ECHO MEAS - LVPWD: 1 CM
BH CV ECHO MEAS - MR MAX PG: 139.9 MMHG
BH CV ECHO MEAS - MR MAX VEL: 591.5 CM/SEC
BH CV ECHO MEAS - MV A MAX VEL: 140 CM/SEC
BH CV ECHO MEAS - MV E MAX VEL: 109.3 CM/SEC
BH CV ECHO MEAS - MV E/A: 0.78
BH CV ECHO MEAS - MV MAX PG: 8.4 MMHG
BH CV ECHO MEAS - MV MEAN PG: 3.9 MMHG
BH CV ECHO MEAS - MV P1/2T MAX VEL: 138 CM/SEC
BH CV ECHO MEAS - MV V2 MAX: 145 CM/SEC
BH CV ECHO MEAS - MV V2 MEAN: 91.3 CM/SEC
BH CV ECHO MEAS - MV V2 VTI: 28.4 CM
BH CV ECHO MEAS - MVA P1/2T LCG: 1.6 CM^2
BH CV ECHO MEAS - MVA(VTI): 2.9 CM^2
BH CV ECHO MEAS - PA MAX PG: 6.6 MMHG
BH CV ECHO MEAS - PA V2 MAX: 128.2 CM/SEC
BH CV ECHO MEAS - RAP SYSTOLE: 5 MMHG
BH CV ECHO MEAS - RVDD: 1.8 CM
BH CV ECHO MEAS - RVSP: 20.7 MMHG
BH CV ECHO MEAS - SV(AO): 203.9 ML
BH CV ECHO MEAS - SV(CUBED): 38.9 ML
BH CV ECHO MEAS - SV(LVOT): 81.8 ML
BH CV ECHO MEAS - SV(TEICH): 38.6 ML
BH CV ECHO MEAS - TR MAX VEL: 197.8 CM/SEC

## 2017-07-07 NOTE — PLAN OF CARE
Problem: Patient Care Overview (Adult)  Goal: Adult Individualization and Mutuality  Outcome: Outcome(s) achieved Date Met:  07/06/17  Goal: Discharge Needs Assessment  Outcome: Outcome(s) achieved Date Met:  07/06/17 07/05/17 1600 07/05/17 1838 07/06/17 0446   Discharge Needs Assessment   Concerns To Be Addressed --  denies needs/concerns at this time --    Readmission Within The Last 30 Days --  --  no previous admission in last 30 days   Current Health   Anticipated Changes Related to Illness --  --  none   Living Environment   Transportation Available family or friend will provide --  --    Self-Care   Equipment Currently Used at Home none --  --          Problem: Acute Coronary Syndrome (ACS) (Adult)  Goal: Signs and Symptoms of Listed Potential Problems Will be Absent or Manageable (Acute Coronary Syndrome)  Outcome: Outcome(s) achieved Date Met:  07/06/17 07/06/17 1926   Acute Coronary Syndrome (ACS)   Problems Assessed (Acute Coronary Syndrome (ACS)) all   Problems Present (Acute Coronary Syndrome (ACS)) none

## 2017-07-07 NOTE — PLAN OF CARE
Problem: Patient Care Overview (Adult)  Goal: Plan of Care Review  Outcome: Outcome(s) achieved Date Met:  07/06/17

## 2017-07-12 ENCOUNTER — OFFICE VISIT (OUTPATIENT)
Dept: ORTHOPEDIC SURGERY | Facility: CLINIC | Age: 62
End: 2017-07-12

## 2017-07-12 VITALS — HEIGHT: 61 IN | WEIGHT: 175 LBS | BODY MASS INDEX: 33.04 KG/M2

## 2017-07-12 DIAGNOSIS — G89.29 CHRONIC PAIN OF BOTH KNEES: ICD-10-CM

## 2017-07-12 DIAGNOSIS — M25.561 CHRONIC PAIN OF BOTH KNEES: ICD-10-CM

## 2017-07-12 DIAGNOSIS — M25.562 CHRONIC PAIN OF BOTH KNEES: ICD-10-CM

## 2017-07-12 DIAGNOSIS — M17.0 PRIMARY OSTEOARTHRITIS OF BOTH KNEES: Primary | ICD-10-CM

## 2017-07-12 PROCEDURE — 99213 OFFICE O/P EST LOW 20 MIN: CPT | Performed by: ORTHOPAEDIC SURGERY

## 2017-07-12 NOTE — PROGRESS NOTES
Mary Nelson is a 61 y.o. female returns for     Chief Complaint   Patient presents with   • Left Knee - Follow-up   • Right Knee - Follow-up       HISTORY OF PRESENT ILLNESS: Patient follows up with bilat knee pain.   Continues to have knee pain left >> right.  Pain is constant and worse with activity.  She is a patient of Dr. Erazo, she was in the hospital recently for cardiac catheterization and cardiac workup.         CONCURRENT MEDICAL HISTORY:    Past Medical History:   Diagnosis Date   • Anxiety    • Arthritis    • COPD (chronic obstructive pulmonary disease)    • Depression    • Epigastric pain    • GERD (gastroesophageal reflux disease)    • Head injury 1990   • Hiatal hernia    • High cholesterol    • HTN (hypertension)        Allergies   Allergen Reactions   • Codeine Swelling         Current Outpatient Prescriptions:   •  acetaminophen (TYLENOL) 500 MG tablet, TAKE ONE TABLET BY MOUTH EVERY 6 HOURS AS NEEDED FOR PAIN, Disp: , Rfl:   •  albuterol (VENTOLIN HFA) 108 (90 BASE) MCG/ACT inhaler, Inhale 2 puffs 4 (Four) Times a Day., Disp: , Rfl:   •  amitriptyline (ELAVIL) 50 MG tablet, Take 50 mg by mouth Every Night., Disp: , Rfl:   •  aspirin 81 MG tablet, Take 1 tablet by mouth Daily., Disp: 30 tablet, Rfl: 11  •  atorvastatin (LIPITOR) 40 MG tablet, Take 1 tablet by mouth Every Night., Disp: 30 tablet, Rfl: 6  •  citalopram (CELEXA) 40 MG tablet, Take 40 mg by mouth Daily., Disp: , Rfl:   •  diazePAM (VALIUM) 2 MG tablet, Take 2 mg by mouth Every 8 (Eight) Hours As Needed for Anxiety., Disp: , Rfl:   •  diclofenac (VOLTAREN) 75 MG EC tablet, Take 75 mg by mouth 2 (Two) Times a Day., Disp: , Rfl:   •  hydrochlorothiazide (HYDRODIURIL) 12.5 MG tablet, Take 12.5 mg by mouth Daily., Disp: , Rfl:   •  hydrOXYzine (VISTARIL) 25 MG capsule, Take 25 mg by mouth 3 (Three) Times a Day As Needed for Anxiety., Disp: , Rfl:   •  lisinopril (PRINIVIL,ZESTRIL) 10 MG tablet, Take 10 mg by mouth Daily., Disp: ,  Rfl:   •  omeprazole (PRILOSEC) 40 MG capsule, Take 40 mg by mouth Daily., Disp: , Rfl:   •  risperiDONE (RISPERDAL) 0.25 MG tablet, Take 0.25 mg by mouth 2 (Two) Times a Day., Disp: , Rfl:   •  ticagrelor (BRILINTA) 90 MG tablet tablet, Take 1 tablet by mouth 2 (Two) Times a Day., Disp: 90 tablet, Rfl: 3  •  tiZANidine (ZANAFLEX) 4 MG tablet, Take 4 mg by mouth Every 6 (Six) Hours As Needed., Disp: , Rfl:   •  traZODone (DESYREL) 50 MG tablet, Take 1 tablet by mouth Every Night., Disp: , Rfl:     Past Surgical History:   Procedure Laterality Date   • ABDOMINAL SURGERY     • CARDIAC CATHETERIZATION N/A 4/19/2017    Procedure: Left Heart Cath;  Surgeon: Kevin Erazo MD;  Location: [facility] INVASIVE LOCATION;  Service:    • CARDIAC CATHETERIZATION  4/19/2017    Procedure: Functional Flow [place];  Surgeon: Kevin Erazo MD;  Location: [facility] INVASIVE LOCATION;  Service:    • CARDIAC CATHETERIZATION N/A 7/6/2017    Procedure: Left Heart Cath;  Surgeon: Kevin Erazo MD;  Location: [facility] INVASIVE LOCATION;  Service:    • COLONOSCOPY  06/27/2016   • ENDOSCOPY AND COLONOSCOPY  06/27/2016    External and internal hemorrhoids. No specimens collected   • ESOPHAGOSCOPY / EGD  06/27/2016    With biopsy-Mildly severe esophagitis. Gastritis. Normal examined duodenum. Biopsied. Medium-sized hiatus hernia. Several biopsies were obtained in the lower third of the esophagus. Several biopsies were obtained in the gastric antrum   • NISSEN FUNDOPLICATION N/A 2/1/2017    Procedure: LAPAROSCOPIC NISSEN FUNDOPLICATION,  HIATAL HERNIA REPAIR  ;  Surgeon: Ced Wilson MD;  Location: [facility] OR;  Service:    • [place] RT/LT HEART CATHETERS N/A 4/19/2017    Procedure: Percutaneous Coronary Intervention;  Surgeon: Kevin Erazo MD;  Location: [facility] INVASIVE LOCATION;  Service: Cardiovascular   • TRANSESOPHAGEAL ECHOCARDIOGRAM (JOAO)  11/18/2015    With color flow-Ef of 60%.Early diastolic dysfunction.Normal RV size and  "function.Trace to mild mitral and trace tricuspid regurg.No pericardial effusion.   • TUBAL ABDOMINAL LIGATION  1984       ROS  No fevers or chills.  No chest pain or shortness of air.  No GI or  disturbances.    PHYSICAL EXAMINATION:       Ht 61\" (154.9 cm)  Wt 175 lb (79.4 kg)  LMP  (Within Years)  BMI 33.07 kg/m2    Physical Exam   Constitutional: She is oriented to person, place, and time. She appears well-developed and well-nourished. No distress.   HENT:   Head: Normocephalic.   Right Ear: External ear normal.   Left Ear: External ear normal.   Mouth/Throat: No oropharyngeal exudate.   Eyes: EOM are normal. Pupils are equal, round, and reactive to light. Right eye exhibits no discharge. Left eye exhibits no discharge. No scleral icterus.   Neck: Normal range of motion. No JVD present. No tracheal deviation present. No thyromegaly present.   Cardiovascular: Normal rate, regular rhythm, normal heart sounds and intact distal pulses.  Exam reveals no gallop and no friction rub.    No murmur heard.  Pulmonary/Chest: Effort normal and breath sounds normal. No respiratory distress. She has no wheezes. She has no rales. She exhibits no tenderness.   Abdominal: Soft. Bowel sounds are normal. She exhibits no distension and no mass. There is no tenderness. There is no guarding.   Musculoskeletal: Normal range of motion. She exhibits no edema or deformity.        Left knee: She exhibits no effusion.        Thoracic back: She exhibits normal range of motion, no tenderness, no bony tenderness, no swelling, no edema, no deformity, no laceration, no pain, no spasm and normal pulse.        Lumbar back: Normal. She exhibits normal range of motion, no tenderness, no bony tenderness, no swelling, no edema, no deformity, no laceration, no pain, no spasm and normal pulse.   Lymphadenopathy:     She has no cervical adenopathy.   Neurological: She is alert and oriented to person, place, and time. She has normal reflexes. She " displays normal reflexes. No cranial nerve deficit. She exhibits normal muscle tone. Coordination normal.   Skin: Skin is warm and dry. No rash noted. She is not diaphoretic. No erythema.   Psychiatric: She has a normal mood and affect. Her behavior is normal. Thought content normal.       GAIT:     []  Normal  []  Antalgic    Assistive device: []  None  []  Walker     []  Crutches  []  Cane     []  Wheelchair  []  Stretcher    Left Knee Exam     Tenderness   The patient is experiencing tenderness in the medial joint line.    Range of Motion   Extension: 0   Flexion: 110     Tests   Julius:  Medial - negative Lateral - negative  Lachman:  Anterior - negative    Posterior - negative  Drawer:       Anterior - negative     Posterior - negative  Varus: negative  Valgus: negative    Other   Erythema: absent  Scars: absent  Sensation: normal  Pulse: present  Swelling: mild  Effusion: no effusion present              No results found.          ASSESSMENT:    Diagnoses and all orders for this visit:    Primary osteoarthritis of both knees    Chronic pain of both knees          PLAN      Will obtain orthovisc for continued non operative treatment, she is to obtain neoprene knee sleeve, will consider physical therapy.      Grover Raymond MD

## 2017-09-25 RX ORDER — TICAGRELOR 90 MG/1
TABLET ORAL
Qty: 60 TABLET | Refills: 6 | Status: SHIPPED | OUTPATIENT
Start: 2017-09-25 | End: 2018-07-09 | Stop reason: SDUPTHER

## 2017-10-09 RX ORDER — ATORVASTATIN CALCIUM 40 MG/1
TABLET, FILM COATED ORAL
Qty: 30 TABLET | Status: CANCELLED | OUTPATIENT
Start: 2017-10-09

## 2017-10-09 RX ORDER — ATORVASTATIN CALCIUM 40 MG/1
40 TABLET, FILM COATED ORAL NIGHTLY
Qty: 30 TABLET | Refills: 6 | Status: SHIPPED | OUTPATIENT
Start: 2017-10-09 | End: 2018-05-30 | Stop reason: SDUPTHER

## 2018-03-09 ENCOUNTER — OFFICE VISIT (OUTPATIENT)
Dept: CARDIOLOGY | Facility: CLINIC | Age: 63
End: 2018-03-09

## 2018-03-09 VITALS
WEIGHT: 183 LBS | SYSTOLIC BLOOD PRESSURE: 138 MMHG | HEART RATE: 100 BPM | HEIGHT: 61 IN | BODY MASS INDEX: 34.55 KG/M2 | DIASTOLIC BLOOD PRESSURE: 80 MMHG

## 2018-03-09 DIAGNOSIS — I10 ESSENTIAL HYPERTENSION: ICD-10-CM

## 2018-03-09 DIAGNOSIS — Z72.0 NICOTINE ABUSE: ICD-10-CM

## 2018-03-09 DIAGNOSIS — E78.00 PURE HYPERCHOLESTEROLEMIA: ICD-10-CM

## 2018-03-09 DIAGNOSIS — I25.10 CORONARY ARTERY DISEASE INVOLVING NATIVE CORONARY ARTERY OF NATIVE HEART WITHOUT ANGINA PECTORIS: Primary | ICD-10-CM

## 2018-03-09 DIAGNOSIS — R06.02 SOB (SHORTNESS OF BREATH): ICD-10-CM

## 2018-03-09 PROCEDURE — 99214 OFFICE O/P EST MOD 30 MIN: CPT | Performed by: INTERNAL MEDICINE

## 2018-03-09 PROCEDURE — 93000 ELECTROCARDIOGRAM COMPLETE: CPT | Performed by: INTERNAL MEDICINE

## 2018-03-09 RX ORDER — DILTIAZEM HYDROCHLORIDE 180 MG/1
180 CAPSULE, COATED, EXTENDED RELEASE ORAL DAILY
Qty: 30 CAPSULE | Refills: 6 | Status: SHIPPED | OUTPATIENT
Start: 2018-03-09 | End: 2018-10-16 | Stop reason: SDUPTHER

## 2018-03-09 NOTE — PROGRESS NOTES
[facility]  OFFICE NOTE    Mary Nelson  62 y.o. female    03/09/2018  1. Coronary artery disease involving native coronary artery of native heart without angina pectoris    2. SOB (shortness of breath)    3. Nicotine abuse    4. Essential hypertension    5. Pure hypercholesterolemia        Chief complaint -Pre-op knee surgery      History of present [id number] lady with history of CAD prior stent placement to LAD in 4/ 2017 and had repeat cardiac catheterization in July 2017 due to chest pain and the stents were patent and EF was normal.  She still has chest pain and she is a smoker and smokes about a pack-a-day and she is on multiple antidepressants and antipsychotic medications.  I'm going to refer her to a psychiatrist to take off some of the medications what she doesn't need regarding her mental health so that there is less interaction with other medicines.  She'll await some more time before she can be taken off of dual antiplatelet therapy for surgery as it is not a year since the PCI.  I asked her to quit smoking completely.              Allergies   Allergen Reactions   • Codeine Swelling         Past Medical History:   Diagnosis Date   • Anxiety    • Arthritis    • COPD (chronic obstructive pulmonary disease)    • Depression    • Epigastric pain    • GERD (gastroesophageal reflux disease)    • Head injury 1990   • Hiatal hernia    • High cholesterol    • HTN (hypertension)          Past Surgical History:   Procedure Laterality Date   • ABDOMINAL SURGERY     • CARDIAC CATHETERIZATION N/A 4/19/2017    Procedure: Left Heart Cath;  Surgeon: Kevin Erazo MD;  Location: [facility] CATH INVASIVE LOCATION;  Service:    • CARDIAC CATHETERIZATION  4/19/2017    Procedure: Functional Flow [place];  Surgeon: Kevin Erazo MD;  Location: [facility] CATH INVASIVE LOCATION;  Service:    • CARDIAC CATHETERIZATION N/A 7/6/2017    Procedure: Left Heart Cath;  Surgeon: Kevin Erazo MD;   Location: [facility] CATH INVASIVE LOCATION;  Service:    • COLONOSCOPY  06/27/2016   • ENDOSCOPY AND COLONOSCOPY  06/27/2016    External and internal hemorrhoids. No specimens collected   • ESOPHAGOSCOPY / EGD  06/27/2016    With biopsy-Mildly severe esophagitis. Gastritis. Normal examined duodenum. Biopsied. Medium-sized hiatus hernia. Several biopsies were obtained in the lower third of the esophagus. Several biopsies were obtained in the gastric antrum   • NISSEN FUNDOPLICATION N/A 2/1/2017    Procedure: LAPAROSCOPIC NISSEN FUNDOPLICATION,  HIATAL HERNIA REPAIR  ;  Surgeon: Ced Wilson MD;  Location: [facility] OR;  Service:    • [place] RT/LT HEART CATHETERS N/A 4/19/2017    Procedure: Percutaneous Coronary Intervention;  Surgeon: Kevin Erazo MD;  Location: [facility] CATH INVASIVE LOCATION;  Service: Cardiovascular   • TRANSESOPHAGEAL ECHOCARDIOGRAM (JOAO)  11/18/2015    With color flow-Ef of 60%.Early diastolic dysfunction.Normal RV size and function.Trace to mild mitral and trace tricuspid regurg.No pericardial effusion.   • TUBAL ABDOMINAL LIGATION  1984         Family History   Problem Relation Age of Onset   • Hypertension Other    • Heart disease Father    • Hypertension Father    • COPD Father    • Arthritis Father          Social History     Social History   • Marital status:      Spouse name: N/A   • Number of children: N/A   • Years of education: N/A     Occupational History   • Not on file.     Social History Main Topics   • Smoking status: Current Every Day Smoker     Packs/day: 1.00     Years: 43.00     Types: Cigarettes   • Smokeless tobacco: Never Used   • Alcohol use No   • Drug use: No   • Sexual activity: Defer     Other Topics Concern   • Not on file     Social History Narrative         Current Outpatient Prescriptions   Medication Sig Dispense Refill   • acetaminophen (TYLENOL) 500 MG tablet TAKE ONE TABLET BY MOUTH EVERY 6 HOURS AS NEEDED FOR PAIN     • albuterol (VENTOLIN HFA) 108 (90  BASE) MCG/ACT inhaler Inhale 2 puffs 4 (Four) Times a Day.     • amitriptyline (ELAVIL) 50 MG tablet Take 50 mg by mouth Every Night.     • aspirin 81 MG tablet Take 1 tablet by mouth Daily. 30 tablet 11   • atorvastatin (LIPITOR) 40 MG tablet Take 1 tablet by mouth Every Night. 30 tablet 6   • BRILINTA 90 MG tablet tablet TAKE ONE TABLET BY MOUTH TWICE A DAY 60 tablet 6   • citalopram (CELEXA) 40 MG tablet Take 40 mg by mouth Daily.     • diazePAM (VALIUM) 2 MG tablet Take 2 mg by mouth Every 8 (Eight) Hours As Needed for Anxiety.     • diclofenac (VOLTAREN) 75 MG EC tablet Take 75 mg by mouth 2 (Two) Times a Day.     • hydrochlorothiazide (HYDRODIURIL) 12.5 MG tablet Take 12.5 mg by mouth Daily.     • hydrOXYzine (VISTARIL) 25 MG capsule Take 25 mg by mouth 3 (Three) Times a Day As Needed for Anxiety.     • omeprazole (PRILOSEC) 40 MG capsule Take 40 mg by mouth Daily.     • risperiDONE (RISPERDAL) 0.25 MG tablet Take 0.25 mg by mouth 2 (Two) Times a Day.     • tiZANidine (ZANAFLEX) 4 MG tablet Take 4 mg by mouth Every 6 (Six) Hours As Needed.     • traZODone (DESYREL) 50 MG tablet Take 1 tablet by mouth Every Night.     • diltiaZEM CD (CARDIZEM CD) 180 MG 24 hr capsule Take 1 capsule by mouth Daily. 30 capsule 6     Current Facility-Administered Medications   Medication Dose Route Frequency Provider Last Rate Last Dose   • Hyaluronan (ORTHOVISC) injection 30 mg  30 mg Intra-articular Weekly Grover Raymond MD             Review of Systems     Constitution: Denies any fatigue, fever or chills    HENT: Denies any headache, hearing impairment,     Eyes: Denies any blurring of vision, or photophobia     Cardivascular - Chronic chest pain    Respiratory system-denies any COPD, shortness of breath,   sleep apnea.     Endocrine:   history of hyperlipidemia       Musculoskeletal:  Arthritis of the knee    Gastrointestinal: No nausea, vomiting, or melena    Genitourinary: No dysuria or hematuria    Neurological:  "  No history of seizure disorder, stroke, memory problems    Psychiatric/Behavioral:   History of depression and mood disorder    Hematological- no history of easy bruising            OBJECTIVE    /80  Pulse 100  Ht 154.9 cm (60.98\")  Wt 83 kg (183 lb)  BMI 34.6 kg/m2      Physical Exam     Constitutional: is oriented to person, place, and time.     Skin-warm and dry    Well developed and nourished in no acute distress      Head: Normocephalic and atraumatic.     Eyes: Pupils are equal,    Neck: Neck supple. No bruit in the carotids,    Cardiovascular: [place] in the fifth intercostal space   Regular rate, and  Rhythm,    S1 greater than S2, no S3 or S4, no gallop     Pulmonary/Chest:   Air  Entry is equal on both sides  b/l expiratory rhonchi      Abdominal: Soft.  No hepatosplenomegaly, bowel sounds are present    Musculoskeletal: No kyphoscoliosis, no significant thickening of the joints    Neurological: is alert and oriented to person, place, and time.    cranial nerve are intact .   No motor or sensory deficit    Extremities-no edema, no radial femoral delay      Psychiatric: He has a normal mood and affect.                  His behavior is normal.             ECG 12 Lead  Date/Time: 3/9/2018 11:20 AM  Performed by: SARAH BETH BARBOSA  Authorized by: SARAH BETH BARBOSA   Comparison: not compared with previous ECG   Rhythm: sinus tachycardia  Comments: Sinus tachycardia at a rate of 100 with Q waves in lead 3, aVF possible inferoposterior MI of indeterminate age              Lab Results   Component Value Date    WBC 7.89 07/06/2017    HGB 11.4 (L) 07/06/2017    HCT 35.9 07/06/2017    MCV 87.6 07/06/2017     07/06/2017     Lab Results   Component Value Date    GLUCOSE 107 (H) 07/06/2017    BUN 11 07/06/2017    CREATININE 1.03 (H) 07/06/2017    EGFRIFNONA 55 (L) 07/06/2017    BCR 10.7 07/06/2017    CO2 25.0 07/06/2017    CALCIUM 9.1 07/06/2017    ALBUMIN 3.80 07/06/2017    LABIL2 1.3 07/06/2017 "    AST 27 07/06/2017    ALT 38 07/06/2017     Lab Results   Component Value Date    CHOL 356 (H) 04/20/2017     Lab Results   Component Value Date    TRIG 245 (H) 04/20/2017    TRIG 235 (H) 11/18/2015     Lab Results   Component Value Date    HDL 42 (L) 04/20/2017    HDL 46 (L) 11/18/2015     No components found for: LDLCALC  Lab Results   Component Value Date     (H) 04/20/2017     (H) 11/18/2015     No results found for: HDLLDLRATIO  No components found for: CHOLHDL  Lab Results   Component Value Date    HGBA1C 5.87 (H) 04/20/2017     Lab Results   Component Value Date    TSH 1.90 11/17/2015                  A/P    CAD prior stent placement to LAD in April 2017, has chronic chest pain, repeat catheter in July 2017 showed the stents patent with normal ejection fraction.  Patient still has chest pain and on dual antiplatelet therapy.  Asked her to quit smoking completely.  On Brilinta and aspirin.    Tachycardia with hypertension stopped the HCTZ and started on Cardizem  mg daily.    Depression and mood disorder on multiple antipsychotics and antidepressants, will refer her to [facility] for optimization of medication.    Tobacco abuse talked about quitting smoking, also  about exercise    Follow-up in 4 months            This document has been electronically signed by Marcos Leone MD on March 9, 2018 11:20 AM       EMR Dragon/Transcription disclaimer:   Some of this note may be an electronic transcription/translation of spoken language to printed text. The electronic translation of spoken language may permit erroneous, or at times, nonsensical words or phrases to be inadvertently transcribed; Although I have reviewed the note for such errors, some may still exist.

## 2018-05-30 RX ORDER — ATORVASTATIN CALCIUM 40 MG/1
40 TABLET, FILM COATED ORAL NIGHTLY
Qty: 30 TABLET | Refills: 6 | Status: SHIPPED | OUTPATIENT
Start: 2018-05-30 | End: 2019-04-22

## 2018-07-09 RX ORDER — TICAGRELOR 90 MG/1
TABLET ORAL
Qty: 60 TABLET | Refills: 6 | Status: SHIPPED | OUTPATIENT
Start: 2018-07-09 | End: 2018-09-24

## 2018-09-24 ENCOUNTER — OFFICE VISIT (OUTPATIENT)
Dept: CARDIOLOGY | Facility: CLINIC | Age: 63
End: 2018-09-24

## 2018-09-24 VITALS
BODY MASS INDEX: 35.68 KG/M2 | HEIGHT: 61 IN | DIASTOLIC BLOOD PRESSURE: 82 MMHG | SYSTOLIC BLOOD PRESSURE: 130 MMHG | WEIGHT: 189 LBS | OXYGEN SATURATION: 99 % | HEART RATE: 89 BPM

## 2018-09-24 DIAGNOSIS — Z71.6 TOBACCO ABUSE COUNSELING: ICD-10-CM

## 2018-09-24 DIAGNOSIS — I10 ESSENTIAL HYPERTENSION: ICD-10-CM

## 2018-09-24 DIAGNOSIS — I25.119 CORONARY ARTERY DISEASE INVOLVING NATIVE CORONARY ARTERY OF NATIVE HEART WITH ANGINA PECTORIS (HCC): Primary | ICD-10-CM

## 2018-09-24 DIAGNOSIS — E78.2 MIXED HYPERLIPIDEMIA: ICD-10-CM

## 2018-09-24 DIAGNOSIS — J43.1 PANLOBULAR EMPHYSEMA (HCC): ICD-10-CM

## 2018-09-24 DIAGNOSIS — R06.02 SOB (SHORTNESS OF BREATH): ICD-10-CM

## 2018-09-24 PROCEDURE — 99214 OFFICE O/P EST MOD 30 MIN: CPT | Performed by: INTERNAL MEDICINE

## 2018-09-24 RX ORDER — HYDROCODONE BITARTRATE AND ACETAMINOPHEN 5; 325 MG/1; MG/1
1 TABLET ORAL EVERY 6 HOURS PRN
COMMUNITY
End: 2018-12-26

## 2018-09-24 NOTE — PROGRESS NOTES
[facility] Cardiology  OFFICE NOTE    Mary Nelson  63 y.o. female    09/24/2018  1. Coronary artery disease involving native coronary artery of native heart with angina pectoris (CMS/[facility])    2. Tobacco abuse counseling    3. Essential hypertension    4. Mixed hyperlipidemia    5. Panlobular emphysema (CMS/[facility])    6. SOB (shortness of breath)        Chief complaint -Pre-op knee surgery      History of present [id number]-old lady with history of CAD prior stent placement to LAD in 4/ 2017 and had repeat cardiac catheterization in July 2017 due to chest pain and the stents were patent and EF was normal.  She still has chest pain and she is a smoker and smokes 3-4  and she is on multiple antidepressants and antipsychotic medications.  I refered her to a psychiatrist to take off some of the medications what she doesn't need regarding her mental health so that there is less interaction with other medicines.  She can proceed with her knee surgery with moderate risk by ACC AHA guidelines and can stop the Brilinta 5 days prior to surgery and restarted when okay with the surgeon            Allergies   Allergen Reactions   • Codeine Swelling         Past Medical History:   Diagnosis Date   • Anxiety    • Arthritis    • COPD (chronic obstructive pulmonary disease) (CMS/[facility])    • Depression    • Epigastric pain    • GERD (gastroesophageal reflux disease)    • Head injury 1990   • Hiatal hernia    • High cholesterol    • HTN (hypertension)          Past Surgical History:   Procedure Laterality Date   • ABDOMINAL SURGERY     • CARDIAC CATHETERIZATION N/A 4/19/2017    Procedure: Left Heart Cath;  Surgeon: Kevin Erazo MD;  Location: [facility] CATH INVASIVE LOCATION;  Service:    • CARDIAC CATHETERIZATION  4/19/2017    Procedure: Functional Flow [place];  Surgeon: Kevin Erazo MD;  Location: [facility] CATH INVASIVE LOCATION;  Service:    • CARDIAC CATHETERIZATION N/A 7/6/2017    Procedure: Left Heart Cath;   Surgeon: Kevin Erazo MD;  Location: [facility] INVASIVE LOCATION;  Service:    • COLONOSCOPY  06/27/2016   • ENDOSCOPY AND COLONOSCOPY  06/27/2016    External and internal hemorrhoids. No specimens collected   • ESOPHAGOSCOPY / EGD  06/27/2016    With biopsy-Mildly severe esophagitis. Gastritis. Normal examined duodenum. Biopsied. Medium-sized hiatus hernia. Several biopsies were obtained in the lower third of the esophagus. Several biopsies were obtained in the gastric antrum   • NISSEN FUNDOPLICATION N/A 2/1/2017    Procedure: LAPAROSCOPIC NISSEN FUNDOPLICATION,  HIATAL HERNIA REPAIR  ;  Surgeon: Ced Wilson MD;  Location: [facility] OR;  Service:    • SD RT/LT HEART CATHETERS N/A 4/19/2017    Procedure: Percutaneous Coronary Intervention;  Surgeon: Kevin Erazo MD;  Location: [facility] INVASIVE LOCATION;  Service: Cardiovascular   • TRANSESOPHAGEAL ECHOCARDIOGRAM (JOAO)  11/18/2015    With color flow-Ef of 60%.Early diastolic dysfunction.Normal RV size and function.Trace to mild mitral and trace tricuspid regurg.No pericardial effusion.   • TUBAL ABDOMINAL LIGATION  1984         Family History   Problem Relation Age of Onset   • Hypertension Other    • Heart disease Father    • Hypertension Father    • COPD Father    • Arthritis Father          Social History     Social History   • Marital status:      Spouse name: N/A   • Number of children: N/A   • Years of education: N/A     Occupational History   • Not on file.     Social History Main Topics   • Smoking status: Current Every Day Smoker     Packs/day: 1.00     Years: 43.00     Types: Cigarettes   • Smokeless tobacco: Never Used   • Alcohol use No   • Drug use: No   • Sexual activity: Defer     Other Topics Concern   • Not on file     Social History Narrative   • No narrative on file         Current Outpatient Prescriptions   Medication Sig Dispense Refill   • acetaminophen (TYLENOL) 500 MG tablet TAKE ONE TABLET BY MOUTH EVERY 6 HOURS AS  NEEDED FOR PAIN     • albuterol (VENTOLIN HFA) 108 (90 BASE) MCG/ACT inhaler Inhale 2 puffs 4 (Four) Times a Day.     • amitriptyline (ELAVIL) 50 MG tablet Take 50 mg by mouth Every Night.     • atorvastatin (LIPITOR) 40 MG tablet TAKE 1 TABLET BY MOUTH EVERY NIGHT. 30 tablet 6   • citalopram (CELEXA) 40 MG tablet Take 40 mg by mouth Daily.     • diazePAM (VALIUM) 2 MG tablet Take 2 mg by mouth Every 8 (Eight) Hours As Needed for Anxiety.     • diclofenac (VOLTAREN) 75 MG EC tablet Take 75 mg by mouth 2 (Two) Times a Day.     • diltiaZEM CD (CARDIZEM CD) 180 MG 24 hr capsule Take 1 capsule by mouth Daily. 30 capsule 6   • HYDROcodone-acetaminophen (NORCO) 5-325 MG per tablet Take 1 tablet by mouth Every 6 (Six) Hours As Needed.     • hydrOXYzine (VISTARIL) 25 MG capsule Take 25 mg by mouth 3 (Three) Times a Day As Needed for Anxiety.     • omeprazole (PRILOSEC) 40 MG capsule Take 40 mg by mouth Daily.     • risperiDONE (RISPERDAL) 0.25 MG tablet Take 0.25 mg by mouth 2 (Two) Times a Day.     • tiZANidine (ZANAFLEX) 4 MG tablet Take 4 mg by mouth Every 6 (Six) Hours As Needed.     • traZODone (DESYREL) 50 MG tablet Take 1 tablet by mouth Every Night.     • ticagrelor (BRILINTA) 60 MG tablet tablet Take 1 tablet by mouth 2 (Two) Times a Day. 60 tablet 12     Current Facility-Administered Medications   Medication Dose Route Frequency Provider Last Rate Last Dose   • Hyaluronan (ORTHOVISC) injection 30 mg  30 mg Intra-articular Weekly Grover Raymond MD             Review of Systems     Constitution: Denies any fatigue, fever or chills    HENT: Denies any headache, hearing impairment,     Eyes: Denies any blurring of vision, or photophobia     Cardivascular - Chronic chest pain    Respiratory system-denies any COPD, shortness of breath,   sleep apnea.     Endocrine:   history of hyperlipidemia       Musculoskeletal:  Arthritis of the knee    Gastrointestinal: No nausea, vomiting, or melena    Genitourinary:  "No dysuria or hematuria    Neurological:   No history of seizure disorder, stroke, memory problems    Psychiatric/Behavioral:   History of depression and mood disorder    Hematological- no history of easy bruising            OBJECTIVE    /82   Pulse 89   Ht 154.9 cm (60.98\")   Wt 85.7 kg (189 lb)   SpO2 99%   BMI 35.73 kg/m²       Physical Exam     Constitutional: is oriented to person, place, and time.     Skin-warm and dry    Well developed and nourished in no acute distress      Head: Normocephalic and atraumatic.     Eyes: Pupils are equal,    Neck: Neck supple. No bruit in the carotids,    Cardiovascular: [place] in the fifth intercostal space   Regular rate, and  Rhythm,    S1 greater than S2, no S3 or S4, no gallop     Pulmonary/Chest:   Air  Entry is equal on both sides  b/l expiratory rhonchi      Abdominal: Soft.  No hepatosplenomegaly, bowel sounds are present    Musculoskeletal: No kyphoscoliosis, no significant thickening of the joints    Neurological: is alert and oriented to person, place, and time.    cranial nerve are intact .   No motor or sensory deficit    Extremities-no edema, no radial femoral delay      Psychiatric: He has a normal mood and affect.                  His behavior is normal.           Procedures      Lab Results   Component Value Date    WBC 7.89 07/06/2017    HGB 11.4 (L) 07/06/2017    HCT 35.9 07/06/2017    MCV 87.6 07/06/2017     07/06/2017     Lab Results   Component Value Date    GLUCOSE 107 (H) 07/06/2017    BUN 11 07/06/2017    CREATININE 1.03 (H) 07/06/2017    EGFRIFNONA 55 (L) 07/06/2017    BCR 10.7 07/06/2017    CO2 25.0 07/06/2017    CALCIUM 9.1 07/06/2017    ALBUMIN 3.80 07/06/2017    AST 27 07/06/2017    ALT 38 07/06/2017     Lab Results   Component Value Date    CHOL 356 (H) 04/20/2017     Lab Results   Component Value Date    TRIG 245 (H) 04/20/2017    TRIG 235 (H) 11/18/2015     Lab Results   Component Value Date    HDL 42 (L) 04/20/2017    HDL 46 " (L) 11/18/2015     No components found for: LDLCALC  Lab Results   Component Value Date     (H) 04/20/2017     (H) 11/18/2015     No results found for: HDLLDLRATIO  No components found for: CHOLHDL  Lab Results   Component Value Date    HGBA1C 5.87 (H) 04/20/2017     Lab Results   Component Value Date    TSH 1.90 11/17/2015                  A/P    CAD prior stent placement to LAD in April 2017, has chronic chest pain, repeat cath in July 2017 showed the stents patent with normal ejection fraction.  Patient now on Brilinta 60 mg twice a day as it is more than one year since her stent placement.  And she can proceed with surgery with moderate risk by ACC AHA guidelines    Hypertension controlled with diltiazem  mg daily    Depression and mood disorder on multiple antipsychotics and antidepressants, she has appointment at the psychiatric clinic today    Tobacco abuse talked about quitting smoking, also discussed about exercise    Follow-up in 6 months            This document has been electronically signed by Marcos Leone MD on September 24, 2018 9:07 AM       EMR Dragon/Transcription disclaimer:   Some of this note may be an electronic transcription/translation of spoken language to printed text. The electronic translation of spoken language may permit erroneous, or at times, nonsensical words or phrases to be inadvertently transcribed; Although I have reviewed the note for such errors, some may still exist.

## 2018-10-16 RX ORDER — DILTIAZEM HYDROCHLORIDE 180 MG/1
CAPSULE, EXTENDED RELEASE ORAL
Qty: 30 CAPSULE | Refills: 6 | Status: SHIPPED | OUTPATIENT
Start: 2018-10-16 | End: 2018-12-26

## 2018-11-08 ENCOUNTER — TRANSCRIBE ORDERS (OUTPATIENT)
Dept: ORTHOPEDIC SURGERY | Facility: CLINIC | Age: 63
End: 2018-11-08

## 2018-11-08 DIAGNOSIS — M25.562 BILATERAL ANTERIOR KNEE PAIN: Primary | ICD-10-CM

## 2018-11-08 DIAGNOSIS — M25.561 BILATERAL ANTERIOR KNEE PAIN: Primary | ICD-10-CM

## 2018-11-16 DIAGNOSIS — M17.0 PRIMARY OSTEOARTHRITIS OF BOTH KNEES: Primary | ICD-10-CM

## 2018-11-19 ENCOUNTER — OFFICE VISIT (OUTPATIENT)
Dept: ORTHOPEDIC SURGERY | Facility: CLINIC | Age: 63
End: 2018-11-19

## 2018-11-19 VITALS — WEIGHT: 194 LBS | BODY MASS INDEX: 38.09 KG/M2 | HEIGHT: 60 IN

## 2018-11-19 DIAGNOSIS — M17.0 PRIMARY OSTEOARTHRITIS OF BOTH KNEES: Primary | ICD-10-CM

## 2018-11-19 PROCEDURE — 99214 OFFICE O/P EST MOD 30 MIN: CPT | Performed by: ORTHOPAEDIC SURGERY

## 2018-11-19 RX ORDER — BUPIVACAINE HCL/0.9 % NACL/PF 0.1 %
2 PLASTIC BAG, INJECTION (ML) EPIDURAL ONCE
Status: CANCELLED | OUTPATIENT
Start: 2018-12-27 | End: 2018-11-19

## 2018-11-19 NOTE — PROGRESS NOTES
Mary Nelson is a 63 y.o. female returns for     Chief Complaint   Patient presents with   • Left Knee - Follow-up   • Right Knee - Follow-up     X-rays done today in office   HISTORY OF PRESENT ILLNESS: continued pain in both knees.  Mostly in left knee  Pain scale today 9/10    Continues to have knee pain left >> right.  Pain is constant and worse with activity.  She is a patient of Dr. Erazo, she was in the hospital recently for cardiac catheterization and cardiac workup.    Continues to have pain of the left knee, which is constant, dull, does not radiate, located of the left knee.     she had cardiac stenting with Dr Easton approximately one year ago.    Past Medical History:   Diagnosis Date   • Anxiety    • Arthritis    • COPD (chronic obstructive pulmonary disease) (CMS/HCC)    • Depression    • Epigastric pain    • GERD (gastroesophageal reflux disease)    • Head injury 1990   • Hiatal hernia    • High cholesterol    • HTN (hypertension)      Current Outpatient Medications on File Prior to Visit   Medication Sig Dispense Refill   • acetaminophen (TYLENOL) 500 MG tablet TAKE ONE TABLET BY MOUTH EVERY 6 HOURS AS NEEDED FOR PAIN     • albuterol (VENTOLIN HFA) 108 (90 BASE) MCG/ACT inhaler Inhale 2 puffs 4 (Four) Times a Day.     • amitriptyline (ELAVIL) 50 MG tablet Take 50 mg by mouth Every Night.     • atorvastatin (LIPITOR) 40 MG tablet TAKE 1 TABLET BY MOUTH EVERY NIGHT. 30 tablet 6   • CARTIA  MG 24 hr capsule TAKE ONE CAPSULE BY MOUTH ONCE DAILY 30 capsule 6   • citalopram (CELEXA) 40 MG tablet Take 40 mg by mouth Daily.     • diazePAM (VALIUM) 2 MG tablet Take 2 mg by mouth Every 8 (Eight) Hours As Needed for Anxiety.     • diclofenac (VOLTAREN) 75 MG EC tablet Take 75 mg by mouth 2 (Two) Times a Day.     • HYDROcodone-acetaminophen (NORCO) 5-325 MG per tablet Take 1 tablet by mouth Every 6 (Six) Hours As Needed.     • hydrOXYzine (VISTARIL) 25 MG capsule Take 25 mg by mouth 3 (Three)  "Times a Day As Needed for Anxiety.     • omeprazole (PRILOSEC) 40 MG capsule Take 40 mg by mouth Daily.     • risperiDONE (RISPERDAL) 0.25 MG tablet Take 0.25 mg by mouth 2 (Two) Times a Day.     • ticagrelor (BRILINTA) 60 MG tablet tablet Take 1 tablet by mouth 2 (Two) Times a Day. 60 tablet 12   • tiZANidine (ZANAFLEX) 4 MG tablet Take 4 mg by mouth Every 6 (Six) Hours As Needed.     • traZODone (DESYREL) 50 MG tablet Take 1 tablet by mouth Every Night.       Current Facility-Administered Medications on File Prior to Visit   Medication Dose Route Frequency Provider Last Rate Last Dose   • Hyaluronan (ORTHOVISC) injection 30 mg  30 mg Intra-articular Weekly Grover Raymond MD            CONCURRENT MEDICAL HISTORY:    The following portions of the patient's history were reviewed and updated as appropriate: allergies, current medications, past family history, past medical history, past social history, past surgical history and problem list.     ROS  No fevers or chills.  No chest pain or shortness of air.  No GI or  disturbances.    PHYSICAL EXAMINATION:       Ht 152.4 cm (60\")   Wt 88 kg (194 lb)   BMI 37.89 kg/m²     Physical Exam   Constitutional: She is oriented to person, place, and time. She appears well-developed and well-nourished. No distress.   HENT:   Head: Normocephalic.   Right Ear: External ear normal.   Left Ear: External ear normal.   Mouth/Throat: No oropharyngeal exudate.   Eyes: EOM are normal. Pupils are equal, round, and reactive to light. Right eye exhibits no discharge. Left eye exhibits no discharge. No scleral icterus.   Neck: Normal range of motion. No JVD present. No tracheal deviation present. No thyromegaly present.   Cardiovascular: Normal rate, regular rhythm, normal heart sounds and intact distal pulses. Exam reveals no gallop and no friction rub.   No murmur heard.  Pulmonary/Chest: Effort normal and breath sounds normal. No respiratory distress. She has no wheezes. She " has no rales. She exhibits no tenderness.   Abdominal: Soft. Bowel sounds are normal. She exhibits no distension and no mass. There is no tenderness. There is no guarding.   Musculoskeletal: Normal range of motion. She exhibits no edema or deformity.        Left knee: She exhibits no effusion.        Thoracic back: She exhibits normal range of motion, no tenderness, no bony tenderness, no swelling, no edema, no deformity, no laceration, no pain, no spasm and normal pulse.        Lumbar back: Normal. She exhibits normal range of motion, no tenderness, no bony tenderness, no swelling, no edema, no deformity, no laceration, no pain, no spasm and normal pulse.   Lymphadenopathy:     She has no cervical adenopathy.   Neurological: She is alert and oriented to person, place, and time. She has normal reflexes. She displays normal reflexes. No cranial nerve deficit. She exhibits normal muscle tone. Coordination normal.   Skin: Skin is warm and dry. No rash noted. She is not diaphoretic. No erythema.   Psychiatric: She has a normal mood and affect. Her behavior is normal. Thought content normal.       GAIT:     []  Normal  []  Antalgic    Assistive device: [x]  None  []  Walker     []  Crutches  []  Cane     []  Wheelchair  []  Stretcher    Left Knee Exam     Tenderness   The patient is experiencing tenderness in the medial joint line.    Range of Motion   Extension: 0   Flexion: 110     Tests   Julius:  Medial - negative Lateral - negative  Varus: negative Valgus: negative  Lachman:  Anterior - negative    Posterior - negative  Drawer:  Anterior - negative     Posterior - negative    Other   Erythema: absent  Scars: absent  Sensation: normal  Pulse: present  Swelling: mild  Effusion: no effusion present              Xr Knee Bilateral Ap Standing    Result Date: 11/19/2018  Narrative: Ordering Provider:  Grover Raymond MD Ordering Diagnosis/Indication:  Primary osteoarthritis of both knees Procedure:  XR KNEE  BILATERAL AP STANDING Exam Date:  11/19/18 RELEVANT PRIOR IMAGES:  XR Knee Bilateral AP Standing 04/07/2017 [id number] Final COMPARISON:  Todays X-rays were compared to previous images dated 4/7/2017 and demonstrates no change.     Impression:  loss of joint space of the right and left knee, loss of joint space is more pronounced of the medial compartment, patellofemoral joint space is maintained.  Patellofemoral joint alignment is good. Bone quality: good Alignment: normal knee alignment Fracture: none Soft tissue: normal joint space.      Xr Knee 1 Or 2 View Bilateral    Result Date: 11/19/2018  Narrative: Ordering Provider:  Grover Raymond MD Ordering Diagnosis/Indication:  Primary osteoarthritis of both knees Procedure:  XR KNEE 1 OR 2 VW BILATERAL Exam Date:  11/19/18 RELEVANT PRIOR IMAGES:  XR Knee 1 or 2 View Bilateral 04/07/2017 [id number] Final COMPARISON:  Todays X-rays were compared to previous images dated 4/7/2017 and demonstrates no change.     Impression:  loss of joint space medial compartment greater than lateral, both knees affected, right and left, left knee joint space is decreased perhaps worse than the right, overall alignment is good Bone quality: good Alignment: normal Fracture: none Soft tissue: normal joint tissue             ASSESSMENT:    Diagnoses and all orders for this visit:    Primary osteoarthritis of both knees  -     Case Request; Standing  -     ceFAZolin (ANCEF) 2 g in sodium chloride 0.9 % 100 mL IVPB; Infuse 2 g into a venous catheter 1 (One) Time.  -     Case Request    Other orders  -     Obtain informed consent; Future  -     Obtain Informed Consent; Standing  -     aPTT; Standing  -     Basic Metabolic Panel; Standing  -     CBC (No Diff); Standing  -     ECG 12 Lead; Standing  -     XR Chest PA & Lateral; Standing  -     Protime-INR; Standing          PLAN    Discussed treatment options at length, she has failed non operative treatment, states she cannot live  with the symptoms due to severe pain and impairment.  I reviewed treatment options, described risks including the risk of infection, hematoma, wound healing complications, loosening, instability, fracture, nerve or vascular injury, persistent pain, wear and revision surgery.  Medical risks of surgery include death, heart attack, arrhythmia, heart failure, stroke gastric ulceration perforation peritonitis deep vein thrombosis pulmonary embolus pneumonia pulmonary failure necessitating prolonged ventilation renal failure sepsis and multiorgan system failure.  All risks discussed and questions answered.  She wishes to proceed.  She will need to stop brilinta 7 days before surgery.  She states she has been evaluated by Dr. Easton who clears her for elective orthopaedic surgery.      Grover Raymond MD

## 2018-12-26 ENCOUNTER — APPOINTMENT (OUTPATIENT)
Dept: PREADMISSION TESTING | Facility: HOSPITAL | Age: 63
End: 2018-12-26

## 2018-12-26 ENCOUNTER — HOSPITAL ENCOUNTER (OUTPATIENT)
Dept: GENERAL RADIOLOGY | Facility: HOSPITAL | Age: 63
Discharge: HOME | End: 2018-12-26
Admitting: ORTHOPAEDIC SURGERY

## 2018-12-26 ENCOUNTER — ANESTHESIA EVENT (OUTPATIENT)
Dept: PERIOP | Facility: HOSPITAL | Age: 63
End: 2018-12-26

## 2018-12-26 VITALS
HEIGHT: 61 IN | WEIGHT: 184 LBS | SYSTOLIC BLOOD PRESSURE: 115 MMHG | OXYGEN SATURATION: 97 % | RESPIRATION RATE: 16 BRPM | BODY MASS INDEX: 34.74 KG/M2 | DIASTOLIC BLOOD PRESSURE: 76 MMHG | HEART RATE: 76 BPM

## 2018-12-26 LAB
ABO GROUP BLD: NORMAL
ANION GAP SERPL CALCULATED.3IONS-SCNC: 10 MMOL/L (ref 5–15)
APTT PPP: 23.7 SECONDS (ref 20–40.3)
BLD GP AB SCN SERPL QL: NEGATIVE
BUN BLD-MCNC: 14 MG/DL (ref 7–21)
BUN/CREAT SERPL: 12.7 (ref 7–25)
CALCIUM SPEC-SCNC: 9.4 MG/DL (ref 8.4–10.2)
CHLORIDE SERPL-SCNC: 96 MMOL/L (ref 95–110)
CO2 SERPL-SCNC: 28 MMOL/L (ref 22–31)
CREAT BLD-MCNC: 1.1 MG/DL (ref 0.5–1)
DEPRECATED RDW RBC AUTO: 43.3 FL (ref 36.4–46.3)
ERYTHROCYTE [DISTWIDTH] IN BLOOD BY AUTOMATED COUNT: 14 % (ref 11.5–14.5)
GFR SERPL CREATININE-BSD FRML MDRD: 50 ML/MIN/1.73 (ref 45–104)
GLUCOSE BLD-MCNC: 93 MG/DL (ref 60–100)
HCT VFR BLD AUTO: 38.3 % (ref 35–45)
HGB BLD-MCNC: 12.6 G/DL (ref 12–15.5)
INR PPP: 0.89 (ref 0.8–1.2)
Lab: NORMAL
MCH RBC QN AUTO: 28.1 PG (ref 26.5–34)
MCHC RBC AUTO-ENTMCNC: 32.9 G/DL (ref 31.4–36)
MCV RBC AUTO: 85.3 FL (ref 80–98)
MRSA DNA SPEC QL NAA+PROBE: NEGATIVE
PLATELET # BLD AUTO: 297 10*3/MM3 (ref 150–450)
PMV BLD AUTO: 10.1 FL (ref 8–12)
POTASSIUM BLD-SCNC: 3.7 MMOL/L (ref 3.5–5.1)
PROTHROMBIN TIME: 11.9 SECONDS (ref 11.1–15.3)
RBC # BLD AUTO: 4.49 10*6/MM3 (ref 3.77–5.16)
RH BLD: POSITIVE
SODIUM BLD-SCNC: 134 MMOL/L (ref 137–145)
T&S EXPIRATION DATE: NORMAL
WBC NRBC COR # BLD: 15.95 10*3/MM3 (ref 3.2–9.8)

## 2018-12-26 PROCEDURE — 85027 COMPLETE CBC AUTOMATED: CPT | Performed by: ORTHOPAEDIC SURGERY

## 2018-12-26 PROCEDURE — 85730 THROMBOPLASTIN TIME PARTIAL: CPT | Performed by: ORTHOPAEDIC SURGERY

## 2018-12-26 PROCEDURE — 80048 BASIC METABOLIC PNL TOTAL CA: CPT | Performed by: ORTHOPAEDIC SURGERY

## 2018-12-26 PROCEDURE — 86850 RBC ANTIBODY SCREEN: CPT | Performed by: ANESTHESIOLOGY

## 2018-12-26 PROCEDURE — 93010 ELECTROCARDIOGRAM REPORT: CPT | Performed by: INTERNAL MEDICINE

## 2018-12-26 PROCEDURE — 93005 ELECTROCARDIOGRAM TRACING: CPT | Performed by: ORTHOPAEDIC SURGERY

## 2018-12-26 PROCEDURE — 86900 BLOOD TYPING SEROLOGIC ABO: CPT | Performed by: ANESTHESIOLOGY

## 2018-12-26 PROCEDURE — 36415 COLL VENOUS BLD VENIPUNCTURE: CPT

## 2018-12-26 PROCEDURE — 71046 X-RAY EXAM CHEST 2 VIEWS: CPT

## 2018-12-26 PROCEDURE — 86901 BLOOD TYPING SEROLOGIC RH(D): CPT | Performed by: ANESTHESIOLOGY

## 2018-12-26 PROCEDURE — 85610 PROTHROMBIN TIME: CPT | Performed by: ORTHOPAEDIC SURGERY

## 2018-12-26 PROCEDURE — 87641 MR-STAPH DNA AMP PROBE: CPT | Performed by: ORTHOPAEDIC SURGERY

## 2018-12-26 RX ORDER — DILTIAZEM HYDROCHLORIDE 180 MG/1
180 CAPSULE, COATED, EXTENDED RELEASE ORAL DAILY
COMMUNITY
End: 2019-06-25 | Stop reason: HOSPADM

## 2018-12-26 RX ORDER — LISINOPRIL 10 MG/1
10 TABLET ORAL DAILY
COMMUNITY

## 2018-12-26 RX ORDER — SODIUM CHLORIDE, SODIUM GLUCONATE, SODIUM ACETATE, POTASSIUM CHLORIDE, AND MAGNESIUM CHLORIDE 526; 502; 368; 37; 30 MG/100ML; MG/100ML; MG/100ML; MG/100ML; MG/100ML
1000 INJECTION, SOLUTION INTRAVENOUS CONTINUOUS
Status: CANCELLED | OUTPATIENT
Start: 2018-12-27

## 2018-12-26 RX ORDER — ACETAMINOPHEN 500 MG
500 TABLET ORAL EVERY 6 HOURS PRN
Status: ON HOLD | COMMUNITY
End: 2019-03-07

## 2018-12-26 ASSESSMENT — KOOS JR
KOOS JR SCORE: 34.174
KOOS JR SCORE: 21

## 2018-12-27 ENCOUNTER — APPOINTMENT (OUTPATIENT)
Dept: GENERAL RADIOLOGY | Facility: HOSPITAL | Age: 63
End: 2018-12-27

## 2018-12-27 ENCOUNTER — HOSPITAL ENCOUNTER (INPATIENT)
Facility: HOSPITAL | Age: 63
LOS: 8 days | Discharge: HOME HEALTH | End: 2019-01-04
Attending: ORTHOPAEDIC SURGERY | Admitting: ORTHOPAEDIC SURGERY

## 2018-12-27 ENCOUNTER — ANESTHESIA (OUTPATIENT)
Dept: PERIOP | Facility: HOSPITAL | Age: 63
End: 2018-12-27

## 2018-12-27 DIAGNOSIS — M17.0 PRIMARY OSTEOARTHRITIS OF BOTH KNEES: ICD-10-CM

## 2018-12-27 DIAGNOSIS — Z74.09 IMPAIRED FUNCTIONAL MOBILITY, BALANCE, GAIT, AND ENDURANCE: ICD-10-CM

## 2018-12-27 LAB
DEPRECATED RDW RBC AUTO: 44.1 FL (ref 36.4–46.3)
ERYTHROCYTE [DISTWIDTH] IN BLOOD BY AUTOMATED COUNT: 14.1 % (ref 11.5–14.5)
HCT VFR BLD AUTO: 37.8 % (ref 35–45)
HGB BLD-MCNC: 12.5 G/DL (ref 12–15.5)
MCH RBC QN AUTO: 28.4 PG (ref 26.5–34)
MCHC RBC AUTO-ENTMCNC: 33.1 G/DL (ref 31.4–36)
MCV RBC AUTO: 85.9 FL (ref 80–98)
PLATELET # BLD AUTO: 254 10*3/MM3 (ref 150–450)
PMV BLD AUTO: 10.1 FL (ref 8–12)
RBC # BLD AUTO: 4.4 10*6/MM3 (ref 3.77–5.16)
WBC NRBC COR # BLD: 11.27 10*3/MM3 (ref 3.2–9.8)

## 2018-12-27 PROCEDURE — 97530 THERAPEUTIC ACTIVITIES: CPT

## 2018-12-27 PROCEDURE — 25010000002 MIDAZOLAM PER 1 MG: Performed by: NURSE ANESTHETIST, CERTIFIED REGISTERED

## 2018-12-27 PROCEDURE — 25010000002 ONDANSETRON PER 1 MG: Performed by: NURSE ANESTHETIST, CERTIFIED REGISTERED

## 2018-12-27 PROCEDURE — G0378 HOSPITAL OBSERVATION PER HR: HCPCS

## 2018-12-27 PROCEDURE — G8979 MOBILITY GOAL STATUS: HCPCS

## 2018-12-27 PROCEDURE — 0SRD0J9 REPLACEMENT OF LEFT KNEE JOINT WITH SYNTHETIC SUBSTITUTE, CEMENTED, OPEN APPROACH: ICD-10-PCS | Performed by: ORTHOPAEDIC SURGERY

## 2018-12-27 PROCEDURE — 27447 TOTAL KNEE ARTHROPLASTY: CPT | Performed by: ORTHOPAEDIC SURGERY

## 2018-12-27 PROCEDURE — 97116 GAIT TRAINING THERAPY: CPT

## 2018-12-27 PROCEDURE — 87205 SMEAR GRAM STAIN: CPT | Performed by: ORTHOPAEDIC SURGERY

## 2018-12-27 PROCEDURE — 25810000003 SODIUM CHLORIDE 0.9 % WITH KCL 20 MEQ 20-0.9 MEQ/L-% SOLUTION: Performed by: ORTHOPAEDIC SURGERY

## 2018-12-27 PROCEDURE — 88305 TISSUE EXAM BY PATHOLOGIST: CPT | Performed by: PATHOLOGY

## 2018-12-27 PROCEDURE — 25010000002 FENTANYL CITRATE (PF) 100 MCG/2ML SOLUTION: Performed by: NURSE ANESTHETIST, CERTIFIED REGISTERED

## 2018-12-27 PROCEDURE — G8978 MOBILITY CURRENT STATUS: HCPCS

## 2018-12-27 PROCEDURE — 94640 AIRWAY INHALATION TREATMENT: CPT

## 2018-12-27 PROCEDURE — C1776 JOINT DEVICE (IMPLANTABLE): HCPCS | Performed by: ORTHOPAEDIC SURGERY

## 2018-12-27 PROCEDURE — C1713 ANCHOR/SCREW BN/BN,TIS/BN: HCPCS | Performed by: ORTHOPAEDIC SURGERY

## 2018-12-27 PROCEDURE — 25010000002 SUCCINYLCHOLINE PER 20 MG: Performed by: NURSE ANESTHETIST, CERTIFIED REGISTERED

## 2018-12-27 PROCEDURE — 97162 PT EVAL MOD COMPLEX 30 MIN: CPT

## 2018-12-27 PROCEDURE — 85027 COMPLETE CBC AUTOMATED: CPT | Performed by: ORTHOPAEDIC SURGERY

## 2018-12-27 PROCEDURE — 25010000002 ROPIVACAINE PER 1 MG: Performed by: ANESTHESIOLOGY

## 2018-12-27 PROCEDURE — 73560 X-RAY EXAM OF KNEE 1 OR 2: CPT

## 2018-12-27 PROCEDURE — 88305 TISSUE EXAM BY PATHOLOGIST: CPT | Performed by: ORTHOPAEDIC SURGERY

## 2018-12-27 PROCEDURE — 25010000002 CEFAZOLIN PER 500 MG: Performed by: ORTHOPAEDIC SURGERY

## 2018-12-27 PROCEDURE — 87070 CULTURE OTHR SPECIMN AEROBIC: CPT | Performed by: ORTHOPAEDIC SURGERY

## 2018-12-27 PROCEDURE — 25010000002 HYDROMORPHONE 1 MG/ML SOLUTION: Performed by: NURSE ANESTHETIST, CERTIFIED REGISTERED

## 2018-12-27 PROCEDURE — 25010000002 PROPOFOL 10 MG/ML EMULSION: Performed by: NURSE ANESTHETIST, CERTIFIED REGISTERED

## 2018-12-27 DEVICE — ATTUNE PATELLA MEDIALIZED DOME 29MM CEMENTED AOX
Type: IMPLANTABLE DEVICE | Site: KNEE | Status: FUNCTIONAL
Brand: ATTUNE

## 2018-12-27 DEVICE — ATTUNE KNEE SYSTEM TIBIAL INSERT ROTATING PLATFORM CRUCIATE RETAINING SIZE 4 6MM AOX
Type: IMPLANTABLE DEVICE | Site: KNEE | Status: FUNCTIONAL
Brand: ATTUNE

## 2018-12-27 DEVICE — ATTUNE KNEE SYSTEM FEMORAL CRUCIATE RETAINING SIZE 4 LEFT CEMENTED
Type: IMPLANTABLE DEVICE | Site: KNEE | Status: FUNCTIONAL
Brand: ATTUNE

## 2018-12-27 DEVICE — SMARTSET HV HIGH VISCOSITY BONE CEMENT 40G
Type: IMPLANTABLE DEVICE | Site: KNEE | Status: FUNCTIONAL
Brand: SMARTSET

## 2018-12-27 DEVICE — ATTUNE KNEE SYSTEM TIBIAL BASE ROTATING PLATFORM SIZE 3 CEMENTED
Type: IMPLANTABLE DEVICE | Site: KNEE | Status: FUNCTIONAL
Brand: ATTUNE

## 2018-12-27 DEVICE — TOTL KN ATTUNE DEPUY 9527038: Type: IMPLANTABLE DEVICE | Site: KNEE | Status: FUNCTIONAL

## 2018-12-27 RX ORDER — TIZANIDINE 4 MG/1
4 TABLET ORAL EVERY 6 HOURS PRN
Status: DISCONTINUED | OUTPATIENT
Start: 2018-12-27 | End: 2019-01-04 | Stop reason: HOSPADM

## 2018-12-27 RX ORDER — EPHEDRINE SULFATE 50 MG/ML
5 INJECTION, SOLUTION INTRAVENOUS ONCE AS NEEDED
Status: DISCONTINUED | OUTPATIENT
Start: 2018-12-27 | End: 2018-12-27 | Stop reason: HOSPADM

## 2018-12-27 RX ORDER — ALBUTEROL SULFATE 2.5 MG/3ML
2.5 SOLUTION RESPIRATORY (INHALATION)
Status: DISCONTINUED | OUTPATIENT
Start: 2018-12-27 | End: 2019-01-04 | Stop reason: HOSPADM

## 2018-12-27 RX ORDER — SUCCINYLCHOLINE CHLORIDE 20 MG/ML
INJECTION INTRAMUSCULAR; INTRAVENOUS AS NEEDED
Status: DISCONTINUED | OUTPATIENT
Start: 2018-12-27 | End: 2018-12-27 | Stop reason: SURG

## 2018-12-27 RX ORDER — LISINOPRIL 10 MG/1
10 TABLET ORAL DAILY
Status: DISCONTINUED | OUTPATIENT
Start: 2018-12-27 | End: 2019-01-04 | Stop reason: HOSPADM

## 2018-12-27 RX ORDER — BUPIVACAINE HCL/0.9 % NACL/PF 0.1 %
2 PLASTIC BAG, INJECTION (ML) EPIDURAL EVERY 8 HOURS
Status: COMPLETED | OUTPATIENT
Start: 2018-12-27 | End: 2018-12-28

## 2018-12-27 RX ORDER — OXYCODONE AND ACETAMINOPHEN 7.5; 325 MG/1; MG/1
1 TABLET ORAL EVERY 4 HOURS PRN
Status: DISCONTINUED | OUTPATIENT
Start: 2018-12-27 | End: 2019-01-04 | Stop reason: HOSPADM

## 2018-12-27 RX ORDER — FENTANYL CITRATE 50 UG/ML
INJECTION, SOLUTION INTRAMUSCULAR; INTRAVENOUS AS NEEDED
Status: DISCONTINUED | OUTPATIENT
Start: 2018-12-27 | End: 2018-12-27 | Stop reason: SURG

## 2018-12-27 RX ORDER — ONDANSETRON 2 MG/ML
4 INJECTION INTRAMUSCULAR; INTRAVENOUS ONCE AS NEEDED
Status: DISCONTINUED | OUTPATIENT
Start: 2018-12-27 | End: 2018-12-27 | Stop reason: HOSPADM

## 2018-12-27 RX ORDER — BACITRACIN 50000 [IU]/1
INJECTION, POWDER, FOR SOLUTION INTRAMUSCULAR AS NEEDED
Status: DISCONTINUED | OUTPATIENT
Start: 2018-12-27 | End: 2019-01-04 | Stop reason: HOSPADM

## 2018-12-27 RX ORDER — CITALOPRAM 40 MG/1
40 TABLET ORAL DAILY
Status: DISCONTINUED | OUTPATIENT
Start: 2018-12-27 | End: 2019-01-04 | Stop reason: HOSPADM

## 2018-12-27 RX ORDER — PROPOFOL 10 MG/ML
VIAL (ML) INTRAVENOUS AS NEEDED
Status: DISCONTINUED | OUTPATIENT
Start: 2018-12-27 | End: 2018-12-27 | Stop reason: SURG

## 2018-12-27 RX ORDER — FLUMAZENIL 0.1 MG/ML
0.2 INJECTION INTRAVENOUS AS NEEDED
Status: DISCONTINUED | OUTPATIENT
Start: 2018-12-27 | End: 2018-12-27 | Stop reason: HOSPADM

## 2018-12-27 RX ORDER — ACETAMINOPHEN 650 MG/1
650 SUPPOSITORY RECTAL ONCE AS NEEDED
Status: DISCONTINUED | OUTPATIENT
Start: 2018-12-27 | End: 2018-12-27 | Stop reason: HOSPADM

## 2018-12-27 RX ORDER — ACETAMINOPHEN 500 MG
500 TABLET ORAL EVERY 6 HOURS PRN
Status: DISCONTINUED | OUTPATIENT
Start: 2018-12-27 | End: 2019-01-04 | Stop reason: HOSPADM

## 2018-12-27 RX ORDER — DIPHENHYDRAMINE HYDROCHLORIDE 50 MG/ML
12.5 INJECTION INTRAMUSCULAR; INTRAVENOUS
Status: DISCONTINUED | OUTPATIENT
Start: 2018-12-27 | End: 2018-12-27 | Stop reason: HOSPADM

## 2018-12-27 RX ORDER — LABETALOL HYDROCHLORIDE 5 MG/ML
5 INJECTION, SOLUTION INTRAVENOUS
Status: DISCONTINUED | OUTPATIENT
Start: 2018-12-27 | End: 2018-12-27 | Stop reason: HOSPADM

## 2018-12-27 RX ORDER — 0.9 % SODIUM CHLORIDE 0.9 %
VIAL (ML) INJECTION AS NEEDED
Status: DISCONTINUED | OUTPATIENT
Start: 2018-12-27 | End: 2019-01-04 | Stop reason: HOSPADM

## 2018-12-27 RX ORDER — ACETAMINOPHEN 325 MG/1
650 TABLET ORAL ONCE AS NEEDED
Status: DISCONTINUED | OUTPATIENT
Start: 2018-12-27 | End: 2018-12-27 | Stop reason: HOSPADM

## 2018-12-27 RX ORDER — PROMETHAZINE HYDROCHLORIDE 25 MG/ML
12.5 INJECTION, SOLUTION INTRAMUSCULAR; INTRAVENOUS EVERY 6 HOURS PRN
Status: DISCONTINUED | OUTPATIENT
Start: 2018-12-27 | End: 2019-01-04 | Stop reason: HOSPADM

## 2018-12-27 RX ORDER — PANTOPRAZOLE SODIUM 40 MG/1
40 TABLET, DELAYED RELEASE ORAL EVERY MORNING
Status: DISCONTINUED | OUTPATIENT
Start: 2018-12-28 | End: 2019-01-04 | Stop reason: HOSPADM

## 2018-12-27 RX ORDER — MIDAZOLAM HYDROCHLORIDE 1 MG/ML
INJECTION INTRAMUSCULAR; INTRAVENOUS AS NEEDED
Status: DISCONTINUED | OUTPATIENT
Start: 2018-12-27 | End: 2018-12-27 | Stop reason: SURG

## 2018-12-27 RX ORDER — SODIUM CHLORIDE AND POTASSIUM CHLORIDE 150; 900 MG/100ML; MG/100ML
100 INJECTION, SOLUTION INTRAVENOUS CONTINUOUS
Status: DISCONTINUED | OUTPATIENT
Start: 2018-12-27 | End: 2019-01-02

## 2018-12-27 RX ORDER — DOCUSATE SODIUM 100 MG/1
100 CAPSULE, LIQUID FILLED ORAL 2 TIMES DAILY
Status: DISCONTINUED | OUTPATIENT
Start: 2018-12-27 | End: 2019-01-04 | Stop reason: HOSPADM

## 2018-12-27 RX ORDER — HYDROXYZINE PAMOATE 25 MG/1
25 CAPSULE ORAL 3 TIMES DAILY PRN
Status: DISCONTINUED | OUTPATIENT
Start: 2018-12-27 | End: 2019-01-04 | Stop reason: HOSPADM

## 2018-12-27 RX ORDER — LIDOCAINE HYDROCHLORIDE 20 MG/ML
INJECTION, SOLUTION INFILTRATION; PERINEURAL AS NEEDED
Status: DISCONTINUED | OUTPATIENT
Start: 2018-12-27 | End: 2018-12-27 | Stop reason: SURG

## 2018-12-27 RX ORDER — ONDANSETRON 2 MG/ML
INJECTION INTRAMUSCULAR; INTRAVENOUS AS NEEDED
Status: DISCONTINUED | OUTPATIENT
Start: 2018-12-27 | End: 2018-12-27 | Stop reason: SURG

## 2018-12-27 RX ORDER — AMITRIPTYLINE HYDROCHLORIDE 50 MG/1
50 TABLET, FILM COATED ORAL NIGHTLY
Status: DISCONTINUED | OUTPATIENT
Start: 2018-12-27 | End: 2019-01-04 | Stop reason: HOSPADM

## 2018-12-27 RX ORDER — ALBUTEROL SULFATE 90 UG/1
2 AEROSOL, METERED RESPIRATORY (INHALATION) 4 TIMES DAILY
Status: DISCONTINUED | OUTPATIENT
Start: 2018-12-27 | End: 2018-12-27 | Stop reason: ALTCHOICE

## 2018-12-27 RX ORDER — DILTIAZEM HYDROCHLORIDE 180 MG/1
180 CAPSULE, COATED, EXTENDED RELEASE ORAL DAILY
Status: DISCONTINUED | OUTPATIENT
Start: 2018-12-27 | End: 2019-01-04 | Stop reason: HOSPADM

## 2018-12-27 RX ORDER — ATORVASTATIN CALCIUM 40 MG/1
40 TABLET, FILM COATED ORAL NIGHTLY
Status: DISCONTINUED | OUTPATIENT
Start: 2018-12-27 | End: 2019-01-04 | Stop reason: HOSPADM

## 2018-12-27 RX ORDER — SODIUM CHLORIDE, SODIUM GLUCONATE, SODIUM ACETATE, POTASSIUM CHLORIDE, AND MAGNESIUM CHLORIDE 526; 502; 368; 37; 30 MG/100ML; MG/100ML; MG/100ML; MG/100ML; MG/100ML
1000 INJECTION, SOLUTION INTRAVENOUS CONTINUOUS
Status: ACTIVE | OUTPATIENT
Start: 2018-12-27 | End: 2018-12-29

## 2018-12-27 RX ORDER — NALOXONE HCL 0.4 MG/ML
0.2 VIAL (ML) INJECTION AS NEEDED
Status: DISCONTINUED | OUTPATIENT
Start: 2018-12-27 | End: 2018-12-27 | Stop reason: HOSPADM

## 2018-12-27 RX ORDER — BUPIVACAINE HCL/0.9 % NACL/PF 0.1 %
2 PLASTIC BAG, INJECTION (ML) EPIDURAL ONCE
Status: COMPLETED | OUTPATIENT
Start: 2018-12-27 | End: 2018-12-27

## 2018-12-27 RX ORDER — ROCURONIUM BROMIDE 10 MG/ML
INJECTION, SOLUTION INTRAVENOUS AS NEEDED
Status: DISCONTINUED | OUTPATIENT
Start: 2018-12-27 | End: 2018-12-27 | Stop reason: SURG

## 2018-12-27 RX ORDER — ROPIVACAINE HYDROCHLORIDE 5 MG/ML
INJECTION, SOLUTION EPIDURAL; INFILTRATION; PERINEURAL
Status: COMPLETED | OUTPATIENT
Start: 2018-12-27 | End: 2018-12-27

## 2018-12-27 RX ADMIN — ONDANSETRON 4 MG: 2 INJECTION INTRAMUSCULAR; INTRAVENOUS at 13:18

## 2018-12-27 RX ADMIN — TICAGRELOR 60 MG: 60 TABLET ORAL at 20:33

## 2018-12-27 RX ADMIN — FENTANYL CITRATE 50 MCG: 50 INJECTION, SOLUTION INTRAMUSCULAR; INTRAVENOUS at 12:46

## 2018-12-27 RX ADMIN — Medication 2 G: at 11:30

## 2018-12-27 RX ADMIN — PROPOFOL 20 MG: 10 INJECTION, EMULSION INTRAVENOUS at 13:23

## 2018-12-27 RX ADMIN — OXYCODONE HYDROCHLORIDE AND ACETAMINOPHEN 1 TABLET: 7.5; 325 TABLET ORAL at 19:00

## 2018-12-27 RX ADMIN — DILTIAZEM HYDROCHLORIDE 180 MG: 180 CAPSULE, COATED, EXTENDED RELEASE ORAL at 17:30

## 2018-12-27 RX ADMIN — ROCURONIUM BROMIDE 25 MG: 10 INJECTION INTRAVENOUS at 11:32

## 2018-12-27 RX ADMIN — ROPIVACAINE HYDROCHLORIDE 30 ML: 5 INJECTION, SOLUTION EPIDURAL; INFILTRATION; PERINEURAL at 11:03

## 2018-12-27 RX ADMIN — AMITRIPTYLINE HYDROCHLORIDE 50 MG: 50 TABLET, FILM COATED ORAL at 20:33

## 2018-12-27 RX ADMIN — PROPOFOL 120 MG: 10 INJECTION, EMULSION INTRAVENOUS at 11:22

## 2018-12-27 RX ADMIN — FENTANYL CITRATE 100 MCG: 50 INJECTION, SOLUTION INTRAMUSCULAR; INTRAVENOUS at 11:22

## 2018-12-27 RX ADMIN — MIDAZOLAM HYDROCHLORIDE 1 MG: 2 INJECTION, SOLUTION INTRAMUSCULAR; INTRAVENOUS at 11:15

## 2018-12-27 RX ADMIN — PROPOFOL 50 MG: 10 INJECTION, EMULSION INTRAVENOUS at 12:38

## 2018-12-27 RX ADMIN — LIDOCAINE HYDROCHLORIDE 50 MG: 20 INJECTION, SOLUTION INFILTRATION; PERINEURAL at 11:22

## 2018-12-27 RX ADMIN — PROPOFOL 20 MG: 10 INJECTION, EMULSION INTRAVENOUS at 13:27

## 2018-12-27 RX ADMIN — POTASSIUM CHLORIDE AND SODIUM CHLORIDE 100 ML/HR: 900; 150 INJECTION, SOLUTION INTRAVENOUS at 17:59

## 2018-12-27 RX ADMIN — PROPOFOL 20 MG: 10 INJECTION, EMULSION INTRAVENOUS at 13:30

## 2018-12-27 RX ADMIN — ATORVASTATIN CALCIUM 40 MG: 40 TABLET, FILM COATED ORAL at 20:33

## 2018-12-27 RX ADMIN — SODIUM CHLORIDE, SODIUM GLUCONATE, SODIUM ACETATE, POTASSIUM CHLORIDE, AND MAGNESIUM CHLORIDE 1000 ML: 526; 502; 368; 37; 30 INJECTION, SOLUTION INTRAVENOUS at 09:47

## 2018-12-27 RX ADMIN — HYDROMORPHONE HYDROCHLORIDE 0.5 MG: 1 INJECTION, SOLUTION INTRAMUSCULAR; INTRAVENOUS; SUBCUTANEOUS at 15:00

## 2018-12-27 RX ADMIN — FENTANYL CITRATE 50 MCG: 50 INJECTION, SOLUTION INTRAMUSCULAR; INTRAVENOUS at 12:38

## 2018-12-27 RX ADMIN — ROCURONIUM BROMIDE 5 MG: 10 INJECTION INTRAVENOUS at 11:22

## 2018-12-27 RX ADMIN — PROPOFOL 20 MG: 10 INJECTION, EMULSION INTRAVENOUS at 13:33

## 2018-12-27 RX ADMIN — SODIUM CHLORIDE 2 G: 9 INJECTION, SOLUTION INTRAVENOUS at 17:30

## 2018-12-27 RX ADMIN — DOCUSATE SODIUM 100 MG: 100 CAPSULE, LIQUID FILLED ORAL at 20:33

## 2018-12-27 RX ADMIN — SUCCINYLCHOLINE CHLORIDE 140 MG: 20 INJECTION, SOLUTION INTRAMUSCULAR; INTRAVENOUS at 11:22

## 2018-12-27 RX ADMIN — HYDROMORPHONE HYDROCHLORIDE 0.5 MG: 1 INJECTION, SOLUTION INTRAMUSCULAR; INTRAVENOUS; SUBCUTANEOUS at 13:56

## 2018-12-27 RX ADMIN — CITALOPRAM HYDROBROMIDE 40 MG: 40 TABLET ORAL at 17:30

## 2018-12-27 RX ADMIN — ALBUTEROL SULFATE 2.5 MG: 2.5 SOLUTION RESPIRATORY (INHALATION) at 18:33

## 2018-12-27 RX ADMIN — LISINOPRIL 10 MG: 10 TABLET ORAL at 17:30

## 2018-12-27 RX ADMIN — FENTANYL CITRATE 50 MCG: 50 INJECTION, SOLUTION INTRAMUSCULAR; INTRAVENOUS at 12:55

## 2018-12-27 NOTE — ANESTHESIA PROCEDURE NOTES
Peripheral Block      Patient location during procedure: OR  Start time: 12/27/2018 10:55 AM  Stop time: 12/27/2018 11:03 AM  Reason for block: at surgeon's request and post-op pain management  Performed by  CRNA: Camron Centeno CRNA  Preanesthetic Checklist  Completed: patient identified, site marked, surgical consent, pre-op evaluation, timeout performed, IV checked, risks and benefits discussed and monitors and equipment checked  Prep:  Pt Position: supine  Sterile barriers:cap, gloves, sterile barriers and mask  Prep: ChloraPrep  Patient monitoring: blood pressure monitoring, continuous pulse oximetry and EKG  Procedure  Sedation:yes  Performed under: local infiltration  Guidance:ultrasound guided  ULTRASOUND INTERPRETATION.  Using ultrasound guidance a 21 G gauge needle was placed in close proximity to the femoral nerve, at which point, under ultrasound guidance anesthetic was injected in the area of the nerve and spread of the anesthesia was seen on ultrasound in close proximity thereto.  There were no abnormalities seen on ultrasound; a digital image was taken; and the patient tolerated the procedure with no complications. Images:still images not obtained    Laterality:left  Block Type:adductor canal block  Injection Technique:single-shot  Needle Type:echogenic  Needle Gauge:21 G    Medications Used: ropivacaine (NAROPIN) 0.5 % injection, 30 mL  Med admintered at 12/27/2018 11:03 AM  Post Assessment  Injection Assessment: negative aspiration for heme, no paresthesia on injection and incremental injection  Patient Tolerance:comfortable throughout block  Complications:no

## 2018-12-27 NOTE — ANESTHESIA POSTPROCEDURE EVALUATION
Patient: Mary Nelson    Procedure Summary     Date:  12/27/18 Room / Location:  [facility] OR  / [facility] OR    Anesthesia Start:  1116 Anesthesia Stop:  1353    Procedure:  TOTAL KNEE ARTHROPLASTY (Left ) Diagnosis:       Primary osteoarthritis of both knees      (Primary osteoarthritis of both knees [M17.0])    Surgeon:  Grover Raymond MD Provider:  Marcos Interiano MD    Anesthesia Type:  general, general with block ASA Status:  3          Anesthesia Type: general, general with block  Last vitals  BP   139/79 (12/27/18 0925)   Temp   97.5 °F (36.4 °C) (12/27/18 0925)   Pulse   80 (12/27/18 0925)   Resp   18 (12/27/18 0925)     SpO2   96 % (12/27/18 0925)     Post Anesthesia Care and Evaluation    Patient location during evaluation: PACU  Patient participation: complete - patient participated  Level of consciousness: awake and alert  Pain score: 0  Pain management: adequate  Airway patency: patent  Anesthetic complications: No anesthetic complications  PONV Status: none  Cardiovascular status: acceptable  Respiratory status: acceptable  Hydration status: acceptable  Post Neuraxial Block status: Motor and sensory function returned to baseline

## 2018-12-27 NOTE — ANESTHESIA PROCEDURE NOTES
Airway  Urgency: elective    Airway not difficult    General Information and Staff    Patient location during procedure: OR  CRNA: Camron Centeno CRNA    Indications and Patient Condition  Indications for airway management: airway protection    Preoxygenated: yes  MILS maintained throughout  Mask difficulty assessment: 0 - not attempted    Final Airway Details  Final airway type: endotracheal airway      Successful airway: ETT    Successful intubation technique: direct laryngoscopy  Endotracheal tube insertion site: oral  Blade: [place]  Blade size: 2  ETT size (mm): 7.0  Cormack-Lehane Classification: grade IIb - view of arytenoids or posterior of glottis only  Placement verified by: chest auscultation and capnometry   Measured from: lips  ETT to lips (cm): 20  Number of attempts at approach: 2

## 2018-12-27 NOTE — ANESTHESIA PREPROCEDURE EVALUATION
Anesthesia Evaluation     Patient summary reviewed and Nursing notes reviewed   NPO Solid Status: > 8 hours  NPO Liquid Status: > 8 hours           Airway   Mallampati: II  TM distance: >3 FB  Neck ROM: full  no difficulty expected  Dental - normal exam     Pulmonary - normal exam   (+) a smoker, COPD, shortness of breath,   Cardiovascular - normal exam  Exercise tolerance: good (4-7 METS)    Rhythm: regular  Rate: normal    (+) hypertension, CAD, angina, hyperlipidemia,       Neuro/Psych  (+) psychiatric history Depression,     GI/Hepatic/Renal/Endo    (+) obesity,  hiatal hernia, GERD,      Musculoskeletal     Abdominal  - normal exam   Substance History - negative use     OB/GYN          Other   (+) arthritis                       Anesthesia Plan    ASA 3     general and general with block   (Patient prefers GA and block; patient had negative experience with spinal in the past.)  intravenous induction   Anesthetic plan, all risks, benefits, and alternatives have been provided, discussed and informed consent has been obtained with: patient.

## 2018-12-28 ENCOUNTER — APPOINTMENT (OUTPATIENT)
Dept: ULTRASOUND IMAGING | Facility: HOSPITAL | Age: 63
End: 2018-12-28

## 2018-12-28 LAB
HCT VFR BLD AUTO: 30.8 % (ref 35–45)
HGB BLD-MCNC: 10.1 G/DL (ref 12–15.5)
LAB AP CASE REPORT: NORMAL
PATH REPORT.FINAL DX SPEC: NORMAL
PATH REPORT.GROSS SPEC: NORMAL

## 2018-12-28 PROCEDURE — 25010000002 CEFAZOLIN PER 500 MG: Performed by: ORTHOPAEDIC SURGERY

## 2018-12-28 PROCEDURE — 97530 THERAPEUTIC ACTIVITIES: CPT

## 2018-12-28 PROCEDURE — 94799 UNLISTED PULMONARY SVC/PX: CPT

## 2018-12-28 PROCEDURE — 85014 HEMATOCRIT: CPT | Performed by: ORTHOPAEDIC SURGERY

## 2018-12-28 PROCEDURE — 25810000003 SODIUM CHLORIDE 0.9 % WITH KCL 20 MEQ 20-0.9 MEQ/L-% SOLUTION: Performed by: ORTHOPAEDIC SURGERY

## 2018-12-28 PROCEDURE — 93970 EXTREMITY STUDY: CPT

## 2018-12-28 PROCEDURE — 97535 SELF CARE MNGMENT TRAINING: CPT

## 2018-12-28 PROCEDURE — 97116 GAIT TRAINING THERAPY: CPT

## 2018-12-28 PROCEDURE — 97110 THERAPEUTIC EXERCISES: CPT

## 2018-12-28 PROCEDURE — 94760 N-INVAS EAR/PLS OXIMETRY 1: CPT

## 2018-12-28 PROCEDURE — 85018 HEMOGLOBIN: CPT | Performed by: ORTHOPAEDIC SURGERY

## 2018-12-28 RX ADMIN — SODIUM CHLORIDE 2 G: 9 INJECTION, SOLUTION INTRAVENOUS at 17:55

## 2018-12-28 RX ADMIN — LISINOPRIL 10 MG: 10 TABLET ORAL at 09:07

## 2018-12-28 RX ADMIN — OXYCODONE HYDROCHLORIDE AND ACETAMINOPHEN 1 TABLET: 7.5; 325 TABLET ORAL at 15:06

## 2018-12-28 RX ADMIN — SODIUM CHLORIDE 2 G: 9 INJECTION, SOLUTION INTRAVENOUS at 01:10

## 2018-12-28 RX ADMIN — TICAGRELOR 60 MG: 60 TABLET ORAL at 09:07

## 2018-12-28 RX ADMIN — ATORVASTATIN CALCIUM 40 MG: 40 TABLET, FILM COATED ORAL at 20:23

## 2018-12-28 RX ADMIN — OXYCODONE HYDROCHLORIDE AND ACETAMINOPHEN 1 TABLET: 7.5; 325 TABLET ORAL at 09:10

## 2018-12-28 RX ADMIN — TICAGRELOR 60 MG: 60 TABLET ORAL at 20:23

## 2018-12-28 RX ADMIN — OXYCODONE HYDROCHLORIDE AND ACETAMINOPHEN 1 TABLET: 7.5; 325 TABLET ORAL at 23:57

## 2018-12-28 RX ADMIN — ALBUTEROL SULFATE 2.5 MG: 2.5 SOLUTION RESPIRATORY (INHALATION) at 19:43

## 2018-12-28 RX ADMIN — OXYCODONE HYDROCHLORIDE AND ACETAMINOPHEN 1 TABLET: 7.5; 325 TABLET ORAL at 20:23

## 2018-12-28 RX ADMIN — ALBUTEROL SULFATE 2.5 MG: 2.5 SOLUTION RESPIRATORY (INHALATION) at 06:59

## 2018-12-28 RX ADMIN — DOCUSATE SODIUM 100 MG: 100 CAPSULE, LIQUID FILLED ORAL at 20:23

## 2018-12-28 RX ADMIN — SODIUM CHLORIDE 2 G: 9 INJECTION, SOLUTION INTRAVENOUS at 09:11

## 2018-12-28 RX ADMIN — CITALOPRAM HYDROBROMIDE 40 MG: 40 TABLET ORAL at 09:07

## 2018-12-28 RX ADMIN — DILTIAZEM HYDROCHLORIDE 180 MG: 180 CAPSULE, COATED, EXTENDED RELEASE ORAL at 09:07

## 2018-12-28 RX ADMIN — POTASSIUM CHLORIDE AND SODIUM CHLORIDE 100 ML/HR: 900; 150 INJECTION, SOLUTION INTRAVENOUS at 05:20

## 2018-12-28 RX ADMIN — ALBUTEROL SULFATE 2.5 MG: 2.5 SOLUTION RESPIRATORY (INHALATION) at 00:55

## 2018-12-28 RX ADMIN — PANTOPRAZOLE SODIUM 40 MG: 40 TABLET, DELAYED RELEASE ORAL at 06:05

## 2018-12-28 RX ADMIN — AMITRIPTYLINE HYDROCHLORIDE 50 MG: 50 TABLET, FILM COATED ORAL at 20:23

## 2018-12-28 RX ADMIN — OXYCODONE HYDROCHLORIDE AND ACETAMINOPHEN 1 TABLET: 7.5; 325 TABLET ORAL at 05:20

## 2018-12-29 PROCEDURE — 94760 N-INVAS EAR/PLS OXIMETRY 1: CPT

## 2018-12-29 PROCEDURE — 97116 GAIT TRAINING THERAPY: CPT

## 2018-12-29 PROCEDURE — 94799 UNLISTED PULMONARY SVC/PX: CPT

## 2018-12-29 PROCEDURE — 25010000002 HYDROMORPHONE 1 MG/ML SOLUTION: Performed by: ORTHOPAEDIC SURGERY

## 2018-12-29 PROCEDURE — 97110 THERAPEUTIC EXERCISES: CPT

## 2018-12-29 PROCEDURE — 25810000003 SODIUM CHLORIDE 0.9 % WITH KCL 20 MEQ 20-0.9 MEQ/L-% SOLUTION: Performed by: ORTHOPAEDIC SURGERY

## 2018-12-29 PROCEDURE — 97530 THERAPEUTIC ACTIVITIES: CPT

## 2018-12-29 RX ADMIN — ALBUTEROL SULFATE 2.5 MG: 2.5 SOLUTION RESPIRATORY (INHALATION) at 12:47

## 2018-12-29 RX ADMIN — CITALOPRAM HYDROBROMIDE 40 MG: 40 TABLET ORAL at 08:36

## 2018-12-29 RX ADMIN — OXYCODONE HYDROCHLORIDE AND ACETAMINOPHEN 1 TABLET: 7.5; 325 TABLET ORAL at 22:17

## 2018-12-29 RX ADMIN — AMITRIPTYLINE HYDROCHLORIDE 50 MG: 50 TABLET, FILM COATED ORAL at 21:18

## 2018-12-29 RX ADMIN — ATORVASTATIN CALCIUM 40 MG: 40 TABLET, FILM COATED ORAL at 21:18

## 2018-12-29 RX ADMIN — OXYCODONE HYDROCHLORIDE AND ACETAMINOPHEN 1 TABLET: 7.5; 325 TABLET ORAL at 06:00

## 2018-12-29 RX ADMIN — ALBUTEROL SULFATE 2.5 MG: 2.5 SOLUTION RESPIRATORY (INHALATION) at 18:31

## 2018-12-29 RX ADMIN — OXYCODONE HYDROCHLORIDE AND ACETAMINOPHEN 1 TABLET: 7.5; 325 TABLET ORAL at 15:48

## 2018-12-29 RX ADMIN — DILTIAZEM HYDROCHLORIDE 180 MG: 180 CAPSULE, COATED, EXTENDED RELEASE ORAL at 08:36

## 2018-12-29 RX ADMIN — ALBUTEROL SULFATE 2.5 MG: 2.5 SOLUTION RESPIRATORY (INHALATION) at 00:46

## 2018-12-29 RX ADMIN — HYDROMORPHONE HYDROCHLORIDE 1 MG: 1 INJECTION, SOLUTION INTRAMUSCULAR; INTRAVENOUS; SUBCUTANEOUS at 21:19

## 2018-12-29 RX ADMIN — OXYCODONE HYDROCHLORIDE AND ACETAMINOPHEN 1 TABLET: 7.5; 325 TABLET ORAL at 12:04

## 2018-12-29 RX ADMIN — POTASSIUM CHLORIDE AND SODIUM CHLORIDE 100 ML/HR: 900; 150 INJECTION, SOLUTION INTRAVENOUS at 05:38

## 2018-12-29 RX ADMIN — TICAGRELOR 60 MG: 60 TABLET ORAL at 21:18

## 2018-12-29 RX ADMIN — TICAGRELOR 60 MG: 60 TABLET ORAL at 08:36

## 2018-12-29 RX ADMIN — ALBUTEROL SULFATE 2.5 MG: 2.5 SOLUTION RESPIRATORY (INHALATION) at 06:26

## 2018-12-29 RX ADMIN — LISINOPRIL 10 MG: 10 TABLET ORAL at 08:36

## 2018-12-29 RX ADMIN — PANTOPRAZOLE SODIUM 40 MG: 40 TABLET, DELAYED RELEASE ORAL at 05:38

## 2018-12-30 PROCEDURE — 97530 THERAPEUTIC ACTIVITIES: CPT

## 2018-12-30 PROCEDURE — 94760 N-INVAS EAR/PLS OXIMETRY 1: CPT

## 2018-12-30 PROCEDURE — 97116 GAIT TRAINING THERAPY: CPT

## 2018-12-30 PROCEDURE — 94799 UNLISTED PULMONARY SVC/PX: CPT

## 2018-12-30 PROCEDURE — 25010000002 HYDROMORPHONE 1 MG/ML SOLUTION: Performed by: ORTHOPAEDIC SURGERY

## 2018-12-30 PROCEDURE — 97110 THERAPEUTIC EXERCISES: CPT

## 2018-12-30 RX ADMIN — ALBUTEROL SULFATE 2.5 MG: 2.5 SOLUTION RESPIRATORY (INHALATION) at 13:24

## 2018-12-30 RX ADMIN — OXYCODONE HYDROCHLORIDE AND ACETAMINOPHEN 1 TABLET: 7.5; 325 TABLET ORAL at 12:44

## 2018-12-30 RX ADMIN — HYDROMORPHONE HYDROCHLORIDE 1 MG: 1 INJECTION, SOLUTION INTRAMUSCULAR; INTRAVENOUS; SUBCUTANEOUS at 00:07

## 2018-12-30 RX ADMIN — CITALOPRAM HYDROBROMIDE 40 MG: 40 TABLET ORAL at 08:38

## 2018-12-30 RX ADMIN — HYDROMORPHONE HYDROCHLORIDE 1 MG: 1 INJECTION, SOLUTION INTRAMUSCULAR; INTRAVENOUS; SUBCUTANEOUS at 06:05

## 2018-12-30 RX ADMIN — AMITRIPTYLINE HYDROCHLORIDE 50 MG: 50 TABLET, FILM COATED ORAL at 20:35

## 2018-12-30 RX ADMIN — DILTIAZEM HYDROCHLORIDE 180 MG: 180 CAPSULE, COATED, EXTENDED RELEASE ORAL at 08:38

## 2018-12-30 RX ADMIN — OXYCODONE HYDROCHLORIDE AND ACETAMINOPHEN 1 TABLET: 7.5; 325 TABLET ORAL at 08:38

## 2018-12-30 RX ADMIN — OXYCODONE HYDROCHLORIDE AND ACETAMINOPHEN 1 TABLET: 7.5; 325 TABLET ORAL at 20:36

## 2018-12-30 RX ADMIN — TICAGRELOR 60 MG: 60 TABLET ORAL at 20:35

## 2018-12-30 RX ADMIN — PANTOPRAZOLE SODIUM 40 MG: 40 TABLET, DELAYED RELEASE ORAL at 06:05

## 2018-12-30 RX ADMIN — ALBUTEROL SULFATE 2.5 MG: 2.5 SOLUTION RESPIRATORY (INHALATION) at 06:39

## 2018-12-30 RX ADMIN — ALBUTEROL SULFATE 2.5 MG: 2.5 SOLUTION RESPIRATORY (INHALATION) at 18:42

## 2018-12-30 RX ADMIN — LISINOPRIL 10 MG: 10 TABLET ORAL at 08:38

## 2018-12-30 RX ADMIN — TICAGRELOR 60 MG: 60 TABLET ORAL at 08:38

## 2018-12-30 RX ADMIN — OXYCODONE HYDROCHLORIDE AND ACETAMINOPHEN 1 TABLET: 7.5; 325 TABLET ORAL at 04:10

## 2018-12-30 RX ADMIN — ATORVASTATIN CALCIUM 40 MG: 40 TABLET, FILM COATED ORAL at 20:35

## 2018-12-30 RX ADMIN — ACETAMINOPHEN 500 MG: 500 TABLET ORAL at 08:38

## 2018-12-30 RX ADMIN — HYDROXYZINE PAMOATE 25 MG: 25 CAPSULE ORAL at 12:44

## 2018-12-30 RX ADMIN — ALBUTEROL SULFATE 2.5 MG: 2.5 SOLUTION RESPIRATORY (INHALATION) at 01:05

## 2018-12-31 PROCEDURE — 94799 UNLISTED PULMONARY SVC/PX: CPT

## 2018-12-31 PROCEDURE — 94760 N-INVAS EAR/PLS OXIMETRY 1: CPT

## 2018-12-31 PROCEDURE — 97116 GAIT TRAINING THERAPY: CPT

## 2018-12-31 PROCEDURE — 97530 THERAPEUTIC ACTIVITIES: CPT

## 2018-12-31 RX ORDER — PSEUDOEPHEDRINE HCL 30 MG
100 TABLET ORAL 2 TIMES DAILY
Qty: 30 EACH | Refills: 0 | Status: ON HOLD | OUTPATIENT
Start: 2018-12-31 | End: 2019-03-07

## 2018-12-31 RX ORDER — OXYCODONE AND ACETAMINOPHEN 7.5; 325 MG/1; MG/1
1 TABLET ORAL EVERY 6 HOURS PRN
Qty: 80 TABLET | Refills: 0 | Status: SHIPPED | OUTPATIENT
Start: 2018-12-31 | End: 2019-01-24

## 2018-12-31 RX ADMIN — ATORVASTATIN CALCIUM 40 MG: 40 TABLET, FILM COATED ORAL at 20:10

## 2018-12-31 RX ADMIN — DILTIAZEM HYDROCHLORIDE 180 MG: 180 CAPSULE, COATED, EXTENDED RELEASE ORAL at 08:11

## 2018-12-31 RX ADMIN — OXYCODONE HYDROCHLORIDE AND ACETAMINOPHEN 1 TABLET: 7.5; 325 TABLET ORAL at 01:54

## 2018-12-31 RX ADMIN — OXYCODONE HYDROCHLORIDE AND ACETAMINOPHEN 1 TABLET: 7.5; 325 TABLET ORAL at 17:43

## 2018-12-31 RX ADMIN — AMITRIPTYLINE HYDROCHLORIDE 50 MG: 50 TABLET, FILM COATED ORAL at 20:10

## 2018-12-31 RX ADMIN — OXYCODONE HYDROCHLORIDE AND ACETAMINOPHEN 1 TABLET: 7.5; 325 TABLET ORAL at 11:28

## 2018-12-31 RX ADMIN — ALBUTEROL SULFATE 2.5 MG: 2.5 SOLUTION RESPIRATORY (INHALATION) at 00:05

## 2018-12-31 RX ADMIN — CITALOPRAM HYDROBROMIDE 40 MG: 40 TABLET ORAL at 08:11

## 2018-12-31 RX ADMIN — TICAGRELOR 60 MG: 60 TABLET ORAL at 08:11

## 2018-12-31 RX ADMIN — PANTOPRAZOLE SODIUM 40 MG: 40 TABLET, DELAYED RELEASE ORAL at 06:30

## 2018-12-31 RX ADMIN — OXYCODONE HYDROCHLORIDE AND ACETAMINOPHEN 1 TABLET: 7.5; 325 TABLET ORAL at 07:15

## 2018-12-31 RX ADMIN — ALBUTEROL SULFATE 2.5 MG: 2.5 SOLUTION RESPIRATORY (INHALATION) at 12:49

## 2018-12-31 RX ADMIN — DOCUSATE SODIUM 100 MG: 100 CAPSULE, LIQUID FILLED ORAL at 08:11

## 2018-12-31 RX ADMIN — TICAGRELOR 60 MG: 60 TABLET ORAL at 20:10

## 2018-12-31 RX ADMIN — ALBUTEROL SULFATE 2.5 MG: 2.5 SOLUTION RESPIRATORY (INHALATION) at 06:54

## 2018-12-31 RX ADMIN — ALBUTEROL SULFATE 2.5 MG: 2.5 SOLUTION RESPIRATORY (INHALATION) at 19:25

## 2018-12-31 RX ADMIN — DOCUSATE SODIUM 100 MG: 100 CAPSULE, LIQUID FILLED ORAL at 20:10

## 2018-12-31 RX ADMIN — LISINOPRIL 10 MG: 10 TABLET ORAL at 08:11

## 2019-01-01 ENCOUNTER — APPOINTMENT (OUTPATIENT)
Dept: GENERAL RADIOLOGY | Facility: HOSPITAL | Age: 64
End: 2019-01-01

## 2019-01-01 LAB
BACTERIA SPEC AEROBE CULT: NORMAL
GRAM STN SPEC: NORMAL

## 2019-01-01 PROCEDURE — 97116 GAIT TRAINING THERAPY: CPT

## 2019-01-01 PROCEDURE — 71046 X-RAY EXAM CHEST 2 VIEWS: CPT

## 2019-01-01 PROCEDURE — 94760 N-INVAS EAR/PLS OXIMETRY 1: CPT

## 2019-01-01 PROCEDURE — 94799 UNLISTED PULMONARY SVC/PX: CPT

## 2019-01-01 PROCEDURE — 97110 THERAPEUTIC EXERCISES: CPT

## 2019-01-01 PROCEDURE — 97530 THERAPEUTIC ACTIVITIES: CPT

## 2019-01-01 RX ADMIN — DOCUSATE SODIUM 100 MG: 100 CAPSULE, LIQUID FILLED ORAL at 21:48

## 2019-01-01 RX ADMIN — OXYCODONE HYDROCHLORIDE AND ACETAMINOPHEN 1 TABLET: 7.5; 325 TABLET ORAL at 09:52

## 2019-01-01 RX ADMIN — DILTIAZEM HYDROCHLORIDE 180 MG: 180 CAPSULE, COATED, EXTENDED RELEASE ORAL at 08:13

## 2019-01-01 RX ADMIN — DOCUSATE SODIUM 100 MG: 100 CAPSULE, LIQUID FILLED ORAL at 08:13

## 2019-01-01 RX ADMIN — PANTOPRAZOLE SODIUM 40 MG: 40 TABLET, DELAYED RELEASE ORAL at 06:35

## 2019-01-01 RX ADMIN — ATORVASTATIN CALCIUM 40 MG: 40 TABLET, FILM COATED ORAL at 21:48

## 2019-01-01 RX ADMIN — ALBUTEROL SULFATE 2.5 MG: 2.5 SOLUTION RESPIRATORY (INHALATION) at 01:03

## 2019-01-01 RX ADMIN — OXYCODONE HYDROCHLORIDE AND ACETAMINOPHEN 1 TABLET: 7.5; 325 TABLET ORAL at 21:48

## 2019-01-01 RX ADMIN — LISINOPRIL 10 MG: 10 TABLET ORAL at 08:13

## 2019-01-01 RX ADMIN — CITALOPRAM HYDROBROMIDE 40 MG: 40 TABLET ORAL at 08:13

## 2019-01-01 RX ADMIN — AMITRIPTYLINE HYDROCHLORIDE 50 MG: 50 TABLET, FILM COATED ORAL at 21:48

## 2019-01-01 RX ADMIN — ALBUTEROL SULFATE 2.5 MG: 2.5 SOLUTION RESPIRATORY (INHALATION) at 18:58

## 2019-01-01 RX ADMIN — OXYCODONE HYDROCHLORIDE AND ACETAMINOPHEN 1 TABLET: 7.5; 325 TABLET ORAL at 00:53

## 2019-01-01 RX ADMIN — ALBUTEROL SULFATE 2.5 MG: 2.5 SOLUTION RESPIRATORY (INHALATION) at 06:41

## 2019-01-01 RX ADMIN — TICAGRELOR 60 MG: 60 TABLET ORAL at 08:13

## 2019-01-01 RX ADMIN — OXYCODONE HYDROCHLORIDE AND ACETAMINOPHEN 1 TABLET: 7.5; 325 TABLET ORAL at 05:02

## 2019-01-01 RX ADMIN — TICAGRELOR 60 MG: 60 TABLET ORAL at 21:48

## 2019-01-01 RX ADMIN — OXYCODONE HYDROCHLORIDE AND ACETAMINOPHEN 1 TABLET: 7.5; 325 TABLET ORAL at 15:27

## 2019-01-02 PROCEDURE — 94760 N-INVAS EAR/PLS OXIMETRY 1: CPT

## 2019-01-02 PROCEDURE — 97110 THERAPEUTIC EXERCISES: CPT

## 2019-01-02 PROCEDURE — 97116 GAIT TRAINING THERAPY: CPT

## 2019-01-02 PROCEDURE — 94799 UNLISTED PULMONARY SVC/PX: CPT

## 2019-01-02 PROCEDURE — 97530 THERAPEUTIC ACTIVITIES: CPT

## 2019-01-02 RX ORDER — ONDANSETRON 4 MG/1
4 TABLET, ORALLY DISINTEGRATING ORAL ONCE
Status: COMPLETED | OUTPATIENT
Start: 2019-01-02 | End: 2019-01-02

## 2019-01-02 RX ORDER — ONDANSETRON 4 MG/1
4 TABLET, ORALLY DISINTEGRATING ORAL EVERY 6 HOURS PRN
Status: DISCONTINUED | OUTPATIENT
Start: 2019-01-02 | End: 2019-01-04 | Stop reason: HOSPADM

## 2019-01-02 RX ADMIN — PANTOPRAZOLE SODIUM 40 MG: 40 TABLET, DELAYED RELEASE ORAL at 06:11

## 2019-01-02 RX ADMIN — ATORVASTATIN CALCIUM 40 MG: 40 TABLET, FILM COATED ORAL at 20:57

## 2019-01-02 RX ADMIN — DILTIAZEM HYDROCHLORIDE 180 MG: 180 CAPSULE, COATED, EXTENDED RELEASE ORAL at 08:28

## 2019-01-02 RX ADMIN — ALBUTEROL SULFATE 2.5 MG: 2.5 SOLUTION RESPIRATORY (INHALATION) at 07:10

## 2019-01-02 RX ADMIN — TICAGRELOR 60 MG: 60 TABLET ORAL at 08:28

## 2019-01-02 RX ADMIN — ALBUTEROL SULFATE 2.5 MG: 2.5 SOLUTION RESPIRATORY (INHALATION) at 00:42

## 2019-01-02 RX ADMIN — OXYCODONE HYDROCHLORIDE AND ACETAMINOPHEN 1 TABLET: 7.5; 325 TABLET ORAL at 20:57

## 2019-01-02 RX ADMIN — OXYCODONE HYDROCHLORIDE AND ACETAMINOPHEN 1 TABLET: 7.5; 325 TABLET ORAL at 05:36

## 2019-01-02 RX ADMIN — CITALOPRAM HYDROBROMIDE 40 MG: 40 TABLET ORAL at 08:29

## 2019-01-02 RX ADMIN — OXYCODONE HYDROCHLORIDE AND ACETAMINOPHEN 1 TABLET: 7.5; 325 TABLET ORAL at 10:06

## 2019-01-02 RX ADMIN — ACETAMINOPHEN 500 MG: 500 TABLET ORAL at 16:56

## 2019-01-02 RX ADMIN — OXYCODONE HYDROCHLORIDE AND ACETAMINOPHEN 1 TABLET: 7.5; 325 TABLET ORAL at 14:21

## 2019-01-02 RX ADMIN — ALBUTEROL SULFATE 2.5 MG: 2.5 SOLUTION RESPIRATORY (INHALATION) at 19:54

## 2019-01-02 RX ADMIN — ONDANSETRON 4 MG: 4 TABLET, ORALLY DISINTEGRATING ORAL at 16:52

## 2019-01-02 RX ADMIN — LISINOPRIL 10 MG: 10 TABLET ORAL at 08:29

## 2019-01-02 RX ADMIN — DOCUSATE SODIUM 100 MG: 100 CAPSULE, LIQUID FILLED ORAL at 08:29

## 2019-01-02 RX ADMIN — TICAGRELOR 60 MG: 60 TABLET ORAL at 20:57

## 2019-01-02 RX ADMIN — AMITRIPTYLINE HYDROCHLORIDE 50 MG: 50 TABLET, FILM COATED ORAL at 20:57

## 2019-01-03 PROCEDURE — 97110 THERAPEUTIC EXERCISES: CPT

## 2019-01-03 PROCEDURE — 94799 UNLISTED PULMONARY SVC/PX: CPT

## 2019-01-03 PROCEDURE — 97116 GAIT TRAINING THERAPY: CPT

## 2019-01-03 PROCEDURE — 97530 THERAPEUTIC ACTIVITIES: CPT

## 2019-01-03 PROCEDURE — 94760 N-INVAS EAR/PLS OXIMETRY 1: CPT

## 2019-01-03 RX ADMIN — OXYCODONE HYDROCHLORIDE AND ACETAMINOPHEN 1 TABLET: 7.5; 325 TABLET ORAL at 18:43

## 2019-01-03 RX ADMIN — AMITRIPTYLINE HYDROCHLORIDE 50 MG: 50 TABLET, FILM COATED ORAL at 21:30

## 2019-01-03 RX ADMIN — TICAGRELOR 60 MG: 60 TABLET ORAL at 21:30

## 2019-01-03 RX ADMIN — ALBUTEROL SULFATE 2.5 MG: 2.5 SOLUTION RESPIRATORY (INHALATION) at 19:03

## 2019-01-03 RX ADMIN — ATORVASTATIN CALCIUM 40 MG: 40 TABLET, FILM COATED ORAL at 21:30

## 2019-01-03 RX ADMIN — LISINOPRIL 10 MG: 10 TABLET ORAL at 08:38

## 2019-01-03 RX ADMIN — TICAGRELOR 60 MG: 60 TABLET ORAL at 08:39

## 2019-01-03 RX ADMIN — OXYCODONE HYDROCHLORIDE AND ACETAMINOPHEN 1 TABLET: 7.5; 325 TABLET ORAL at 03:09

## 2019-01-03 RX ADMIN — CITALOPRAM HYDROBROMIDE 40 MG: 40 TABLET ORAL at 08:38

## 2019-01-03 RX ADMIN — DILTIAZEM HYDROCHLORIDE 180 MG: 180 CAPSULE, COATED, EXTENDED RELEASE ORAL at 08:38

## 2019-01-03 RX ADMIN — OXYCODONE HYDROCHLORIDE AND ACETAMINOPHEN 1 TABLET: 7.5; 325 TABLET ORAL at 08:38

## 2019-01-03 RX ADMIN — DOCUSATE SODIUM 100 MG: 100 CAPSULE, LIQUID FILLED ORAL at 08:38

## 2019-01-03 RX ADMIN — ALBUTEROL SULFATE 2.5 MG: 2.5 SOLUTION RESPIRATORY (INHALATION) at 08:07

## 2019-01-03 RX ADMIN — OXYCODONE HYDROCHLORIDE AND ACETAMINOPHEN 1 TABLET: 7.5; 325 TABLET ORAL at 14:05

## 2019-01-03 RX ADMIN — PANTOPRAZOLE SODIUM 40 MG: 40 TABLET, DELAYED RELEASE ORAL at 05:36

## 2019-01-03 RX ADMIN — ALBUTEROL SULFATE 2.5 MG: 2.5 SOLUTION RESPIRATORY (INHALATION) at 01:01

## 2019-01-03 RX ADMIN — OXYCODONE HYDROCHLORIDE AND ACETAMINOPHEN 1 TABLET: 7.5; 325 TABLET ORAL at 22:35

## 2019-01-04 VITALS
DIASTOLIC BLOOD PRESSURE: 69 MMHG | HEART RATE: 82 BPM | TEMPERATURE: 97.5 F | WEIGHT: 186.29 LBS | RESPIRATION RATE: 18 BRPM | OXYGEN SATURATION: 97 % | HEIGHT: 61 IN | SYSTOLIC BLOOD PRESSURE: 115 MMHG | BODY MASS INDEX: 35.17 KG/M2

## 2019-01-04 PROCEDURE — 97110 THERAPEUTIC EXERCISES: CPT

## 2019-01-04 PROCEDURE — 94799 UNLISTED PULMONARY SVC/PX: CPT

## 2019-01-04 PROCEDURE — 97116 GAIT TRAINING THERAPY: CPT

## 2019-01-04 PROCEDURE — 82962 GLUCOSE BLOOD TEST: CPT

## 2019-01-04 PROCEDURE — 97530 THERAPEUTIC ACTIVITIES: CPT

## 2019-01-04 PROCEDURE — 94760 N-INVAS EAR/PLS OXIMETRY 1: CPT

## 2019-01-04 RX ADMIN — ALBUTEROL SULFATE 2.5 MG: 2.5 SOLUTION RESPIRATORY (INHALATION) at 07:10

## 2019-01-04 RX ADMIN — DILTIAZEM HYDROCHLORIDE 180 MG: 180 CAPSULE, COATED, EXTENDED RELEASE ORAL at 08:20

## 2019-01-04 RX ADMIN — OXYCODONE HYDROCHLORIDE AND ACETAMINOPHEN 1 TABLET: 7.5; 325 TABLET ORAL at 08:24

## 2019-01-04 RX ADMIN — CITALOPRAM HYDROBROMIDE 40 MG: 40 TABLET ORAL at 08:20

## 2019-01-04 RX ADMIN — OXYCODONE HYDROCHLORIDE AND ACETAMINOPHEN 1 TABLET: 7.5; 325 TABLET ORAL at 03:27

## 2019-01-04 RX ADMIN — LISINOPRIL 10 MG: 10 TABLET ORAL at 08:20

## 2019-01-04 RX ADMIN — TICAGRELOR 60 MG: 60 TABLET ORAL at 08:20

## 2019-01-05 ENCOUNTER — READMISSION MANAGEMENT (OUTPATIENT)
Dept: CALL CENTER | Facility: HOSPITAL | Age: 64
End: 2019-01-05

## 2019-01-05 LAB — GLUCOSE BLDC GLUCOMTR-MCNC: 114 MG/DL (ref 70–130)

## 2019-01-07 ENCOUNTER — READMISSION MANAGEMENT (OUTPATIENT)
Dept: CALL CENTER | Facility: HOSPITAL | Age: 64
End: 2019-01-07

## 2019-01-07 NOTE — OUTREACH NOTE
Total Joint Week 1 Survey      Responses   Facility patient discharged from?  [facility]   Does the patient have one of the following disease processes/diagnoses(primary or secondary)?  Total Joint Replacement   Is there a successful TCM telephone encounter documented?  No   Joint surgery performed?  Knee   Week 1 attempt successful?  No   Unsuccessful attempts  Attempt 1          Rylee Greco RN

## 2019-01-08 ENCOUNTER — TELEPHONE (OUTPATIENT)
Dept: ORTHOPEDIC SURGERY | Facility: CLINIC | Age: 64
End: 2019-01-08

## 2019-01-08 ENCOUNTER — READMISSION MANAGEMENT (OUTPATIENT)
Dept: CALL CENTER | Facility: HOSPITAL | Age: 64
End: 2019-01-08

## 2019-01-08 NOTE — TELEPHONE ENCOUNTER
PATIENT'S DAUGHTER IN LAW CALLED STATING THE PERCOCET 7.5MG DOES NOT SEEM TO BE HOLDING THE PAIN AFTER 4 HOURS.  SHE IS SUPPOSE TO TAKE IT EVERY 6 HOURS.

## 2019-01-14 ENCOUNTER — READMISSION MANAGEMENT (OUTPATIENT)
Dept: CALL CENTER | Facility: HOSPITAL | Age: 64
End: 2019-01-14

## 2019-01-14 NOTE — OUTREACH NOTE
Total Joint Week 2 Survey      Responses   Facility patient discharged from?  [facility]   Does the patient have one of the following disease processes/diagnoses(primary or secondary)?  Total Joint Replacement   Joint surgery performed?  Knee   Week 2 attempt successful?  No   Unsuccessful attempts  Attempt 1          Giuliana Lemon RN

## 2019-01-15 ENCOUNTER — READMISSION MANAGEMENT (OUTPATIENT)
Dept: CALL CENTER | Facility: HOSPITAL | Age: 64
End: 2019-01-15

## 2019-01-15 NOTE — OUTREACH NOTE
Total Joint Week 2 Survey      Responses   Facility patient discharged from?  [facility]   Does the patient have one of the following disease processes/diagnoses(primary or secondary)?  Total Joint Replacement   Joint surgery performed?  Knee   Week 2 attempt successful?  No   Unsuccessful attempts  Attempt 2          iGuliana Lemon RN

## 2019-01-18 DIAGNOSIS — M25.562 LEFT KNEE PAIN, UNSPECIFIED CHRONICITY: Primary | ICD-10-CM

## 2019-01-28 ENCOUNTER — OFFICE VISIT (OUTPATIENT)
Dept: ORTHOPEDIC SURGERY | Facility: CLINIC | Age: 64
End: 2019-01-28

## 2019-01-28 VITALS — BODY MASS INDEX: 34.74 KG/M2 | WEIGHT: 184 LBS | HEIGHT: 61 IN

## 2019-01-28 DIAGNOSIS — Z96.652 PRESENCE OF TOTAL KNEE JOINT PROSTHESIS, LEFT: Primary | ICD-10-CM

## 2019-01-28 DIAGNOSIS — M25.462 SWELLING OF KNEE JOINT, LEFT: ICD-10-CM

## 2019-01-28 PROCEDURE — 99024 POSTOP FOLLOW-UP VISIT: CPT | Performed by: ORTHOPAEDIC SURGERY

## 2019-01-28 RX ORDER — OXYCODONE HYDROCHLORIDE AND ACETAMINOPHEN 5; 325 MG/1; MG/1
1 TABLET ORAL EVERY 6 HOURS PRN
Qty: 80 TABLET | Refills: 0 | Status: ON HOLD | OUTPATIENT
Start: 2019-01-28 | End: 2019-03-07

## 2019-01-29 ENCOUNTER — READMISSION MANAGEMENT (OUTPATIENT)
Dept: CALL CENTER | Facility: HOSPITAL | Age: 64
End: 2019-01-29

## 2019-01-29 NOTE — OUTREACH NOTE
Total Joint Month 1 Survey      Responses   Facility patient discharged from?  [facility]   Does the patient have one of the following disease processes/diagnoses(primary or secondary)?  Total Joint Replacement   Joint surgery performed?  Knee   Month 1 attempt successful?  Yes   Call start time  1050   Call end time  1053   Has the patient been back in either the hospital or Emergency Department since discharge?  No   Discharge diagnosis  Total Knee Arthroplasty    Is the patient taking all medications as directed (includes completed medication regime)?  Yes   Is the patient able to teach back alternate methods of pain control?  Ice, Knee-elevation/no pillow under knee, Reposition, Correct alignment   Medication comments  MD changed pain dose to 5mg/325mg Norco.    Has the patient kept scheduled appointments due by today?  Yes   Is the patient still receiving Home Health Services?  No   Home health comments   has finished.    Is the patient still attending therapy sessions(either in the home or as an outpatient)?  Yes   Has the patient fallen since discharge?  No   If the patient has fallen, were there any injuries?  No   Comments  all sutures were removed yesterday at appt.    What is the patient's perception of their functional status since discharge?  Improving   Is the patient able to teach back signs and symptoms of infection?  Increased swelling or redness around incision (not associated with surgical edema), Temp >100.4 for 24h or longer   Additional teach back comments  Incision is without s/sx of inf per pt.    Month 1 call completed?  Yes          Ambika Pastrana RN

## 2019-02-11 ENCOUNTER — HOSPITAL ENCOUNTER (OUTPATIENT)
Dept: ULTRASOUND IMAGING | Facility: HOSPITAL | Age: 64
Discharge: HOME | End: 2019-02-11
Attending: ORTHOPAEDIC SURGERY | Admitting: ORTHOPAEDIC SURGERY

## 2019-02-11 ENCOUNTER — HOSPITAL ENCOUNTER (OUTPATIENT)
Dept: PHYSICAL THERAPY | Facility: HOSPITAL | Age: 64
Setting detail: THERAPIES SERIES
Discharge: HOME | End: 2019-02-11

## 2019-02-11 DIAGNOSIS — M25.562 CHRONIC PAIN OF BOTH KNEES: Primary | ICD-10-CM

## 2019-02-11 DIAGNOSIS — M25.462 SWELLING OF KNEE JOINT, LEFT: ICD-10-CM

## 2019-02-11 DIAGNOSIS — M25.561 CHRONIC PAIN OF BOTH KNEES: Primary | ICD-10-CM

## 2019-02-11 DIAGNOSIS — G89.29 CHRONIC PAIN OF BOTH KNEES: Primary | ICD-10-CM

## 2019-02-11 DIAGNOSIS — M17.0 PRIMARY OSTEOARTHRITIS OF BOTH KNEES: ICD-10-CM

## 2019-02-11 DIAGNOSIS — Z96.652 PRESENCE OF TOTAL KNEE JOINT PROSTHESIS, LEFT: ICD-10-CM

## 2019-02-11 PROCEDURE — 93970 EXTREMITY STUDY: CPT

## 2019-02-11 PROCEDURE — 97162 PT EVAL MOD COMPLEX 30 MIN: CPT | Performed by: PHYSICAL THERAPIST

## 2019-02-11 NOTE — THERAPY EVALUATION
Outpatient Physical Therapy Ortho Initial Evaluation  [facility]     Patient Name: Mary Nelson  : 1955  MRN: [id number]  Today's Date: 2019      Visit Date: 2019  Attendance:  (authorization required)  Subjective Improvement: n/a  Next MD Appt: 19  Recert Date: 3/4/19    Therapy Diagnosis: L total knee arthroplasty 18         Past Medical History:   Diagnosis Date   • Anxiety    • Arthritis    • COPD (chronic obstructive pulmonary disease) (CMS/[facility])    • Coronary artery disease    • Depression    • Epigastric pain    • GERD (gastroesophageal reflux disease)    • Head injury    • Hiatal hernia    • High cholesterol    • HTN (hypertension)         Past Surgical History:   Procedure Laterality Date   • ABDOMINAL SURGERY     • CARDIAC CATHETERIZATION N/A 2017    Procedure: Left Heart Cath;  Surgeon: Kevin Erazo MD;  Location: [facility] CATH INVASIVE LOCATION;  Service:    • CARDIAC CATHETERIZATION  2017    Procedure: Functional Flow [place];  Surgeon: Kevin Erazo MD;  Location: [facility] CATH INVASIVE LOCATION;  Service:    • CARDIAC CATHETERIZATION N/A 2017    Procedure: Left Heart Cath;  Surgeon: Kevin Erazo MD;  Location: [facility] CATH INVASIVE LOCATION;  Service:    • COLONOSCOPY  2016   • ENDOSCOPY AND COLONOSCOPY  2016    External and internal hemorrhoids. No specimens collected   • ESOPHAGOSCOPY / EGD  2016    With biopsy-Mildly severe esophagitis. Gastritis. Normal examined duodenum. Biopsied. Medium-sized hiatus hernia. Several biopsies were obtained in the lower third of the esophagus. Several biopsies were obtained in the gastric antrum   • HERNIA REPAIR     • NISSEN FUNDOPLICATION N/A 2017    Procedure: LAPAROSCOPIC NISSEN FUNDOPLICATION,  HIATAL HERNIA REPAIR  ;  Surgeon: Ced Wilson MD;  Location: [facility] OR;  Service:    • NV RT/LT HEART CATHETERS N/A 2017    Procedure: Percutaneous Coronary Intervention;   Surgeon: Kevin Erazo MD;  Location: [facility] CATH INVASIVE LOCATION;  Service: Cardiovascular   • TOTAL KNEE ARTHROPLASTY Left 12/27/2018    Procedure: TOTAL KNEE ARTHROPLASTY;  Surgeon: Grover Raymond MD;  Location: [facility] OR;  Service: Orthopedics   • TRANSESOPHAGEAL ECHOCARDIOGRAM (JOAO)  11/18/2015    With color flow-Ef of 60%.Early diastolic dysfunction.Normal RV size and function.Trace to mild mitral and trace tricuspid regurg.No pericardial effusion.   • TUBAL ABDOMINAL LIGATION  1984       Current Outpatient Medications:   •  acetaminophen (TYLENOL) 500 MG tablet, Take 500 mg by mouth Every 6 (Six) Hours As Needed for Mild Pain ., Disp: , Rfl:   •  albuterol (VENTOLIN HFA) 108 (90 BASE) MCG/ACT inhaler, Inhale 2 puffs 4 (Four) Times a Day., Disp: , Rfl:   •  amitriptyline (ELAVIL) 50 MG tablet, Take 50 mg by mouth Every Night., Disp: , Rfl:   •  atorvastatin (LIPITOR) 40 MG tablet, TAKE 1 TABLET BY MOUTH EVERY NIGHT., Disp: 30 tablet, Rfl: 6  •  citalopram (CELEXA) 40 MG tablet, Take 40 mg by mouth Daily., Disp: , Rfl:   •  diltiaZEM CD (CARDIZEM CD) 180 MG 24 hr capsule, Take 180 mg by mouth Daily., Disp: , Rfl:   •  docusate sodium 100 MG capsule, Take 1 capsule by mouth 2 (Two) Times a Day., Disp: 30 each, Rfl: 0  •  hydrOXYzine (VISTARIL) 25 MG capsule, Take 25 mg by mouth 3 (Three) Times a Day As Needed for Anxiety., Disp: , Rfl:   •  lisinopril (PRINIVIL,ZESTRIL) 10 MG tablet, Take 10 mg by mouth Daily., Disp: , Rfl:   •  omeprazole (PRILOSEC) 40 MG capsule, Take 40 mg by mouth Daily., Disp: , Rfl:   •  oxyCODONE-acetaminophen (PERCOCET) 5-325 MG per tablet, Take 1 tablet by mouth Every 6 (Six) Hours As Needed for Moderate Pain ., Disp: 80 tablet, Rfl: 0  •  ticagrelor (BRILINTA) 60 MG tablet tablet, Take 1 tablet by mouth 2 (Two) Times a Day., Disp: 60 tablet, Rfl: 12  •  tiZANidine (ZANAFLEX) 4 MG tablet, Take 4 mg by mouth Every 6 (Six) Hours As Needed., Disp: , Rfl:     Allergies  "  Allergen Reactions   • Codeine Swelling       Visit Dx:     ICD-10-CM ICD-9-CM   1. Chronic pain of both knees M25.561 719.46    M25.562 338.29    G89.29    2. Presence of total knee joint prosthesis, left Z96.652 V43.65   3. Primary osteoarthritis of both knees M17.0 715.16       Patient History     Row Name 02/11/19 0800          Chief Complaint  Difficulty Walking;Difficulty with daily activities;Pain;Joint stiffness;Muscle weakness  -    Type of Pain  Knee pain left  -SS    Date Current Problem(s) Began  12/28/18  -    Brief Description of Current Complaint  Patient underwent L total knee arthroplasty on 12/28/18. Home Health P.T. on 1/23/19. Still using walking. States that she has an appointment for US today to determine if she has a blood clot. Has not been doing her HEP. \"All I want to do is sleep.\" Walked in the house yesterday without her walker. Uses walker \"for support so I won't fall or anything.\"  female with children. Lives in a mobile home with 3 steps to enter. Taking stairs one at a time.  -SS    Patient/Caregiver Goals  -- \"I want to get my leg back in shape.\"  -SS    Current Tobacco Use  vape/cigarettes  -    Smoking Status  vape/cigarettes  -SS    Patient's Rating of General Health  Fair  -SS    Occupation/sports/leisure activities  Homemaker. Hobbies: color, grandbabies  -    Surgery/Hospitalization  12/28/18 - L TKA  -SS          Pain Location  Knee left  -SS    Pain at Present  0  -SS    Pain at Best  0  -SS    Pain at Worst  10 over past 1 month  -    Pain Frequency  Intermittent  -    Pain Description  Throbbing  -    What Performance Factors Make the Current Problem(s) WORSE?  movement, getting up, walking  -    What Performance Factors Make the Current Problem(s) BETTER?  get still and not move so much  -    Is your sleep disturbed?  Yes  -SS    Is medication used to assist with sleep?  Yes  -SS    Difficulties at work?  n/a  -SS    Difficulties with ADL's?  " decreased  -SS    Difficulties with recreational activities?  decreased  -SS          Any falls in the past year:  Yes  -SS    Number of falls reported in the last 12 months  1  -SS    Factors that contributed to the fall:  -- knee gave out  -SS    Does patient have a fear of falling  No  -SS          Primary Language  English  -SS          Are you being hurt, hit, or frightened by anyone at home or in your life?  No  -SS    Are you being neglected by a caregiver  No  -SS      User Key  (r) = Recorded By, (t) = Taken By, (c) = Cosigned By    Initials Name Provider Type    Nishant Doe, PT DPT Physical Therapist          PT Ortho     Row Name 02/11/19 0900       Subjective Comments    Subjective Comments  see Therapy Patient History  -       Precautions and Contraindications    Precautions  L TKA 12/28/18  -       Subjective Pain    Able to rate subjective pain?  yes  -SS    Pre-Treatment Pain Level  0  -SS    Post-Treatment Pain Level  0  -SS       Posture/Observations    Posture/Observations Comments  Patient presents ambulating with RW. Antalgic gait. Appears anxious. Pitting edema L lower leg.  -SS       Left Lower Ext    Lt Knee Extension/Flexion AROM  0-22-82  -SS    Lt Knee Extension/Flexion PROM  0-18-89  -SS    LT Lower Extremity Comments  pain with flexion and extension  -       MMT Left Lower Ext    Lt Lower Extremity Comments   deferred  -      User Key  (r) = Recorded By, (t) = Taken By, (c) = Cosigned By    Initials Name Provider Type    Nishant Doe, PT DPT Physical Therapist                      Therapy Education  Education Details: extension prop, seated active flexion  Given: HEP  How Provided: Verbal, Demonstration  Provided to: Patient(patient's caregiver)  Level of Understanding: Verbalized, Demonstrated     PT OP Goals     Row Name 02/11/19 0800          STG Date to Achieve  03/04/19  -    STG 1  Improve L knee active extension to -10 degrees or better.  -     STG 2  Improve L knee active flexion to >/= 100 degrees.  -    STG 3  LEFS score to be >/= 35/80.  -    STG 4  Ambulate throughout clinic without assistive device.  -          LTG Date to Achieve  04/22/19  -    LTG 1  Independent with HEP.  -    LTG 2  L knee active extension to -5 degrees or better.  -    LTG 3  L knee active flexion to >/= 110 degrees.  -    LTG 4  Demonstrate ability to ascend 5 stairs reciprocally with 1 HR as needed.  -    LTG 5  LEFS score to be >/= 45/80.  -          PT Goal Re-Cert Due Date  03/04/19  -      User Key  (r) = Recorded By, (t) = Taken By, (c) = Cosigned By    Initials Name Provider Type     Nishant Mario, PT DPT Physical Therapist          PT Assessment/Plan     Row Name 02/11/19 0900          Functional Limitations  Impaired gait;Limitation in home management;Limitations in community activities;Limitations in functional capacity and performance;Performance in self-care ADL;Performance in leisure activities  -    Impairments  Gait;Balance;Range of motion;Pain;Muscle strength;Joint mobility  -    Assessment Comments  Patient is 6 weeks, 3 days s/p L total knee arthroplasty. Decreased compliance with HEP. Continued pain and dysfunction. Spends quite a bit of time with the knee flexed. Patient was scheduled for evaluation on 2/5/19 but did not keep appointment. Patient being evaluated this date for possible blood clot. I recommended patient no longer use assistive device.  -    Rehab Potential  Fair barrier: > 6 weeks since surgery, possible blood clot  -    Patient/caregiver participated in establishment of treatment plan and goals  Yes  -SS    Patient would benefit from skilled therapy intervention  Yes  -SS          PT Frequency  2x/week;3x/week  -    Predicted Duration of Therapy Intervention (Therapy Eval)  4-6 weeks with further TBA  -    Planned CPT's?  PT EVAL MOD COMPLEXITY: 29434;PT THER PROC EA 15 MIN: 98457;PT THER ACT EA  15 MIN: 88950;PT MANUAL THERAPY EA 15 MIN: 67197;PT SELF CARE/HOME MGMT/TRAIN EA 15: 35588;PT HOT OR COLD PACK TREAT MCARE;PT ELECTRICAL STIM UNATTEND: ;PT THER SUPP EA 15 MIN  -SS    PT Plan Comments  ROM, stretching, strengthening, gait training, IFC estim, heat/ice, possible joint mobilization  -      User Key  (r) = Recorded By, (t) = Taken By, (c) = Cosigned By    Initials Name Provider Type    SS Nishant Mario, PT DPT Physical Therapist                              Outcome Measure Options: Lower Extremity Functional Scale (LEFS)  Lower Extremity Functional Index  Any of your usual work, housework or school activities: Moderate difficulty  Your usual hobbies, recreational or sporting activities: Moderate difficulty  Getting into or out of the bath: Moderate difficulty  Walking between rooms: A little bit of difficulty  Putting on your shoes or socks: Quite a bit of difficulty  Squatting: Extreme difficulty or unable to perform activity  Lifting an object, like a bag of groceries from the floor: Moderate difficulty  Performing light activities around your home: A little bit of difficulty  Performing heavy activities around your home: Quite a bit of difficulty  Getting into or out of a car: No difficulty  Walking 2 blocks: Extreme difficulty or unable to perform activity  Walking a mile: Extreme difficulty or unable to perform activity  Going up or down 10 stairs (about 1 flight of stairs): Extreme difficulty or unable to perform activity  Standing for 1 hour: Quite a bit of difficulty  Sitting for 1 hour: A little bit of difficulty  Running on even ground: Extreme difficulty or unable to perform activity  Running on uneven ground: Extreme difficulty or unable to perform activity  Making sharp turns while running fast: Extreme difficulty or unable to perform activity  Hopping: Extreme difficulty or unable to perform activity  Rolling over in bed: Moderate difficulty  Total: 26      Time  Calculation:         Start Time: 0855  Stop Time: 0918  Time Calculation (min): 23 min     Therapy Charges for Today     Code Description Service Date Service Provider Modifiers Qty    91505043356 HC PT EVAL MOD COMPLEXITY 2 2/11/2019 Nishant Mario, PT DPT GP 1                   Nishant Mario, PT, DPT, CHT  2/11/2019

## 2019-02-26 ENCOUNTER — HOSPITAL ENCOUNTER (OUTPATIENT)
Dept: PHYSICAL THERAPY | Facility: HOSPITAL | Age: 64
Setting detail: THERAPIES SERIES
Discharge: HOME | End: 2019-02-26

## 2019-02-26 DIAGNOSIS — G89.29 CHRONIC PAIN OF BOTH KNEES: Primary | ICD-10-CM

## 2019-02-26 DIAGNOSIS — M17.0 PRIMARY OSTEOARTHRITIS OF BOTH KNEES: ICD-10-CM

## 2019-02-26 DIAGNOSIS — M25.561 CHRONIC PAIN OF BOTH KNEES: Primary | ICD-10-CM

## 2019-02-26 DIAGNOSIS — M25.562 CHRONIC PAIN OF BOTH KNEES: Primary | ICD-10-CM

## 2019-02-26 DIAGNOSIS — Z96.652 PRESENCE OF TOTAL KNEE JOINT PROSTHESIS, LEFT: ICD-10-CM

## 2019-02-26 PROCEDURE — 97110 THERAPEUTIC EXERCISES: CPT

## 2019-02-26 PROCEDURE — G0283 ELEC STIM OTHER THAN WOUND: HCPCS

## 2019-02-26 NOTE — THERAPY TREATMENT NOTE
Outpatient Physical Therapy Ortho Treatment Note  [facility]     Patient Name: Mary Nelson  : 1955  MRN: [id number]  Today's Date: 2019      Visit Date: 2019     Subjective Improvement 0  Visits 2/3  Visits approved 12 visits from 2019 to 2019  RTMD 3-  Recert Date 3-    S/P Left TKA on 2018  Post op 8 weeks 4 days      Visit Dx:    ICD-10-CM ICD-9-CM   1. Chronic pain of both knees M25.561 719.46    M25.562 338.29    G89.29    2. Presence of total knee joint prosthesis, left Z96.652 V43.65   3. Primary osteoarthritis of both knees M17.0 715.16       Patient Active Problem List   Diagnosis   • Epigastric pain   • Hiatal hernia   • Major depressive disorder with single episode, in partial remission (CMS/HCC)   • Benign essential HTN   • Gastroesophageal reflux disease without esophagitis   • Panlobular emphysema (CMS/HCC)   • Hiatal hernia with gastroesophageal reflux   • Abnormal nuclear stress test   • Coronary artery disease involving native coronary artery of native heart with unstable angina pectoris (CMS/HCC)   • Essential hypertension   • Mixed hyperlipidemia   • Tobacco abuse counseling   • Unstable angina pectoris (CMS/HCC)   • Chronic pain of both knees   • Primary osteoarthritis of both knees   • Nicotine abuse   • SOB (shortness of breath)   • Coronary artery disease involving native coronary artery of native heart without angina pectoris   • Presence of total knee joint prosthesis, left        Past Medical History:   Diagnosis Date   • Anxiety    • Arthritis    • COPD (chronic obstructive pulmonary disease) (CMS/HCC)    • Coronary artery disease    • Depression    • Epigastric pain    • GERD (gastroesophageal reflux disease)    • Head injury    • Hiatal hernia    • High cholesterol    • HTN (hypertension)         Past Surgical History:   Procedure Laterality Date   • ABDOMINAL SURGERY     • CARDIAC CATHETERIZATION N/A 2017     Procedure: Left Heart Cath;  Surgeon: Kevin Erazo MD;  Location: [facility] CATH INVASIVE LOCATION;  Service:    • CARDIAC CATHETERIZATION  4/19/2017    Procedure: Functional Flow [place];  Surgeon: Kevin Erazo MD;  Location: [facility] CATH INVASIVE LOCATION;  Service:    • CARDIAC CATHETERIZATION N/A 7/6/2017    Procedure: Left Heart Cath;  Surgeon: Kevin Erazo MD;  Location: [facility] CATH INVASIVE LOCATION;  Service:    • COLONOSCOPY  06/27/2016   • ENDOSCOPY AND COLONOSCOPY  06/27/2016    External and internal hemorrhoids. No specimens collected   • ESOPHAGOSCOPY / EGD  06/27/2016    With biopsy-Mildly severe esophagitis. Gastritis. Normal examined duodenum. Biopsied. Medium-sized hiatus hernia. Several biopsies were obtained in the lower third of the esophagus. Several biopsies were obtained in the gastric antrum   • HERNIA REPAIR     • NISSEN FUNDOPLICATION N/A 2/1/2017    Procedure: LAPAROSCOPIC NISSEN FUNDOPLICATION,  HIATAL HERNIA REPAIR  ;  Surgeon: Ced Wilson MD;  Location: [facility];  Service:    • [place] RT/LT HEART CATHETERS N/A 4/19/2017    Procedure: Percutaneous Coronary Intervention;  Surgeon: Kevin Erazo MD;  Location: [facility] INVASIVE LOCATION;  Service: Cardiovascular   • TOTAL KNEE ARTHROPLASTY Left 12/27/2018    Procedure: TOTAL KNEE ARTHROPLASTY;  Surgeon: Grover Raymond MD;  Location: [facility];  Service: Orthopedics   • TRANSESOPHAGEAL ECHOCARDIOGRAM (JOAO)  11/18/2015    With color flow-Ef of 60%.Early diastolic dysfunction.Normal RV size and function.Trace to mild mitral and trace tricuspid regurg.No pericardial effusion.   • TUBAL ABDOMINAL LIGATION  1984       PT Ortho     Row Name 02/26/19 0800       Subjective Comments    Subjective Comments  Patient states that she just started walking with quad cane this week.  She has been using a walker.  Trying to do HEP that primary PT had given her at least 4 to 5 times a day  -CP       Precautions and Contraindications     Precautions  L TKA on 12/28/2018  -CP       Subjective Pain    Able to rate subjective pain?  yes  -CP    Pre-Treatment Pain Level  8  -CP       Posture/Observations    Posture/Observations Comments  amb with quad cane  -CP       Left Lower Ext    Lt Knee Extension/Flexion AROM  0-  -CP      User Key  (r) = Recorded By, (t) = Taken By, (c) = Cosigned By    Initials Name Provider Type    CP Tri Antonio, LUCIA Physical Therapy Assistant                      PT Assessment/Plan     Row Name 02/26/19 0950          PT Assessment    Assessment Comments  Patient appears to be compliant with HEP.  She did have increase AROM in both fl and ext.    -CP        PT Plan    PT Frequency  2x/week;3x/week  -CP     Predicted Duration of Therapy Intervention (Therapy Eval)  4-6 weeks  -CP     PT Plan Comments  Cont with POC.  Up and down ramp in rehab gym  -CP       User Key  (r) = Recorded By, (t) = Taken By, (c) = Cosigned By    Initials Name Provider Type    CP Tri Antonio PTA Physical Therapy Assistant          Modalities     Row Name 02/26/19 0800             Ice    Ice Applied  Yes  -CP      Location  left knee  -CP      Rx Minutes  -- 20 minutes with IFC  -CP      Ice S/P Rx  Yes  -CP         ELECTRICAL STIMULATION    Attended/Unattended  Unattended  -CP      Stimulation Type  IFC  -CP      Location/Electrode Placement/Other  left knee  -CP       PT E-Stim Unattended (Manual) Minutes  20  -CP        User Key  (r) = Recorded By, (t) = Taken By, (c) = Cosigned By    Initials Name Provider Type    Tri Arenas PTA Physical Therapy Assistant          Exercises     Row Name 02/26/19 0800             Subjective Comments    Subjective Comments  Patient states that she just started walking with quad cane this week.  She has been using a walker.  Trying to do HEP that primary PT had given her at least 4 to 5 times a day  -CP         Subjective Pain    Able to rate subjective pain?  yes  -CP       "Pre-Treatment Pain Level  8  -CP         Exercise 1    Exercise Name 1  Pro II level 1  -CP      Time 1  10  -CP      Additional Comments  Seat 7  -CP         Exercise 2    Exercise Name 2  incline stretch  -CP      Sets 2  3  -CP      Time 2  30  -CP         Exercise 3    Exercise Name 3  standing HS stretch  -CP      Sets 3  3  -CP      Time 3  30  -CP         Exercise 4    Exercise Name 4  lunge stretch  -CP      Sets 4  3  -CP      Time 4  30  -CP         Exercise 5    Exercise Name 5  step up 4\"  -CP      Sets 5  2  -CP      Reps 5  10  -CP         Exercise 6    Exercise Name 6  mini squats  -CP      Sets 6  1  -CP      Reps 6  10  -CP         Exercise 7    Exercise Name 7  LLPS seated knee fl stretch  -CP      Sets 7  3  -CP      Time 7  30  -CP         Exercise 8    Exercise Name 8  heelslides with strap  -CP      Reps 8  30  -CP        User Key  (r) = Recorded By, (t) = Taken By, (c) = Cosigned By    Initials Name Provider Type    Tri Arenas, PTA Physical Therapy Assistant                         PT OP Goals     Row Name 02/26/19 0900          PT Short Term Goals    STG Date to Achieve  03/04/19  -CP     STG 1  Improve L knee active extension to -10 degrees or better.  -CP     STG 1 Progress  Not Met  -CP     STG 2  Improve L knee active flexion to >/= 100 degrees.  -CP     STG 2 Progress  Met  -CP     STG 3  LEFS score to be >/= 35/80.  -CP     STG 3 Progress  Not Met  -CP     STG 4  Ambulate throughout clinic without assistive device.  -CP     STG 4 Progress  Not Met  -CP        Long Term Goals    LTG Date to Achieve  04/22/19  -CP     LTG 1  Independent with HEP.  -CP     LTG 1 Progress  Ongoing  -CP     LTG 2  L knee active extension to -5 degrees or better.  -CP     LTG 2 Progress  Not Met  -CP     LTG 3  L knee active flexion to >/= 110 degrees.  -CP     LTG 3 Progress  Not Met  -CP     LTG 4  Demonstrate ability to ascend 5 stairs reciprocally with 1 HR as needed.  -CP     LTG 4 Progress  " Not Met  -CP     LTG 5  LEFS score to be >/= 45/80.  -CP     LTG 5 Progress  Not Met  -CP        Time Calculation    PT Goal Re-Cert Due Date  03/04/19  -CP       User Key  (r) = Recorded By, (t) = Taken By, (c) = Cosigned By    Initials Name Provider Type    CP Tri Antonio, PTA Physical Therapy Assistant          Therapy Education  Education Details: heelslides with strap or belt  Given: HEP  Program: New  How Provided: Verbal, Demonstration  Provided to: Patient  Level of Understanding: Verbalized, Demonstrated              Time Calculation:   Start Time: 0807  Stop Time: 0910  Time Calculation (min): 63 min  Total Timed Code Minutes- PT: 40 minute(s)  Therapy Suggested Charges     Code   Minutes Charges    33458 (CPT®) Hc Pt Neuromusc Re Education Ea 15 Min      52136 (CPT®) Hc Pt Ther Proc Ea 15 Min      75195 (CPT®) Hc Gait Training Ea 15 Min      47409 (CPT®) Hc Pt Therapeutic Act Ea 15 Min      62372 (CPT®) Hc Pt Manual Therapy Ea 15 Min      33229 (CPT®) Hc Pt Ther Massage- Per 15 Min      92144 (CPT®) Hc Pt Iontophoresis Ea 15 Min      30706 (CPT®) Hc Pt Elec Stim Ea-Per 15 Min      44397 (CPT®) Hc Pt Ultrasound Ea 15 Min      84383 (CPT®) Hc Pt Self Care/Mgmt/Train Ea 15 Min      66358 (CPT®) Hc Pt Prosthetic (S) Train Initial Encounter, Each 15 Min      87826 (CPT®) Hc Orthotic(S) Mgmt/Train Initial Encounter, Each 15min      08599 (CPT®) Hc Pt Aquatic Therapy Ea 15 Min      06028 (CPT®) Hc Pt Orthotic(S)/Prosthetic(S) Encounter, Each 15 Min       (CPT®) Hc Pt Electrical Stim Unattended 20 1    Total  20 1        Therapy Charges for Today     Code Description Service Date Service Provider Modifiers Qty    62265437888 HC PT THER PROC EA 15 MIN 2/26/2019 Tri Antonio, PTA GP 3    26986405138 HC PT ELECTRICAL STIM UNATTENDED 2/26/2019 Tri Antonio, PTA  1                    Tri Antonio PTA  2/26/2019

## 2019-03-01 DIAGNOSIS — G89.29 CHRONIC PAIN OF LEFT KNEE: Primary | ICD-10-CM

## 2019-03-01 DIAGNOSIS — M25.562 CHRONIC PAIN OF LEFT KNEE: Primary | ICD-10-CM

## 2019-03-01 RX ORDER — ONDANSETRON 4 MG/1
4 TABLET, FILM COATED ORAL EVERY 8 HOURS PRN
Qty: 30 TABLET | Refills: 0 | Status: SHIPPED | OUTPATIENT
Start: 2019-03-01 | End: 2019-04-22

## 2019-03-01 RX ORDER — HYDROCODONE BITARTRATE AND ACETAMINOPHEN 7.5; 325 MG/1; MG/1
1 TABLET ORAL EVERY 6 HOURS PRN
Qty: 30 TABLET | Refills: 0 | Status: SHIPPED | OUTPATIENT
Start: 2019-03-01 | End: 2019-03-18 | Stop reason: SDUPTHER

## 2019-03-01 NOTE — TELEPHONE ENCOUNTER
NAI          PT CALLED AND WAS WONDERING IF YOU WOULD CALL HER IN PAIN MEDICATION TO [facility]     SHE STATED THE OXYCODONE 5 YOU HAD PRESCRIBED ON 1/28/19 MADE HER FEEL SICK AND ITCHY

## 2019-03-05 ENCOUNTER — HOSPITAL ENCOUNTER (OUTPATIENT)
Dept: PHYSICAL THERAPY | Facility: HOSPITAL | Age: 64
Setting detail: THERAPIES SERIES
Discharge: HOME | End: 2019-03-05

## 2019-03-05 DIAGNOSIS — M17.0 PRIMARY OSTEOARTHRITIS OF BOTH KNEES: ICD-10-CM

## 2019-03-05 DIAGNOSIS — M25.561 CHRONIC PAIN OF BOTH KNEES: Primary | ICD-10-CM

## 2019-03-05 DIAGNOSIS — Z96.652 PRESENCE OF TOTAL KNEE JOINT PROSTHESIS, LEFT: ICD-10-CM

## 2019-03-05 DIAGNOSIS — G89.29 CHRONIC PAIN OF BOTH KNEES: Primary | ICD-10-CM

## 2019-03-05 DIAGNOSIS — M25.562 CHRONIC PAIN OF BOTH KNEES: Primary | ICD-10-CM

## 2019-03-05 PROCEDURE — 97110 THERAPEUTIC EXERCISES: CPT

## 2019-03-05 NOTE — THERAPY TREATMENT NOTE
Outpatient Physical Therapy Ortho Treatment Note  [facility]     Patient Name: Mary Nelson  : 1955  MRN: [id number]  Today's Date: 3/5/2019      Visit Date: 2019    Subjective Improvement: 0%  Attendance:  3/5 (eval + 12 until 19)  Next MD Visit : 19  Recert Date:  19      Therapy Diagnosis:  S/P L TKA 18      Visit Dx:    ICD-10-CM ICD-9-CM   1. Chronic pain of both knees M25.561 719.46    M25.562 338.29    G89.29    2. Presence of total knee joint prosthesis, left Z96.652 V43.65   3. Primary osteoarthritis of both knees M17.0 715.16       Patient Active Problem List   Diagnosis   • Epigastric pain   • Hiatal hernia   • Major depressive disorder with single episode, in partial remission (CMS/HCC)   • Benign essential HTN   • Gastroesophageal reflux disease without esophagitis   • Panlobular emphysema (CMS/HCC)   • Hiatal hernia with gastroesophageal reflux   • Abnormal nuclear stress test   • Coronary artery disease involving native coronary artery of native heart with unstable angina pectoris (CMS/HCC)   • Essential hypertension   • Mixed hyperlipidemia   • Tobacco abuse counseling   • Unstable angina pectoris (CMS/HCC)   • Chronic pain of both knees   • Primary osteoarthritis of both knees   • Nicotine abuse   • SOB (shortness of breath)   • Coronary artery disease involving native coronary artery of native heart without angina pectoris   • Presence of total knee joint prosthesis, left        Past Medical History:   Diagnosis Date   • Anxiety    • Arthritis    • COPD (chronic obstructive pulmonary disease) (CMS/HCC)    • Coronary artery disease    • Depression    • Epigastric pain    • GERD (gastroesophageal reflux disease)    • Head injury    • Hiatal hernia    • High cholesterol    • HTN (hypertension)         Past Surgical History:   Procedure Laterality Date   • ABDOMINAL SURGERY     • CARDIAC CATHETERIZATION N/A 2017    Procedure: Left Heart  Cath;  Surgeon: Kevin Erazo MD;  Location: [facility] CATH INVASIVE LOCATION;  Service:    • CARDIAC CATHETERIZATION  4/19/2017    Procedure: Functional Flow [place];  Surgeon: Kevin Erazo MD;  Location: [facility] CATH INVASIVE LOCATION;  Service:    • CARDIAC CATHETERIZATION N/A 7/6/2017    Procedure: Left Heart Cath;  Surgeon: Kevin Erazo MD;  Location: [facility] CATH INVASIVE LOCATION;  Service:    • COLONOSCOPY  06/27/2016   • ENDOSCOPY AND COLONOSCOPY  06/27/2016    External and internal hemorrhoids. No specimens collected   • ESOPHAGOSCOPY / EGD  06/27/2016    With biopsy-Mildly severe esophagitis. Gastritis. Normal examined duodenum. Biopsied. Medium-sized hiatus hernia. Several biopsies were obtained in the lower third of the esophagus. Several biopsies were obtained in the gastric antrum   • HERNIA REPAIR     • NISSEN FUNDOPLICATION N/A 2/1/2017    Procedure: LAPAROSCOPIC NISSEN FUNDOPLICATION,  HIATAL HERNIA REPAIR  ;  Surgeon: Ced Wilson MD;  Location: [facility] OR;  Service:    • MI RT/LT HEART CATHETERS N/A 4/19/2017    Procedure: Percutaneous Coronary Intervention;  Surgeon: Kevin Erazo MD;  Location: [facility] CATH INVASIVE LOCATION;  Service: Cardiovascular   • TOTAL KNEE ARTHROPLASTY Left 12/27/2018    Procedure: TOTAL KNEE ARTHROPLASTY;  Surgeon: Grover Raymond MD;  Location: [facility];  Service: Orthopedics   • TRANSESOPHAGEAL ECHOCARDIOGRAM (JOAO)  11/18/2015    With color flow-Ef of 60%.Early diastolic dysfunction.Normal RV size and function.Trace to mild mitral and trace tricuspid regurg.No pericardial effusion.   • TUBAL ABDOMINAL LIGATION  1984       PT Ortho     Row Name 03/05/19 0922       Precautions and Contraindications    Precautions  L TKA on 12/28/2018  -KH       Posture/Observations    Posture/Observations Comments  amb with quad cane; L leg abducted, decreased knee flexion with gait   -KH       Left Lower Ext    Lt Knee Extension/Flexion AROM  0-2-111  -KH      User Key   "(r) = Recorded By, (t) = Taken By, (c) = Cosigned By    Initials Name Provider Type    Kaity Vila PTA Physical Therapy Assistant                      PT Assessment/Plan     Row Name 03/05/19 0922          PT Assessment    Assessment Comments  tolerated MFR to left knee with increased extension and flexion noted post treatment; education given this date for correct gait sequence and improves with VC  -        PT Plan    PT Frequency  2x/week;3x/week  -     Predicted Duration of Therapy Intervention (Therapy Eval)  4-6 weeks  -     PT Plan Comments  Recheck next week; Continue with current POC; Progress ROM, strength and gait as pt allows  -       User Key  (r) = Recorded By, (t) = Taken By, (c) = Cosigned By    Initials Name Provider Type    Kaity Vila PTA Physical Therapy Assistant          Modalities     Row Name 03/05/19 0922             Subjective Pain    Post-Treatment Pain Level  4  -      Subjective Pain Comment  declined modalities  -         Ice    Patient reports will apply ice at home to involved area  Yes  -        User Key  (r) = Recorded By, (t) = Taken By, (c) = Cosigned By    Initials Name Provider Type    Kaity Vila PTA Physical Therapy Assistant          Exercises     Row Name 03/05/19 0922             Subjective Comments    Subjective Comments  Pt reports that her knee is stiff today; could barely walk yesterday secondary to stiffness and pain   -         Subjective Pain    Able to rate subjective pain?  yes  -      Pre-Treatment Pain Level  8  -KH      Post-Treatment Pain Level  4  -KH      Subjective Pain Comment  declined modalities  -         Exercise 1    Exercise Name 1  pro ll LE strength  -      Time 1  15'  -KH      Additional Comments  level 2; seat 10  -KH         Exercise 2    Exercise Name 2  incline stretch  -KH      Sets 2  3  -KH      Time 2  30\"  -KH         Exercise 3    Exercise Name 3  standing hamstring stretch  -KH      Sets 3  3  -KH      " "Time 3  30\"  -         Exercise 4    Exercise Name 4  CR/TR  -      Sets 4  2  -KH      Reps 4  10  -KH         Exercise 5    Exercise Name 5  LAQ w/ add squeeze  -      Sets 5  2  -      Reps 5  10  -KH         Exercise 6    Exercise Name 6  sit to stands  -      Sets 6  1  -      Reps 6  10  -         Exercise 7    Exercise Name 7  MFR/STM to posterior knee and lateral knee (hamstrings; ITB)  -      Time 7  10'  -         Exercise 8    Exercise Name 8  checked ROM   -         Exercise 9    Exercise Name 9  gait w/ quad cane and VC to increase heel toe and correct gait sequence  -      Reps 9  270 ft   -        User Key  (r) = Recorded By, (t) = Taken By, (c) = Cosigned By    Initials Name Provider Type    Kaity Vila PTA Physical Therapy Assistant                         PT OP Goals     Row Name 03/05/19 0922          PT Short Term Goals    STG Date to Achieve  03/04/19  -     STG 1  Improve L knee active extension to -10 degrees or better.  -     STG 1 Progress  Met  -     STG 2  Improve L knee active flexion to >/= 100 degrees.  -     STG 2 Progress  Met  -     STG 3  LEFS score to be >/= 35/80.  -     STG 3 Progress  Not Met  -     STG 4  Ambulate throughout clinic without assistive device.  -     STG 4 Progress  Progressing  -        Long Term Goals    LTG Date to Achieve  04/22/19  -     LTG 1  Independent with HEP.  -     LTG 1 Progress  Ongoing  -     LTG 2  L knee active extension to -5 degrees or better.  -     LTG 2 Progress  Met  -     LTG 3  L knee active flexion to >/= 110 degrees.  -     LTG 3 Progress  Met  -     LTG 4  Demonstrate ability to ascend 5 stairs reciprocally with 1 HR as needed.  -     LTG 4 Progress  Not Met  -     LTG 5  LEFS score to be >/= 45/80.  -     LTG 5 Progress  Not Met  -        Time Calculation    PT Goal Re-Cert Due Date  03/04/19  -       User Key  (r) = Recorded By, (t) = Taken By, (c) = Cosigned By    " Initials Name Provider Type     Kaity Marquez PTA Physical Therapy Assistant                         Time Calculation:   Start Time: 0922  Stop Time: 1005  Time Calculation (min): 43 min  Total Timed Code Minutes- PT: 43 minute(s)  Therapy Suggested Charges     Code   Minutes Charges    None           Therapy Charges for Today     Code Description Service Date Service Provider Modifiers Qty    91742613403 HC PT THER PROC EA 15 MIN 3/5/2019 Kaity Marquez PTA GP 3                    Kaity Marquez PTA  3/5/2019

## 2019-03-07 ENCOUNTER — HOSPITAL ENCOUNTER (OUTPATIENT)
Dept: PHYSICAL THERAPY | Facility: HOSPITAL | Age: 64
Setting detail: THERAPIES SERIES
Discharge: HOME | End: 2019-03-07

## 2019-03-07 ENCOUNTER — APPOINTMENT (OUTPATIENT)
Dept: GENERAL RADIOLOGY | Facility: HOSPITAL | Age: 64
End: 2019-03-07

## 2019-03-07 ENCOUNTER — HOSPITAL ENCOUNTER (OUTPATIENT)
Facility: HOSPITAL | Age: 64
Discharge: HOME | End: 2019-03-09
Attending: FAMILY MEDICINE | Admitting: INTERNAL MEDICINE

## 2019-03-07 DIAGNOSIS — R07.9 CHEST PAIN, UNSPECIFIED TYPE: Primary | ICD-10-CM

## 2019-03-07 DIAGNOSIS — Z96.652 PRESENCE OF TOTAL KNEE JOINT PROSTHESIS, LEFT: ICD-10-CM

## 2019-03-07 LAB
ALBUMIN SERPL-MCNC: 3.5 G/DL (ref 3.4–4.8)
ALBUMIN/GLOB SERPL: 1.1 G/DL (ref 1.1–1.8)
ALP SERPL-CCNC: 94 U/L (ref 38–126)
ALT SERPL W P-5'-P-CCNC: 10 U/L (ref 9–52)
ANION GAP SERPL CALCULATED.3IONS-SCNC: 9 MMOL/L (ref 5–15)
AST SERPL-CCNC: 16 U/L (ref 14–36)
BASOPHILS # BLD AUTO: 0.03 10*3/MM3 (ref 0–0.2)
BASOPHILS NFR BLD AUTO: 0.4 % (ref 0–1.5)
BILIRUB SERPL-MCNC: 0.3 MG/DL (ref 0.2–1.3)
BUN BLD-MCNC: 4 MG/DL (ref 7–21)
BUN/CREAT SERPL: 6 (ref 7–25)
CALCIUM SPEC-SCNC: 9.2 MG/DL (ref 8.4–10.2)
CHLORIDE SERPL-SCNC: 102 MMOL/L (ref 95–110)
CO2 SERPL-SCNC: 26 MMOL/L (ref 22–31)
CREAT BLD-MCNC: 0.67 MG/DL (ref 0.5–1)
DEPRECATED RDW RBC AUTO: 46.7 FL (ref 37–54)
EOSINOPHIL # BLD AUTO: 0.04 10*3/MM3 (ref 0–0.4)
EOSINOPHIL NFR BLD AUTO: 0.5 % (ref 0.3–6.2)
ERYTHROCYTE [DISTWIDTH] IN BLOOD BY AUTOMATED COUNT: 15.7 % (ref 12.3–15.4)
GFR SERPL CREATININE-BSD FRML MDRD: 89 ML/MIN/1.73 (ref 45–104)
GLOBULIN UR ELPH-MCNC: 3.1 GM/DL (ref 2.3–3.5)
GLUCOSE BLD-MCNC: 102 MG/DL (ref 60–100)
HCT VFR BLD AUTO: 35.7 % (ref 34–46.6)
HGB BLD-MCNC: 10.9 G/DL (ref 12–15.9)
HOLD SPECIMEN: NORMAL
IMM GRANULOCYTES # BLD AUTO: 0.02 10*3/MM3 (ref 0–0.05)
IMM GRANULOCYTES NFR BLD AUTO: 0.3 % (ref 0–0.5)
INR PPP: 1.01 (ref 0.8–1.2)
LYMPHOCYTES # BLD AUTO: 1.71 10*3/MM3 (ref 0.7–3.1)
LYMPHOCYTES NFR BLD AUTO: 21.9 % (ref 19.6–45.3)
MCH RBC QN AUTO: 25.2 PG (ref 26.6–33)
MCHC RBC AUTO-ENTMCNC: 30.5 G/DL (ref 31.5–35.7)
MCV RBC AUTO: 82.6 FL (ref 79–97)
MONOCYTES # BLD AUTO: 0.42 10*3/MM3 (ref 0.1–0.9)
MONOCYTES NFR BLD AUTO: 5.4 % (ref 5–12)
NEUTROPHILS # BLD AUTO: 5.59 10*3/MM3 (ref 1.4–7)
NEUTROPHILS NFR BLD AUTO: 71.5 % (ref 42.7–76)
NRBC BLD AUTO-RTO: 0 /100 WBC (ref 0–0)
NT-PROBNP SERPL-MCNC: 242 PG/ML (ref 0–900)
PLATELET # BLD AUTO: 266 10*3/MM3 (ref 140–450)
PMV BLD AUTO: 11.5 FL (ref 6–12)
POTASSIUM BLD-SCNC: 2.9 MMOL/L (ref 3.5–5.1)
POTASSIUM BLD-SCNC: 3.6 MMOL/L (ref 3.5–5.1)
PROT SERPL-MCNC: 6.6 G/DL (ref 6.3–8.6)
PROTHROMBIN TIME: 13.1 SECONDS (ref 11.1–15.3)
RBC # BLD AUTO: 4.32 10*6/MM3 (ref 3.77–5.28)
SODIUM BLD-SCNC: 137 MMOL/L (ref 137–145)
TROPONIN I SERPL-MCNC: 0.01 NG/ML
TROPONIN I SERPL-MCNC: 0.01 NG/ML
TROPONIN I SERPL-MCNC: 0.04 NG/ML
WBC NRBC COR # BLD: 7.81 10*3/MM3 (ref 3.4–10.8)
WHOLE BLOOD HOLD SPECIMEN: NORMAL
WHOLE BLOOD HOLD SPECIMEN: NORMAL

## 2019-03-07 PROCEDURE — 84484 ASSAY OF TROPONIN QUANT: CPT | Performed by: PHYSICIAN ASSISTANT

## 2019-03-07 PROCEDURE — 84484 ASSAY OF TROPONIN QUANT: CPT | Performed by: HOSPITALIST

## 2019-03-07 PROCEDURE — 25010000002 HEPARIN (PORCINE) PER 1000 UNITS: Performed by: HOSPITALIST

## 2019-03-07 PROCEDURE — 96372 THER/PROPH/DIAG INJ SC/IM: CPT

## 2019-03-07 PROCEDURE — 80053 COMPREHEN METABOLIC PANEL: CPT | Performed by: PHYSICIAN ASSISTANT

## 2019-03-07 PROCEDURE — 84132 ASSAY OF SERUM POTASSIUM: CPT | Performed by: INTERNAL MEDICINE

## 2019-03-07 PROCEDURE — G0378 HOSPITAL OBSERVATION PER HR: HCPCS

## 2019-03-07 PROCEDURE — 85025 COMPLETE CBC W/AUTO DIFF WBC: CPT | Performed by: PHYSICIAN ASSISTANT

## 2019-03-07 PROCEDURE — 97110 THERAPEUTIC EXERCISES: CPT

## 2019-03-07 PROCEDURE — 83880 ASSAY OF NATRIURETIC PEPTIDE: CPT | Performed by: PHYSICIAN ASSISTANT

## 2019-03-07 PROCEDURE — 71046 X-RAY EXAM CHEST 2 VIEWS: CPT

## 2019-03-07 PROCEDURE — 85610 PROTHROMBIN TIME: CPT | Performed by: PHYSICIAN ASSISTANT

## 2019-03-07 PROCEDURE — 93010 ELECTROCARDIOGRAM REPORT: CPT | Performed by: INTERNAL MEDICINE

## 2019-03-07 PROCEDURE — 93005 ELECTROCARDIOGRAM TRACING: CPT | Performed by: FAMILY MEDICINE

## 2019-03-07 PROCEDURE — 99284 EMERGENCY DEPT VISIT MOD MDM: CPT

## 2019-03-07 PROCEDURE — 99244 OFF/OP CNSLTJ NEW/EST MOD 40: CPT | Performed by: INTERNAL MEDICINE

## 2019-03-07 PROCEDURE — 36415 COLL VENOUS BLD VENIPUNCTURE: CPT | Performed by: PHYSICIAN ASSISTANT

## 2019-03-07 RX ORDER — POTASSIUM CHLORIDE 750 MG/1
40 CAPSULE, EXTENDED RELEASE ORAL ONCE
Status: COMPLETED | OUTPATIENT
Start: 2019-03-07 | End: 2019-03-07

## 2019-03-07 RX ORDER — POTASSIUM CHLORIDE 7.45 MG/ML
10 INJECTION INTRAVENOUS
Status: DISCONTINUED | OUTPATIENT
Start: 2019-03-07 | End: 2019-03-09 | Stop reason: HOSPADM

## 2019-03-07 RX ORDER — LISINOPRIL 10 MG/1
10 TABLET ORAL DAILY
Status: DISCONTINUED | OUTPATIENT
Start: 2019-03-08 | End: 2019-03-07

## 2019-03-07 RX ORDER — MAGNESIUM SULFATE HEPTAHYDRATE 40 MG/ML
2 INJECTION, SOLUTION INTRAVENOUS AS NEEDED
Status: DISCONTINUED | OUTPATIENT
Start: 2019-03-07 | End: 2019-03-09 | Stop reason: HOSPADM

## 2019-03-07 RX ORDER — SODIUM CHLORIDE 0.9 % (FLUSH) 0.9 %
3 SYRINGE (ML) INJECTION EVERY 12 HOURS SCHEDULED
Status: DISCONTINUED | OUTPATIENT
Start: 2019-03-07 | End: 2019-03-09 | Stop reason: HOSPADM

## 2019-03-07 RX ORDER — POTASSIUM CHLORIDE 1.5 G/1.77G
40 POWDER, FOR SOLUTION ORAL AS NEEDED
Status: DISCONTINUED | OUTPATIENT
Start: 2019-03-07 | End: 2019-03-09 | Stop reason: HOSPADM

## 2019-03-07 RX ORDER — AMITRIPTYLINE HYDROCHLORIDE 50 MG/1
50 TABLET, FILM COATED ORAL NIGHTLY
Status: DISCONTINUED | OUTPATIENT
Start: 2019-03-07 | End: 2019-03-09 | Stop reason: HOSPADM

## 2019-03-07 RX ORDER — ACETAMINOPHEN 500 MG
500 TABLET ORAL EVERY 6 HOURS PRN
Status: DISCONTINUED | OUTPATIENT
Start: 2019-03-07 | End: 2019-03-09 | Stop reason: HOSPADM

## 2019-03-07 RX ORDER — NITROGLYCERIN 0.4 MG/1
0.4 TABLET SUBLINGUAL
Status: DISCONTINUED | OUTPATIENT
Start: 2019-03-07 | End: 2019-03-09 | Stop reason: HOSPADM

## 2019-03-07 RX ORDER — SODIUM CHLORIDE 9 MG/ML
100 INJECTION, SOLUTION INTRAVENOUS CONTINUOUS
Status: DISCONTINUED | OUTPATIENT
Start: 2019-03-08 | End: 2019-03-09

## 2019-03-07 RX ORDER — DILTIAZEM HYDROCHLORIDE 180 MG/1
180 CAPSULE, COATED, EXTENDED RELEASE ORAL DAILY
Status: DISCONTINUED | OUTPATIENT
Start: 2019-03-08 | End: 2019-03-09 | Stop reason: HOSPADM

## 2019-03-07 RX ORDER — HEPARIN SODIUM 5000 [USP'U]/ML
5000 INJECTION, SOLUTION INTRAVENOUS; SUBCUTANEOUS EVERY 8 HOURS SCHEDULED
Status: DISCONTINUED | OUTPATIENT
Start: 2019-03-07 | End: 2019-03-09 | Stop reason: HOSPADM

## 2019-03-07 RX ORDER — METOPROLOL TARTRATE 100 MG/1
100 TABLET ORAL EVERY 12 HOURS SCHEDULED
Status: DISCONTINUED | OUTPATIENT
Start: 2019-03-07 | End: 2019-03-09 | Stop reason: HOSPADM

## 2019-03-07 RX ORDER — OXYCODONE HYDROCHLORIDE AND ACETAMINOPHEN 5; 325 MG/1; MG/1
1 TABLET ORAL EVERY 6 HOURS PRN
Status: DISCONTINUED | OUTPATIENT
Start: 2019-03-07 | End: 2019-03-07

## 2019-03-07 RX ORDER — ONDANSETRON 4 MG/1
4 TABLET, FILM COATED ORAL EVERY 8 HOURS PRN
Status: DISCONTINUED | OUTPATIENT
Start: 2019-03-07 | End: 2019-03-09 | Stop reason: HOSPADM

## 2019-03-07 RX ORDER — HYDROCODONE BITARTRATE AND ACETAMINOPHEN 7.5; 325 MG/1; MG/1
1 TABLET ORAL EVERY 6 HOURS PRN
Status: DISCONTINUED | OUTPATIENT
Start: 2019-03-07 | End: 2019-03-09 | Stop reason: HOSPADM

## 2019-03-07 RX ORDER — DOCUSATE SODIUM 100 MG/1
100 CAPSULE, LIQUID FILLED ORAL 2 TIMES DAILY
Status: DISCONTINUED | OUTPATIENT
Start: 2019-03-07 | End: 2019-03-09 | Stop reason: HOSPADM

## 2019-03-07 RX ORDER — POTASSIUM CHLORIDE 750 MG/1
40 CAPSULE, EXTENDED RELEASE ORAL AS NEEDED
Status: DISCONTINUED | OUTPATIENT
Start: 2019-03-07 | End: 2019-03-09 | Stop reason: HOSPADM

## 2019-03-07 RX ORDER — HYDROXYZINE PAMOATE 25 MG/1
25 CAPSULE ORAL 3 TIMES DAILY PRN
Status: DISCONTINUED | OUTPATIENT
Start: 2019-03-07 | End: 2019-03-09 | Stop reason: HOSPADM

## 2019-03-07 RX ORDER — ASPIRIN 81 MG/1
81 TABLET, CHEWABLE ORAL DAILY
Status: DISCONTINUED | OUTPATIENT
Start: 2019-03-08 | End: 2019-03-09 | Stop reason: HOSPADM

## 2019-03-07 RX ORDER — ATORVASTATIN CALCIUM 40 MG/1
40 TABLET, FILM COATED ORAL NIGHTLY
Status: DISCONTINUED | OUTPATIENT
Start: 2019-03-07 | End: 2019-03-09 | Stop reason: HOSPADM

## 2019-03-07 RX ORDER — SODIUM CHLORIDE 0.9 % (FLUSH) 0.9 %
3-10 SYRINGE (ML) INJECTION AS NEEDED
Status: DISCONTINUED | OUTPATIENT
Start: 2019-03-07 | End: 2019-03-09 | Stop reason: HOSPADM

## 2019-03-07 RX ORDER — ASPIRIN 81 MG/1
324 TABLET, CHEWABLE ORAL ONCE
Status: COMPLETED | OUTPATIENT
Start: 2019-03-07 | End: 2019-03-07

## 2019-03-07 RX ORDER — CITALOPRAM 40 MG/1
40 TABLET ORAL DAILY
Status: DISCONTINUED | OUTPATIENT
Start: 2019-03-08 | End: 2019-03-09 | Stop reason: HOSPADM

## 2019-03-07 RX ORDER — ALBUTEROL SULFATE 2.5 MG/3ML
2.5 SOLUTION RESPIRATORY (INHALATION) EVERY 6 HOURS PRN
Status: DISCONTINUED | OUTPATIENT
Start: 2019-03-07 | End: 2019-03-09 | Stop reason: HOSPADM

## 2019-03-07 RX ORDER — SODIUM CHLORIDE 0.9 % (FLUSH) 0.9 %
10 SYRINGE (ML) INJECTION AS NEEDED
Status: DISCONTINUED | OUTPATIENT
Start: 2019-03-07 | End: 2019-03-09 | Stop reason: HOSPADM

## 2019-03-07 RX ORDER — PANTOPRAZOLE SODIUM 40 MG/1
40 TABLET, DELAYED RELEASE ORAL EVERY MORNING
Status: DISCONTINUED | OUTPATIENT
Start: 2019-03-08 | End: 2019-03-09 | Stop reason: HOSPADM

## 2019-03-07 RX ORDER — MAGNESIUM SULFATE HEPTAHYDRATE 40 MG/ML
4 INJECTION, SOLUTION INTRAVENOUS AS NEEDED
Status: DISCONTINUED | OUTPATIENT
Start: 2019-03-07 | End: 2019-03-09 | Stop reason: HOSPADM

## 2019-03-07 RX ORDER — TIZANIDINE 4 MG/1
4 TABLET ORAL EVERY 6 HOURS PRN
Status: DISCONTINUED | OUTPATIENT
Start: 2019-03-07 | End: 2019-03-09 | Stop reason: HOSPADM

## 2019-03-07 RX ADMIN — POTASSIUM CHLORIDE 40 MEQ: 750 CAPSULE, EXTENDED RELEASE ORAL at 12:01

## 2019-03-07 RX ADMIN — DOCUSATE SODIUM 100 MG: 100 CAPSULE, LIQUID FILLED ORAL at 21:15

## 2019-03-07 RX ADMIN — ASPIRIN 81 MG 324 MG: 81 TABLET ORAL at 10:38

## 2019-03-07 RX ADMIN — NITROGLYCERIN 0.4 MG: 0.4 TABLET SUBLINGUAL at 10:40

## 2019-03-07 RX ADMIN — ATORVASTATIN CALCIUM 40 MG: 40 TABLET, FILM COATED ORAL at 21:15

## 2019-03-07 RX ADMIN — SODIUM CHLORIDE, PRESERVATIVE FREE 3 ML: 5 INJECTION INTRAVENOUS at 21:16

## 2019-03-07 RX ADMIN — ACETAMINOPHEN 500 MG: 500 TABLET ORAL at 21:32

## 2019-03-07 RX ADMIN — TICAGRELOR 60 MG: 60 TABLET ORAL at 21:15

## 2019-03-07 RX ADMIN — METOPROLOL TARTRATE 100 MG: 100 TABLET, FILM COATED ORAL at 21:31

## 2019-03-07 RX ADMIN — AMITRIPTYLINE HYDROCHLORIDE 50 MG: 50 TABLET, FILM COATED ORAL at 21:15

## 2019-03-07 RX ADMIN — ONDANSETRON 4 MG: 4 TABLET, FILM COATED ORAL at 18:44

## 2019-03-07 RX ADMIN — HEPARIN SODIUM 5000 UNITS: 5000 INJECTION INTRAVENOUS; SUBCUTANEOUS at 21:15

## 2019-03-07 NOTE — THERAPY TREATMENT NOTE
Outpatient Physical Therapy Ortho Treatment Note  [facility]     Patient Name: Mary Nelson  : 1955  MRN: [id number]  Today's Date: 3/7/2019      Visit Date: 2019     Subjective Improvement: 0%  Attendance:   (eval + 12 until 19)  Next MD Visit : 19  Recert Date:  19        Therapy Diagnosis:  S/P L TKA 18      Visit Dx:    ICD-10-CM ICD-9-CM   1. Presence of total knee joint prosthesis, left Z96.652 V43.65       Patient Active Problem List   Diagnosis   • Epigastric pain   • Hiatal hernia   • Major depressive disorder with single episode, in partial remission (CMS/HCC)   • Benign essential HTN   • Gastroesophageal reflux disease without esophagitis   • Panlobular emphysema (CMS/HCC)   • Hiatal hernia with gastroesophageal reflux   • Abnormal nuclear stress test   • Coronary artery disease involving native coronary artery of native heart with unstable angina pectoris (CMS/HCC)   • Essential hypertension   • Mixed hyperlipidemia   • Tobacco abuse counseling   • Unstable angina pectoris (CMS/HCC)   • Chronic pain of both knees   • Primary osteoarthritis of both knees   • Nicotine abuse   • SOB (shortness of breath)   • Coronary artery disease involving native coronary artery of native heart without angina pectoris   • Presence of total knee joint prosthesis, left        Past Medical History:   Diagnosis Date   • Anxiety    • Arthritis    • COPD (chronic obstructive pulmonary disease) (CMS/HCC)    • Coronary artery disease    • Depression    • Epigastric pain    • GERD (gastroesophageal reflux disease)    • Head injury    • Hiatal hernia    • High cholesterol    • HTN (hypertension)         Past Surgical History:   Procedure Laterality Date   • ABDOMINAL SURGERY     • CARDIAC CATHETERIZATION N/A 2017    Procedure: Left Heart Cath;  Surgeon: Kevin Erazo MD;  Location: [facility] CATH INVASIVE LOCATION;  Service:    • CARDIAC CATHETERIZATION  2017     Procedure: Functional Flow [place];  Surgeon: Kevin Erazo MD;  Location: [facility] CATH INVASIVE LOCATION;  Service:    • CARDIAC CATHETERIZATION N/A 7/6/2017    Procedure: Left Heart Cath;  Surgeon: Kevin Erazo MD;  Location: [facility] CATH INVASIVE LOCATION;  Service:    • COLONOSCOPY  06/27/2016   • ENDOSCOPY AND COLONOSCOPY  06/27/2016    External and internal hemorrhoids. No specimens collected   • ESOPHAGOSCOPY / EGD  06/27/2016    With biopsy-Mildly severe esophagitis. Gastritis. Normal examined duodenum. Biopsied. Medium-sized hiatus hernia. Several biopsies were obtained in the lower third of the esophagus. Several biopsies were obtained in the gastric antrum   • HERNIA REPAIR     • NISSEN FUNDOPLICATION N/A 2/1/2017    Procedure: LAPAROSCOPIC NISSEN FUNDOPLICATION,  HIATAL HERNIA REPAIR  ;  Surgeon: Ced Wilson MD;  Location: [facility] OR;  Service:    • WI RT/LT HEART CATHETERS N/A 4/19/2017    Procedure: Percutaneous Coronary Intervention;  Surgeon: Kevin Erazo MD;  Location: [facility] CATH INVASIVE LOCATION;  Service: Cardiovascular   • TOTAL KNEE ARTHROPLASTY Left 12/27/2018    Procedure: TOTAL KNEE ARTHROPLASTY;  Surgeon: Grover Raymond MD;  Location: [facility] OR;  Service: Orthopedics   • TRANSESOPHAGEAL ECHOCARDIOGRAM (JOAO)  11/18/2015    With color flow-Ef of 60%.Early diastolic dysfunction.Normal RV size and function.Trace to mild mitral and trace tricuspid regurg.No pericardial effusion.   • TUBAL ABDOMINAL LIGATION  1984       PT Ortho     Row Name 03/07/19 0900       Subjective Pain    Subjective Pain Comment  Did ask pain rating after PRO II and patient said it was better and rated her pain as a 8/10.  (Pended)   -KM       Posture/Observations    Posture/Observations Comments  amb with quad cane; L leg abducted, decreased knee flexion with gait   (Pended)   -KM    Row Name 03/05/19 0922       Precautions and Contraindications    Precautions  L TKA on 12/28/2018  -MELVIN        "Posture/Observations    Posture/Observations Comments  amb with quad cane; L leg abducted, decreased knee flexion with gait   -KH       Left Lower Ext    Lt Knee Extension/Flexion AROM  0-2-111  -KH      User Key  (r) = Recorded By, (t) = Taken By, (c) = Cosigned By    Initials Name Provider Type    Kaity Vila, PTA Physical Therapy Assistant    Roverto Perales, ATC                       PT Assessment/Plan     Row Name 03/07/19 1000 03/07/19 0900       PT Assessment    Assessment Comments  After PRO II and a couple of  stretches, patient was seated and complained of chest tightness & pain.  Stopped therapy and took vitals:(9:45 /98, P 90 rapid and moderate.  9:55  /86.  Informed Rula Gamez, PT of concerns.  Spoke with sister (who had the transportation) to take her to the ED.  Therapist called the ED and spoke with nurse to relay data.  While using wheelchair to take patient to curb, she began coughing and gagged.   (Pended)   -KM  --       PT Plan    PT Frequency  2x/week;3x/week  (Pended)   -KM  2x/week;3x/week  (Pended)   -KM    Predicted Duration of Therapy Intervention (Therapy Eval)  4-6 weeks  (Pended)   -KM  4-6 weeks  (Pended)   -KM    PT Plan Comments  Will check on status of patient to determine ability to restart therapy.  (Pended)   -KM  Recheck next week; Continue with current POC; Progress ROM, strength and gait as pt allows  (Pended)   -KM      User Key  (r) = Recorded By, (t) = Taken By, (c) = Cosigned By    Initials Name Provider Type    Roverto Perales, ATC               Exercises     Row Name 03/07/19 0900             Subjective Comments    Subjective Comments  Patient reports that her knee \"is really hurting today\".  No additional issues other than knee pain.  (Pended)   -KM         Subjective Pain    Able to rate subjective pain?  yes  (Pended)   -KM      Pre-Treatment Pain Level  10  (Pended)   -KM      Subjective Pain Comment  Did ask " "pain rating after PRO II and patient said it was better and rated her pain as a 8/10.  (Pended)   -KM         Exercise 1    Exercise Name 1  pro ll LE strength  (Pended)   -KM      Time 1  15'  (Pended)   -KM      Additional Comments  level 2; seat 8  (Pended)   -KM         Exercise 2    Exercise Name 2  incline stretch  (Pended)   -KM      Sets 2  3  (Pended)   -KM      Time 2  30\"  (Pended)   -KM         Exercise 3    Exercise Name 3  standing hamstring stretch  (Pended)   -KM      Sets 3  3  (Pended)   -KM      Time 3  30\"  (Pended)   -KM         Exercise 4    Exercise Name 4  --  (Pended)   -KM      Sets 4  --  (Pended)   -KM      Reps 4  --  (Pended)   -KM         Exercise 5    Exercise Name 5  --  (Pended)   -KM      Sets 5  --  (Pended)   -KM      Reps 5  --  (Pended)   -KM         Exercise 6    Exercise Name 6  --  (Pended)   -KM      Sets 6  --  (Pended)   -KM      Reps 6  --  (Pended)   -KM         Exercise 7    Exercise Name 7  --  (Pended)   -KM      Time 7  --  (Pended)   -KM         Exercise 9    Exercise Name 9  --  (Pended)   -KM      Reps 9  --  (Pended)   -KM        User Key  (r) = Recorded By, (t) = Taken By, (c) = Cosigned By    Initials Name Provider Type    Roverto Perales, ATC                          PT OP Goals     Row Name 03/07/19 0900          PT Short Term Goals    STG Date to Achieve  03/04/19  (Pended)   -KM     STG 1  Improve L knee active extension to -10 degrees or better.  (Pended)   -KM     STG 1 Progress  Met  (Pended)   -KM     STG 2  Improve L knee active flexion to >/= 100 degrees.  (Pended)   -KM     STG 2 Progress  Met  (Pended)   -KM     STG 3  LEFS score to be >/= 35/80.  (Pended)   -KM     STG 3 Progress  Not Met  (Pended)   -KM     STG 4  Ambulate throughout clinic without assistive device.  (Pended)   -KM     STG 4 Progress  Progressing  (Pended)   -KM        Long Term Goals    LTG Date to Achieve  04/22/19  (Pended)   -KM     LTG 1  Independent with " HEP.  (Pended)   -KM     LTG 1 Progress  Ongoing  (Pended)   -KM     LTG 2  L knee active extension to -5 degrees or better.  (Pended)   -KM     LTG 2 Progress  Met  (Pended)   -KM     LTG 3  L knee active flexion to >/= 110 degrees.  (Pended)   -KM     LTG 3 Progress  Met  (Pended)   -KM     LTG 4  Demonstrate ability to ascend 5 stairs reciprocally with 1 HR as needed.  (Pended)   -KM     LTG 4 Progress  Not Met  (Pended)   -KM     LTG 5  LEFS score to be >/= 45/80.  (Pended)   -KM     LTG 5 Progress  Not Met  (Pended)   -KM       User Key  (r) = Recorded By, (t) = Taken By, (c) = Cosigned By    Initials Name Provider Type    Roverto Perales, ATC                          Time Calculation:   Start Time: (P) 0925  Stop Time: (P) 0945  Time Calculation (min): (P) 20 min  Total Timed Code Minutes- PT: (P) 20 minute(s)  Therapy Suggested Charges     Code   Minutes Charges    None           Therapy Charges for Today     Code Description Service Date Service Provider Modifiers Qty    61150462195 HC PT THER PROC EA 15 MIN 3/7/2019 Roverto Baker, ATC  1                    Roverto Baker ATC  3/7/2019

## 2019-03-08 ENCOUNTER — APPOINTMENT (OUTPATIENT)
Dept: CT IMAGING | Facility: HOSPITAL | Age: 64
End: 2019-03-08

## 2019-03-08 LAB
POTASSIUM BLD-SCNC: 4.3 MMOL/L (ref 3.5–5.1)
TROPONIN I SERPL-MCNC: 0.05 NG/ML

## 2019-03-08 PROCEDURE — 96374 THER/PROPH/DIAG INJ IV PUSH: CPT

## 2019-03-08 PROCEDURE — G0378 HOSPITAL OBSERVATION PER HR: HCPCS

## 2019-03-08 PROCEDURE — 25010000002 HEPARIN (PORCINE) PER 1000 UNITS: Performed by: HOSPITALIST

## 2019-03-08 PROCEDURE — 84484 ASSAY OF TROPONIN QUANT: CPT | Performed by: HOSPITALIST

## 2019-03-08 PROCEDURE — 96361 HYDRATE IV INFUSION ADD-ON: CPT

## 2019-03-08 PROCEDURE — 84132 ASSAY OF SERUM POTASSIUM: CPT | Performed by: HOSPITALIST

## 2019-03-08 PROCEDURE — 75574 CT ANGIO HRT W/3D IMAGE: CPT

## 2019-03-08 PROCEDURE — 0 IOPAMIDOL PER 1 ML: Performed by: HOSPITALIST

## 2019-03-08 PROCEDURE — 96372 THER/PROPH/DIAG INJ SC/IM: CPT

## 2019-03-08 RX ORDER — METOPROLOL TARTRATE 5 MG/5ML
5 INJECTION INTRAVENOUS
Status: DISCONTINUED | OUTPATIENT
Start: 2019-03-08 | End: 2019-03-09 | Stop reason: HOSPADM

## 2019-03-08 RX ORDER — METOPROLOL TARTRATE 5 MG/5ML
5 INJECTION INTRAVENOUS ONCE
Status: DISCONTINUED | OUTPATIENT
Start: 2019-03-08 | End: 2019-03-08

## 2019-03-08 RX ADMIN — ATORVASTATIN CALCIUM 40 MG: 40 TABLET, FILM COATED ORAL at 21:16

## 2019-03-08 RX ADMIN — DILTIAZEM HYDROCHLORIDE 180 MG: 180 CAPSULE, COATED, EXTENDED RELEASE ORAL at 08:13

## 2019-03-08 RX ADMIN — HEPARIN SODIUM 5000 UNITS: 5000 INJECTION INTRAVENOUS; SUBCUTANEOUS at 15:17

## 2019-03-08 RX ADMIN — PANTOPRAZOLE SODIUM 40 MG: 40 TABLET, DELAYED RELEASE ORAL at 08:13

## 2019-03-08 RX ADMIN — TICAGRELOR 60 MG: 60 TABLET ORAL at 21:16

## 2019-03-08 RX ADMIN — TICAGRELOR 60 MG: 60 TABLET ORAL at 08:13

## 2019-03-08 RX ADMIN — METOPROLOL TARTRATE 100 MG: 100 TABLET, FILM COATED ORAL at 21:15

## 2019-03-08 RX ADMIN — POTASSIUM CHLORIDE 40 MEQ: 10 CAPSULE, COATED, EXTENDED RELEASE ORAL at 19:12

## 2019-03-08 RX ADMIN — HYDROCODONE BITARTRATE AND ACETAMINOPHEN 1 TABLET: 7.5; 325 TABLET ORAL at 21:26

## 2019-03-08 RX ADMIN — HEPARIN SODIUM 5000 UNITS: 5000 INJECTION INTRAVENOUS; SUBCUTANEOUS at 21:16

## 2019-03-08 RX ADMIN — SODIUM CHLORIDE 100 ML/HR: 9 INJECTION, SOLUTION INTRAVENOUS at 08:12

## 2019-03-08 RX ADMIN — SODIUM CHLORIDE 100 ML/HR: 9 INJECTION, SOLUTION INTRAVENOUS at 18:14

## 2019-03-08 RX ADMIN — ONDANSETRON 4 MG: 4 TABLET, FILM COATED ORAL at 21:15

## 2019-03-08 RX ADMIN — DOCUSATE SODIUM 100 MG: 100 CAPSULE, LIQUID FILLED ORAL at 21:15

## 2019-03-08 RX ADMIN — AMITRIPTYLINE HYDROCHLORIDE 50 MG: 50 TABLET, FILM COATED ORAL at 21:15

## 2019-03-08 RX ADMIN — IOPAMIDOL 80 ML: 755 INJECTION, SOLUTION INTRAVENOUS at 12:12

## 2019-03-08 RX ADMIN — HEPARIN SODIUM 5000 UNITS: 5000 INJECTION INTRAVENOUS; SUBCUTANEOUS at 06:04

## 2019-03-08 RX ADMIN — METOPROLOL TARTRATE 5 MG: 5 INJECTION, SOLUTION INTRAVENOUS at 11:46

## 2019-03-08 RX ADMIN — CITALOPRAM HYDROBROMIDE 40 MG: 40 TABLET ORAL at 08:13

## 2019-03-08 RX ADMIN — METOPROLOL TARTRATE 100 MG: 100 TABLET, FILM COATED ORAL at 08:13

## 2019-03-08 RX ADMIN — POTASSIUM CHLORIDE 40 MEQ: 10 CAPSULE, COATED, EXTENDED RELEASE ORAL at 12:46

## 2019-03-09 VITALS
SYSTOLIC BLOOD PRESSURE: 136 MMHG | TEMPERATURE: 97.6 F | OXYGEN SATURATION: 96 % | WEIGHT: 179.9 LBS | BODY MASS INDEX: 33.96 KG/M2 | RESPIRATION RATE: 18 BRPM | HEART RATE: 76 BPM | HEIGHT: 61 IN | DIASTOLIC BLOOD PRESSURE: 84 MMHG

## 2019-03-09 LAB
ALBUMIN SERPL-MCNC: 3.4 G/DL (ref 3.4–4.8)
ALBUMIN/GLOB SERPL: 1.3 G/DL (ref 1.1–1.8)
ALP SERPL-CCNC: 97 U/L (ref 38–126)
ALT SERPL W P-5'-P-CCNC: 14 U/L (ref 9–52)
ANION GAP SERPL CALCULATED.3IONS-SCNC: 4 MMOL/L (ref 5–15)
ANION GAP SERPL CALCULATED.3IONS-SCNC: 6 MMOL/L (ref 5–15)
AST SERPL-CCNC: 16 U/L (ref 14–36)
BASOPHILS # BLD AUTO: 0.03 10*3/MM3 (ref 0–0.2)
BASOPHILS # BLD AUTO: 0.04 10*3/MM3 (ref 0–0.2)
BASOPHILS NFR BLD AUTO: 0.5 % (ref 0–1.5)
BASOPHILS NFR BLD AUTO: 0.7 % (ref 0–1.5)
BILIRUB SERPL-MCNC: 0.3 MG/DL (ref 0.2–1.3)
BUN BLD-MCNC: 3 MG/DL (ref 7–21)
BUN BLD-MCNC: 4 MG/DL (ref 7–21)
BUN/CREAT SERPL: 3.7 (ref 7–25)
BUN/CREAT SERPL: 5.2 (ref 7–25)
CALCIUM SPEC-SCNC: 9 MG/DL (ref 8.4–10.2)
CALCIUM SPEC-SCNC: 9.2 MG/DL (ref 8.4–10.2)
CHLORIDE SERPL-SCNC: 101 MMOL/L (ref 95–110)
CHLORIDE SERPL-SCNC: 103 MMOL/L (ref 95–110)
CO2 SERPL-SCNC: 24 MMOL/L (ref 22–31)
CO2 SERPL-SCNC: 26 MMOL/L (ref 22–31)
CREAT BLD-MCNC: 0.77 MG/DL (ref 0.5–1)
CREAT BLD-MCNC: 0.81 MG/DL (ref 0.5–1)
DEPRECATED RDW RBC AUTO: 46.6 FL (ref 37–54)
DEPRECATED RDW RBC AUTO: 49.1 FL (ref 37–54)
EOSINOPHIL # BLD AUTO: 0.04 10*3/MM3 (ref 0–0.4)
EOSINOPHIL # BLD AUTO: 0.05 10*3/MM3 (ref 0–0.4)
EOSINOPHIL NFR BLD AUTO: 0.7 % (ref 0.3–6.2)
EOSINOPHIL NFR BLD AUTO: 0.9 % (ref 0.3–6.2)
ERYTHROCYTE [DISTWIDTH] IN BLOOD BY AUTOMATED COUNT: 15.9 % (ref 12.3–15.4)
ERYTHROCYTE [DISTWIDTH] IN BLOOD BY AUTOMATED COUNT: 15.9 % (ref 12.3–15.4)
GFR SERPL CREATININE-BSD FRML MDRD: 72 ML/MIN/1.73 (ref 45–104)
GFR SERPL CREATININE-BSD FRML MDRD: 76 ML/MIN/1.73 (ref 45–104)
GLOBULIN UR ELPH-MCNC: 2.7 GM/DL (ref 2.3–3.5)
GLUCOSE BLD-MCNC: 100 MG/DL (ref 60–100)
GLUCOSE BLD-MCNC: 105 MG/DL (ref 60–100)
HCT VFR BLD AUTO: 33.8 % (ref 34–46.6)
HCT VFR BLD AUTO: 36.1 % (ref 34–46.6)
HGB BLD-MCNC: 10.5 G/DL (ref 12–15.9)
HGB BLD-MCNC: 10.6 G/DL (ref 12–15.9)
IMM GRANULOCYTES # BLD AUTO: 0.01 10*3/MM3 (ref 0–0.05)
IMM GRANULOCYTES # BLD AUTO: 0.01 10*3/MM3 (ref 0–0.05)
IMM GRANULOCYTES NFR BLD AUTO: 0.2 % (ref 0–0.5)
IMM GRANULOCYTES NFR BLD AUTO: 0.2 % (ref 0–0.5)
INR PPP: 0.95 (ref 0.8–1.2)
INR PPP: 1.01 (ref 0.8–1.2)
LYMPHOCYTES # BLD AUTO: 1.67 10*3/MM3 (ref 0.7–3.1)
LYMPHOCYTES # BLD AUTO: 1.98 10*3/MM3 (ref 0.7–3.1)
LYMPHOCYTES NFR BLD AUTO: 28.9 % (ref 19.6–45.3)
LYMPHOCYTES NFR BLD AUTO: 34.4 % (ref 19.6–45.3)
MCH RBC QN AUTO: 24.9 PG (ref 26.6–33)
MCH RBC QN AUTO: 25.2 PG (ref 26.6–33)
MCHC RBC AUTO-ENTMCNC: 29.4 G/DL (ref 31.5–35.7)
MCHC RBC AUTO-ENTMCNC: 31.1 G/DL (ref 31.5–35.7)
MCV RBC AUTO: 81.1 FL (ref 79–97)
MCV RBC AUTO: 84.7 FL (ref 79–97)
MONOCYTES # BLD AUTO: 0.32 10*3/MM3 (ref 0.1–0.9)
MONOCYTES # BLD AUTO: 0.35 10*3/MM3 (ref 0.1–0.9)
MONOCYTES NFR BLD AUTO: 5.6 % (ref 5–12)
MONOCYTES NFR BLD AUTO: 6.1 % (ref 5–12)
NEUTROPHILS # BLD AUTO: 3.37 10*3/MM3 (ref 1.4–7)
NEUTROPHILS # BLD AUTO: 3.66 10*3/MM3 (ref 1.4–7)
NEUTROPHILS NFR BLD AUTO: 58.4 % (ref 42.7–76)
NEUTROPHILS NFR BLD AUTO: 63.4 % (ref 42.7–76)
NRBC BLD AUTO-RTO: 0 /100 WBC (ref 0–0)
NRBC BLD AUTO-RTO: 0 /100 WBC (ref 0–0)
PLATELET # BLD AUTO: 247 10*3/MM3 (ref 140–450)
PLATELET # BLD AUTO: 262 10*3/MM3 (ref 140–450)
PMV BLD AUTO: 11.1 FL (ref 6–12)
PMV BLD AUTO: 11.3 FL (ref 6–12)
POTASSIUM BLD-SCNC: 4.4 MMOL/L (ref 3.5–5.1)
POTASSIUM BLD-SCNC: 4.5 MMOL/L (ref 3.5–5.1)
PROT SERPL-MCNC: 6.1 G/DL (ref 6.3–8.6)
PROTHROMBIN TIME: 12.5 SECONDS (ref 11.1–15.3)
PROTHROMBIN TIME: 13.1 SECONDS (ref 11.1–15.3)
RBC # BLD AUTO: 4.17 10*6/MM3 (ref 3.77–5.28)
RBC # BLD AUTO: 4.26 10*6/MM3 (ref 3.77–5.28)
SODIUM BLD-SCNC: 131 MMOL/L (ref 137–145)
SODIUM BLD-SCNC: 133 MMOL/L (ref 137–145)
WBC NRBC COR # BLD: 5.76 10*3/MM3 (ref 3.4–10.8)
WBC NRBC COR # BLD: 5.77 10*3/MM3 (ref 3.4–10.8)

## 2019-03-09 PROCEDURE — C1894 INTRO/SHEATH, NON-LASER: HCPCS | Performed by: INTERNAL MEDICINE

## 2019-03-09 PROCEDURE — G0378 HOSPITAL OBSERVATION PER HR: HCPCS

## 2019-03-09 PROCEDURE — 93458 L HRT ARTERY/VENTRICLE ANGIO: CPT | Performed by: INTERNAL MEDICINE

## 2019-03-09 PROCEDURE — 85025 COMPLETE CBC W/AUTO DIFF WBC: CPT | Performed by: INTERNAL MEDICINE

## 2019-03-09 PROCEDURE — 25010000002 FENTANYL CITRATE (PF) 100 MCG/2ML SOLUTION: Performed by: INTERNAL MEDICINE

## 2019-03-09 PROCEDURE — 0 IOPAMIDOL PER 1 ML: Performed by: INTERNAL MEDICINE

## 2019-03-09 PROCEDURE — 25010000002 MIDAZOLAM PER 1 MG: Performed by: INTERNAL MEDICINE

## 2019-03-09 PROCEDURE — C1769 GUIDE WIRE: HCPCS | Performed by: INTERNAL MEDICINE

## 2019-03-09 PROCEDURE — C1760 CLOSURE DEV, VASC: HCPCS | Performed by: INTERNAL MEDICINE

## 2019-03-09 PROCEDURE — 85610 PROTHROMBIN TIME: CPT | Performed by: INTERNAL MEDICINE

## 2019-03-09 PROCEDURE — 80053 COMPREHEN METABOLIC PANEL: CPT | Performed by: INTERNAL MEDICINE

## 2019-03-09 RX ORDER — MIDAZOLAM HYDROCHLORIDE 1 MG/ML
INJECTION INTRAMUSCULAR; INTRAVENOUS AS NEEDED
Status: DISCONTINUED | OUTPATIENT
Start: 2019-03-09 | End: 2019-03-09 | Stop reason: HOSPADM

## 2019-03-09 RX ORDER — NITROGLYCERIN 0.4 MG/1
0.4 TABLET SUBLINGUAL
Qty: 30 TABLET | Refills: 1 | Status: SHIPPED | OUTPATIENT
Start: 2019-03-09

## 2019-03-09 RX ORDER — SODIUM CHLORIDE 9 MG/ML
100 INJECTION, SOLUTION INTRAVENOUS CONTINUOUS
Status: DISCONTINUED | OUTPATIENT
Start: 2019-03-09 | End: 2019-03-09 | Stop reason: HOSPADM

## 2019-03-09 RX ORDER — METOPROLOL TARTRATE 50 MG/1
50 TABLET, FILM COATED ORAL EVERY 12 HOURS SCHEDULED
Qty: 60 TABLET | Refills: 1 | Status: SHIPPED | OUTPATIENT
Start: 2019-03-09 | End: 2019-06-25 | Stop reason: HOSPADM

## 2019-03-09 RX ORDER — FENTANYL CITRATE 50 UG/ML
INJECTION, SOLUTION INTRAMUSCULAR; INTRAVENOUS AS NEEDED
Status: DISCONTINUED | OUTPATIENT
Start: 2019-03-09 | End: 2019-03-09 | Stop reason: HOSPADM

## 2019-03-09 RX ORDER — SODIUM CHLORIDE 0.9 % (FLUSH) 0.9 %
3 SYRINGE (ML) INJECTION EVERY 12 HOURS SCHEDULED
Status: DISCONTINUED | OUTPATIENT
Start: 2019-03-09 | End: 2019-03-09 | Stop reason: HOSPADM

## 2019-03-09 RX ORDER — SODIUM CHLORIDE 0.9 % (FLUSH) 0.9 %
1-10 SYRINGE (ML) INJECTION AS NEEDED
Status: DISCONTINUED | OUTPATIENT
Start: 2019-03-09 | End: 2019-03-09 | Stop reason: HOSPADM

## 2019-03-09 RX ORDER — ASPIRIN 81 MG/1
81 TABLET, CHEWABLE ORAL DAILY
Qty: 30 TABLET | Refills: 1 | Status: SHIPPED | OUTPATIENT
Start: 2019-03-10 | End: 2019-11-18 | Stop reason: HOSPADM

## 2019-03-09 RX ORDER — LIDOCAINE HYDROCHLORIDE 20 MG/ML
INJECTION, SOLUTION INFILTRATION; PERINEURAL AS NEEDED
Status: DISCONTINUED | OUTPATIENT
Start: 2019-03-09 | End: 2019-03-09 | Stop reason: HOSPADM

## 2019-03-09 RX ORDER — SODIUM CHLORIDE 9 MG/ML
1-3 INJECTION, SOLUTION INTRAVENOUS CONTINUOUS
Status: DISCONTINUED | OUTPATIENT
Start: 2019-03-09 | End: 2019-03-09

## 2019-03-09 RX ADMIN — ASPIRIN 81 MG 81 MG: 81 TABLET ORAL at 09:21

## 2019-03-09 RX ADMIN — DOCUSATE SODIUM 100 MG: 100 CAPSULE, LIQUID FILLED ORAL at 09:22

## 2019-03-09 RX ADMIN — ONDANSETRON 4 MG: 4 TABLET, FILM COATED ORAL at 15:09

## 2019-03-09 RX ADMIN — METOPROLOL TARTRATE 100 MG: 100 TABLET, FILM COATED ORAL at 09:20

## 2019-03-09 RX ADMIN — TICAGRELOR 60 MG: 60 TABLET ORAL at 09:20

## 2019-03-09 RX ADMIN — DILTIAZEM HYDROCHLORIDE 180 MG: 180 CAPSULE, COATED, EXTENDED RELEASE ORAL at 09:20

## 2019-03-09 RX ADMIN — CITALOPRAM HYDROBROMIDE 40 MG: 40 TABLET ORAL at 09:20

## 2019-03-11 ENCOUNTER — OFFICE VISIT (OUTPATIENT)
Dept: ORTHOPEDIC SURGERY | Facility: CLINIC | Age: 64
End: 2019-03-11

## 2019-03-11 VITALS — HEIGHT: 61 IN | BODY MASS INDEX: 32.85 KG/M2 | WEIGHT: 174 LBS

## 2019-03-11 DIAGNOSIS — Z96.652 PRESENCE OF TOTAL KNEE JOINT PROSTHESIS, LEFT: Primary | ICD-10-CM

## 2019-03-11 PROCEDURE — 99024 POSTOP FOLLOW-UP VISIT: CPT | Performed by: ORTHOPAEDIC SURGERY

## 2019-03-11 NOTE — PROGRESS NOTES
Postop Follow-up    Name:  Mary Nelson  Date:  3/11/2019  :  1955    Chief Complaint:    Chief Complaint   Patient presents with   • Left Knee - Follow-up     Date of surgery:         18 (74d) Grover Raymond MD    TOTAL KNEE ARTHROPLASTY - Left       Procedure:    History of Present Illness:1 month follow up left total knee.  Pain scale today 7/10    States she had a stent placed last week with Dr. Chaparro, on brilinta,   Knee therapy / progress has been slow, but she is doing well, knee motion is better        Current Outpatient Medications:   •  amitriptyline (ELAVIL) 50 MG tablet, Take 50 mg by mouth Every Night., Disp: , Rfl:   •  aspirin 81 MG chewable tablet, Chew 1 tablet Daily., Disp: 30 tablet, Rfl: 1  •  atorvastatin (LIPITOR) 40 MG tablet, TAKE 1 TABLET BY MOUTH EVERY NIGHT., Disp: 30 tablet, Rfl: 6  •  citalopram (CELEXA) 40 MG tablet, Take 40 mg by mouth Daily., Disp: , Rfl:   •  diltiaZEM CD (CARDIZEM CD) 180 MG 24 hr capsule, Take 180 mg by mouth Daily., Disp: , Rfl:   •  HYDROcodone-acetaminophen (NORCO) 7.5-325 MG per tablet, Take 1 tablet by mouth Every 6 (Six) Hours As Needed for Moderate Pain ., Disp: 30 tablet, Rfl: 0  •  hydrOXYzine (VISTARIL) 25 MG capsule, Take 25 mg by mouth 3 (Three) Times a Day As Needed for Anxiety., Disp: , Rfl:   •  lisinopril (PRINIVIL,ZESTRIL) 10 MG tablet, Take 10 mg by mouth Daily., Disp: , Rfl:   •  metoprolol tartrate (LOPRESSOR) 50 MG tablet, Take 1 tablet by mouth Every 12 (Twelve) Hours., Disp: 60 tablet, Rfl: 1  •  nitroglycerin (NITROSTAT) 0.4 MG SL tablet, Place 1 tablet under the tongue Every 5 (Five) Minutes As Needed for Chest Pain. Take no more than 3 doses in 15 minutes., Disp: 30 tablet, Rfl: 1  •  omeprazole (PRILOSEC) 40 MG capsule, Take 40 mg by mouth Daily., Disp: , Rfl:   •  ondansetron (ZOFRAN) 4 MG tablet, Take 1 tablet by mouth Every 8 (Eight) Hours As Needed for Nausea or Vomiting., Disp: 30 tablet, Rfl: 0  •   "ticagrelor (BRILINTA) 60 MG tablet tablet, Take 1 tablet by mouth 2 (Two) Times a Day., Disp: 60 tablet, Rfl: 12  •  tiZANidine (ZANAFLEX) 4 MG tablet, Take 4 mg by mouth Every 6 (Six) Hours As Needed., Disp: , Rfl:     Allergies   Allergen Reactions   • Codeine Swelling         Exam:  Vitals:    03/11/19 0925 03/11/19 0928   Weight: 81.2 kg (179 lb) 78.9 kg (174 lb)   Height: 154.9 cm (61\")        The patient is awake, alert, and oriented and in no apparent distress.    Right upper extremity:    Left upper extremity:    Right lower extremity:    Left lower extremity:    Left knee incision healing well, no erythema, no drainage, no mass , minimal swelling  Knee flexion 110  Extension -5  No signs dvt/ pe  Ambulating with cane.  Slight limp.    Xr Chest 2 View    Result Date: 3/7/2019  Narrative: PROCEDURE: Two-view chest COMPARISON: None. HISTORY: Chest pain protocol FINDINGS: Frontal and lateral views of the chest are obtained. Devices: None Lungs/Pleura: The lungs are clear Cardiomediastinal structures: Normal     Impression: CONCLUSION:  No acute cardiopulmonary disease Electronically signed by:  Ludwig Vee MD  3/7/2019 11:03 AM CST Workstation: [id number]    Ct Angiogram Coronary    Result Date: 3/8/2019  Narrative: PROCEDURE: CT HEART CORONARY ANGIOGRAM WITH IV CONTRAST CLINICAL HISTORY: Chest pain, chronic, low to mod probability of CAD, R07.9 Chest pain, unspecified COMPARISON: None. TECHNIQUE: Images were obtained after premedication with 100 mg p.o. metoprolol Serial axial CT images were obtained through the heart at 3 mm thickness without contrast for calcium scoring. Subsequently, following the intravenous administration of 80 ml of Isovue-370, serial axial CT images were obtained through the heart at 0.6 mm thickness utilizing retrospective  gating. Post processing was performed by the radiologist at the [employer] workstation. 3D images including vessel probing technique were also obtained. Full field of " view reconstructed images were used for evaluation of the extracardiac tissues. This exam was performed using radiation doses that are as low as reasonably achievable (ALARA). This exam was performed according to our departmental dose optimization program, which includes automated exposure control, adjustment of the mA and/or KV according to patient size and/or use of iterative reconstruction technique. FINDINGS: CALCIUM PLAQUE BURDEN: REGION                                         CALCIUM SCORE ([place]) Left Main                                                      0 Right Coronary Artery                                 0 Left Anterior Descending                           51 Circumflex                                                   79 Total calcium score is 130  Implication: Definite, at least moderate atherosclerotic plaque Risk of coronary artery disease: Mild coronary artery disease highly likely, significant narrowings possible CT FUNCTIONAL ANALYSIS: Ejection Fraction     76 % Diastolic Volume     106 ml Systolic Volume      25 ml Stroke Volume        80 ml Cardiac Output        4.8 L/minute CTA OF THE CORONARY ARTERIES: There is a type C LAD. Coronary anatomy is left dominant Left Main: No calcified or soft itssue density plaque and no stenosis. LAD: Contains a metallic stent in the mid segment. Proximal to the stent and distal to the first major bifurcation there is a 14 mm segment of severe narrowing of greater than 90%, associated with noncalcified plaque. Distal to this narrowing, however the stent appears patent and distal to the stent flow is visualized and the LAD is otherwise unremarkable. Circumflex: Contains foci of small to moderate size calcifications in the proximal and distal segments associated with less than 40% narrowing. RCA: Very small caliber vessel. No calcified or soft tisue density plaque and no stenosis. ADDITIONAL FINDINGS: The aortic valve is tricuspid. There is no myocardial  bridging. On short axis views, the myocardium is homogeneous in thickness. Moderate size hiatal hernia.     Impression: CONCLUSION: In between the first major bifurcation of the LAD and the mid LAD stent (proximal to the stent), there is a 14 mm segment of severe narrowing of greater than 90%, associated with noncalcified plaque. Distal to this narrowing, the stent appears patent and distal to the stent flow is visualized and the LAD is otherwise unremarkable. Moderate size hiatal hernia. Findings and recommendations  discussed with SARAH BETH BARBOSA on 3/8/2019 4:49 PM CST Electronically signed by:  Ludwig Vee MD  3/8/2019 4:52 PM CST Workstation: [id number]    Us Venous Doppler Lower Extremity Bilateral (duplex)    Result Date: 2/11/2019  Narrative: Doppler duplex venous examination bilateral lower extremity. CLINICAL INDICATION: Pain and swelling. COMPARISON: Doppler venous examination December 28, 2018. FINDINGS: The common femoral,  femoral, and popliteal veins of the bilateral     lower extremity are well identified and compress normally.  Doppler signals are heard either spontaneously or on distal compression.  No evidence of intraluminal thrombus was noted. The visualized posterior calf veins are normal.     Impression: CONCLUSION:  No evidence of deep venous thrombosis in the common femoral, femoral, or popliteal veins of the bilateral lower extremities.     Electronically signed by:  Saurav Adorno MD  2/11/2019 4:54 PM CST Workstation: [id number]        Assessment:  Diagnoses and all orders for this visit:    Presence of total knee joint prosthesis, left          Plan:    Continue physical therapy, if ok with Dr. Chaparro  Continue therapy for range of motion, strengthening, ice    Xray left knee      No Follow-up on file.      03/11/19 at 9:25 AM by Grover Raymond MD

## 2019-03-12 ENCOUNTER — HOSPITAL ENCOUNTER (OUTPATIENT)
Dept: PHYSICAL THERAPY | Facility: HOSPITAL | Age: 64
Setting detail: THERAPIES SERIES
End: 2019-03-12

## 2019-03-12 ENCOUNTER — DOCUMENTATION (OUTPATIENT)
Dept: CARDIAC REHAB | Facility: HOSPITAL | Age: 64
End: 2019-03-12

## 2019-03-12 DIAGNOSIS — G89.29 CHRONIC PAIN OF BOTH KNEES: Primary | ICD-10-CM

## 2019-03-12 DIAGNOSIS — M25.562 CHRONIC PAIN OF BOTH KNEES: Primary | ICD-10-CM

## 2019-03-12 DIAGNOSIS — M17.0 PRIMARY OSTEOARTHRITIS OF BOTH KNEES: ICD-10-CM

## 2019-03-12 DIAGNOSIS — M25.561 CHRONIC PAIN OF BOTH KNEES: Primary | ICD-10-CM

## 2019-03-12 DIAGNOSIS — Z96.652 PRESENCE OF TOTAL KNEE JOINT PROSTHESIS, LEFT: ICD-10-CM

## 2019-03-14 ENCOUNTER — APPOINTMENT (OUTPATIENT)
Dept: PHYSICAL THERAPY | Facility: HOSPITAL | Age: 64
End: 2019-03-14

## 2019-03-18 ENCOUNTER — HOSPITAL ENCOUNTER (OUTPATIENT)
Dept: PHYSICAL THERAPY | Facility: HOSPITAL | Age: 64
Setting detail: THERAPIES SERIES
Discharge: HOME | End: 2019-03-18

## 2019-03-18 DIAGNOSIS — Z96.652 PRESENCE OF TOTAL KNEE JOINT PROSTHESIS, LEFT: ICD-10-CM

## 2019-03-18 DIAGNOSIS — M25.562 CHRONIC PAIN OF BOTH KNEES: ICD-10-CM

## 2019-03-18 DIAGNOSIS — M25.562 CHRONIC PAIN OF LEFT KNEE: ICD-10-CM

## 2019-03-18 DIAGNOSIS — G89.29 CHRONIC PAIN OF BOTH KNEES: ICD-10-CM

## 2019-03-18 DIAGNOSIS — G89.29 CHRONIC PAIN OF LEFT KNEE: ICD-10-CM

## 2019-03-18 DIAGNOSIS — M17.0 PRIMARY OSTEOARTHRITIS OF BOTH KNEES: Primary | ICD-10-CM

## 2019-03-18 DIAGNOSIS — M25.561 CHRONIC PAIN OF BOTH KNEES: ICD-10-CM

## 2019-03-18 PROCEDURE — 97164 PT RE-EVAL EST PLAN CARE: CPT | Performed by: PHYSICAL THERAPIST

## 2019-03-18 RX ORDER — HYDROCODONE BITARTRATE AND ACETAMINOPHEN 7.5; 325 MG/1; MG/1
1 TABLET ORAL EVERY 6 HOURS PRN
Qty: 60 TABLET | Refills: 0 | Status: SHIPPED | OUTPATIENT
Start: 2019-03-18 | End: 2019-04-16 | Stop reason: SDUPTHER

## 2019-03-18 RX ORDER — PROMETHAZINE HYDROCHLORIDE 12.5 MG/1
12.5 TABLET ORAL EVERY 6 HOURS PRN
Qty: 30 TABLET | Refills: 0 | Status: SHIPPED | OUTPATIENT
Start: 2019-03-18 | End: 2019-04-22
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
## 2019-03-20 ENCOUNTER — APPOINTMENT (OUTPATIENT)
Dept: PHYSICAL THERAPY | Facility: HOSPITAL | Age: 64
End: 2019-03-20

## 2019-03-25 ENCOUNTER — APPOINTMENT (OUTPATIENT)
Dept: PHYSICAL THERAPY | Facility: HOSPITAL | Age: 64
End: 2019-03-25

## 2019-03-27 ENCOUNTER — OFFICE VISIT (OUTPATIENT)
Dept: CARDIOLOGY | Facility: CLINIC | Age: 64
End: 2019-03-27

## 2019-03-27 ENCOUNTER — LAB (OUTPATIENT)
Dept: LAB | Facility: HOSPITAL | Age: 64
End: 2019-03-27

## 2019-03-27 VITALS
HEIGHT: 61 IN | SYSTOLIC BLOOD PRESSURE: 100 MMHG | BODY MASS INDEX: 32.55 KG/M2 | HEART RATE: 72 BPM | DIASTOLIC BLOOD PRESSURE: 70 MMHG | OXYGEN SATURATION: 98 % | WEIGHT: 172.4 LBS

## 2019-03-27 DIAGNOSIS — I25.10 CORONARY ARTERY DISEASE INVOLVING NATIVE CORONARY ARTERY OF NATIVE HEART WITHOUT ANGINA PECTORIS: Primary | ICD-10-CM

## 2019-03-27 DIAGNOSIS — E78.2 MIXED HYPERLIPIDEMIA: Primary | ICD-10-CM

## 2019-03-27 DIAGNOSIS — I10 ESSENTIAL HYPERTENSION: ICD-10-CM

## 2019-03-27 DIAGNOSIS — I25.10 CORONARY ARTERY DISEASE INVOLVING NATIVE CORONARY ARTERY OF NATIVE HEART WITHOUT ANGINA PECTORIS: ICD-10-CM

## 2019-03-27 DIAGNOSIS — E78.2 MIXED HYPERLIPIDEMIA: ICD-10-CM

## 2019-03-27 LAB
CHOLEST SERPL-MCNC: 368 MG/DL (ref 0–200)
HDLC SERPL-MCNC: 42 MG/DL (ref 40–60)
LDLC SERPL CALC-MCNC: 270 MG/DL (ref 0–100)
LDLC/HDLC SERPL: 6.42 {RATIO}
TRIGL SERPL-MCNC: 282 MG/DL (ref 0–150)
VLDLC SERPL-MCNC: 56.4 MG/DL

## 2019-03-27 PROCEDURE — 99215 OFFICE O/P EST HI 40 MIN: CPT | Performed by: INTERNAL MEDICINE

## 2019-03-27 PROCEDURE — 80061 LIPID PANEL: CPT

## 2019-03-27 PROCEDURE — 36415 COLL VENOUS BLD VENIPUNCTURE: CPT

## 2019-03-28 ENCOUNTER — HOSPITAL ENCOUNTER (OUTPATIENT)
Dept: PHYSICAL THERAPY | Facility: HOSPITAL | Age: 64
Setting detail: THERAPIES SERIES
Discharge: HOME | End: 2019-03-28

## 2019-03-28 DIAGNOSIS — Z96.652 PRESENCE OF TOTAL KNEE JOINT PROSTHESIS, LEFT: ICD-10-CM

## 2019-03-28 DIAGNOSIS — M25.561 CHRONIC PAIN OF BOTH KNEES: ICD-10-CM

## 2019-03-28 DIAGNOSIS — M25.562 CHRONIC PAIN OF BOTH KNEES: ICD-10-CM

## 2019-03-28 DIAGNOSIS — G89.29 CHRONIC PAIN OF BOTH KNEES: ICD-10-CM

## 2019-03-28 DIAGNOSIS — M17.0 PRIMARY OSTEOARTHRITIS OF BOTH KNEES: Primary | ICD-10-CM

## 2019-03-28 PROCEDURE — 97110 THERAPEUTIC EXERCISES: CPT

## 2019-03-28 NOTE — PROGRESS NOTES
Mary Nelson  63 y.o. female    03/27/2019  1. Coronary artery disease involving native coronary artery of native heart without angina pectoris    2. Essential hypertension    3. Mixed hyperlipidemia        History of Present Illness    Mary Nelson is a 63 yr old  female who is being seen by me for the first time.  She has seen Dr. Leone in September 2018 and again recently in March this year when she was admitted to [facility].  She presented for evaluation of chest pain.    She has history of documented atherosclerotic coronary artery disease with previous coronary angiogram done in April 2007 which had revealed stenosis in the left anterior descending artery with subsequent FFR evaluation and PTCA and stenting of the left anterior descending artery with a 2.5 x 23 mm Xience Alpine stent.  The stent was postdilated with a 2.5 noncompliant balloon at high pressure 16 ruchi.  Patient underwent repeat coronary angiogram in July 2017 which had revealed 50% stenosis in the left anterior descending artery.    She underwent CT angiogram of the coronary arteries since her troponins are borderline elevated and the findings are as follows:    CONCLUSION:   In between the first major bifurcation of the LAD and the mid LAD  stent (proximal to the stent), there is a 14 mm segment of severe  narrowing of greater than 90%, associated with noncalcified  plaque. Distal to this narrowing, the stent appears patent and  distal to the stent flow is visualized and the LAD is otherwise  unremarkable.   Moderate size hiatal hernia.    She subsequently underwent cardiac catheterization by Dr. Chaparro which showed the following findings.  No critical lesions were identified.  Impression   1.  Sustained patency in the left anterior descending artery site of previous angioplasty and stenting with 50% stenosis proximal to the previously placed stent with a mild tortuous course.  2.  No evidence of any obstructive  disease noted in the circumflex artery.  3.  Nondominant right coronary artery with luminal irregularity.  4.  Preserved left ventricular systolic function with an ejection fraction of 55%    Patient has done reasonably well since discharge from the hospital.  She unfortunately continues to smoke and we had a discussion about smoking cessation.  Her lipid profiles are markedly abnormal though she claims to be compliant with her statin therapy.    SUBJECTIVE    Allergies   Allergen Reactions   • Codeine Swelling         Past Medical History:   Diagnosis Date   • Anxiety    • Arthritis    • COPD (chronic obstructive pulmonary disease) (CMS/Formerly [facility])    • Coronary artery disease    • Depression    • Epigastric pain    • GERD (gastroesophageal reflux disease)    • Head injury 1990   • Hiatal hernia    • High cholesterol    • HTN (hypertension)          Past Surgical History:   Procedure Laterality Date   • ABDOMINAL SURGERY     • CARDIAC CATHETERIZATION N/A 4/19/2017    Procedure: Left Heart Cath;  Surgeon: Kevin Erazo MD;  Location: [facility] INVASIVE LOCATION;  Service:    • CARDIAC CATHETERIZATION  4/19/2017    Procedure: Functional Flow [place];  Surgeon: Kevin Erazo MD;  Location: [facility] CATH INVASIVE LOCATION;  Service:    • CARDIAC CATHETERIZATION N/A 7/6/2017    Procedure: Left Heart Cath;  Surgeon: Kevin Erazo MD;  Location: [facility] CATH INVASIVE LOCATION;  Service:    • CARDIAC CATHETERIZATION N/A 3/9/2019    Procedure: Left Heart Cath;  Surgeon: Brenden Chaparro MD;  Location: [facility] INVASIVE LOCATION;  Service: Cardiology   • COLONOSCOPY  06/27/2016   • ENDOSCOPY AND COLONOSCOPY  06/27/2016    External and internal hemorrhoids. No specimens collected   • ESOPHAGOSCOPY / EGD  06/27/2016    With biopsy-Mildly severe esophagitis. Gastritis. Normal examined duodenum. Biopsied. Medium-sized hiatus hernia. Several biopsies were obtained in the lower third of the esophagus. Several biopsies were obtained in the  gastric antrum   • HERNIA REPAIR     • NISSEN FUNDOPLICATION N/A 2/1/2017    Procedure: LAPAROSCOPIC NISSEN FUNDOPLICATION,  HIATAL HERNIA REPAIR  ;  Surgeon: Ced Wilson MD;  Location: [facility] OR;  Service:    • [place] RT/LT HEART CATHETERS N/A 4/19/2017    Procedure: Percutaneous Coronary Intervention;  Surgeon: Kevin Erazo MD;  Location: [facility] CATH INVASIVE LOCATION;  Service: Cardiovascular   • TOTAL KNEE ARTHROPLASTY Left 12/27/2018    Procedure: TOTAL KNEE ARTHROPLASTY;  Surgeon: Grover Raymond MD;  Location: [facility] OR;  Service: Orthopedics   • TRANSESOPHAGEAL ECHOCARDIOGRAM (JOAO)  11/18/2015    With color flow-Ef of 60%.Early diastolic dysfunction.Normal RV size and function.Trace to mild mitral and trace tricuspid regurg.No pericardial effusion.   • TUBAL ABDOMINAL LIGATION  1984         Family History   Problem Relation Age of Onset   • Hypertension Other    • Heart disease Father    • Hypertension Father    • COPD Father    • Arthritis Father          Social History     Socioeconomic History   • Marital status:      Spouse name: Not on file   • Number of children: Not on file   • Years of education: Not on file   • Highest education level: Not on file   Tobacco Use   • Smoking status: Current Every Day Smoker     Packs/day: 1.00     Years: 43.00     Pack years: 43.00     Types: Cigarettes   • Smokeless tobacco: Never Used   • Tobacco comment: No cigarettes x 1 month but vape   Substance and Sexual Activity   • Alcohol use: No   • Drug use: No   • Sexual activity: Defer         Current Outpatient Medications   Medication Sig Dispense Refill   • amitriptyline (ELAVIL) 50 MG tablet Take 50 mg by mouth Every Night.     • aspirin 81 MG chewable tablet Chew 1 tablet Daily. 30 tablet 1   • atorvastatin (LIPITOR) 40 MG tablet TAKE 1 TABLET BY MOUTH EVERY NIGHT. 30 tablet 6   • citalopram (CELEXA) 40 MG tablet Take 40 mg by mouth Daily.     • diltiaZEM CD (CARDIZEM CD) 180 MG 24 hr  "capsule Take 180 mg by mouth Daily.     • HYDROcodone-acetaminophen (NORCO) 7.5-325 MG per tablet Take 1 tablet by mouth Every 6 (Six) Hours As Needed for Moderate Pain . 60 tablet 0   • hydrOXYzine (VISTARIL) 25 MG capsule Take 25 mg by mouth 3 (Three) Times a Day As Needed for Anxiety.     • lisinopril (PRINIVIL,ZESTRIL) 10 MG tablet Take 10 mg by mouth Daily.     • metoprolol tartrate (LOPRESSOR) 50 MG tablet Take 1 tablet by mouth Every 12 (Twelve) Hours. 60 tablet 1   • nitroglycerin (NITROSTAT) 0.4 MG SL tablet Place 1 tablet under the tongue Every 5 (Five) Minutes As Needed for Chest Pain. Take no more than 3 doses in 15 minutes. 30 tablet 1   • omeprazole (PRILOSEC) 40 MG capsule Take 40 mg by mouth Daily.     • ondansetron (ZOFRAN) 4 MG tablet Take 1 tablet by mouth Every 8 (Eight) Hours As Needed for Nausea or Vomiting. 30 tablet 0   • promethazine (PHENERGAN) 12.5 MG tablet Take 1 tablet by mouth Every 6 (Six) Hours As Needed for Nausea or Vomiting. 30 tablet 0   • ticagrelor (BRILINTA) 60 MG tablet tablet Take 1 tablet by mouth 2 (Two) Times a Day. 60 tablet 12   • tiZANidine (ZANAFLEX) 4 MG tablet Take 4 mg by mouth Every 6 (Six) Hours As Needed.       No current facility-administered medications for this visit.          OBJECTIVE    /70   Pulse 72   Ht 154.9 cm (60.98\")   Wt 78.2 kg (172 lb 6.4 oz)   SpO2 98%   BMI 32.59 kg/m²         Review of Systems     Constitutional:  Denies recent weight loss, weight gain, fever or chills, no change in exercise tolerance     HENT:  Denies any hearing loss, epistaxis, hoarseness, or difficulty speaking.     Eyes: Wears eyeglasses or contact lenses     Respiratory:  Denies dyspnea with exertion,no cough, wheezing, or hemoptysis.     Cardiovascular: Negative for palpations, chest pain, orthopnea, PND    Gastrointestinal:  Denies change in bowel habits, dyspepsia, ulcer disease, hematochezia, or melena.     Endocrine: Negative for cold intolerance, heat " intolerance, polydipsia, polyphagia and polyuria.     Genitourinary: Negative.      Musculoskeletal: DJD    Skin:  Denies any change in hair or nails, rashes, or skin lesions.     Allergic/Immunologic: Negative.  Negative for environmental allergies, food allergies and immunocompromised state.     Neurological:  Denies any history of recurrent headaches, strokes, TIA, or seizure disorder.     Hematological: Denies any food allergies, seasonal allergies, bleeding disorders, or lymphadenopathy.     Psychiatric/Behavioral:  history of depression        Physical Exam     Constitutional: Cooperative, alert and oriented, in no acute distress.     HENT:   Head: Normocephalic, normal hair patterns, no masses or tenderness.  Ears, Nose, and Throat: No gross abnormalities. No pallor or cyanosis.   Eyes: EOMS intact, PERRL, conjunctivae and lids unremarkable. Fundoscopic exam and visual fields not performed.   Neck: No palpable masses or adenopathy, no thyromegaly, no JVD, carotid pulses are full and equal bilaterally and without  Bruits.     Cardiovascular: Regular rhythm, S1 and S2 normal, no S3 or S4.  No murmurs, gallops, or rubs detected.     Pulmonary/Chest: Chest: normal symmetry,  normal respiratory excursion, no intercostal retraction, no use of accessory muscles.            Pulmonary: Normal breath sounds. No rales or ronchi.    Abdominal: Abdomen soft, bowel sounds normoactive, no masses, no hepatosplenomegaly, non-tender, no bruits.     Musculoskeletal: No deformities, clubbing, cyanosis, erythema, or edema observed.     Neurological: No gross motor or sensory deficits noted, affect appropriate, oriented to time, person, place.     Skin: Warm and dry to the touch, no apparent skin lesions or masses noted.     Psychiatric: She has a normal mood and affect. Her behavior is normal. Judgment and thought content normal.         Procedures      Lab Results   Component Value Date    WBC 5.76 03/09/2019    HGB 10.6 (L)  03/09/2019    HCT 36.1 03/09/2019    MCV 84.7 03/09/2019     03/09/2019     Lab Results   Component Value Date    GLUCOSE 100 03/09/2019    BUN 4 (L) 03/09/2019    CREATININE 0.77 03/09/2019    EGFRIFNONA 76 03/09/2019    BCR 5.2 (L) 03/09/2019    CO2 24.0 03/09/2019    CALCIUM 9.0 03/09/2019    ALBUMIN 3.40 03/09/2019    AST 16 03/09/2019    ALT 14 03/09/2019     Lab Results   Component Value Date    CHOL 368 (H) 03/27/2019    CHOL 356 (H) 04/20/2017     Lab Results   Component Value Date    TRIG 282 (H) 03/27/2019    TRIG 245 (H) 04/20/2017    TRIG 235 (H) 11/18/2015     Lab Results   Component Value Date    HDL 42 03/27/2019    HDL 42 (L) 04/20/2017    HDL 46 (L) 11/18/2015     No components found for: LDLCALC  Lab Results   Component Value Date     (H) 03/27/2019     (H) 04/20/2017     (H) 11/18/2015     No results found for: HDLLDLRATIO  No components found for: CHOLHDL  Lab Results   Component Value Date    HGBA1C 5.87 (H) 04/20/2017     Lab Results   Component Value Date    TSH 1.90 11/17/2015           ASSESSMENT AND PLAN  Ms. Nelson has multiple medical issues as discussed on the history of present illness but fortunately stable at this time with no definite evidence of angina, arrhythmia or congestive heart failure.  Aggressive risk factor modification with optimization of lipid profile and smoking cessation has been stressed.  A fasting lipid profile being checked today and if the LDL is markedly elevated I will consider PCSK9 inhibitors.  About 45 minutes was spent in the assessment and evaluation of this patient    Mary was seen today for follow-up.    Diagnoses and all orders for this visit:    Coronary artery disease involving native coronary artery of native heart without angina pectoris  -     Lipid Panel; Future    Essential hypertension  -     Lipid Panel; Future    Mixed hyperlipidemia  -     Lipid Panel; Future        Patient's Body mass index is 32.59 kg/m². BMI  is above normal parameters. Recommendations include: exercise counseling and nutrition counseling.      I advised Mary of the risks of continuing to use tobacco, and I provided her with tobacco cessation educational materials in the After Visit Summary.     During this visit, I spent 3 minutes counseling the patient regarding tobacco cessation.      Emanuel Olmos MD  3/27/2019  7:30 PM

## 2019-03-28 NOTE — THERAPY TREATMENT NOTE
Outpatient Physical Therapy Ortho Treatment Note  [facility]     Patient Name: Mary Nelson  : 1955  MRN: [id number]  Today's Date: 3/28/2019      Visit Date: 2019     Subjective Improvement: 60%  Attendance:   (eval + 12 until 19)  Next MD Visit : 19  Recert Date:  19      Therapy Diagnosis:  L TKA 19        Visit Dx:    ICD-10-CM ICD-9-CM   1. Primary osteoarthritis of both knees M17.0 715.16   2. Chronic pain of both knees M25.561 719.46    M25.562 338.29    G89.29    3. Presence of total knee joint prosthesis, left Z96.652 V43.65       Patient Active Problem List   Diagnosis   • Epigastric pain   • Hiatal hernia   • Major depressive disorder with single episode, in partial remission (CMS/HCC)   • Benign essential HTN   • Gastroesophageal reflux disease without esophagitis   • Panlobular emphysema (CMS/HCC)   • Hiatal hernia with gastroesophageal reflux   • Abnormal nuclear stress test   • Coronary artery disease involving native coronary artery of native heart with unstable angina pectoris (CMS/HCC)   • Essential hypertension   • Mixed hyperlipidemia   • Tobacco abuse counseling   • Unstable angina pectoris (CMS/HCC)   • Chronic pain of both knees   • Primary osteoarthritis of both knees   • Nicotine abuse   • SOB (shortness of breath)   • Coronary artery disease involving native coronary artery of native heart without angina pectoris   • Presence of total knee joint prosthesis, left   • Chest pain        Past Medical History:   Diagnosis Date   • Anxiety    • Arthritis    • COPD (chronic obstructive pulmonary disease) (CMS/HCC)    • Coronary artery disease    • Depression    • Epigastric pain    • GERD (gastroesophageal reflux disease)    • Head injury    • Hiatal hernia    • High cholesterol    • HTN (hypertension)         Past Surgical History:   Procedure Laterality Date   • ABDOMINAL SURGERY     • CARDIAC CATHETERIZATION N/A 2017     Procedure: Left Heart Cath;  Surgeon: Kevin Erazo MD;  Location: [facility] CATH INVASIVE LOCATION;  Service:    • CARDIAC CATHETERIZATION  4/19/2017    Procedure: Functional Flow [place];  Surgeon: Kevin Erazo MD;  Location: [facility] CATH INVASIVE LOCATION;  Service:    • CARDIAC CATHETERIZATION N/A 7/6/2017    Procedure: Left Heart Cath;  Surgeon: Kevin Erazo MD;  Location: [facility] INVASIVE LOCATION;  Service:    • CARDIAC CATHETERIZATION N/A 3/9/2019    Procedure: Left Heart Cath;  Surgeon: Brenden Chaparro MD;  Location: [facility] INVASIVE LOCATION;  Service: Cardiology   • COLONOSCOPY  06/27/2016   • ENDOSCOPY AND COLONOSCOPY  06/27/2016    External and internal hemorrhoids. No specimens collected   • ESOPHAGOSCOPY / EGD  06/27/2016    With biopsy-Mildly severe esophagitis. Gastritis. Normal examined duodenum. Biopsied. Medium-sized hiatus hernia. Several biopsies were obtained in the lower third of the esophagus. Several biopsies were obtained in the gastric antrum   • HERNIA REPAIR     • NISSEN FUNDOPLICATION N/A 2/1/2017    Procedure: LAPAROSCOPIC NISSEN FUNDOPLICATION,  HIATAL HERNIA REPAIR  ;  Surgeon: Ced Wilson MD;  Location: [facility];  Service:    • [place] RT/LT HEART CATHETERS N/A 4/19/2017    Procedure: Percutaneous Coronary Intervention;  Surgeon: Kevin Erazo MD;  Location: [facility] INVASIVE LOCATION;  Service: Cardiovascular   • TOTAL KNEE ARTHROPLASTY Left 12/27/2018    Procedure: TOTAL KNEE ARTHROPLASTY;  Surgeon: Grover Raymond MD;  Location: [facility];  Service: Orthopedics   • TRANSESOPHAGEAL ECHOCARDIOGRAM (JOAO)  11/18/2015    With color flow-Ef of 60%.Early diastolic dysfunction.Normal RV size and function.Trace to mild mitral and trace tricuspid regurg.No pericardial effusion.   • TUBAL ABDOMINAL LIGATION  1984       PT Ortho     Row Name 03/28/19 1100       Precautions and Contraindications    Precautions/Limitations  cardiac precautions  -KH    Precautions  L  TKA 12/28/18  -       Posture/Observations    Posture/Observations Comments  pt presents with no AD this date; slightly antalgic  -       Left Lower Ext    Lt Knee Extension/Flexion AROM  0-4-113  -      User Key  (r) = Recorded By, (t) = Taken By, (c) = Cosigned By    Initials Name Provider Type    Kaity Vila PTA Physical Therapy Assistant                      PT Assessment/Plan     Row Name 03/28/19 1100          PT Assessment    Assessment Comments  continues with quad lag with SLR; improved extension this date and quad tone improving.  she did well with step ups and gait slightly antalgic without AD; she can correct antalgia with VCing. decreased pain post treatment this date  -        PT Plan    PT Frequency  2x/week  -     Predicted Duration of Therapy Intervention (Therapy Eval)  4-6 weeks  -     PT Plan Comments  7 more approved vists after today until 04/14/19; Continue to montior VS and improve gait and ROM as able.  -       User Key  (r) = Recorded By, (t) = Taken By, (c) = Cosigned By    Initials Name Provider Type    Kaity Vila PTA Physical Therapy Assistant          Modalities     Row Name 03/28/19 1100             Subjective Pain    Post-Treatment Pain Level  4  -        User Key  (r) = Recorded By, (t) = Taken By, (c) = Cosigned By    Initials Name Provider Type    Kaity Vila PTA Physical Therapy Assistant        Exercises     Row Name 03/28/19 1100             Subjective Comments    Subjective Comments  BP has been doing ok; states that knee is still stiff and painful but doing better. having pain all the way down into the foot;   -         Subjective Pain    Able to rate subjective pain?  yes  -      Pre-Treatment Pain Level  8  -      Post-Treatment Pain Level  4  -         Exercise 1    Exercise Name 1  pro ll LE strength  -      Time 1  10'  -      Additional Comments  level 2; seat 8  -KH         Exercise 2    Exercise Name 2  /82  -          "Exercise 3    Exercise Name 3  incline stretch   -KH      Sets 3  3  -KH      Time 3  30\"  -KH         Exercise 4    Exercise Name 4  standing hamstring stretch   -KH      Sets 4  3  -KH      Time 4  30\"  -KH         Exercise 5    Exercise Name 5  step ups fwd/lat  -KH      Sets 5  2  -KH      Reps 5  10  -KH      Additional Comments  4 inch   -KH         Exercise 6    Exercise Name 6  LAQ w/ add squeeze  -KH      Sets 6  3  -KH      Reps 6  10  -KH         Exercise 7    Exercise Name 7  ham curls with tband  -KH      Sets 7  3  -KH      Reps 7  10  -KH      Additional Comments  red  -KH         Exercise 8    Exercise Name 8  patella mobes  -KH      Time 8  3'  -KH         Exercise 9    Exercise Name 9  quad sets  -KH      Sets 9  2  -KH      Reps 9  10  -KH        User Key  (r) = Recorded By, (t) = Taken By, (c) = Cosigned By    Initials Name Provider Type    Kaity Vila, LUCIA Physical Therapy Assistant                       PT OP Goals     Row Name 03/28/19 1100          PT Short Term Goals    STG Date to Achieve  04/08/19  -     STG 1  Improve L knee active extension to -10 degrees or better.  -     STG 1 Progress  Met  -     STG 2  Improve L knee active flexion to >/= 100 degrees.  -     STG 2 Progress  Met  -     STG 3  LEFS score to be >/= 35/80.  -     STG 3 Progress  Met  -     STG 4  Ambulate throughout clinic without assistive device.  -     STG 4 Progress  Met  -        Long Term Goals    LTG Date to Achieve  04/22/19  -     LTG 1  Independent with HEP.  -     LTG 1 Progress  Ongoing  -     LTG 2  L knee active extension to -5 degrees or better.  -     LTG 2 Progress  Not Met;Ongoing  -     LTG 3  L knee active flexion to >/= 110 degrees.  -     LTG 3 Progress  Met  -     LTG 4  Demonstrate ability to ascend 5 stairs reciprocally with 1 HR as needed.  -     LTG 4 Progress  Not Met  -     LTG 5  LEFS score to be >/= 45/80.  -     LTG 5 Progress  Not Met  -     LTG " 6  L knee active flexion to >/= 115 degrees  -MELVIN     LTG 6 Progress  New  -MELVIN        Time Calculation    PT Goal Re-Cert Due Date  04/08/19  -MELVIN       User Key  (r) = Recorded By, (t) = Taken By, (c) = Cosigned By    Initials Name Provider Type    Kaity Vila PTA Physical Therapy Assistant                         Time Calculation:   Start Time: 1102  Stop Time: 1140  Time Calculation (min): 38 min  Total Timed Code Minutes- PT: 38 minute(s)  Therapy Charges for Today     Code Description Service Date Service Provider Modifiers Qty    [id number] HC PT THER PROC EA 15 MIN 3/28/2019 Kaity Marquez PTA GP 3                    Kaity Marquez PTA  3/28/2019

## 2019-04-01 ENCOUNTER — HOSPITAL ENCOUNTER (OUTPATIENT)
Dept: PHYSICAL THERAPY | Facility: HOSPITAL | Age: 64
Setting detail: THERAPIES SERIES
Discharge: HOME | End: 2019-04-01

## 2019-04-01 DIAGNOSIS — Z96.652 PRESENCE OF TOTAL KNEE JOINT PROSTHESIS, LEFT: ICD-10-CM

## 2019-04-01 DIAGNOSIS — M17.0 PRIMARY OSTEOARTHRITIS OF BOTH KNEES: Primary | ICD-10-CM

## 2019-04-01 DIAGNOSIS — G89.29 CHRONIC PAIN OF BOTH KNEES: ICD-10-CM

## 2019-04-01 DIAGNOSIS — M25.562 CHRONIC PAIN OF BOTH KNEES: ICD-10-CM

## 2019-04-01 DIAGNOSIS — M25.561 CHRONIC PAIN OF BOTH KNEES: ICD-10-CM

## 2019-04-01 PROCEDURE — 97110 THERAPEUTIC EXERCISES: CPT

## 2019-04-01 NOTE — THERAPY TREATMENT NOTE
Outpatient Physical Therapy Ortho Treatment Note  [facility]     Patient Name: Mary Nelson  : 1955  MRN: [id number]  Today's Date: 2019      Visit Date: 2019     Sub imp 60%  Visit  (Eval + 12 until 19)  MD 19  Re 19    Visit Dx:    ICD-10-CM ICD-9-CM   1. Primary osteoarthritis of both knees M17.0 715.16   2. Chronic pain of both knees M25.561 719.46    M25.562 338.29    G89.29    3. Presence of total knee joint prosthesis, left Z96.652 V43.65       Patient Active Problem List   Diagnosis   • Epigastric pain   • Hiatal hernia   • Major depressive disorder with single episode, in partial remission (CMS/HCC)   • Benign essential HTN   • Gastroesophageal reflux disease without esophagitis   • Panlobular emphysema (CMS/HCC)   • Hiatal hernia with gastroesophageal reflux   • Abnormal nuclear stress test   • Coronary artery disease involving native coronary artery of native heart with unstable angina pectoris (CMS/HCC)   • Essential hypertension   • Mixed hyperlipidemia   • Tobacco abuse counseling   • Unstable angina pectoris (CMS/HCC)   • Chronic pain of both knees   • Primary osteoarthritis of both knees   • Nicotine abuse   • SOB (shortness of breath)   • Coronary artery disease involving native coronary artery of native heart without angina pectoris   • Presence of total knee joint prosthesis, left   • Chest pain        Past Medical History:   Diagnosis Date   • Anxiety    • Arthritis    • COPD (chronic obstructive pulmonary disease) (CMS/HCC)    • Coronary artery disease    • Depression    • Epigastric pain    • GERD (gastroesophageal reflux disease)    • Head injury    • Hiatal hernia    • High cholesterol    • HTN (hypertension)         Past Surgical History:   Procedure Laterality Date   • ABDOMINAL SURGERY     • CARDIAC CATHETERIZATION N/A 2017    Procedure: Left Heart Cath;  Surgeon: Kevin Erazo MD;  Location: [facility] INVASIVE LOCATION;   Service:    • CARDIAC CATHETERIZATION  4/19/2017    Procedure: Functional Flow [place];  Surgeon: Kevin Erazo MD;  Location: [facility] CATH INVASIVE LOCATION;  Service:    • CARDIAC CATHETERIZATION N/A 7/6/2017    Procedure: Left Heart Cath;  Surgeon: Kevin Erazo MD;  Location: [facility] CATH INVASIVE LOCATION;  Service:    • CARDIAC CATHETERIZATION N/A 3/9/2019    Procedure: Left Heart Cath;  Surgeon: Brenden Chaparro MD;  Location: [facility] CATH INVASIVE LOCATION;  Service: Cardiology   • COLONOSCOPY  06/27/2016   • ENDOSCOPY AND COLONOSCOPY  06/27/2016    External and internal hemorrhoids. No specimens collected   • ESOPHAGOSCOPY / EGD  06/27/2016    With biopsy-Mildly severe esophagitis. Gastritis. Normal examined duodenum. Biopsied. Medium-sized hiatus hernia. Several biopsies were obtained in the lower third of the esophagus. Several biopsies were obtained in the gastric antrum   • HERNIA REPAIR     • NISSEN FUNDOPLICATION N/A 2/1/2017    Procedure: LAPAROSCOPIC NISSEN FUNDOPLICATION,  HIATAL HERNIA REPAIR  ;  Surgeon: Ced Wilson MD;  Location: [facility];  Service:    • MA RT/LT HEART CATHETERS N/A 4/19/2017    Procedure: Percutaneous Coronary Intervention;  Surgeon: Kevin Erazo MD;  Location: [facility] CATH INVASIVE LOCATION;  Service: Cardiovascular   • TOTAL KNEE ARTHROPLASTY Left 12/27/2018    Procedure: TOTAL KNEE ARTHROPLASTY;  Surgeon: Grover Raymond MD;  Location: [facility];  Service: Orthopedics   • TRANSESOPHAGEAL ECHOCARDIOGRAM (JOAO)  11/18/2015    With color flow-Ef of 60%.Early diastolic dysfunction.Normal RV size and function.Trace to mild mitral and trace tricuspid regurg.No pericardial effusion.   • TUBAL ABDOMINAL LIGATION  1984       PT Ortho     Row Name 04/01/19 1000       Precautions and Contraindications    Precautions/Limitations  cardiac precautions  (Pended)   -LESLIE    Precautions  L KTA 12/28/18  (Pended)   -LESLIE       Posture/Observations    Posture/Observations Comments  " Presets with no assistive device  (Pended)   -       Left Lower Ext    Lt Knee Extension/Flexion AROM  0-5-110  (Pended)   -      User Key  (r) = Recorded By, (t) = Taken By, (c) = Cosigned By    Initials Name Provider Type    Jeyson Roberts, [facility]                       PT Assessment/Plan     Row Name 04/01/19 1135          PT Assessment    Assessment Comments  Cont decreased knee ext. Fatigued with ex. small breaks between ex.   (Pended)   -LESLIE        PT Plan    PT Plan Comments  Cont per POC. Monitor BP. Progress ROM, strength as rosalind.   (Pended)   -       User Key  (r) = Recorded By, (t) = Taken By, (c) = Cosigned By    Initials Name Provider Type    Jeyson Roberts [facility]             Exercises     Row Name 04/01/19 1000             Subjective Comments    Subjective Comments  Reports knee pain is a little better today. BP has been doing OK  (Pended)   -         Subjective Pain    Able to rate subjective pain?  yes  (Pended)   -LESLIE      Pre-Treatment Pain Level  6  (Pended)   -LESLIE      Post-Treatment Pain Level  5  (Pended)   -LESLIE         Exercise 1    Exercise Name 1  pro ll LE strength  (Pended)   -LESLIE      Time 1  10'  (Pended)   -LESLIE         Exercise 2    Exercise Name 2  /80  (Pended)   -LESLIE         Exercise 3    Exercise Name 3  incline stretch   (Pended)   -LESLIE      Sets 3  3  (Pended)   -LESLIE      Time 3  30\"  (Pended)   -LESLIE         Exercise 4    Exercise Name 4  standing hamstring stretch   (Pended)   -LESLIE      Sets 4  3  (Pended)   -LESLIE      Time 4  30\"  (Pended)   -LESLIE         Exercise 5    Exercise Name 5  step ups fwd/lat  (Pended)   -LESLIE      Sets 5  2  (Pended)   -LESLIE      Reps 5  10  (Pended)   -LESLIE      Additional Comments  4\"  (Pended)   -LESLIE         Exercise 6    Exercise Name 6  Sit to stand  (Pended)   -LESLIE      Sets 6  2  (Pended)   -LESLIE      Reps 6  10  (Pended)   -LESLIE         Exercise 7    Exercise Name 7  Ham ball iso  (Pended)   -LESLIE      Sets 7  2  (Pended)   -LESLIE  "     Reps 7  10  (Pended)   -LESLIE         Exercise 8    Exercise Name 8  SAQ  (Pended)   -LESLIE      Sets 8  2  (Pended)   -LESLIE      Reps 8  10  (Pended)   -LESLIE      Time 8  --  (Pended)   -LESLIE         Exercise 9    Exercise Name 9  quad sets w/ext prop.   (Pended)   -LESLIE      Sets 9  2  (Pended)   -LESLIE      Reps 9  10  (Pended)   -LESLIE         Exercise 10    Exercise Name 10  Ext Prop  (Pended)   -LESLIE      Time 10  5'  (Pended)   -LESLIE         Exercise 11    Exercise Name 11  LAQ w/ Add  (Pended)   -LESLIE      Sets 11  2  (Pended)   -LESLIE      Reps 11  10  (Pended)   -LESLIE        User Key  (r) = Recorded By, (t) = Taken By, (c) = Cosigned By    Initials Name Provider Type    Jeyson Roberts, ATC                        PT OP Goals     Row Name 04/01/19 1000          PT Short Term Goals    STG Date to Achieve  04/08/19  (Pended)   -LESLIE     STG 1  Improve L knee active extension to -10 degrees or better.  (Pended)   -LESLIE     STG 1 Progress  Met  (Pended)   -LESLIE     STG 2  Improve L knee active flexion to >/= 100 degrees.  (Pended)   -LESLIE     STG 2 Progress  Met  (Pended)   -LESLIE     STG 3  LEFS score to be >/= 35/80.  (Pended)   -LESLIE     STG 3 Progress  Met  (Pended)   -LESLIE     STG 4  Ambulate throughout clinic without assistive device.  (Pended)   -LESLIE     STG 4 Progress  Met  (Pended)   -        Long Term Goals    LTG Date to Achieve  04/22/19  (Pended)   -LESLIE     LTG 1  Independent with HEP.  (Pended)   -LESLIE     LTG 1 Progress  Ongoing  (Pended)   -LESLIE     LTG 2  L knee active extension to -5 degrees or better.  (Pended)   -LESLIE     LTG 2 Progress  Not Met;Ongoing  (Pended)   -LESLIE     LTG 3  L knee active flexion to >/= 110 degrees.  (Pended)   -LESLIE     LTG 3 Progress  Met  (Pended)   -LESLIE     LTG 4  Demonstrate ability to ascend 5 stairs reciprocally with 1 HR as needed.  (Pended)   -LESLIE     LTG 4 Progress  Not Met  (Pended)   -LESLIE     LTG 5  LEFS score to be >/= 45/80.  (Pended)   -LESLIE     LTG 5 Progress  Not Met  (Pended)   -LESLIE     LTG  6  L knee active flexion to >/= 115 degrees  (Pended)   -LESLIE     LTG 6 Progress  New  (Pended)   -LESLIE       User Key  (r) = Recorded By, (t) = Taken By, (c) = Cosigned By    Initials Name Provider Type    Jeyson Roberts, [facility]           Therapy Education  Given: (P) HEP  Program: (P) Reinforced  How Provided: (P) Verbal  Provided to: (P) Patient  Level of Understanding: (P) Verbalized              Time Calculation:   Start Time: (P) 1052  Stop Time: (P) 1132  Time Calculation (min): (P) 40 min  Total Timed Code Minutes- PT: (P) 40 minute(s)  Therapy Charges for Today     Code Description Service Date Service Provider Modifiers Qty    82984993676 HC PT THER PROC EA 15 MIN 4/1/2019 Jeyson Beck, ATC  3                    Jeyson Beck ATC  4/1/2019

## 2019-04-03 ENCOUNTER — APPOINTMENT (OUTPATIENT)
Dept: PHYSICAL THERAPY | Facility: HOSPITAL | Age: 64
End: 2019-04-03

## 2019-04-08 ENCOUNTER — HOSPITAL ENCOUNTER (OUTPATIENT)
Dept: PHYSICAL THERAPY | Facility: HOSPITAL | Age: 64
Setting detail: THERAPIES SERIES
Discharge: HOME | End: 2019-04-08

## 2019-04-08 DIAGNOSIS — Z96.652 PRESENCE OF TOTAL KNEE JOINT PROSTHESIS, LEFT: ICD-10-CM

## 2019-04-08 DIAGNOSIS — M25.562 CHRONIC PAIN OF BOTH KNEES: ICD-10-CM

## 2019-04-08 DIAGNOSIS — M25.561 CHRONIC PAIN OF BOTH KNEES: ICD-10-CM

## 2019-04-08 DIAGNOSIS — G89.29 CHRONIC PAIN OF BOTH KNEES: ICD-10-CM

## 2019-04-08 DIAGNOSIS — M17.0 PRIMARY OSTEOARTHRITIS OF BOTH KNEES: Primary | ICD-10-CM

## 2019-04-08 PROCEDURE — 97110 THERAPEUTIC EXERCISES: CPT

## 2019-04-08 NOTE — THERAPY TREATMENT NOTE
Outpatient Physical Therapy Ortho Treatment Note  [facility]     Patient Name: Mary Nelson  : 1955  MRN: [id number]  Today's Date: 2019      Visit Date: 2019     Sub imp 60%  Visit  (Eval + 12 until 19)  MD 19  Re scheduled 19    Visit Dx:    ICD-10-CM ICD-9-CM   1. Primary osteoarthritis of both knees M17.0 715.16   2. Chronic pain of both knees M25.561 719.46    M25.562 338.29    G89.29    3. Presence of total knee joint prosthesis, left Z96.652 V43.65       Patient Active Problem List   Diagnosis   • Epigastric pain   • Hiatal hernia   • Major depressive disorder with single episode, in partial remission (CMS/HCC)   • Benign essential HTN   • Gastroesophageal reflux disease without esophagitis   • Panlobular emphysema (CMS/HCC)   • Hiatal hernia with gastroesophageal reflux   • Abnormal nuclear stress test   • Coronary artery disease involving native coronary artery of native heart with unstable angina pectoris (CMS/HCC)   • Essential hypertension   • Mixed hyperlipidemia   • Tobacco abuse counseling   • Unstable angina pectoris (CMS/HCC)   • Chronic pain of both knees   • Primary osteoarthritis of both knees   • Nicotine abuse   • SOB (shortness of breath)   • Coronary artery disease involving native coronary artery of native heart without angina pectoris   • Presence of total knee joint prosthesis, left   • Chest pain        Past Medical History:   Diagnosis Date   • Anxiety    • Arthritis    • COPD (chronic obstructive pulmonary disease) (CMS/HCC)    • Coronary artery disease    • Depression    • Epigastric pain    • GERD (gastroesophageal reflux disease)    • Head injury    • Hiatal hernia    • High cholesterol    • HTN (hypertension)         Past Surgical History:   Procedure Laterality Date   • ABDOMINAL SURGERY     • CARDIAC CATHETERIZATION N/A 2017    Procedure: Left Heart Cath;  Surgeon: Kevin Erazo MD;  Location: [facility] INVASIVE  LOCATION;  Service:    • CARDIAC CATHETERIZATION  4/19/2017    Procedure: Functional Flow [place];  Surgeon: Kevin Erazo MD;  Location: [facility] CATH INVASIVE LOCATION;  Service:    • CARDIAC CATHETERIZATION N/A 7/6/2017    Procedure: Left Heart Cath;  Surgeon: Kevin Erazo MD;  Location: [facility] CATH INVASIVE LOCATION;  Service:    • CARDIAC CATHETERIZATION N/A 3/9/2019    Procedure: Left Heart Cath;  Surgeon: Brenden Chaparro MD;  Location: [facility] CATH INVASIVE LOCATION;  Service: Cardiology   • COLONOSCOPY  06/27/2016   • ENDOSCOPY AND COLONOSCOPY  06/27/2016    External and internal hemorrhoids. No specimens collected   • ESOPHAGOSCOPY / EGD  06/27/2016    With biopsy-Mildly severe esophagitis. Gastritis. Normal examined duodenum. Biopsied. Medium-sized hiatus hernia. Several biopsies were obtained in the lower third of the esophagus. Several biopsies were obtained in the gastric antrum   • HERNIA REPAIR     • NISSEN FUNDOPLICATION N/A 2/1/2017    Procedure: LAPAROSCOPIC NISSEN FUNDOPLICATION,  HIATAL HERNIA REPAIR  ;  Surgeon: Ced Wilson MD;  Location: [facility] OR;  Service:    • NM RT/LT HEART CATHETERS N/A 4/19/2017    Procedure: Percutaneous Coronary Intervention;  Surgeon: Kevin Erazo MD;  Location: [facility] CATH INVASIVE LOCATION;  Service: Cardiovascular   • TOTAL KNEE ARTHROPLASTY Left 12/27/2018    Procedure: TOTAL KNEE ARTHROPLASTY;  Surgeon: Grover Raymond MD;  Location: [facility];  Service: Orthopedics   • TRANSESOPHAGEAL ECHOCARDIOGRAM (JOAO)  11/18/2015    With color flow-Ef of 60%.Early diastolic dysfunction.Normal RV size and function.Trace to mild mitral and trace tricuspid regurg.No pericardial effusion.   • TUBAL ABDOMINAL LIGATION  1984       PT Ortho     Row Name 04/08/19 0800       Posture/Observations    Posture/Observations Comments  Presents with no assistive device  (Pended)   -LESLIE       Left Lower Ext    Lt Knee Extension/Flexion AROM  8 degree ext lag  (Pended)   -LESLIE  "      Gait/Stairs Assessment/Training    Comment (Gait/Stairs)  antalgic  (Pended)   -      User Key  (r) = Recorded By, (t) = Taken By, (c) = Cosigned By    Initials Name Provider Type    Jeyson Roberts, [facility]                       PT Assessment/Plan     Row Name 04/08/19 0845          PT Assessment    Functional Limitations  Limitations in community activities  (Pended)   -     Assessment Comments  Pt not feeling well today. Increased feeling with ex. Patient requested to stop Rx early. BP and Glucose checked. Pt also states did not eat anything this morning  (Pended)   -        PT Plan    PT Frequency  2x/week  (Pended)   -     Predicted Duration of Therapy Intervention (Therapy Eval)  4-6 weeks  (Pended)   -     PT Plan Comments  Recert next visit.   (Pended)   -       User Key  (r) = Recorded By, (t) = Taken By, (c) = Cosigned By    Initials Name Provider Type    Jeyson Roberts, [facility]           Modalities     Row Name 04/08/19 0800             Ice    Patient denies application of Ice  Yes  (Pended)   -      Patient reports will apply ice at home to involved area  Yes  (Pended)   -        User Key  (r) = Recorded By, (t) = Taken By, (c) = Cosigned By    Initials Name Provider Type    Jeyson Roberts [facility]         Exercises     Row Name 04/08/19 0800             Subjective Comments    Subjective Comments  Reports L knee is hurting more today.   (Pended)   -         Subjective Pain    Able to rate subjective pain?  yes  (Pended)   -      Pre-Treatment Pain Level  8  (Pended)   -      Post-Treatment Pain Level  9  (Pended)   -         Exercise 1    Exercise Name 1  Pro II  (Pended)   -      Time 1  10\"  (Pended)   -      Additional Comments  L2 seat 8  (Pended)   -         Exercise 2    Exercise Name 2  /76  (Pended)   -         Exercise 3    Exercise Name 3  Incline stretch  (Pended)   -      Sets 3  3  (Pended)   -      Time " "3  30\"  (Pended)   -LESLIE         Exercise 4    Exercise Name 4  Standing Ham stretch  (Pended)   -LESLIE      Sets 4  3  (Pended)   -LESLIE      Time 4  30\"  (Pended)   -LESLIE         Exercise 5    Exercise Name 5  Stepups fwd/lat  (Pended)   -LESLIE      Sets 5  2  (Pended)   -LESLIE      Reps 5  10  (Pended)   -LESLIE      Additional Comments  4\"  (Pended)   -LESLIE         Exercise 6    Exercise Name 6  Glucose 118  (Pended)   -LESLIE         Exercise 7    Exercise Name 7  seated cocontraction  (Pended)   -LESLIE      Sets 7  2  (Pended)   -LESLIE      Reps 7  10  (Pended)   -LESLIE         Exercise 8    Exercise Name 8  Ham Ball Iso  (Pended)   -LESLIE      Sets 8  2  (Pended)   -LESLIE      Reps 8  10  (Pended)   -LESLIE         Exercise 9    Exercise Name 9  Ext prop  (Pended)   -LESLIE      Time 9  5'  (Pended)   -LESLIE      Additional Comments  8 degree quad lag  (Pended)   -LESLIE         Exercise 10    Exercise Name 10  Ext prop w/QS  (Pended)   -LESLIE      Reps 10  10  (Pended)   -LESLIE         Exercise 11    Exercise Name 11  QS  (Pended)   -LESLIE      Sets 11  1  (Pended)   -LESLIE      Reps 11  10  (Pended)   -LESLIE         Exercise 12    Exercise Name 12  Stopped ex. Patient not feeling well  (Pended)   -        User Key  (r) = Recorded By, (t) = Taken By, (c) = Cosigned By    Initials Name Provider Type    Jeyson Roberts, ATC                        PT OP Goals     Row Name 04/08/19 0700          PT Short Term Goals    STG Date to Achieve  04/08/19  (Pended)   -LESLIE     STG 1  Improve L knee active extension to -10 degrees or better.  (Pended)   -LESLIE     STG 1 Progress  Met  (Pended)   -LESLIE     STG 2  Improve L knee active flexion to >/= 100 degrees.  (Pended)   -LESLIE     STG 2 Progress  Met  (Pended)   -LESLIE     STG 3  LEFS score to be >/= 35/80.  (Pended)   -LESLIE     STG 3 Progress  Met  (Pended)   -LESLIE     STG 4  Ambulate throughout clinic without assistive device.  (Pended)   -LESLIE     STG 4 Progress  Met  (Pended)   -        Long Term Goals    LTG Date to Achieve  " 04/22/19  (Pended)   -LESLIE     LTG 1  Independent with HEP.  (Pended)   -LESLIE     LTG 1 Progress  Ongoing  (Pended)   -LESLIE     LTG 2  L knee active extension to -5 degrees or better.  (Pended)   -LESLIE     LTG 2 Progress  Not Met;Ongoing  (Pended)   -LESLIE     LTG 3  L knee active flexion to >/= 110 degrees.  (Pended)   -LESLIE     LTG 3 Progress  Met  (Pended)   -LESLIE     LTG 4  Demonstrate ability to ascend 5 stairs reciprocally with 1 HR as needed.  (Pended)   -LESLIE     LTG 4 Progress  Not Met  (Pended)   -LESLIE     LTG 5  LEFS score to be >/= 45/80.  (Pended)   -LESLIE     LTG 5 Progress  Not Met  (Pended)   -LESLIE     LTG 6  L knee active flexion to >/= 115 degrees  (Pended)   -LESLIE     LTG 6 Progress  New  (Pended)   -       User Key  (r) = Recorded By, (t) = Taken By, (c) = Cosigned By    Initials Name Provider Type    Jeyson Roberts ATC           Therapy Education  Given: (P) HEP  Program: (P) Reinforced  How Provided: (P) Verbal  Provided to: (P) Patient  Level of Understanding: (P) Verbalized              Time Calculation:   Start Time: (P) 0800  Stop Time: (P) 0843  Time Calculation (min): (P) 43 min  Total Timed Code Minutes- PT: (P) 43 minute(s)  Therapy Charges for Today     Code Description Service Date Service Provider Modifiers Qty    49327359978 HC PT THER PROC EA 15 MIN 4/8/2019 Jeyson Beck ATC  3                    Jeyson Beck ATC  4/8/2019

## 2019-04-11 ENCOUNTER — HOSPITAL ENCOUNTER (OUTPATIENT)
Dept: PHYSICAL THERAPY | Facility: HOSPITAL | Age: 64
Setting detail: THERAPIES SERIES
Discharge: HOME | End: 2019-04-11

## 2019-04-11 DIAGNOSIS — Z96.652 PRESENCE OF TOTAL KNEE JOINT PROSTHESIS, LEFT: ICD-10-CM

## 2019-04-11 DIAGNOSIS — M17.0 PRIMARY OSTEOARTHRITIS OF BOTH KNEES: Primary | ICD-10-CM

## 2019-04-11 PROCEDURE — 97110 THERAPEUTIC EXERCISES: CPT | Performed by: PHYSICAL THERAPIST

## 2019-04-11 NOTE — THERAPY DISCHARGE NOTE
Outpatient Physical Therapy Ortho Progress Note/Discharge Summary  [facility]     Patient Name: Mary Nelson  : 1955  MRN: [id number]  Today's Date: 2019      Visit Date: 2019  Attendance: 9/15   Subjective Improvement: 90%  Next MD Appt: 19    Therapy Diagnosis: L TKA 18    Changes in Medications: none noted  Changes in MD Orders: none noted  Number of Work Days Lost: n/a    Visit Dx:    ICD-10-CM ICD-9-CM   1. Primary osteoarthritis of both knees M17.0 715.16   2. Presence of total knee joint prosthesis, left Z96.652 V43.65            Past Medical History:   Diagnosis Date   • Anxiety    • Arthritis    • COPD (chronic obstructive pulmonary disease) (CMS/Formerly [facility])    • Coronary artery disease    • Depression    • Epigastric pain    • GERD (gastroesophageal reflux disease)    • Head injury    • Hiatal hernia    • High cholesterol    • HTN (hypertension)         Past Surgical History:   Procedure Laterality Date   • ABDOMINAL SURGERY     • CARDIAC CATHETERIZATION N/A 2017    Procedure: Left Heart Cath;  Surgeon: Kevin Erazo MD;  Location: [facility] INVASIVE LOCATION;  Service:    • CARDIAC CATHETERIZATION  2017    Procedure: Functional Flow [place];  Surgeon: Kevin Erazo MD;  Location: [facility] CATH INVASIVE LOCATION;  Service:    • CARDIAC CATHETERIZATION N/A 2017    Procedure: Left Heart Cath;  Surgeon: Kevin Erazo MD;  Location: [facility] CATH INVASIVE LOCATION;  Service:    • CARDIAC CATHETERIZATION N/A 3/9/2019    Procedure: Left Heart Cath;  Surgeon: Brenden Chaparro MD;  Location: [facility] INVASIVE LOCATION;  Service: Cardiology   • COLONOSCOPY  2016   • ENDOSCOPY AND COLONOSCOPY  2016    External and internal hemorrhoids. No specimens collected   • ESOPHAGOSCOPY / EGD  2016    With biopsy-Mildly severe esophagitis. Gastritis. Normal examined duodenum. Biopsied. Medium-sized hiatus hernia. Several biopsies were obtained in the  lower third of the esophagus. Several biopsies were obtained in the gastric antrum   • HERNIA REPAIR     • NISSEN FUNDOPLICATION N/A 2/1/2017    Procedure: LAPAROSCOPIC NISSEN FUNDOPLICATION,  HIATAL HERNIA REPAIR  ;  Surgeon: Ced Wilson MD;  Location: [facility] OR;  Service:    • CO RT/LT HEART CATHETERS N/A 4/19/2017    Procedure: Percutaneous Coronary Intervention;  Surgeon: Kevin Erazo MD;  Location: [facility] CATH INVASIVE LOCATION;  Service: Cardiovascular   • TOTAL KNEE ARTHROPLASTY Left 12/27/2018    Procedure: TOTAL KNEE ARTHROPLASTY;  Surgeon: Grover Raymond MD;  Location: [facility] OR;  Service: Orthopedics   • TRANSESOPHAGEAL ECHOCARDIOGRAM (JOAO)  11/18/2015    With color flow-Ef of 60%.Early diastolic dysfunction.Normal RV size and function.Trace to mild mitral and trace tricuspid regurg.No pericardial effusion.   • TUBAL ABDOMINAL LIGATION  1984       PT Ortho     Row Name 04/11/19 0800       Subjective Comments    Subjective Comments  Left knee is stiff today. Knee stiffened up while cleaning on her room yesterday. 90% subjective improvement. Able to bend knee more. Able to walk without it hurting her.   -SS       Precautions and Contraindications    Precautions/Limitations  cardiac precautions  -SS    Precautions  L TKA 12/28/18  -       Subjective Pain    Able to rate subjective pain?  yes  -    Pre-Treatment Pain Level  7  -    Post-Treatment Pain Level  7  -       Posture/Observations    Posture/Observations Comments  Presents ambulating without assistive device.   -SS       Left Lower Ext    Lt Knee Extension/Flexion AROM  0-3-115  -SS       MMT Left Lower Ext    Lt Hip Flexion MMT, Gross Movement  (5/5) normal  -SS    Lt Hip Extension MMT, Gross Movement  (5/5) normal  -SS    Lt Hip ABduction MMT, Gross Movement  (4/5) good  -SS    Lt Hip ADduction MMT, Gross Movement  (5/5) normal  -SS    Lt Hip Internal (Medial) Rotation MMT, Gross Movement  (5/5) normal  -SS    Lt Hip  External (Lateral) Rotation MMT, Gross Movement  (5/5) normal  -SS    Lt Knee Extension MMT, Gross Movement  (5/5) normal  -SS    Lt Knee Flexion MMT, Gross Movement  (5/5) normal  -SS    Lt Lower Extremity Comments   12 degree extension lag with SLR.  -       Gait/Stairs Assessment/Training    Handrail Location (Stairs)  right side (ascending)  -    Number of Steps (Stairs)  5  -SS    Ascending Technique (Stairs)  step-over-step  -SS    Descending Technique (Stairs)  step-over-step  -SS    Stairs, Safety Issues  -- none noted  -    Stairs, Impairments  -- none noted  -      User Key  (r) = Recorded By, (t) = Taken By, (c) = Cosigned By    Initials Name Provider Type    Nishant Doe, PT DPT Physical Therapist                      PT Assessment/Plan     Row Name 04/11/19 0800          Functional Limitations  Limitations in community activities;Limitations in functional capacity and performance  -    Impairments  Range of motion;Pain;Joint mobility  -    Assessment Comments  Overall, patient is doing well. Continues to have knee pain. Improving function. Patient thinks that she is ready for discharge. She was given a 1 month membership to [employer].  -    Rehab Potential  Fair barrier: delay between surgery and start of P.T.; lost time  -    Patient/caregiver participated in establishment of treatment plan and goals  Yes  -    Patient would benefit from skilled therapy intervention  No  -          PT Plan Comments  D/C P.T. to [facility] and [employer]. Patient instructed to contact P.T. clinic if questions or problems arise.  -      User Key  (r) = Recorded By, (t) = Taken By, (c) = Cosigned By    Initials Name Provider Type    Nishant Doe, PT DPT Physical Therapist              Exercises     Row Name 04/11/19 0800          Subjective Comments  Left knee is stiff today. Knee stiffened up while cleaning on her room yesterday. 90% subjective improvement. Able to bend  knee more. Able to walk without it hurting her.   -          Able to rate subjective pain?  yes  -SS    Pre-Treatment Pain Level  7  -SS    Post-Treatment Pain Level  7  -SS          Exercise Name 1  Pro2, Seat 7, Pedal 2, LE  -SS    Cueing 1  Verbal  -SS    Time 1  12 mins  -SS    Additional Comments  Level 4  -SS          Exercise Name 2  Incline stretch  -SS    Cueing 2  Verbal  -SS    Sets 2  3  -SS    Time 2  30 sec hold  -SS          Exercise Name 3  Standing HS stretch  -SS    Cueing 3  Verbal  -SS    Sets 3  3  -SS    Time 3  30 sec hold  -SS          Exercise Name 4  Cybex Hip Abduction  -SS    Cueing 4  Verbal  -SS    Sets 4  2  -SS    Reps 4  10  -SS    Additional Comments  20#  -SS          Exercise Name 5  Cybex Hip Adduction  -SS    Cueing 5  Verbal  -SS    Sets 5  2  -SS    Reps 5  10  -SS    Additional Comments  20#  -SS          Exercise Name 6  Cybex Leg Press  -SS    Cueing 6  Verbal  -SS    Sets 6  2  -SS    Reps 6  10  -SS    Additional Comments  70#  -SS      User Key  (r) = Recorded By, (t) = Taken By, (c) = Cosigned By    Initials Name Provider Type    SS Nishant Mario, PT DPT Physical Therapist                         PT OP Goals     Row Name 04/11/19 0800          STG Date to Achieve  04/08/19  -    STG 1  Improve L knee active extension to -10 degrees or better.  -    STG 1 Progress  Met  -    STG 2  Improve L knee active flexion to >/= 100 degrees.  -    STG 2 Progress  Met  -    STG 3  LEFS score to be >/= 35/80.  -    STG 3 Progress  Met  -    STG 4  Ambulate throughout clinic without assistive device.  -    STG 4 Progress  Met  -          LTG Date to Achieve  04/22/19  -    LTG 1  Independent with HEP.  -    LTG 1 Progress  Met  -    LTG 2  L knee active extension to -5 degrees or better.  -    LTG 2 Progress  Met  -    LTG 3  L knee active flexion to >/= 110 degrees.  -    LTG 3 Progress  Met  -    LTG 4  Demonstrate ability to ascend 5  stairs reciprocally with 1 HR as needed.  -    LTG 4 Progress  Met  -    LTG 5  LEFS score to be >/= 45/80.  -    LTG 5 Progress  Met  -    LTG 6  L knee active flexion to >/= 115 degrees  -    LTG 6 Progress  Met  -SS      User Key  (r) = Recorded By, (t) = Taken By, (c) = Cosigned By    Initials Name Provider Type    Nishant Doe, PT DPT Physical Therapist          Therapy Education  Given: HEP, Symptoms/condition management  Program: Reinforced  How Provided: Verbal  Provided to: Patient  Level of Understanding: Verbalized    Outcome Measure Options: Lower Extremity Functional Scale (LEFS)  Lower Extremity Functional Index  Any of your usual work, housework or school activities: Moderate difficulty  Your usual hobbies, recreational or sporting activities: Moderate difficulty  Getting into or out of the bath: No difficulty  Walking between rooms: No difficulty  Putting on your shoes or socks: Moderate difficulty  Squatting: Moderate difficulty  Lifting an object, like a bag of groceries from the floor: No difficulty  Performing light activities around your home: No difficulty  Performing heavy activities around your home: No difficulty  Getting into or out of a car: No difficulty  Walking 2 blocks: Moderate difficulty  Walking a mile: No difficulty  Going up or down 10 stairs (about 1 flight of stairs): Moderate difficulty  Standing for 1 hour: Moderate difficulty  Sitting for 1 hour: Moderate difficulty  Running on even ground: Moderate difficulty  Running on uneven ground: Moderate difficulty  Making sharp turns while running fast: Moderate difficulty  Hopping: Moderate difficulty  Rolling over in bed: Moderate difficulty  Total: 54      Time Calculation:   Start Time: 0801  Stop Time: 0845  Time Calculation (min): 44 min  Total Timed Code Minutes- PT: 44 minute(s)  Therapy Charges for Today     Code Description Service Date Service Provider Modifiers Qty    87873554563 HC PT THER PROC EA  15 MIN 4/11/2019 Nishant Mario, PT DPT GP 3               OP PT Discharge Summary  Date of Discharge: 04/11/19  Reason for Discharge: All goals achieved, Independent, Maximum functional potential achieved  Outcomes Achieved: Able to achieve all goals within established timeline  Discharge Destination: Home with home program      Nishant Mario, PT, DPT, CHT  4/11/2019

## 2019-04-12 ENCOUNTER — DOCUMENTATION (OUTPATIENT)
Dept: CARDIOLOGY | Facility: CLINIC | Age: 64
End: 2019-04-12

## 2019-04-12 RX ORDER — ATORVASTATIN CALCIUM 20 MG/1
20 TABLET, FILM COATED ORAL DAILY
Qty: 90 TABLET | Refills: 3 | Status: SHIPPED | OUTPATIENT
Start: 2019-04-12 | End: 2020-07-22

## 2019-04-12 RX ORDER — EZETIMIBE 10 MG/1
10 TABLET ORAL DAILY
Qty: 30 TABLET | Refills: 11 | Status: ON HOLD | OUTPATIENT
Start: 2019-04-12 | End: 2019-11-15

## 2019-04-12 NOTE — PROGRESS NOTES
Repatha was denied for patient, per the appeal patient needs to try a trial of Atorvastatin 20 and zetia 10 , if the patient fails then we can try repatha again.

## 2019-04-15 ENCOUNTER — DOCUMENTATION (OUTPATIENT)
Dept: CARDIOLOGY | Facility: CLINIC | Age: 64
End: 2019-04-15

## 2019-04-15 NOTE — PROGRESS NOTES
Attempted PA for Repatha , due to Lipid Panel being abnormal , and LDL high. The pa was denied and stated the patient had to try and fail a trial of zetia with a statin. I just received now a Pa for zetia , the insurance will not cover that now. I have sent an Urgent request for a PA and an appeal. Lvm for the patient to give our office a call back to explain.

## 2019-04-16 DIAGNOSIS — G89.29 CHRONIC PAIN OF LEFT KNEE: ICD-10-CM

## 2019-04-16 DIAGNOSIS — M25.562 CHRONIC PAIN OF LEFT KNEE: ICD-10-CM

## 2019-04-19 DIAGNOSIS — G89.29 CHRONIC PAIN OF LEFT KNEE: Primary | ICD-10-CM

## 2019-04-19 DIAGNOSIS — Z96.652 PRESENCE OF TOTAL KNEE JOINT PROSTHESIS, LEFT: ICD-10-CM

## 2019-04-19 DIAGNOSIS — M25.562 CHRONIC PAIN OF LEFT KNEE: Primary | ICD-10-CM

## 2019-04-19 RX ORDER — HYDROCODONE BITARTRATE AND ACETAMINOPHEN 7.5; 325 MG/1; MG/1
1 TABLET ORAL EVERY 6 HOURS PRN
Qty: 60 TABLET | Refills: 0 | Status: SHIPPED | OUTPATIENT
Start: 2019-04-19 | End: 2019-05-15 | Stop reason: SDUPTHER

## 2019-04-22 ENCOUNTER — OFFICE VISIT (OUTPATIENT)
Dept: ORTHOPEDIC SURGERY | Facility: CLINIC | Age: 64
End: 2019-04-22

## 2019-04-22 VITALS — BODY MASS INDEX: 33.04 KG/M2 | HEIGHT: 61 IN | WEIGHT: 175 LBS

## 2019-04-22 DIAGNOSIS — Z96.652 PRESENCE OF TOTAL KNEE JOINT PROSTHESIS, LEFT: ICD-10-CM

## 2019-04-22 DIAGNOSIS — M25.562 LEFT KNEE PAIN, UNSPECIFIED CHRONICITY: ICD-10-CM

## 2019-04-22 DIAGNOSIS — M25.462 SWELLING OF KNEE JOINT, LEFT: ICD-10-CM

## 2019-04-22 DIAGNOSIS — M25.562 CHRONIC PAIN OF LEFT KNEE: Primary | ICD-10-CM

## 2019-04-22 DIAGNOSIS — G89.29 CHRONIC PAIN OF LEFT KNEE: Primary | ICD-10-CM

## 2019-04-22 PROCEDURE — 99212 OFFICE O/P EST SF 10 MIN: CPT | Performed by: ORTHOPAEDIC SURGERY

## 2019-04-22 NOTE — PROGRESS NOTES
"Mary Nelson is a 63 y.o. female returns for     Chief Complaint   Patient presents with   • Left Knee - Pain       HISTORY OF PRESENT ILLNESS: Left total knee replacement on 12/27/2019. Repeat xrays of the left knee done today in the office. Patient is doing home exercises. Pain score 8/10   improvement at home with physical therapy.        CONCURRENT MEDICAL HISTORY:    The following portions of the patient's history were reviewed and updated as appropriate: allergies, current medications, past family history, past medical history, past social history, past surgical history and problem list.     ROS  No fevers or chills.  No chest pain or shortness of air.  No GI or  disturbances.    PHYSICAL EXAMINATION:       Ht 154.9 cm (61\")   Wt 79.4 kg (175 lb)   BMI 33.07 kg/m²     Physical Exam    GAIT:     [x]  Normal  []  Antalgic    Assistive device: [x]  None  []  Walker     []  Crutches  []  Cane     []  Wheelchair  []  Stretcher    Ortho Exam  Incision healed,   No erythema, no mass, no swelling  Flexion 100  Extension -5    Xr Knee 1 Or 2 View Left    Result Date: 4/22/2019  Narrative: Ordering Provider:  Grover Raymond MD Ordering Diagnosis/Indication:  Chronic pain of left knee, Presence of total knee joint prosthesis, left Procedure:  XR KNEE 1 OR 2 VW LEFT Exam Date:  4/22/19 RELEVANT PRIOR IMAGES:  XR Knee 1 or 2 View Left 01/28/2019 [id number] Final COMPARISON:  Todays X-rays were compared to previous images dated 1/28/2019.     Impression:   Total knee arthoplasty in good position, alignment is good, patellofemoral alignment is good, no fracture Bone quality: good Alignment:  Patellofemoral alignment is normal Fracture: none Soft tissue: normal.     Xr Knee Bilateral Ap Standing    Result Date: 4/22/2019  Narrative: Ordering Provider:  Grover Raymond MD Ordering Diagnosis/Indication:  Chronic pain of left knee, Presence of total knee joint prosthesis, left Procedure:  XR " KNEE BILATERAL AP STANDING Exam Date:  4/22/19 RELEVANT PRIOR IMAGES:  XR Knee Bilateral AP Standing 01/28/2019 [id number] Final COMPARISON:  Todays X-rays were compared to previous images dated 1/28/2019.     Impression:   Left total knee arthroplasty components in good position, good alignment, femoral and tibial components in good position, no fracture, right knee moderate to severe joint space narrowing with loss of joint space of the medial compartment, preservation of the lateral joint compartment is decent. Bone quality: good Alignment:  Femoral tibial joint alignment is good Fracture: none Soft tissue: normal     Study Result     Ordering Provider:  Grover Raymond MD  Ordering Diagnosis/Indication:  Left knee pain, unspecified chronicity     Procedure:  XR KNEE 1 OR 2 VW LEFT  Exam Date:  1/28/19     RELEVANT PRIOR IMAGES:    XR Knee 1 or 2 View Left 12/27/2018 [id number] Final      COMPARISON:  Todays X-rays were compared to previous images dated 12/27/2018.     IMPRESSION: left knee total knee arthroplasty components in position, patellar alignment is good.  Femoral component in good alignment, tibial component in good alignment     Bone quality: good  Alignment: patellar alignment is good  Fracture: none  Soft tissue: knee swelling.     Ordering Provider:  Grover Raymond MD  Ordering Diagnosis/Indication:  Left knee pain, unspecified chronicity     Procedure:  XR KNEE BILATERAL AP STANDING  Exam Date:  1/28/19     RELEVANT PRIOR IMAGES:    XR Knee Bilateral AP Standing 11/19/2018 [id number] Final      COMPARISON:  Todays X-rays were compared to previous images dated 12/27/2018.     IMPRESSION: total knee arthroplasty femoral component in good position, tibial cut 5 degree varus, knee is aligned, no fractures, mild swelling.     Bone quality: good  Alignment:  Joint alignment is good  Fracture: none  Soft tissue: swelling of the knee tissues.          ASSESSMENT:    Diagnoses and  all orders for this visit:    Chronic pain of left knee    Presence of total knee joint prosthesis, left    Swelling of knee joint, left    Left knee pain, unspecified chronicity          PLAN    Good progress with total knee arthroplasty, continue therapy.  Range of motion progression.      Grover Raymond MD

## 2019-05-15 DIAGNOSIS — G89.29 CHRONIC PAIN OF LEFT KNEE: ICD-10-CM

## 2019-05-15 DIAGNOSIS — M25.562 CHRONIC PAIN OF LEFT KNEE: ICD-10-CM

## 2019-05-15 RX ORDER — HYDROCODONE BITARTRATE AND ACETAMINOPHEN 7.5; 325 MG/1; MG/1
1 TABLET ORAL EVERY 6 HOURS PRN
Qty: 60 TABLET | Refills: 0 | Status: SHIPPED | OUTPATIENT
Start: 2019-05-15 | End: 2019-06-03 | Stop reason: SDUPTHER

## 2019-06-03 DIAGNOSIS — M25.562 CHRONIC PAIN OF LEFT KNEE: ICD-10-CM

## 2019-06-03 DIAGNOSIS — G89.29 CHRONIC PAIN OF LEFT KNEE: ICD-10-CM

## 2019-06-03 DIAGNOSIS — M25.562 CHRONIC PAIN OF LEFT KNEE: Primary | ICD-10-CM

## 2019-06-03 DIAGNOSIS — G89.29 CHRONIC PAIN OF LEFT KNEE: Primary | ICD-10-CM

## 2019-06-03 RX ORDER — HYDROCODONE BITARTRATE AND ACETAMINOPHEN 7.5; 325 MG/1; MG/1
1 TABLET ORAL EVERY 6 HOURS PRN
Qty: 60 TABLET | Refills: 0 | Status: SHIPPED | OUTPATIENT
Start: 2019-06-03 | End: 2019-06-28 | Stop reason: SDUPTHER

## 2019-06-06 DIAGNOSIS — Z96.652 PRESENCE OF TOTAL KNEE JOINT PROSTHESIS, LEFT: ICD-10-CM

## 2019-06-06 DIAGNOSIS — G89.29 CHRONIC PAIN OF LEFT KNEE: Primary | ICD-10-CM

## 2019-06-06 DIAGNOSIS — M25.562 CHRONIC PAIN OF LEFT KNEE: Primary | ICD-10-CM

## 2019-06-23 ENCOUNTER — HOSPITAL ENCOUNTER (OUTPATIENT)
Facility: HOSPITAL | Age: 64
Setting detail: OBSERVATION
Discharge: HOME | End: 2019-06-25
Attending: EMERGENCY MEDICINE | Admitting: INTERNAL MEDICINE

## 2019-06-23 DIAGNOSIS — R07.9 CHEST PAIN, UNSPECIFIED TYPE: Primary | ICD-10-CM

## 2019-06-23 PROCEDURE — 93010 ELECTROCARDIOGRAM REPORT: CPT | Performed by: INTERNAL MEDICINE

## 2019-06-23 PROCEDURE — 99285 EMERGENCY DEPT VISIT HI MDM: CPT

## 2019-06-23 PROCEDURE — 93005 ELECTROCARDIOGRAM TRACING: CPT | Performed by: EMERGENCY MEDICINE

## 2019-06-23 RX ORDER — SODIUM CHLORIDE 0.9 % (FLUSH) 0.9 %
10 SYRINGE (ML) INJECTION AS NEEDED
Status: DISCONTINUED | OUTPATIENT
Start: 2019-06-23 | End: 2019-06-25 | Stop reason: HOSPADM

## 2019-06-24 ENCOUNTER — APPOINTMENT (OUTPATIENT)
Dept: GENERAL RADIOLOGY | Facility: HOSPITAL | Age: 64
End: 2019-06-24

## 2019-06-24 ENCOUNTER — APPOINTMENT (OUTPATIENT)
Dept: CARDIOLOGY | Facility: HOSPITAL | Age: 64
End: 2019-06-24

## 2019-06-24 LAB
ALBUMIN SERPL-MCNC: 4 G/DL (ref 3.5–5.2)
ALBUMIN/GLOB SERPL: 1.4 G/DL
ALP SERPL-CCNC: 136 U/L (ref 39–117)
ALT SERPL W P-5'-P-CCNC: 10 U/L (ref 1–33)
ANION GAP SERPL CALCULATED.3IONS-SCNC: 13 MMOL/L
ANION GAP SERPL CALCULATED.3IONS-SCNC: 15 MMOL/L
APTT PPP: 24 SECONDS (ref 20–40.3)
AST SERPL-CCNC: 10 U/L (ref 1–32)
BASOPHILS # BLD AUTO: 0.05 10*3/MM3 (ref 0–0.2)
BASOPHILS # BLD AUTO: 0.05 10*3/MM3 (ref 0–0.2)
BASOPHILS NFR BLD AUTO: 0.5 % (ref 0–1.5)
BASOPHILS NFR BLD AUTO: 0.5 % (ref 0–1.5)
BH CV ECHO MEAS - ACS: 1.5 CM
BH CV ECHO MEAS - AO MAX PG (FULL): 11.3 MMHG
BH CV ECHO MEAS - AO MAX PG: 16.8 MMHG
BH CV ECHO MEAS - AO MEAN PG (FULL): 5 MMHG
BH CV ECHO MEAS - AO MEAN PG: 8 MMHG
BH CV ECHO MEAS - AO ROOT AREA (BSA CORRECTED): 1.5
BH CV ECHO MEAS - AO ROOT AREA: 5.3 CM^2
BH CV ECHO MEAS - AO ROOT DIAM: 2.6 CM
BH CV ECHO MEAS - AO V2 MAX: 205 CM/SEC
BH CV ECHO MEAS - AO V2 MEAN: 125 CM/SEC
BH CV ECHO MEAS - AO V2 VTI: 35 CM
BH CV ECHO MEAS - ASC AORTA: 3.5 CM
BH CV ECHO MEAS - AVA(I,A): 2.2 CM^2
BH CV ECHO MEAS - AVA(I,D): 2.2 CM^2
BH CV ECHO MEAS - AVA(V,A): 1.8 CM^2
BH CV ECHO MEAS - AVA(V,D): 1.8 CM^2
BH CV ECHO MEAS - BSA(HAYCOCK): 1.9 M^2
BH CV ECHO MEAS - BSA: 1.8 M^2
BH CV ECHO MEAS - BZI_BMI: 33.3 KILOGRAMS/M^2
BH CV ECHO MEAS - BZI_METRIC_HEIGHT: 154.9 CM
BH CV ECHO MEAS - BZI_METRIC_WEIGHT: 79.8 KG
BH CV ECHO MEAS - EDV(CUBED): 115.5 ML
BH CV ECHO MEAS - EDV(TEICH): 111.2 ML
BH CV ECHO MEAS - EF(CUBED): 80.4 %
BH CV ECHO MEAS - EF(TEICH): 72.7 %
BH CV ECHO MEAS - ESV(CUBED): 22.7 ML
BH CV ECHO MEAS - ESV(TEICH): 30.3 ML
BH CV ECHO MEAS - FS: 41.9 %
BH CV ECHO MEAS - IVS/LVPW: 1.1
BH CV ECHO MEAS - IVSD: 1.1 CM
BH CV ECHO MEAS - LA DIMENSION: 4 CM
BH CV ECHO MEAS - LA/AO: 1.5
BH CV ECHO MEAS - LV MASS(C)D: 186 GRAMS
BH CV ECHO MEAS - LV MASS(C)DI: 103.9 GRAMS/M^2
BH CV ECHO MEAS - LV MAX PG: 5.5 MMHG
BH CV ECHO MEAS - LV MEAN PG: 3 MMHG
BH CV ECHO MEAS - LV V1 MAX: 117 CM/SEC
BH CV ECHO MEAS - LV V1 MEAN: 83.8 CM/SEC
BH CV ECHO MEAS - LV V1 VTI: 25 CM
BH CV ECHO MEAS - LVIDD: 4.9 CM
BH CV ECHO MEAS - LVIDS: 2.8 CM
BH CV ECHO MEAS - LVOT AREA (M): 3.1 CM^2
BH CV ECHO MEAS - LVOT AREA: 3.1 CM^2
BH CV ECHO MEAS - LVOT DIAM: 2 CM
BH CV ECHO MEAS - LVPWD: 1 CM
BH CV ECHO MEAS - MR MAX PG: 151.3 MMHG
BH CV ECHO MEAS - MR MAX VEL: 615 CM/SEC
BH CV ECHO MEAS - MV A MAX VEL: 162 CM/SEC
BH CV ECHO MEAS - MV DEC SLOPE: 566 CM/SEC^2
BH CV ECHO MEAS - MV E MAX VEL: 140 CM/SEC
BH CV ECHO MEAS - MV E/A: 0.86
BH CV ECHO MEAS - MV P1/2T MAX VEL: 164 CM/SEC
BH CV ECHO MEAS - MV P1/2T: 84.9 MSEC
BH CV ECHO MEAS - MVA P1/2T LCG: 1.3 CM^2
BH CV ECHO MEAS - MVA(P1/2T): 2.6 CM^2
BH CV ECHO MEAS - PA MAX PG (FULL): 5 MMHG
BH CV ECHO MEAS - PA MAX PG: 9.1 MMHG
BH CV ECHO MEAS - PA V2 MAX: 151 CM/SEC
BH CV ECHO MEAS - RAP SYSTOLE: 5 MMHG
BH CV ECHO MEAS - RV MAX PG: 4.1 MMHG
BH CV ECHO MEAS - RV MEAN PG: 3 MMHG
BH CV ECHO MEAS - RV V1 MAX: 101 CM/SEC
BH CV ECHO MEAS - RV V1 MEAN: 74.3 CM/SEC
BH CV ECHO MEAS - RV V1 VTI: 25.6 CM
BH CV ECHO MEAS - RVDD: 2.7 CM
BH CV ECHO MEAS - RVSP: 31.8 MMHG
BH CV ECHO MEAS - SI(AO): 103.9 ML/M^2
BH CV ECHO MEAS - SI(CUBED): 51.9 ML/M^2
BH CV ECHO MEAS - SI(LVOT): 43.9 ML/M^2
BH CV ECHO MEAS - SI(TEICH): 45.2 ML/M^2
BH CV ECHO MEAS - SV(AO): 185.8 ML
BH CV ECHO MEAS - SV(CUBED): 92.8 ML
BH CV ECHO MEAS - SV(LVOT): 78.5 ML
BH CV ECHO MEAS - SV(TEICH): 80.9 ML
BH CV ECHO MEAS - TR MAX VEL: 259 CM/SEC
BILIRUB SERPL-MCNC: 0.2 MG/DL (ref 0.2–1.2)
BUN BLD-MCNC: 10 MG/DL (ref 8–23)
BUN BLD-MCNC: 11 MG/DL (ref 8–23)
BUN/CREAT SERPL: 10.8 (ref 7–25)
BUN/CREAT SERPL: 11.8 (ref 7–25)
CALCIUM SPEC-SCNC: 9.7 MG/DL (ref 8.6–10.5)
CALCIUM SPEC-SCNC: 9.8 MG/DL (ref 8.6–10.5)
CHLORIDE SERPL-SCNC: 102 MMOL/L (ref 98–107)
CHLORIDE SERPL-SCNC: 103 MMOL/L (ref 98–107)
CHOLEST SERPL-MCNC: 280 MG/DL (ref 0–200)
CO2 SERPL-SCNC: 22 MMOL/L (ref 22–29)
CO2 SERPL-SCNC: 24 MMOL/L (ref 22–29)
CREAT BLD-MCNC: 0.85 MG/DL (ref 0.57–1)
CREAT BLD-MCNC: 1.02 MG/DL (ref 0.57–1)
D-LACTATE SERPL-SCNC: 1.7 MMOL/L (ref 0.5–2)
DEPRECATED RDW RBC AUTO: 53.9 FL (ref 37–54)
DEPRECATED RDW RBC AUTO: 54.9 FL (ref 37–54)
EOSINOPHIL # BLD AUTO: 0.04 10*3/MM3 (ref 0–0.4)
EOSINOPHIL # BLD AUTO: 0.05 10*3/MM3 (ref 0–0.4)
EOSINOPHIL NFR BLD AUTO: 0.4 % (ref 0.3–6.2)
EOSINOPHIL NFR BLD AUTO: 0.5 % (ref 0.3–6.2)
ERYTHROCYTE [DISTWIDTH] IN BLOOD BY AUTOMATED COUNT: 16.8 % (ref 12.3–15.4)
ERYTHROCYTE [DISTWIDTH] IN BLOOD BY AUTOMATED COUNT: 16.9 % (ref 12.3–15.4)
GFR SERPL CREATININE-BSD FRML MDRD: 55 ML/MIN/1.73
GFR SERPL CREATININE-BSD FRML MDRD: 68 ML/MIN/1.73
GLOBULIN UR ELPH-MCNC: 2.9 GM/DL
GLUCOSE BLD-MCNC: 113 MG/DL (ref 65–99)
GLUCOSE BLD-MCNC: 122 MG/DL (ref 65–99)
HBA1C MFR BLD: 5.6 % (ref 4.8–5.6)
HCT VFR BLD AUTO: 35.4 % (ref 34–46.6)
HCT VFR BLD AUTO: 35.9 % (ref 34–46.6)
HDLC SERPL-MCNC: 60 MG/DL (ref 40–60)
HGB BLD-MCNC: 11.2 G/DL (ref 12–15.9)
HGB BLD-MCNC: 11.4 G/DL (ref 12–15.9)
HOLD SPECIMEN: NORMAL
IMM GRANULOCYTES # BLD AUTO: 0.03 10*3/MM3 (ref 0–0.05)
IMM GRANULOCYTES # BLD AUTO: 0.04 10*3/MM3 (ref 0–0.05)
IMM GRANULOCYTES NFR BLD AUTO: 0.3 % (ref 0–0.5)
IMM GRANULOCYTES NFR BLD AUTO: 0.4 % (ref 0–0.5)
INR PPP: 0.87 (ref 0.8–1.2)
LDLC SERPL CALC-MCNC: 194 MG/DL (ref 0–100)
LDLC/HDLC SERPL: 3.23 {RATIO}
LV EF 2D ECHO EST: 61 %
LYMPHOCYTES # BLD AUTO: 1.82 10*3/MM3 (ref 0.7–3.1)
LYMPHOCYTES # BLD AUTO: 2.1 10*3/MM3 (ref 0.7–3.1)
LYMPHOCYTES NFR BLD AUTO: 17.8 % (ref 19.6–45.3)
LYMPHOCYTES NFR BLD AUTO: 21.3 % (ref 19.6–45.3)
MAGNESIUM SERPL-MCNC: 2.4 MG/DL (ref 1.6–2.4)
MCH RBC QN AUTO: 27.9 PG (ref 26.6–33)
MCH RBC QN AUTO: 28.1 PG (ref 26.6–33)
MCHC RBC AUTO-ENTMCNC: 31.6 G/DL (ref 31.5–35.7)
MCHC RBC AUTO-ENTMCNC: 31.8 G/DL (ref 31.5–35.7)
MCV RBC AUTO: 87.8 FL (ref 79–97)
MCV RBC AUTO: 88.7 FL (ref 79–97)
MONOCYTES # BLD AUTO: 0.55 10*3/MM3 (ref 0.1–0.9)
MONOCYTES # BLD AUTO: 0.61 10*3/MM3 (ref 0.1–0.9)
MONOCYTES NFR BLD AUTO: 5.6 % (ref 5–12)
MONOCYTES NFR BLD AUTO: 6 % (ref 5–12)
NEUTROPHILS # BLD AUTO: 7.09 10*3/MM3 (ref 1.7–7)
NEUTROPHILS # BLD AUTO: 7.66 10*3/MM3 (ref 1.7–7)
NEUTROPHILS NFR BLD AUTO: 71.8 % (ref 42.7–76)
NEUTROPHILS NFR BLD AUTO: 74.9 % (ref 42.7–76)
NRBC BLD AUTO-RTO: 0 /100 WBC (ref 0–0.2)
NRBC BLD AUTO-RTO: 0 /100 WBC (ref 0–0.2)
PLATELET # BLD AUTO: 250 10*3/MM3 (ref 140–450)
PLATELET # BLD AUTO: 250 10*3/MM3 (ref 140–450)
PMV BLD AUTO: 10 FL (ref 6–12)
PMV BLD AUTO: 10.2 FL (ref 6–12)
POTASSIUM BLD-SCNC: 3.7 MMOL/L (ref 3.5–5.2)
POTASSIUM BLD-SCNC: 4.1 MMOL/L (ref 3.5–5.2)
PROCALCITONIN SERPL-MCNC: 0.04 NG/ML (ref 0.1–0.25)
PROT SERPL-MCNC: 6.9 G/DL (ref 6–8.5)
PROTHROMBIN TIME: 11.6 SECONDS (ref 11.1–15.3)
RBC # BLD AUTO: 3.99 10*6/MM3 (ref 3.77–5.28)
RBC # BLD AUTO: 4.09 10*6/MM3 (ref 3.77–5.28)
SODIUM BLD-SCNC: 139 MMOL/L (ref 136–145)
SODIUM BLD-SCNC: 140 MMOL/L (ref 136–145)
TRIGL SERPL-MCNC: 132 MG/DL (ref 0–150)
TROPONIN T SERPL-MCNC: <0.01 NG/ML (ref 0–0.03)
TROPONIN T SERPL-MCNC: <0.01 NG/ML (ref 0–0.03)
TSH SERPL DL<=0.05 MIU/L-ACNC: 4.3 MIU/ML (ref 0.27–4.2)
VLDLC SERPL-MCNC: 26.4 MG/DL
WBC NRBC COR # BLD: 10.22 10*3/MM3 (ref 3.4–10.8)
WBC NRBC COR # BLD: 9.87 10*3/MM3 (ref 3.4–10.8)
WHOLE BLOOD HOLD SPECIMEN: NORMAL

## 2019-06-24 PROCEDURE — 84145 PROCALCITONIN (PCT): CPT | Performed by: INTERNAL MEDICINE

## 2019-06-24 PROCEDURE — 25010000002 ONDANSETRON PER 1 MG: Performed by: INTERNAL MEDICINE

## 2019-06-24 PROCEDURE — 84484 ASSAY OF TROPONIN QUANT: CPT | Performed by: INTERNAL MEDICINE

## 2019-06-24 PROCEDURE — G0378 HOSPITAL OBSERVATION PER HR: HCPCS

## 2019-06-24 PROCEDURE — 94640 AIRWAY INHALATION TREATMENT: CPT

## 2019-06-24 PROCEDURE — 84443 ASSAY THYROID STIM HORMONE: CPT | Performed by: INTERNAL MEDICINE

## 2019-06-24 PROCEDURE — 84484 ASSAY OF TROPONIN QUANT: CPT | Performed by: EMERGENCY MEDICINE

## 2019-06-24 PROCEDURE — 83605 ASSAY OF LACTIC ACID: CPT | Performed by: INTERNAL MEDICINE

## 2019-06-24 PROCEDURE — 85025 COMPLETE CBC W/AUTO DIFF WBC: CPT | Performed by: EMERGENCY MEDICINE

## 2019-06-24 PROCEDURE — 85730 THROMBOPLASTIN TIME PARTIAL: CPT | Performed by: INTERNAL MEDICINE

## 2019-06-24 PROCEDURE — 71045 X-RAY EXAM CHEST 1 VIEW: CPT

## 2019-06-24 PROCEDURE — 96374 THER/PROPH/DIAG INJ IV PUSH: CPT

## 2019-06-24 PROCEDURE — 85610 PROTHROMBIN TIME: CPT | Performed by: INTERNAL MEDICINE

## 2019-06-24 PROCEDURE — 93306 TTE W/DOPPLER COMPLETE: CPT

## 2019-06-24 PROCEDURE — 99244 OFF/OP CNSLTJ NEW/EST MOD 40: CPT | Performed by: INTERNAL MEDICINE

## 2019-06-24 PROCEDURE — 85025 COMPLETE CBC W/AUTO DIFF WBC: CPT | Performed by: INTERNAL MEDICINE

## 2019-06-24 PROCEDURE — 93306 TTE W/DOPPLER COMPLETE: CPT | Performed by: INTERNAL MEDICINE

## 2019-06-24 PROCEDURE — 94760 N-INVAS EAR/PLS OXIMETRY 1: CPT

## 2019-06-24 PROCEDURE — 94799 UNLISTED PULMONARY SVC/PX: CPT

## 2019-06-24 PROCEDURE — 80061 LIPID PANEL: CPT | Performed by: INTERNAL MEDICINE

## 2019-06-24 PROCEDURE — 83735 ASSAY OF MAGNESIUM: CPT | Performed by: INTERNAL MEDICINE

## 2019-06-24 PROCEDURE — 83036 HEMOGLOBIN GLYCOSYLATED A1C: CPT | Performed by: INTERNAL MEDICINE

## 2019-06-24 PROCEDURE — 80053 COMPREHEN METABOLIC PANEL: CPT | Performed by: EMERGENCY MEDICINE

## 2019-06-24 PROCEDURE — 96361 HYDRATE IV INFUSION ADD-ON: CPT

## 2019-06-24 RX ORDER — HEPARIN SODIUM 5000 [USP'U]/ML
40 INJECTION, SOLUTION INTRAVENOUS; SUBCUTANEOUS AS NEEDED
Status: DISCONTINUED | OUTPATIENT
Start: 2019-06-24 | End: 2019-06-24

## 2019-06-24 RX ORDER — DOCUSATE SODIUM 100 MG/1
100 CAPSULE, LIQUID FILLED ORAL DAILY
Status: DISCONTINUED | OUTPATIENT
Start: 2019-06-24 | End: 2019-06-25 | Stop reason: HOSPADM

## 2019-06-24 RX ORDER — MORPHINE SULFATE 2 MG/ML
1 INJECTION, SOLUTION INTRAMUSCULAR; INTRAVENOUS EVERY 4 HOURS PRN
Status: DISCONTINUED | OUTPATIENT
Start: 2019-06-24 | End: 2019-06-25 | Stop reason: HOSPADM

## 2019-06-24 RX ORDER — FAMOTIDINE 40 MG/1
40 TABLET, FILM COATED ORAL DAILY
Status: DISCONTINUED | OUTPATIENT
Start: 2019-06-24 | End: 2019-06-25 | Stop reason: HOSPADM

## 2019-06-24 RX ORDER — ONDANSETRON 2 MG/ML
4 INJECTION INTRAMUSCULAR; INTRAVENOUS EVERY 6 HOURS PRN
Status: DISCONTINUED | OUTPATIENT
Start: 2019-06-24 | End: 2019-06-25 | Stop reason: HOSPADM

## 2019-06-24 RX ORDER — ONDANSETRON 4 MG/1
4 TABLET, FILM COATED ORAL EVERY 6 HOURS PRN
Status: DISCONTINUED | OUTPATIENT
Start: 2019-06-24 | End: 2019-06-25 | Stop reason: HOSPADM

## 2019-06-24 RX ORDER — QUETIAPINE FUMARATE 100 MG/1
100 TABLET, FILM COATED ORAL NIGHTLY
Status: DISCONTINUED | OUTPATIENT
Start: 2019-06-24 | End: 2019-06-25 | Stop reason: HOSPADM

## 2019-06-24 RX ORDER — ASPIRIN 81 MG/1
81 TABLET, CHEWABLE ORAL DAILY
Status: DISCONTINUED | OUTPATIENT
Start: 2019-06-24 | End: 2019-06-25 | Stop reason: HOSPADM

## 2019-06-24 RX ORDER — SODIUM CHLORIDE 9 MG/ML
100 INJECTION, SOLUTION INTRAVENOUS CONTINUOUS
Status: DISCONTINUED | OUTPATIENT
Start: 2019-06-24 | End: 2019-06-25 | Stop reason: HOSPADM

## 2019-06-24 RX ORDER — HEPARIN SODIUM 5000 [USP'U]/ML
80 INJECTION, SOLUTION INTRAVENOUS; SUBCUTANEOUS AS NEEDED
Status: DISCONTINUED | OUTPATIENT
Start: 2019-06-24 | End: 2019-06-24

## 2019-06-24 RX ORDER — SODIUM CHLORIDE 0.9 % (FLUSH) 0.9 %
3-10 SYRINGE (ML) INJECTION AS NEEDED
Status: DISCONTINUED | OUTPATIENT
Start: 2019-06-24 | End: 2019-06-25 | Stop reason: HOSPADM

## 2019-06-24 RX ORDER — HEPARIN SODIUM 10000 [USP'U]/100ML
18 INJECTION, SOLUTION INTRAVENOUS
Status: DISCONTINUED | OUTPATIENT
Start: 2019-06-24 | End: 2019-06-24

## 2019-06-24 RX ORDER — ASPIRIN 81 MG/1
81 TABLET, CHEWABLE ORAL DAILY
Status: DISCONTINUED | OUTPATIENT
Start: 2019-06-24 | End: 2019-06-24

## 2019-06-24 RX ORDER — METOPROLOL TARTRATE 50 MG/1
50 TABLET, FILM COATED ORAL EVERY 12 HOURS SCHEDULED
Status: DISCONTINUED | OUTPATIENT
Start: 2019-06-24 | End: 2019-06-24

## 2019-06-24 RX ORDER — QUETIAPINE FUMARATE 100 MG/1
100 TABLET, FILM COATED ORAL NIGHTLY
COMMUNITY

## 2019-06-24 RX ORDER — IPRATROPIUM BROMIDE AND ALBUTEROL SULFATE 2.5; .5 MG/3ML; MG/3ML
3 SOLUTION RESPIRATORY (INHALATION)
Status: DISCONTINUED | OUTPATIENT
Start: 2019-06-24 | End: 2019-06-25 | Stop reason: HOSPADM

## 2019-06-24 RX ORDER — SODIUM CHLORIDE 0.9 % (FLUSH) 0.9 %
3 SYRINGE (ML) INJECTION EVERY 12 HOURS SCHEDULED
Status: DISCONTINUED | OUTPATIENT
Start: 2019-06-24 | End: 2019-06-25 | Stop reason: HOSPADM

## 2019-06-24 RX ORDER — ATORVASTATIN CALCIUM 20 MG/1
20 TABLET, FILM COATED ORAL DAILY
Status: DISCONTINUED | OUTPATIENT
Start: 2019-06-24 | End: 2019-06-25 | Stop reason: HOSPADM

## 2019-06-24 RX ORDER — NALOXONE HCL 0.4 MG/ML
0.4 VIAL (ML) INJECTION
Status: DISCONTINUED | OUTPATIENT
Start: 2019-06-24 | End: 2019-06-25 | Stop reason: HOSPADM

## 2019-06-24 RX ORDER — HYDROCODONE BITARTRATE AND ACETAMINOPHEN 7.5; 325 MG/1; MG/1
1 TABLET ORAL EVERY 6 HOURS PRN
Status: DISCONTINUED | OUTPATIENT
Start: 2019-06-24 | End: 2019-06-25 | Stop reason: HOSPADM

## 2019-06-24 RX ORDER — CITALOPRAM 40 MG/1
40 TABLET ORAL DAILY
Status: DISCONTINUED | OUTPATIENT
Start: 2019-06-24 | End: 2019-06-25 | Stop reason: HOSPADM

## 2019-06-24 RX ORDER — DILTIAZEM HYDROCHLORIDE 180 MG/1
180 CAPSULE, COATED, EXTENDED RELEASE ORAL DAILY
Status: DISCONTINUED | OUTPATIENT
Start: 2019-06-24 | End: 2019-06-24

## 2019-06-24 RX ORDER — LANOLIN ALCOHOL/MO/W.PET/CERES
3 CREAM (GRAM) TOPICAL NIGHTLY PRN
Status: DISCONTINUED | OUTPATIENT
Start: 2019-06-24 | End: 2019-06-25 | Stop reason: HOSPADM

## 2019-06-24 RX ADMIN — TICAGRELOR 60 MG: 60 TABLET ORAL at 10:28

## 2019-06-24 RX ADMIN — IPRATROPIUM BROMIDE AND ALBUTEROL SULFATE 3 ML: 2.5; .5 SOLUTION RESPIRATORY (INHALATION) at 14:09

## 2019-06-24 RX ADMIN — IPRATROPIUM BROMIDE AND ALBUTEROL SULFATE 3 ML: 2.5; .5 SOLUTION RESPIRATORY (INHALATION) at 10:01

## 2019-06-24 RX ADMIN — SODIUM CHLORIDE 100 ML/HR: 9 INJECTION, SOLUTION INTRAVENOUS at 05:54

## 2019-06-24 RX ADMIN — FAMOTIDINE 40 MG: 40 TABLET ORAL at 10:29

## 2019-06-24 RX ADMIN — SODIUM CHLORIDE 100 ML/HR: 9 INJECTION, SOLUTION INTRAVENOUS at 16:15

## 2019-06-24 RX ADMIN — IPRATROPIUM BROMIDE AND ALBUTEROL SULFATE 3 ML: 2.5; .5 SOLUTION RESPIRATORY (INHALATION) at 06:56

## 2019-06-24 RX ADMIN — ONDANSETRON 4 MG: 2 INJECTION INTRAMUSCULAR; INTRAVENOUS at 08:07

## 2019-06-24 RX ADMIN — CITALOPRAM HYDROBROMIDE 40 MG: 40 TABLET ORAL at 10:29

## 2019-06-24 RX ADMIN — ASPIRIN 81 MG 81 MG: 81 TABLET ORAL at 10:29

## 2019-06-24 RX ADMIN — HYDROCODONE BITARTRATE AND ACETAMINOPHEN 1 TABLET: 7.5; 325 TABLET ORAL at 05:54

## 2019-06-24 RX ADMIN — QUETIAPINE 100 MG: 100 TABLET ORAL at 21:48

## 2019-06-24 RX ADMIN — NITROGLYCERIN 1 INCH: 20 OINTMENT TOPICAL at 00:32

## 2019-06-24 RX ADMIN — ATORVASTATIN CALCIUM 20 MG: 20 TABLET, FILM COATED ORAL at 10:28

## 2019-06-24 NOTE — CONSULTS
Cardiology at [facility]  Cardiovascular Consultation Note      Mary Nelson  365/1  [id number]  1955    DATE OF ADMISSION: 6/23/2019  DATE OF CONSULTATION:  6/24/2019    Ge Jean MD  Treatment Team:   Attending Provider: Fran Rodríguez MD  Consulting Physician: Tevin Dwyer MD  Admitting Provider: Fran Rodríguez MD    Chief Complaint   Patient presents with   • Chest Pain       Chief complaint/ Reason for Consultation: Chest pain with a documented pauses up to 3.7-second at the time with the patient having vomiting with history of ongoing cough and on Cardizem and beta-blocker      History of Present Illness  Patient's Body mass index is 33.25 kg/m². BMI is above normal parameters. Recommendations include: exercise counseling, nutrition counseling and referral to primary care     I advised Mary of the risks of continuing to use tobacco, and I provided her with tobacco cessation education  During this visit, I spent 3 minutes counseling the patient regarding tobacco cessation.  63 yr old  female  admitted for evaluations of chest pain with off-and-on dry cough of chronic bronchitis and had episode of mild vomiting earlier today at that time patient develop pulse of 3.7-second resolved with resolution of GI symptom.  The patient was also on Cardizem  and Toprol-XL 50 mg and evaluated earlier today by Dr. Gt James is a regular patient of Dr. Leone.  She is a pain-free at the time of evaluation EKG sinus rhythm and cardiac enzymes are negative she had cardiac catheterization in March 2019 patent stent no significant CAD noted  She has history of documented atherosclerotic coronary artery disease with previous coronary angiogram done in April 2007 which had revealed stenosis in the left anterior descending artery with subsequent FFR evaluation and PTCA and stenting of the left anterior descending artery with a 2.5 x 23 mm Xience Alpine stent.   The stent was postdilated with a 2.5 noncompliant balloon at high pressure 16 ruchi.  Patient underwent repeat coronary angiogram in July 2017 which had revealed 50% stenosis in the left anterior descending artery.     She underwent CT angiogram of the coronary arteries since her troponins are borderline elevated and the findings are as follows:     CONCLUSION: CATH   In between the first major bifurcation of the LAD and the mid LAD  stent (proximal to the stent), there is a 14 mm segment of severe  narrowing of greater than 90%, associated with noncalcified  plaque. Distal to this narrowing, the stent appears patent and  distal to the stent flow is visualized and the LAD is otherwise  unremarkable.   Moderate size hiatal hernia.     CATH 3/2019  She subsequently underwent cardiac catheterization by Dr. Chaparro which showed the following findings.  No critical lesions were identified.  Impression   1.  Sustained patency in the left anterior descending artery site of previous angioplasty and stenting with 50% stenosis proximal to the previously placed stent with a mild tortuous course.  2.  No evidence of any obstructive disease noted in the circumflex artery.  3.  Nondominant right coronary artery with luminal irregularity.  4.  Preserved left ventricular systolic function with an ejection fraction of 55%      .  Past Medical History:   Diagnosis Date   • Anxiety    • Arthritis    • COPD (chronic obstructive pulmonary disease) (CMS/[facility])    • Coronary artery disease    • Depression    • Epigastric pain    • GERD (gastroesophageal reflux disease)    • Head injury 1990   • Hiatal hernia    • High cholesterol    • HTN (hypertension)        Past Surgical History:   Procedure Laterality Date   • ABDOMINAL SURGERY     • CARDIAC CATHETERIZATION N/A 4/19/2017    Procedure: Left Heart Cath;  Surgeon: Kevin Erazo MD;  Location: [facility] CATH INVASIVE LOCATION;  Service:    • CARDIAC CATHETERIZATION  4/19/2017    Procedure:  Functional Flow [place];  Surgeon: Kevin Erazo MD;  Location: [facility] CATH INVASIVE LOCATION;  Service:    • CARDIAC CATHETERIZATION N/A 7/6/2017    Procedure: Left Heart Cath;  Surgeon: Kevin Erazo MD;  Location: [facility] CATH INVASIVE LOCATION;  Service:    • CARDIAC CATHETERIZATION N/A 3/9/2019    Procedure: Left Heart Cath;  Surgeon: Brenden Chaparro MD;  Location: [facility] CATH INVASIVE LOCATION;  Service: Cardiology   • COLONOSCOPY  06/27/2016   • ENDOSCOPY AND COLONOSCOPY  06/27/2016    External and internal hemorrhoids. No specimens collected   • ESOPHAGOSCOPY / EGD  06/27/2016    With biopsy-Mildly severe esophagitis. Gastritis. Normal examined duodenum. Biopsied. Medium-sized hiatus hernia. Several biopsies were obtained in the lower third of the esophagus. Several biopsies were obtained in the gastric antrum   • HERNIA REPAIR     • NISSEN FUNDOPLICATION N/A 2/1/2017    Procedure: LAPAROSCOPIC NISSEN FUNDOPLICATION,  HIATAL HERNIA REPAIR  ;  Surgeon: Ced Wilson MD;  Location: [facility];  Service:    • AL RT/LT HEART CATHETERS N/A 4/19/2017    Procedure: Percutaneous Coronary Intervention;  Surgeon: Kevin Erazo MD;  Location: [facility] CATH INVASIVE LOCATION;  Service: Cardiovascular   • TOTAL KNEE ARTHROPLASTY Left 12/27/2018    Procedure: TOTAL KNEE ARTHROPLASTY;  Surgeon: Grover Raymond MD;  Location: [facility];  Service: Orthopedics   • TRANSESOPHAGEAL ECHOCARDIOGRAM (JOAO)  11/18/2015    With color flow-Ef of 60%.Early diastolic dysfunction.Normal RV size and function.Trace to mild mitral and trace tricuspid regurg.No pericardial effusion.   • TUBAL ABDOMINAL LIGATION  1984       Allergies   Allergen Reactions   • Codeine Swelling       No current facility-administered medications on file prior to encounter.      Current Outpatient Medications on File Prior to Encounter   Medication Sig Dispense Refill   • amitriptyline (ELAVIL) 50 MG tablet Take 50 mg by mouth Every Night.     •  aspirin 81 MG chewable tablet Chew 1 tablet Daily. 30 tablet 1   • atorvastatin (LIPITOR) 20 MG tablet Take 1 tablet by mouth Daily. 90 tablet 3   • citalopram (CELEXA) 40 MG tablet Take 40 mg by mouth Daily.     • diltiaZEM CD (CARDIZEM CD) 180 MG 24 hr capsule Take 180 mg by mouth Daily.     • ezetimibe (ZETIA) 10 MG tablet Take 1 tablet by mouth Daily. 30 tablet 11   • HYDROcodone-acetaminophen (NORCO) 7.5-325 MG per tablet Take 1 tablet by mouth Every 6 (Six) Hours As Needed for Moderate Pain . 60 tablet 0   • hydrOXYzine (VISTARIL) 25 MG capsule Take 25 mg by mouth 3 (Three) Times a Day As Needed for Anxiety.     • lisinopril (PRINIVIL,ZESTRIL) 10 MG tablet Take 10 mg by mouth Daily.     • metoprolol tartrate (LOPRESSOR) 50 MG tablet Take 1 tablet by mouth Every 12 (Twelve) Hours. 60 tablet 1   • nitroglycerin (NITROSTAT) 0.4 MG SL tablet Place 1 tablet under the tongue Every 5 (Five) Minutes As Needed for Chest Pain. Take no more than 3 doses in 15 minutes. 30 tablet 1   • omeprazole (PRILOSEC) 40 MG capsule Take 40 mg by mouth Daily.     • QUEtiapine (SEROquel) 100 MG tablet Take 100 mg by mouth Every Night.     • ticagrelor (BRILINTA) 60 MG tablet tablet Take 1 tablet by mouth 2 (Two) Times a Day. 60 tablet 12   • tiZANidine (ZANAFLEX) 4 MG tablet Take 4 mg by mouth Every 6 (Six) Hours As Needed.         Social History     Socioeconomic History   • Marital status:      Spouse name: Not on file   • Number of children: Not on file   • Years of education: Not on file   • Highest education level: Not on file   Tobacco Use   • Smoking status: Current Every Day Smoker     Packs/day: 0.25     Years: 43.00     Pack years: 10.75     Types: Cigarettes   • Smokeless tobacco: Never Used   • Tobacco comment: No cigarettes x 1 month but vape   Substance and Sexual Activity   • Alcohol use: No   • Drug use: No   • Sexual activity: Defer           REVIEW OF SYSTEMS:   ROS  Constitutional: Positive for fatigue.  "Negative for chills, diaphoresis and fever.   HENT: Positive for congestion. Negative for ear pain and voice change.    Eyes: Negative for photophobia and visual disturbance.   Respiratory: Positive for cough and chest tightness. Negative for shortness of breath and wheezing.    Cardiovascular: Positive for chest pain. Negative for palpitations.   Gastrointestinal: Negative for abdominal distention and abdominal pain.   Endocrine: Negative for cold intolerance and heat intolerance.   Genitourinary: Negative for dysuria and hematuria.   Musculoskeletal: Negative for neck pain and neck stiffness.   Skin: Negative for color change.   Neurological: Negative for facial asymmetry and speech difficulty.   Hematological: Negative for adenopathy.   Psychiatric/Behavioral: Negative for agitation and confusion.          Objective:     Vitals:    06/24/19 1100 06/24/19 1409 06/24/19 1413 06/24/19 1500   BP: 139/77   127/72   BP Location: Right arm   Right arm   Patient Position: Lying   Lying   Pulse: 78 76 80 79   Resp: 18 18 18 18   Temp: 96.6 °F (35.9 °C)   96.9 °F (36.1 °C)   TempSrc:       SpO2: 96% 96%  96%   Weight:       Height:         Body mass index is 33.25 kg/m².  Flowsheet Rows      First Filed Value   Admission Height  154.9 cm (61\") Documented at 06/23/2019 2345   Admission Weight  82.3 kg (181 lb 8 oz) Documented at 06/23/2019 2345          Intake/Output Summary (Last 24 hours) at 6/24/2019 1819  Last data filed at 6/24/2019 1100  Gross per 24 hour   Intake 120 ml   Output --   Net 120 ml         Physical Exam   Physical Exam  Constitutional: She is oriented to person, place, and time. No distress.   HENT:   Head: Normocephalic and atraumatic.   Nose: Nose normal.   Eyes: Pupils are equal, round, and reactive to light. No scleral icterus.   Neck: Neck supple.   Cardiovascular: Normal heart sounds. Exam reveals no gallop and no friction rub.   Pulmonary/Chest: No stridor. No respiratory distress. She has no " "wheezes. She has no rales. She exhibits no tenderness.   Diffusely diminished air entry b/l     Abdominal: She exhibits no distension. There is no tenderness. There is no rebound and no guarding.   Musculoskeletal: Normal range of motion.   Neurological: She is alert and oriented to person, place, and time.   Skin: Skin is warm and dry. She is not diaphoretic.   Psychiatric: She has a normal mood and affect. Her behavior is normal.   Vitals reviewed.      Radiology Review    XR Chest 1 View   Final Result   No acute disease.      Electronically signed by:  Andrea Berg  6/24/2019 1:05 AM   CDT Workstation: [id number]          Lab Results:  Lab Results (last 24 hours)     Procedure Component Value Units Date/Time    Procalcitonin [[id number]]  (Abnormal) Collected:  06/24/19 0321    Specimen:  Blood Updated:  06/24/19 1244     Procalcitonin 0.04 ng/mL     Narrative:       As a Marker for Sepsis (Non-Neonates):   1. <0.5 ng/mL represents a low risk of severe sepsis and/or septic shock.  1. >2 ng/mL represents a high risk of severe sepsis and/or septic shock.    As a Marker for Lower Respiratory Tract Infections that require antibiotic therapy:  PCT on Admission     Antibiotic Therapy             6-12 Hrs later  > 0.5                Strongly Recommended            >0.25 - <0.5         Recommended  0.1 - 0.25           Discouraged                   Remeasure/reassess PCT  <0.1                 Strongly Discouraged          Remeasure/reassess PCT      As 28 day mortality risk marker: \"Change in Procalcitonin Result\" (> 80 % or <=80 %) if Day 0 (or Day 1) and Day 4 values are available. Refer to http://[email or web address]-pct-calculator.com/   Change in PCT <=80 %   A decrease of PCT levels below or equal to 80 % defines a positive change in PCT test result representing a higher risk for 28-day all-cause mortality of patients diagnosed with severe sepsis or septic shock.  Change in PCT > 80 %   A decrease of PCT levels of " more than 80 % defines a negative change in PCT result representing a lower risk for 28-day all-cause mortality of patients diagnosed with severe sepsis or septic shock.                Troponin [[id number]]  (Normal) Collected:  06/24/19 0547    Specimen:  Blood Updated:  06/24/19 0635     Troponin T <0.010 ng/mL     Narrative:       Troponin T Reference Range:  <= 0.03 ng/mL-   Negative for AMI  >0.03 ng/mL-     Abnormal for myocardial necrosis.  Clinicians would have to utilize clinical acumen, EKG, Troponin and serial changes to determine if it is an Acute Myocardial Infarction or myocardial injury due to an underlying chronic condition.     TSH [[id number]]  (Abnormal) Collected:  06/24/19 0547    Specimen:  Blood Updated:  06/24/19 0635     TSH 4.300 mIU/mL     Hemoglobin A1c [[id number]]  (Normal) Collected:  06/24/19 0547    Specimen:  Blood Updated:  06/24/19 0632     Hemoglobin A1C 5.60 %     Narrative:       Hemoglobin A1C Ranges:    Increased Risk for Diabetes  5.7% to 6.4%  Diabetes                     >= 6.5%  Diabetic Goal                < 7.0%    Basic Metabolic Panel [[id number]]  (Abnormal) Collected:  06/24/19 0547    Specimen:  Blood Updated:  06/24/19 0621     Glucose 113 mg/dL      BUN 10 mg/dL      Creatinine 0.85 mg/dL      Sodium 139 mmol/L      Potassium 4.1 mmol/L      Chloride 102 mmol/L      CO2 24.0 mmol/L      Calcium 9.7 mg/dL      eGFR Non African Amer 68 mL/min/1.73      BUN/Creatinine Ratio 11.8     Anion Gap 13.0 mmol/L     Narrative:       GFR Normal >60  Chronic Kidney Disease <60  Kidney Failure <15    Lipid Panel [[id number]]  (Abnormal) Collected:  06/24/19 0547    Specimen:  Blood Updated:  06/24/19 0621     Total Cholesterol 280 mg/dL      Triglycerides 132 mg/dL      HDL Cholesterol 60 mg/dL      LDL Cholesterol  194 mg/dL      VLDL Cholesterol 26.4 mg/dL      LDL/HDL Ratio 3.23    Narrative:       Cholesterol Reference Ranges  (U.S. Department of Health and Human Services ATP  III Classifications)    Desirable          <200 mg/dL  Borderline High    200-239 mg/dL  High Risk          >240 mg/dL      Triglyceride Reference Ranges  (U.S. Department of Health and Human Services ATP III Classifications)    Normal           <150 mg/dL  Borderline High  150-199 mg/dL  High             200-499 mg/dL  Very High        >500 mg/dL    HDL Reference Ranges  (U.S. Department of Health and Human Services ATP III Classifcations)    Low     <40 mg/dl (major risk factor for CHD)  High    >60 mg/dl ('negative' risk factor for CHD)        LDL Reference Ranges  (U.S. Department of Health and Human Services ATP III Classifcations)    Optimal          <100 mg/dL  Near Optimal     100-129 mg/dL  Borderline High  130-159 mg/dL  High             160-189 mg/dL  Very High        >189 mg/dL    Magnesium [[id number]]  (Normal) Collected:  06/24/19 0547    Specimen:  Blood Updated:  06/24/19 0621     Magnesium 2.4 mg/dL     CBC Auto Differential [[id number]]  (Abnormal) Collected:  06/24/19 0547    Specimen:  Blood Updated:  06/24/19 0557     WBC 9.87 10*3/mm3      RBC 4.09 10*6/mm3      Hemoglobin 11.4 g/dL      Hematocrit 35.9 %      MCV 87.8 fL      MCH 27.9 pg      MCHC 31.8 g/dL      RDW 16.8 %      RDW-SD 53.9 fl      MPV 10.2 fL      Platelets 250 10*3/mm3      Neutrophil % 71.8 %      Lymphocyte % 21.3 %      Monocyte % 5.6 %      Eosinophil % 0.4 %      Basophil % 0.5 %      Immature Grans % 0.4 %      Neutrophils, Absolute 7.09 10*3/mm3      Lymphocytes, Absolute 2.10 10*3/mm3      Monocytes, Absolute 0.55 10*3/mm3      Eosinophils, Absolute 0.04 10*3/mm3      Basophils, Absolute 0.05 10*3/mm3      Immature Grans, Absolute 0.04 10*3/mm3      nRBC 0.0 /100 WBC     Lactic Acid, Plasma [[id number]]  (Normal) Collected:  06/24/19 0321    Specimen:  Blood Updated:  06/24/19 0342     Lactate 1.7 mmol/L     Protime-INR [[id number]]  (Normal) Collected:  06/24/19 0018    Specimen:  Blood Updated:  06/24/19 0329      Protime 11.6 Seconds      INR 0.87    Narrative:       Therapeutic range for most indications is 2.0-3.0 INR,  or 2.5-3.5 for mechanical heart valves.    aPTT [[id number]]  (Normal) Collected:  06/24/19 0018    Specimen:  Blood Updated:  06/24/19 0309     PTT 24.0 seconds     Narrative:       The recommended Heparin therapeutic range is 68-97 seconds.    Extra Tubes [[id number]] Collected:  06/24/19 0018    Specimen:  Blood, Venous Line Updated:  06/24/19 0130    Narrative:       The following orders were created for panel order Extra Tubes.  Procedure                               Abnormality         Status                     ---------                               -----------         ------                     Light Blue Top[[id number]]                                   Final result               Gold Top - SST[[id number]]                                   Final result                 Please view results for these tests on the individual orders.    Light Blue Top [[id number]] Collected:  06/24/19 0018    Specimen:  Blood Updated:  06/24/19 0130     Extra Tube hold for add-on     Comment: Auto resulted       Gold Top - SST [[id number]] Collected:  06/24/19 0018    Specimen:  Blood Updated:  06/24/19 0130     Extra Tube Hold for add-ons.     Comment: Auto resulted.       Comprehensive Metabolic Panel [[id number]]  (Abnormal) Collected:  06/24/19 0018    Specimen:  Blood Updated:  06/24/19 0054     Glucose 122 mg/dL      BUN 11 mg/dL      Creatinine 1.02 mg/dL      Sodium 140 mmol/L      Potassium 3.7 mmol/L      Chloride 103 mmol/L      CO2 22.0 mmol/L      Calcium 9.8 mg/dL      Total Protein 6.9 g/dL      Albumin 4.00 g/dL      ALT (SGPT) 10 U/L      AST (SGOT) 10 U/L      Alkaline Phosphatase 136 U/L      Total Bilirubin 0.2 mg/dL      eGFR Non African Amer 55 mL/min/1.73      Globulin 2.9 gm/dL      A/G Ratio 1.4 g/dL      BUN/Creatinine Ratio 10.8     Anion Gap 15.0 mmol/L     Narrative:       GFR Normal >60  Chronic  Kidney Disease <60  Kidney Failure <15    Troponin [[id number]]  (Normal) Collected:  06/24/19 0018    Specimen:  Blood Updated:  06/24/19 0053     Troponin T <0.010 ng/mL     Narrative:       Troponin T Reference Range:  <= 0.03 ng/mL-   Negative for AMI  >0.03 ng/mL-     Abnormal for myocardial necrosis.  Clinicians would have to utilize clinical acumen, EKG, Troponin and serial changes to determine if it is an Acute Myocardial Infarction or myocardial injury due to an underlying chronic condition.     CBC & Differential [[id number]] Collected:  06/24/19 0018    Specimen:  Blood Updated:  06/24/19 0033    Narrative:       The following orders were created for panel order CBC & Differential.  Procedure                               Abnormality         Status                     ---------                               -----------         ------                     CBC Auto Differential[[id number]]        Abnormal            Final result                 Please view results for these tests on the individual orders.    CBC Auto Differential [[id number]]  (Abnormal) Collected:  06/24/19 0018    Specimen:  Blood Updated:  06/24/19 0033     WBC 10.22 10*3/mm3      RBC 3.99 10*6/mm3      Hemoglobin 11.2 g/dL      Hematocrit 35.4 %      MCV 88.7 fL      MCH 28.1 pg      MCHC 31.6 g/dL      RDW 16.9 %      RDW-SD 54.9 fl      MPV 10.0 fL      Platelets 250 10*3/mm3      Neutrophil % 74.9 %      Lymphocyte % 17.8 %      Monocyte % 6.0 %      Eosinophil % 0.5 %      Basophil % 0.5 %      Immature Grans % 0.3 %      Neutrophils, Absolute 7.66 10*3/mm3      Lymphocytes, Absolute 1.82 10*3/mm3      Monocytes, Absolute 0.61 10*3/mm3      Eosinophils, Absolute 0.05 10*3/mm3      Basophils, Absolute 0.05 10*3/mm3      Immature Grans, Absolute 0.03 10*3/mm3      nRBC 0.0 /100 WBC           I personally viewed and interpreted the patient's EKG/Telemetry data       Assessment/Plan:   #1 chest pain #2 sinus pause at the time of mild  vomiting #3 hypertension with hypertensive heart disease #4 hyperlipidemia #5 chronic history of smoking    Clinically, no sign of cardiac decompensation based on the clinical history physical findings.  Risk factor lifestyle modification discussed.  We will arrange a Lexiscan Cardiolite tomorrow as the patient admitted with the chest pain described as pressure-like feeling with associated mild vomiting..  Patient have documented pauses up to 3.7-second during mild vomiting procedure by bouts of cough spell and was on Toprol-XL 50 mg and Cardizem  mg.  We will go ahead and discontinue sinus and AV josefa blocking drug and recommend to monitor for next 48 hours or so.  I will arrange a stress Cardiolite to rule out ischemia as she admitted with chest pain with a previous history of CAD.  Risk factor lifestyle modification discussed.  Risk of continued smoking explained to the patient which she understands.  Recommend to continue aspirin with history of PT stent, atorvastatin for hyperlipidemia, nitro patch 1 inch.  Further recommendation based upon outcome of stress test.      Thank you for allowing me to participate in the care of Mary Nelson. Feel free to contact me directly with any further questions or concerns.    Tevin Dwyer MD  06/24/19  6:19 PM.      Part of this note may be an electronic transcription/translation of spoken language to printed text using the Dragon Dictation system.

## 2019-06-24 NOTE — ED PROVIDER NOTES
Subjective   63-year-old white female presents to the emergency department with chief complaint of chest pain.  Patient complains of substernal chest pain that started approximately 1 hour prior to arrival.  She was treated with 324 mg of aspirin and nitroglycerin x2 with relief.  She describes the chest pain as tightness.  The pain does not radiate.  She relates she does not feel short of breath.  She was nauseated and diaphoretic.  Patient is status post LAD stent in 2017.  Patient is status post cardiac catheterization in March 2019 demonstrating 50% stenosis proximal to the previously placed LAD stent.            Review of Systems   Constitutional: Positive for diaphoresis. Negative for chills and fever.   Respiratory: Positive for cough. Negative for shortness of breath.    Cardiovascular: Positive for chest pain. Negative for leg swelling.   Gastrointestinal: Positive for nausea. Negative for abdominal pain and vomiting.   Neurological: Negative for syncope, weakness and headaches.   Psychiatric/Behavioral: The patient is nervous/anxious.    All other systems reviewed and are negative.      Past Medical History:   Diagnosis Date   • Anxiety    • Arthritis    • COPD (chronic obstructive pulmonary disease) (CMS/HCC)    • Coronary artery disease    • Depression    • Epigastric pain    • GERD (gastroesophageal reflux disease)    • Head injury 1990   • Hiatal hernia    • High cholesterol    • HTN (hypertension)        Allergies   Allergen Reactions   • Codeine Swelling       Past Surgical History:   Procedure Laterality Date   • ABDOMINAL SURGERY     • CARDIAC CATHETERIZATION N/A 4/19/2017    Procedure: Left Heart Cath;  Surgeon: Kevin Erazo MD;  Location: [facility] CATH INVASIVE LOCATION;  Service:    • CARDIAC CATHETERIZATION  4/19/2017    Procedure: Functional Flow [place];  Surgeon: Kevin Erazo MD;  Location: [facility] CATH INVASIVE LOCATION;  Service:    • CARDIAC CATHETERIZATION N/A 7/6/2017    Procedure: Left  Heart Cath;  Surgeon: Kevin Erazo MD;  Location: [facility] CATH INVASIVE LOCATION;  Service:    • CARDIAC CATHETERIZATION N/A 3/9/2019    Procedure: Left Heart Cath;  Surgeon: Brenden Chaparro MD;  Location: [facility] INVASIVE LOCATION;  Service: Cardiology   • COLONOSCOPY  06/27/2016   • ENDOSCOPY AND COLONOSCOPY  06/27/2016    External and internal hemorrhoids. No specimens collected   • ESOPHAGOSCOPY / EGD  06/27/2016    With biopsy-Mildly severe esophagitis. Gastritis. Normal examined duodenum. Biopsied. Medium-sized hiatus hernia. Several biopsies were obtained in the lower third of the esophagus. Several biopsies were obtained in the gastric antrum   • HERNIA REPAIR     • NISSEN FUNDOPLICATION N/A 2/1/2017    Procedure: LAPAROSCOPIC NISSEN FUNDOPLICATION,  HIATAL HERNIA REPAIR  ;  Surgeon: Ced Wilson MD;  Location: [facility] OR;  Service:    • FL RT/LT HEART CATHETERS N/A 4/19/2017    Procedure: Percutaneous Coronary Intervention;  Surgeon: Kevin Erazo MD;  Location: [facility] INVASIVE LOCATION;  Service: Cardiovascular   • TOTAL KNEE ARTHROPLASTY Left 12/27/2018    Procedure: TOTAL KNEE ARTHROPLASTY;  Surgeon: Grover Raymond MD;  Location: [facility] OR;  Service: Orthopedics   • TRANSESOPHAGEAL ECHOCARDIOGRAM (JOAO)  11/18/2015    With color flow-Ef of 60%.Early diastolic dysfunction.Normal RV size and function.Trace to mild mitral and trace tricuspid regurg.No pericardial effusion.   • TUBAL ABDOMINAL LIGATION  1984       Family History   Problem Relation Age of Onset   • Hypertension Other    • Heart disease Father    • Hypertension Father    • COPD Father    • Arthritis Father        Social History     Socioeconomic History   • Marital status:      Spouse name: Not on file   • Number of children: Not on file   • Years of education: Not on file   • Highest education level: Not on file   Tobacco Use   • Smoking status: Current Every Day Smoker     Packs/day: 0.25     Years: 43.00      Pack years: 10.75     Types: Cigarettes   • Smokeless tobacco: Never Used   • Tobacco comment: No cigarettes x 1 month but vape   Substance and Sexual Activity   • Alcohol use: No   • Drug use: No   • Sexual activity: Defer           Objective   Physical Exam   Constitutional: She is oriented to person, place, and time. She appears well-developed and well-nourished. No distress.   HENT:   Head: Normocephalic and atraumatic.   Right Ear: External ear normal.   Left Ear: External ear normal.   Nose: Nose normal.   Mouth/Throat: Oropharynx is clear and moist.   Eyes: Conjunctivae and EOM are normal. Pupils are equal, round, and reactive to light.   Neck: Normal range of motion. Neck supple.   Cardiovascular: Normal rate, regular rhythm, normal heart sounds and intact distal pulses.   Pulmonary/Chest: Effort normal and breath sounds normal.   Abdominal: Soft. Bowel sounds are normal. She exhibits no distension and no mass. There is no tenderness. There is no guarding.   Musculoskeletal: Normal range of motion. She exhibits no edema or tenderness.   Neurological: She is alert and oriented to person, place, and time. No cranial nerve deficit or sensory deficit. She exhibits normal muscle tone.   Skin: Skin is warm and dry. She is not diaphoretic.   Psychiatric: She has a normal mood and affect. Her behavior is normal.   Nursing note and vitals reviewed.      ECG 12 Lead    Date/Time: 6/23/2019 11:38 PM  Performed by: Trev Marshall MD  Authorized by: Trev Marshall MD   Interpreted by physician  Clinical impression: abnormal ECG  Comments: Sinus tachycardia rate of 101.  Baseline artifact.  No ST elevation.  Nonspecific findings.                 ED Course  ED Course as of Jun 24 0112   Mon Jun 24, 2019   0109 Patient is alert and resting comfortably.  I reviewed the results of her evaluation with her and recommended admission to the hospital.  [DR]   0111 Case discussed with Dr. Rodríguez and he agrees to admit to  telemetry.  [DR]      ED Course User Index  [DR] Trev Marshall MD      Labs Reviewed   COMPREHENSIVE METABOLIC PANEL - Abnormal; Notable for the following components:       Result Value    Glucose 122 (*)     Creatinine 1.02 (*)     Alkaline Phosphatase 136 (*)     eGFR Non  Amer 55 (*)     All other components within normal limits    Narrative:     GFR Normal >60  Chronic Kidney Disease <60  Kidney Failure <15   CBC WITH AUTO DIFFERENTIAL - Abnormal; Notable for the following components:    Hemoglobin 11.2 (*)     RDW 16.9 (*)     RDW-SD 54.9 (*)     Lymphocyte % 17.8 (*)     Neutrophils, Absolute 7.66 (*)     All other components within normal limits   TROPONIN (IN-HOUSE) - Normal    Narrative:     Troponin T Reference Range:  <= 0.03 ng/mL-   Negative for AMI  >0.03 ng/mL-     Abnormal for myocardial necrosis.  Clinicians would have to utilize clinical acumen, EKG, Troponin and serial changes to determine if it is an Acute Myocardial Infarction or myocardial injury due to an underlying chronic condition.    CBC AND DIFFERENTIAL    Narrative:     The following orders were created for panel order CBC & Differential.  Procedure                               Abnormality         Status                     ---------                               -----------         ------                     CBC Auto Differential[[id number]]        Abnormal            Final result                 Please view results for these tests on the individual orders.   EXTRA TUBES    Narrative:     The following orders were created for panel order Extra Tubes.  Procedure                               Abnormality         Status                     ---------                               -----------         ------                     Light Blue Top[[id number]]                                   In process                 Gold Top - SST[[id number]]                                   In process                   Please view results for these tests  on the individual orders.   LIGHT BLUE TOP   GOLD TOP - SST     Xr Chest 1 View    Result Date: 6/24/2019  Narrative: Chest single view on  6/24/2019 CLINICAL INDICATION: Chest pain COMPARISON: 3/7/2019 FINDINGS: There is a small hiatal hernia. Heart is upper limits normal for size. The lungs are clear. Pulmonary vascularity is within normal limits.     Impression: No acute disease. Electronically signed by:  Andrea Berg  6/24/2019 1:05 AM CDT Workstation: [id number]              [facility]      Final diagnoses:   Chest pain, unspecified type            Trev Marshall MD  06/24/19 0113

## 2019-06-24 NOTE — H&P
[facility] Medicine Admission      Date of Admission: 6/23/2019      Primary Care Physician: Ge Jean MD      Chief Complaint: chest pain    HPI:  The patient is a 63 y.o. female who presents w/ complaints of chest pain which started earlier today. Chest pain is substernal, non-radiating, described as a tightness, intermittent, no association w/ exertion or rest, improved by nothing, worsened by nothing. She states she also has been sick for the past couple weeks w/ congestion & cough. These symptoms are progressively improving. Denies palpitations, dyspnea, nausea, vomiting, diaphoresis, jaw pain, lightheadedness, heartburn, arm discomfort. Denies fever, chills, night sweats, abdominal pain, urgency, frequency, dysuria, numbness, new focal deficits.     Concurrent Medical History:  has a past medical history of Anxiety, Arthritis, COPD (chronic obstructive pulmonary disease) (CMS/[facility]), Coronary artery disease, Depression, Epigastric pain, GERD (gastroesophageal reflux disease), Head injury (1990), Hiatal hernia, High cholesterol, and HTN (hypertension).    Past Surgical History:   Past Surgical History:   Procedure Laterality Date   • ABDOMINAL SURGERY     • CARDIAC CATHETERIZATION N/A 4/19/2017    Procedure: Left Heart Cath;  Surgeon: Kevin Erazo MD;  Location: [facility] INVASIVE LOCATION;  Service:    • CARDIAC CATHETERIZATION  4/19/2017    Procedure: Functional Flow [place];  Surgeon: Kevin Erazo MD;  Location: [facility] INVASIVE LOCATION;  Service:    • CARDIAC CATHETERIZATION N/A 7/6/2017    Procedure: Left Heart Cath;  Surgeon: Kevin Erazo MD;  Location: [facility] INVASIVE LOCATION;  Service:    • CARDIAC CATHETERIZATION N/A 3/9/2019    Procedure: Left Heart Cath;  Surgeon: Brenden Chaparro MD;  Location: [facility] INVASIVE LOCATION;  Service: Cardiology   • COLONOSCOPY  06/27/2016   • ENDOSCOPY AND COLONOSCOPY  06/27/2016    External and internal  hemorrhoids. No specimens collected   • ESOPHAGOSCOPY / EGD  06/27/2016    With biopsy-Mildly severe esophagitis. Gastritis. Normal examined duodenum. Biopsied. Medium-sized hiatus hernia. Several biopsies were obtained in the lower third of the esophagus. Several biopsies were obtained in the gastric antrum   • HERNIA REPAIR     • NISSEN FUNDOPLICATION N/A 2/1/2017    Procedure: LAPAROSCOPIC NISSEN FUNDOPLICATION,  HIATAL HERNIA REPAIR  ;  Surgeon: Ced Wilson MD;  Location: [facility] OR;  Service:    • [place] RT/LT HEART CATHETERS N/A 4/19/2017    Procedure: Percutaneous Coronary Intervention;  Surgeon: Kevin Erazo MD;  Location: [facility] CATH INVASIVE LOCATION;  Service: Cardiovascular   • TOTAL KNEE ARTHROPLASTY Left 12/27/2018    Procedure: TOTAL KNEE ARTHROPLASTY;  Surgeon: Grover Raymond MD;  Location: [facility] OR;  Service: Orthopedics   • TRANSESOPHAGEAL ECHOCARDIOGRAM (JOAO)  11/18/2015    With color flow-Ef of 60%.Early diastolic dysfunction.Normal RV size and function.Trace to mild mitral and trace tricuspid regurg.No pericardial effusion.   • TUBAL ABDOMINAL LIGATION  1984       Family History:   Family History   Problem Relation Age of Onset   • Hypertension Other    • Heart disease Father    • Hypertension Father    • COPD Father    • Arthritis Father        Social History:   Social History     Socioeconomic History   • Marital status:      Spouse name: Not on file   • Number of children: Not on file   • Years of education: Not on file   • Highest education level: Not on file   Tobacco Use   • Smoking status: Current Every Day Smoker     Packs/day: 0.25     Years: 43.00     Pack years: 10.75     Types: Cigarettes   • Smokeless tobacco: Never Used   • Tobacco comment: No cigarettes x 1 month but vape   Substance and Sexual Activity   • Alcohol use: No   • Drug use: No   • Sexual activity: Defer       Allergies:   Allergies   Allergen Reactions   • Codeine Swelling       Medications:    Prior to Admission medications    Medication Sig Start Date End Date Taking? Authorizing Provider   amitriptyline (ELAVIL) 50 MG tablet Take 50 mg by mouth Every Night.   Yes Ryan Henning MD   atorvastatin (LIPITOR) 20 MG tablet Take 1 tablet by mouth Daily. 4/12/19  Yes Emanuel Olmos MD   citalopram (CELEXA) 40 MG tablet Take 40 mg by mouth Daily. 7/19/16  Yes Ryan Henning MD   diltiaZEM CD (CARDIZEM CD) 180 MG 24 hr capsule Take 180 mg by mouth Daily.   Yes Ryan Henning MD   ezetimibe (ZETIA) 10 MG tablet Take 1 tablet by mouth Daily. 4/12/19  Yes Emanuel Olmos MD   HYDROcodone-acetaminophen (NORCO) 7.5-325 MG per tablet Take 1 tablet by mouth Every 6 (Six) Hours As Needed for Moderate Pain . 6/3/19  Yes Grover Raymond MD   hydrOXYzine (VISTARIL) 25 MG capsule Take 25 mg by mouth 3 (Three) Times a Day As Needed for Anxiety. 3/7/17  Yes Ryan Henning MD   metoprolol tartrate (LOPRESSOR) 50 MG tablet Take 1 tablet by mouth Every 12 (Twelve) Hours. 3/9/19  Yes Ishan Alston MD   nitroglycerin (NITROSTAT) 0.4 MG SL tablet Place 1 tablet under the tongue Every 5 (Five) Minutes As Needed for Chest Pain. Take no more than 3 doses in 15 minutes. 3/9/19  Yes Ishan Alston MD   omeprazole (PRILOSEC) 40 MG capsule Take 40 mg by mouth Daily. 7/19/16  Yes Ryan Henning MD   ticagrelor (BRILINTA) 60 MG tablet tablet Take 1 tablet by mouth 2 (Two) Times a Day. 9/24/18  Yes Marcos Leone MD   tiZANidine (ZANAFLEX) 4 MG tablet Take 4 mg by mouth Every 6 (Six) Hours As Needed. 8/26/16  Yes Ryan Henning MD   aspirin 81 MG chewable tablet Chew 1 tablet Daily. 3/10/19   Ishan Alston MD   lisinopril (PRINIVIL,ZESTRIL) 10 MG tablet Take 10 mg by mouth Daily.    Ryan Henning MD       Review of Systems:  Review of Systems   Constitutional: Positive for fatigue. Negative for chills, diaphoresis and fever.   HENT:  Positive for congestion. Negative for ear pain and voice change.    Eyes: Negative for photophobia and visual disturbance.   Respiratory: Positive for cough and chest tightness. Negative for shortness of breath and wheezing.    Cardiovascular: Positive for chest pain. Negative for palpitations.   Gastrointestinal: Negative for abdominal distention and abdominal pain.   Endocrine: Negative for cold intolerance and heat intolerance.   Genitourinary: Negative for dysuria and hematuria.   Musculoskeletal: Negative for neck pain and neck stiffness.   Skin: Negative for color change.   Neurological: Negative for facial asymmetry and speech difficulty.   Hematological: Negative for adenopathy.   Psychiatric/Behavioral: Negative for agitation and confusion.          Physical Exam:   Temp:  [97.7 °F (36.5 °C)] 97.7 °F (36.5 °C)  Heart Rate:  [76-82] 82  Resp:  [18] 18  BP: (136-158)/(75-85) 136/82  Physical Exam   Constitutional: She is oriented to person, place, and time. No distress.   HENT:   Head: Normocephalic and atraumatic.   Nose: Nose normal.   Eyes: Pupils are equal, round, and reactive to light. No scleral icterus.   Neck: Neck supple.   Cardiovascular: Normal heart sounds. Exam reveals no gallop and no friction rub.   Pulmonary/Chest: No stridor. No respiratory distress. She has no wheezes. She has no rales. She exhibits no tenderness.   Diffusely diminished air entry b/l     Abdominal: She exhibits no distension. There is no tenderness. There is no rebound and no guarding.   Musculoskeletal: Normal range of motion.   Neurological: She is alert and oriented to person, place, and time.   Skin: Skin is warm and dry. She is not diaphoretic.   Psychiatric: She has a normal mood and affect. Her behavior is normal.   Vitals reviewed.        Results Reviewed:  I have personally reviewed & interpreted current lab, radiology, and data.  Lab Results (last 24 hours)     Procedure Component Value Units Date/Time    Extra  Tubes [[id number]] Collected:  06/24/19 0018    Specimen:  Blood, Venous Line Updated:  06/24/19 0130    Narrative:       The following orders were created for panel order Extra Tubes.  Procedure                               Abnormality         Status                     ---------                               -----------         ------                     Light Blue Top[[id number]]                                   Final result               Gold Top - SST[[id number]]                                   Final result                 Please view results for these tests on the individual orders.    Light Blue Top [[id number]] Collected:  06/24/19 0018    Specimen:  Blood Updated:  06/24/19 0130     Extra Tube hold for add-on     Comment: Auto resulted       Gold Top - SST [[id number]] Collected:  06/24/19 0018    Specimen:  Blood Updated:  06/24/19 0130     Extra Tube Hold for add-ons.     Comment: Auto resulted.       Comprehensive Metabolic Panel [[id number]]  (Abnormal) Collected:  06/24/19 0018    Specimen:  Blood Updated:  06/24/19 0054     Glucose 122 mg/dL      BUN 11 mg/dL      Creatinine 1.02 mg/dL      Sodium 140 mmol/L      Potassium 3.7 mmol/L      Chloride 103 mmol/L      CO2 22.0 mmol/L      Calcium 9.8 mg/dL      Total Protein 6.9 g/dL      Albumin 4.00 g/dL      ALT (SGPT) 10 U/L      AST (SGOT) 10 U/L      Alkaline Phosphatase 136 U/L      Total Bilirubin 0.2 mg/dL      eGFR Non African Amer 55 mL/min/1.73      Globulin 2.9 gm/dL      A/G Ratio 1.4 g/dL      BUN/Creatinine Ratio 10.8     Anion Gap 15.0 mmol/L     Narrative:       GFR Normal >60  Chronic Kidney Disease <60  Kidney Failure <15    Troponin [[id number]]  (Normal) Collected:  06/24/19 0018    Specimen:  Blood Updated:  06/24/19 0053     Troponin T <0.010 ng/mL     Narrative:       Troponin T Reference Range:  <= 0.03 ng/mL-   Negative for AMI  >0.03 ng/mL-     Abnormal for myocardial necrosis.  Clinicians would have to utilize clinical acumen,  EKG, Troponin and serial changes to determine if it is an Acute Myocardial Infarction or myocardial injury due to an underlying chronic condition.     CBC & Differential [[id number]] Collected:  06/24/19 0018    Specimen:  Blood Updated:  06/24/19 0033    Narrative:       The following orders were created for panel order CBC & Differential.  Procedure                               Abnormality         Status                     ---------                               -----------         ------                     CBC Auto Differential[[id number]]        Abnormal            Final result                 Please view results for these tests on the individual orders.    CBC Auto Differential [[id number]]  (Abnormal) Collected:  06/24/19 0018    Specimen:  Blood Updated:  06/24/19 0033     WBC 10.22 10*3/mm3      RBC 3.99 10*6/mm3      Hemoglobin 11.2 g/dL      Hematocrit 35.4 %      MCV 88.7 fL      MCH 28.1 pg      MCHC 31.6 g/dL      RDW 16.9 %      RDW-SD 54.9 fl      MPV 10.0 fL      Platelets 250 10*3/mm3      Neutrophil % 74.9 %      Lymphocyte % 17.8 %      Monocyte % 6.0 %      Eosinophil % 0.5 %      Basophil % 0.5 %      Immature Grans % 0.3 %      Neutrophils, Absolute 7.66 10*3/mm3      Lymphocytes, Absolute 1.82 10*3/mm3      Monocytes, Absolute 0.61 10*3/mm3      Eosinophils, Absolute 0.05 10*3/mm3      Basophils, Absolute 0.05 10*3/mm3      Immature Grans, Absolute 0.03 10*3/mm3      nRBC 0.0 /100 WBC         Imaging Results (last 24 hours)     Procedure Component Value Units Date/Time    XR Chest 1 View [[id number]] Collected:  06/24/19 0049     Updated:  06/24/19 0107    Narrative:         Chest single view on  6/24/2019     CLINICAL INDICATION: Chest pain    COMPARISON: 3/7/2019    FINDINGS: There is a small hiatal hernia. Heart is upper limits  normal for size. The lungs are clear. Pulmonary vascularity is  within normal limits.      Impression:       No acute disease.    Electronically signed by:  Andrea  Otis  6/24/2019 1:05 AM  CDT Workstation: [id number]            Assessment:    #Chest Pain, r/o acs. Low pre-test probability for PE per wells score of 0. Will r/o structural abnormalities via echo. Chest pain is unlikely cardiac but pt is a high risk patient. CT coronaries (false positive) circa March 2019 reviewed  #CAD w/ hx of PCI; ef preserved. [facility] circa March 2019 reviewed  #BRENDON, likely 2/2 volume depletion   #Upper Respiratory Infection, likely viral--improving. No consolidations/infiltrates on cxr  #COPD, not in exacerbation  #HTN      Plan:  -ivf  -hold acei  -continue asa, statin, bb, ccb, brilinta   -nebs  -serial trops w/ ekg  -TTE  -lactic acid, procal, coags, tsh, lipid panel, hba1c  -monitor cbg  -serial h/h; transfuse to maintain hgb>7  -I/o, daily wts  -monitor lytes; recheck/replace prn   -supplemental o2 to maintain o2 sat>92%  -tele  -gi prophylaxis: Pepcid  -dvt prophylaxis: scds    PTA Medications reconciled. Labs reviewed. Records reviewed. Case & plan of care was discussed w/ the patient. All concerns were addressed & questions were answered.     Discharge Planning: depends on hospital course    Prognosis: guarded    Fran Rodríguez MD  06/24/19  2:31 AM

## 2019-06-24 NOTE — PLAN OF CARE
Problem: Patient Care Overview  Goal: Plan of Care Review  Outcome: Ongoing (interventions implemented as appropriate)   06/24/19 0835   Coping/Psychosocial   Plan of Care Reviewed With patient   Plan of Care Review   Progress improving   OTHER   Outcome Summary no complaints of chest pain at this time       Problem: Cardiac: ACS (Acute Coronary Syndrome) (Adult)  Goal: Signs and Symptoms of Listed Potential Problems Will be Absent, Minimized or Managed (Cardiac: ACS)  Outcome: Ongoing (interventions implemented as appropriate)

## 2019-06-24 NOTE — PROGRESS NOTES
[facility] Medicine Services  INPATIENT PROGRESS NOTE    Length of Stay: 0  Date of Admission: 6/23/2019  Primary Care Physician: Ge Jean MD    Subjective   Chief Complaint: chest pain   HPI: 63-year-old  female with past medical history of COPD, coronary artery disease, anxiety, depression, hypertension, hyperlipidemia who presented on 6/23/2019 with complaints of chest pain that started on the afternoon of admission.  It is described as a substernal tightness that is intermittent in nature.  The patient denies any accompanying symptoms such as dyspnea, diaphoresis, palpitations.  Nursing has reported that this morning patient had 3.74-second pause on telemetry monitoring.  The patient reports she was having an episode of nausea and vomiting at the time.  Telemetry strips have been reviewed by Dr. Dwyer who will assess.    Review of Systems   Constitutional: Negative for chills and fever.   Respiratory: Negative for cough, chest tightness, shortness of breath and wheezing.    Cardiovascular: Negative for chest pain and palpitations.   Gastrointestinal: Positive for nausea and vomiting. Negative for abdominal pain and constipation.   Musculoskeletal: Negative for back pain and neck pain.   Skin: Negative for pallor.   Neurological: Negative for dizziness and weakness.   Psychiatric/Behavioral: Negative for confusion. The patient is not nervous/anxious.         All pertinent negatives and positives are as above. All other systems have been reviewed and are negative unless otherwise stated.     Objective    Temp:  [96.1 °F (35.6 °C)-97.7 °F (36.5 °C)] 96.6 °F (35.9 °C)  Heart Rate:  [47-82] 78  Resp:  [16-18] 18  BP: (126-162)/(63-90) 139/77    Physical Exam   Constitutional: She is oriented to person, place, and time. She appears well-developed and well-nourished.   HENT:   Head: Normocephalic and atraumatic.   Eyes: Conjunctivae and lids are  normal.   Neck: Normal range of motion. Neck supple.   Cardiovascular: Normal rate, normal heart sounds and intact distal pulses.   Pulmonary/Chest: Effort normal and breath sounds normal.   Abdominal: Soft. Bowel sounds are normal.   Musculoskeletal: Normal range of motion. She exhibits no edema.   Neurological: She is alert and oriented to person, place, and time.   Skin: Skin is warm and dry.   Psychiatric: She has a normal mood and affect. Her behavior is normal.   Nursing note and vitals reviewed.          Results Review:  I have reviewed the labs, radiology results, and diagnostic studies.    Laboratory Data:   Results from last 7 days   Lab Units 06/24/19  0547 06/24/19  0018   SODIUM mmol/L 139 140   POTASSIUM mmol/L 4.1 3.7   CHLORIDE mmol/L 102 103   CO2 mmol/L 24.0 22.0   BUN mg/dL 10 11   CREATININE mg/dL 0.85 1.02*   GLUCOSE mg/dL 113* 122*   CALCIUM mg/dL 9.7 9.8   BILIRUBIN mg/dL  --  0.2   ALK PHOS U/L  --  136*   ALT (SGPT) U/L  --  10   AST (SGOT) U/L  --  10   ANION GAP mmol/L 13.0 15.0     Estimated Creatinine Clearance: 66.5 mL/min (by C-G formula based on SCr of 0.85 mg/dL).  Results from last 7 days   Lab Units 06/24/19  0547   MAGNESIUM mg/dL 2.4         Results from last 7 days   Lab Units 06/24/19  0547 06/24/19  0018   WBC 10*3/mm3 9.87 10.22   HEMOGLOBIN g/dL 11.4* 11.2*   HEMATOCRIT % 35.9 35.4   PLATELETS 10*3/mm3 250 250     Results from last 7 days   Lab Units 06/24/19  0018   INR  0.87       Culture Data:   No results found for: BLOODCX  No results found for: URINECX  No results found for: RESPCX  No results found for: WOUNDCX  No results found for: STOOLCX  No components found for: BODYFLD    Radiology Data:   Imaging Results (last 24 hours)     Procedure Component Value Units Date/Time    XR Chest 1 View [[id number]] Collected:  06/24/19 0049     Updated:  06/24/19 0107    Narrative:         Chest single view on  6/24/2019     CLINICAL INDICATION: Chest pain    COMPARISON:  3/7/2019    FINDINGS: There is a small hiatal hernia. Heart is upper limits  normal for size. The lungs are clear. Pulmonary vascularity is  within normal limits.      Impression:       No acute disease.    Electronically signed by:  Andrea Berg  6/24/2019 1:05 AM  CDT Workstation: [id number]          I have reviewed the patient's current medications.     Assessment/Plan     Active Hospital Problems    Diagnosis   • **Chest pain   • Coronary artery disease involving native coronary artery of native heart with unstable angina pectoris (CMS/HCC)   • Essential hypertension   • Mixed hyperlipidemia   • Panlobular emphysema (CMS/HCC)       Plan:    1. Chest pain r/o ACS: serial cardiac enzymes are negative.  3.74 second pauses noted on telemetry.  Case discussed with Dr. Dwyer.  Hold Metoprolol and Cardizem.  Dr. Dwyer wishes for 48 hours monitoring off of Toprol, Cardizem.   2. CAD: ASA, statin.  Metoprolol ordered.    3. HTN: Cardizem and Metoprolol on hold.   4. HLD: continue statin.          This document has been electronically signed by VILMA Lacy on June 24, 2019 1:37 PM

## 2019-06-24 NOTE — ED NOTES
Patient presents to ED with complaint of chest pain. She states 40 minutes prior to arrival her chest pain was a 9/10, it was mid sternal and felt as tightness. She took a nitroglycerin at home and received 2 nitro by EMS and 4 baby aspirin. She denies any chest pain at this time.      Debora Stoll RN  06/24/19 0017

## 2019-06-25 ENCOUNTER — APPOINTMENT (OUTPATIENT)
Dept: CARDIOLOGY | Facility: HOSPITAL | Age: 64
End: 2019-06-25

## 2019-06-25 ENCOUNTER — APPOINTMENT (OUTPATIENT)
Dept: NUCLEAR MEDICINE | Facility: HOSPITAL | Age: 64
End: 2019-06-25

## 2019-06-25 VITALS
RESPIRATION RATE: 18 BRPM | BODY MASS INDEX: 33.23 KG/M2 | HEIGHT: 61 IN | DIASTOLIC BLOOD PRESSURE: 87 MMHG | WEIGHT: 176 LBS | HEART RATE: 85 BPM | OXYGEN SATURATION: 97 % | TEMPERATURE: 97.8 F | SYSTOLIC BLOOD PRESSURE: 140 MMHG

## 2019-06-25 LAB
BH CV STRESS BP STAGE 1: NORMAL
BH CV STRESS COMMENTS STAGE 1: NORMAL
BH CV STRESS DOSE REGADENOSON STAGE 1: 0.4
BH CV STRESS DURATION MIN STAGE 1: 0
BH CV STRESS DURATION SEC STAGE 1: 10
BH CV STRESS HR STAGE 1: 100
BH CV STRESS PROTOCOL 1: NORMAL
BH CV STRESS RECOVERY BP: NORMAL MMHG
BH CV STRESS RECOVERY HR: 102 BPM
BH CV STRESS STAGE 1: 1
LV EF NUC BP: 85 %
MAXIMAL PREDICTED HEART RATE: 159 BPM
PERCENT MAX PREDICTED HR: 72.96 %
STRESS BASELINE BP: NORMAL MMHG
STRESS BASELINE HR: 80 BPM
STRESS PERCENT HR: 86 %
STRESS POST ESTIMATED WORKLOAD: 1 METS
STRESS POST PEAK BP: NORMAL MMHG
STRESS POST PEAK HR: 116 BPM
STRESS TARGET HR: 135 BPM

## 2019-06-25 PROCEDURE — G0378 HOSPITAL OBSERVATION PER HR: HCPCS

## 2019-06-25 PROCEDURE — 25010000002 REGADENOSON 0.4 MG/5ML SOLUTION: Performed by: INTERNAL MEDICINE

## 2019-06-25 PROCEDURE — 93018 CV STRESS TEST I&R ONLY: CPT | Performed by: INTERNAL MEDICINE

## 2019-06-25 PROCEDURE — A9500 TC99M SESTAMIBI: HCPCS | Performed by: INTERNAL MEDICINE

## 2019-06-25 PROCEDURE — 78452 HT MUSCLE IMAGE SPECT MULT: CPT | Performed by: INTERNAL MEDICINE

## 2019-06-25 PROCEDURE — 0 TECHNETIUM SESTAMIBI: Performed by: INTERNAL MEDICINE

## 2019-06-25 PROCEDURE — 78452 HT MUSCLE IMAGE SPECT MULT: CPT

## 2019-06-25 PROCEDURE — 93017 CV STRESS TEST TRACING ONLY: CPT

## 2019-06-25 PROCEDURE — 93016 CV STRESS TEST SUPVJ ONLY: CPT | Performed by: INTERNAL MEDICINE

## 2019-06-25 RX ORDER — ONDANSETRON 4 MG/1
4 TABLET, FILM COATED ORAL EVERY 8 HOURS PRN
Qty: 30 TABLET | Refills: 0 | Status: ON HOLD | OUTPATIENT
Start: 2019-06-25 | End: 2019-11-18 | Stop reason: SDUPTHER

## 2019-06-25 RX ORDER — 0.9 % SODIUM CHLORIDE 0.9 %
10 VIAL (ML) INJECTION AS NEEDED
Status: DISCONTINUED | OUTPATIENT
Start: 2019-06-25 | End: 2019-06-25 | Stop reason: HOSPADM

## 2019-06-25 RX ADMIN — REGADENOSON 0.4 MG: 0.08 INJECTION, SOLUTION INTRAVENOUS at 09:29

## 2019-06-25 RX ADMIN — ASPIRIN 81 MG 81 MG: 81 TABLET ORAL at 08:06

## 2019-06-25 RX ADMIN — SODIUM CHLORIDE, PRESERVATIVE FREE 10 ML: 5 INJECTION INTRAVENOUS at 09:30

## 2019-06-25 RX ADMIN — DOCUSATE SODIUM 100 MG: 100 CAPSULE, LIQUID FILLED ORAL at 08:06

## 2019-06-25 RX ADMIN — TECHNETIUM TC 99M SESTAMIBI 1 DOSE: 1 INJECTION INTRAVENOUS at 09:30

## 2019-06-25 RX ADMIN — FAMOTIDINE 40 MG: 40 TABLET ORAL at 08:06

## 2019-06-25 RX ADMIN — TECHNETIUM TC 99M SESTAMIBI 1 DOSE: 1 INJECTION INTRAVENOUS at 07:32

## 2019-06-25 RX ADMIN — CITALOPRAM HYDROBROMIDE 40 MG: 40 TABLET ORAL at 08:06

## 2019-06-25 RX ADMIN — ATORVASTATIN CALCIUM 20 MG: 20 TABLET, FILM COATED ORAL at 08:06

## 2019-06-25 NOTE — DISCHARGE INSTR - APPOINTMENTS
DR ELLER 1 WEEK (OFFICE WILL CALL WITH APPOINTMENT)  DR BARROW 1 WEEK (OFFICE WILL CALL WITH APPOINTMENT)

## 2019-06-25 NOTE — PLAN OF CARE
Problem: Patient Care Overview  Goal: Plan of Care Review  Outcome: Ongoing (interventions implemented as appropriate)   06/25/19 9633   Coping/Psychosocial   Plan of Care Reviewed With patient   Plan of Care Review   Progress no change   OTHER   Outcome Summary Continue to monitor       Problem: Cardiac: ACS (Acute Coronary Syndrome) (Adult)  Goal: Signs and Symptoms of Listed Potential Problems Will be Absent, Minimized or Managed (Cardiac: ACS)  Outcome: Ongoing (interventions implemented as appropriate)

## 2019-06-25 NOTE — DISCHARGE SUMMARY
[facility] Medicine Services  DISCHARGE SUMMARY       Date of Admission: 6/23/2019  Date of Discharge:  6/25/2019  Primary Care Physician: Ge Jean MD    Presenting Problem/History of Present Illness:  Chest pain, unspecified type [R07.9]  Chest pain, unspecified type [R07.9]       Final Discharge Diagnoses:  Active Hospital Problems    Diagnosis   • **Chest pain   • Coronary artery disease involving native coronary artery of native heart with unstable angina pectoris (CMS/HCC)   • Essential hypertension   • Mixed hyperlipidemia   • Panlobular emphysema (CMS/HCC)       Consults:   Consults     Date and Time Order Name Status Description    6/24/2019 0852 Inpatient Cardiology Consult Completed           Procedures Performed:                 Pertinent Test Results:   Imaging Results (all)     Procedure Component Value Units Date/Time    XR Chest 1 View [[id number]] Collected:  06/24/19 0049     Updated:  06/24/19 0107    Narrative:         Chest single view on  6/24/2019     CLINICAL INDICATION: Chest pain    COMPARISON: 3/7/2019    FINDINGS: There is a small hiatal hernia. Heart is upper limits  normal for size. The lungs are clear. Pulmonary vascularity is  within normal limits.      Impression:       No acute disease.    Electronically signed by:  Andrea Berg  6/24/2019 1:05 AM  CDT Workstation: [id number]        Lab Results (last 24 hours)     ** No results found for the last 24 hours. **            Chief Complaint on Day of Discharge: none    Hospital Course:  63-year-old  female with past medical history of COPD, coronary artery disease, anxiety, depression, hypertension, hyperlipidemia who presented on 6/23/2019 with complaints of chest pain that started on the afternoon of admission.  Her pain was described as substernal tightness that was intermittent in nature and accompanied dyspnea.  On morning of 6/24/2019, patient was noted to  "have a 3.74-second pause on telemetry monitoring was noted during an episode of vomiting.  Metoprolol and Cardizem were stopped per Dr. Dwyer's instruction.  Cardiology has assessed the patient during her hospital stay and opted for nuclear stress testing.  Stress test revealed normal myocardial perfusion with no evidence of ischemia with a calculated EF of 70%.  Impressions were consistent with low risk study.  Findings were discussed on day of discharge with Dr. Dwyer who is given clearance for discharge with instructions to continue with holding Cardizem and metoprolol.  The patient will be discharged home today in stable condition with instructions for 1 week follow-up with PCP in 1 month follow-up with cardiology.    Condition on Discharge:  stable    Physical Exam on Discharge:  /87 (BP Location: Left arm, Patient Position: Lying)   Pulse 85   Temp 97.8 °F (36.6 °C) (Oral)   Resp 18   Ht 154.9 cm (61\")   Wt 79.8 kg (176 lb)   SpO2 97%   BMI 33.25 kg/m²   Physical Exam   Constitutional: She is oriented to person, place, and time. She appears well-developed and well-nourished.   HENT:   Head: Normocephalic and atraumatic.   Eyes: Conjunctivae and lids are normal.   Neck: Normal range of motion. Neck supple.   Cardiovascular: Normal rate, normal heart sounds and intact distal pulses.   Pulmonary/Chest: Effort normal and breath sounds normal.   Abdominal: Soft. Bowel sounds are normal.   Musculoskeletal: Normal range of motion. She exhibits no edema.   Neurological: She is alert and oriented to person, place, and time.   Skin: Skin is warm and dry.   Psychiatric: She has a normal mood and affect. Her behavior is normal.   Nursing note and vitals reviewed.        Discharge Disposition:  Home or Self Care    Discharge Medications:     Discharge Medications      New Medications      Instructions Start Date   ondansetron 4 MG tablet  Commonly known as:  ZOFRAN   4 mg, Oral, Every 8 Hours PRN       "   Continue These Medications      Instructions Start Date   amitriptyline 50 MG tablet  Commonly known as:  ELAVIL   50 mg, Oral, Nightly      aspirin 81 MG chewable tablet   81 mg, Oral, Daily      atorvastatin 20 MG tablet  Commonly known as:  LIPITOR   20 mg, Oral, Daily      CELEXA 40 MG tablet  Generic drug:  citalopram   40 mg, Oral, Daily      ezetimibe 10 MG tablet  Commonly known as:  ZETIA   10 mg, Oral, Daily      HYDROcodone-acetaminophen 7.5-325 MG per tablet  Commonly known as:  NORCO   1 tablet, Oral, Every 6 Hours PRN      hydrOXYzine pamoate 25 MG capsule  Commonly known as:  VISTARIL   25 mg, Oral, 3 Times Daily PRN      lisinopril 10 MG tablet  Commonly known as:  PRINIVIL,ZESTRIL   10 mg, Oral, Daily      nitroglycerin 0.4 MG SL tablet  Commonly known as:  NITROSTAT   0.4 mg, Sublingual, Every 5 Minutes PRN, Take no more than 3 doses in 15 minutes.      PRILOSEC 40 MG capsule  Generic drug:  omeprazole   40 mg, Oral, Daily      QUEtiapine 100 MG tablet  Commonly known as:  SEROquel   100 mg, Oral, Nightly      ticagrelor 60 MG tablet tablet  Commonly known as:  BRILINTA   60 mg, Oral, 2 Times Daily      ZANAFLEX 4 MG tablet  Generic drug:  tiZANidine   4 mg, Oral, Every 6 Hours PRN         Stop These Medications    diltiaZEM  MG 24 hr capsule  Commonly known as:  CARDIZEM CD     metoprolol tartrate 50 MG tablet  Commonly known as:  LOPRESSOR            Discharge Diet:   Diet Instructions     Diet: Cardiac; Thin      Discharge Diet:  Cardiac    Fluid Consistency:  Thin          Activity at Discharge:   Activity Instructions     Activity as Tolerated            Discharge Care Plan/Instructions: Follow-up with PCP in 1 week.  Follow-up with cardiology in 1 month.    Follow-up Appointments:   Additional Instructions for the Follow-ups that You Need to Schedule     Discharge Follow-up with PCP   As directed       Currently Documented PCP:    Ge Jean MD    PCP Phone Number:     [phone]     Follow Up Details:  one week           Follow-up Information     Ge Jean MD Follow up.    Specialty:  Family Medicine  Why:  one week  Contact information:  [address] [address]  [phone]             Tevin Dwyer MD Follow up in 1 month(s).    Specialty:  Cardiology  Contact information:  [address] [place] [address] [address]  [facility] [address]  [place] [address]  [phone]                   Test Results Pending at Discharge:           This document has been electronically signed by VILMA Lacy on June 25, 2019 4:46 PM

## 2019-06-27 ENCOUNTER — TELEPHONE (OUTPATIENT)
Dept: CARDIOLOGY | Facility: CLINIC | Age: 64
End: 2019-06-27

## 2019-06-27 NOTE — TELEPHONE ENCOUNTER
Informed patient that her stress test was ok per Dr. Dwyer.                    ----- Message from Shanelle Zeng RN sent at 6/27/2019 10:09 AM CDT -----  You can let her know that her stress test was ok per Reymundo. He ordered it at hospital discharge. She is normally Gt's patient and will follow with him.

## 2019-06-28 ENCOUNTER — OFFICE VISIT (OUTPATIENT)
Dept: ORTHOPEDIC SURGERY | Facility: CLINIC | Age: 64
End: 2019-06-28

## 2019-06-28 VITALS — WEIGHT: 175 LBS | HEIGHT: 61 IN | BODY MASS INDEX: 33.04 KG/M2

## 2019-06-28 DIAGNOSIS — G89.29 CHRONIC PAIN OF LEFT KNEE: Primary | ICD-10-CM

## 2019-06-28 DIAGNOSIS — Z96.652 PRESENCE OF TOTAL KNEE JOINT PROSTHESIS, LEFT: ICD-10-CM

## 2019-06-28 DIAGNOSIS — M25.562 CHRONIC PAIN OF LEFT KNEE: Primary | ICD-10-CM

## 2019-06-28 PROCEDURE — 99213 OFFICE O/P EST LOW 20 MIN: CPT | Performed by: ORTHOPAEDIC SURGERY

## 2019-06-28 RX ORDER — HYDROCODONE BITARTRATE AND ACETAMINOPHEN 7.5; 325 MG/1; MG/1
1 TABLET ORAL EVERY 6 HOURS PRN
Qty: 60 TABLET | Refills: 0 | Status: SHIPPED | OUTPATIENT
Start: 2019-06-28 | End: 2019-08-05 | Stop reason: SDUPTHER

## 2019-06-28 NOTE — PROGRESS NOTES
"Mary Nelson is a 63 y.o. female returns for     Chief Complaint   Patient presents with   • Left Knee - Follow-up       HISTORY OF PRESENT ILLNESS: f/u left knee, surgery done on 12/27/2018        States she is doing well, requesting oral pain medicines.  She is taking a trip to [place] next week.         CONCURRENT MEDICAL HISTORY:    The following portions of the patient's history were reviewed and updated as appropriate: allergies, current medications, past family history, past medical history, past social history, past surgical history and problem list.     ROS  No fevers or chills.  No chest pain or shortness of air.  No GI or  disturbances.    PHYSICAL EXAMINATION:       Ht 154.9 cm (61\")   Wt 79.4 kg (175 lb)   BMI 33.07 kg/m²     Physical Exam   Musculoskeletal:        Left knee: She exhibits no effusion.       GAIT:     [x]  Normal  []  Antalgic    Assistive device: [x]  None  []  Walker     []  Crutches  []  Cane     []  Wheelchair  []  Stretcher    Left Knee Exam     Tenderness   The patient is experiencing no tenderness.     Range of Motion   Extension: -5   Flexion: 120     Tests   Julius:  Medial - negative Lateral - negative    Other   Erythema: absent  Scars: present  Sensation: normal  Pulse: present  Swelling: mild  Effusion: no effusion present    Comments:  No tenderness with palpation.                            ASSESSMENT:    Diagnoses and all orders for this visit:    Chronic pain of left knee  -     HYDROcodone-acetaminophen (NORCO) 7.5-325 MG per tablet; Take 1 tablet by mouth Every 6 (Six) Hours As Needed for Moderate Pain .    Presence of total knee joint prosthesis, left          PLAN    norco is written for pain control, I cautioned her narcotic medicines can be addictive, abuse can lead to death or coma, do not drive or ingest alcohol.  Activity as tolerated.    Xray left knee      Grover Raymond MD  "

## 2019-08-02 DIAGNOSIS — M25.562 CHRONIC PAIN OF LEFT KNEE: Primary | ICD-10-CM

## 2019-08-02 DIAGNOSIS — G89.29 CHRONIC PAIN OF LEFT KNEE: Primary | ICD-10-CM

## 2019-08-02 DIAGNOSIS — Z96.652 PRESENCE OF TOTAL KNEE JOINT PROSTHESIS, LEFT: ICD-10-CM

## 2019-08-05 ENCOUNTER — OFFICE VISIT (OUTPATIENT)
Dept: ORTHOPEDIC SURGERY | Facility: CLINIC | Age: 64
End: 2019-08-05

## 2019-08-05 VITALS — WEIGHT: 179 LBS | BODY MASS INDEX: 33.79 KG/M2 | HEIGHT: 61 IN

## 2019-08-05 DIAGNOSIS — G89.29 CHRONIC PAIN OF LEFT KNEE: Primary | ICD-10-CM

## 2019-08-05 DIAGNOSIS — M25.562 LEFT KNEE PAIN, UNSPECIFIED CHRONICITY: ICD-10-CM

## 2019-08-05 DIAGNOSIS — M25.462 SWELLING OF KNEE JOINT, LEFT: ICD-10-CM

## 2019-08-05 DIAGNOSIS — Z96.652 PRESENCE OF TOTAL KNEE JOINT PROSTHESIS, LEFT: ICD-10-CM

## 2019-08-05 DIAGNOSIS — M25.562 CHRONIC PAIN OF LEFT KNEE: Primary | ICD-10-CM

## 2019-08-05 PROCEDURE — 99213 OFFICE O/P EST LOW 20 MIN: CPT | Performed by: ORTHOPAEDIC SURGERY

## 2019-08-05 RX ORDER — HYDROCODONE BITARTRATE AND ACETAMINOPHEN 7.5; 325 MG/1; MG/1
1 TABLET ORAL EVERY 6 HOURS PRN
Qty: 80 TABLET | Refills: 0 | Status: SHIPPED | OUTPATIENT
Start: 2019-08-05 | End: 2019-11-18 | Stop reason: HOSPADM

## 2019-08-05 NOTE — PROGRESS NOTES
Mary Nelson is a 64 y.o. female returns for     Chief Complaint   Patient presents with   • Left Knee - Follow-up       HISTORY OF PRESENT ILLNESS:  F/u left knee, patient had XRAYS done today, patient states pain score is at a 8 today,     64 y returns post op states her is improved, she still has some pain at night along with difficulty sleeping.  She is pleased with function of her left total knee arthroplasty, good function.  Requesting additional refill medications for norco.  States she would like to have pain medicines to take.     CONCURRENT MEDICAL HISTORY:    The following portions of the patient's history were reviewed and updated as appropriate: allergies, current medications, past family history, past medical history, past social history, past surgical history and problem list.     ROS  No fevers or chills.  No chest pain or shortness of air.  No GI or  disturbances.    Past Medical History:   Diagnosis Date   • Anxiety    • Arthritis    • COPD (chronic obstructive pulmonary disease) (CMS/[facility])    • Coronary artery disease    • Depression    • Epigastric pain    • GERD (gastroesophageal reflux disease)    • Head injury 1990   • Hiatal hernia    • High cholesterol    • HTN (hypertension)      Past Surgical History:   Procedure Laterality Date   • ABDOMINAL SURGERY     • CARDIAC CATHETERIZATION N/A 4/19/2017    Procedure: Left Heart Cath;  Surgeon: Kevin Erazo MD;  Location: [facility] INVASIVE LOCATION;  Service:    • CARDIAC CATHETERIZATION  4/19/2017    Procedure: Functional Flow [place];  Surgeon: Kevin Erazo MD;  Location: [facility] INVASIVE LOCATION;  Service:    • CARDIAC CATHETERIZATION N/A 7/6/2017    Procedure: Left Heart Cath;  Surgeon: Kevin Erazo MD;  Location: [facility] INVASIVE LOCATION;  Service:    • CARDIAC CATHETERIZATION N/A 3/9/2019    Procedure: Left Heart Cath;  Surgeon: Brenden Chaparro MD;  Location: [facility] INVASIVE LOCATION;  Service: Cardiology   •  COLONOSCOPY  06/27/2016   • ENDOSCOPY AND COLONOSCOPY  06/27/2016    External and internal hemorrhoids. No specimens collected   • ESOPHAGOSCOPY / EGD  06/27/2016    With biopsy-Mildly severe esophagitis. Gastritis. Normal examined duodenum. Biopsied. Medium-sized hiatus hernia. Several biopsies were obtained in the lower third of the esophagus. Several biopsies were obtained in the gastric antrum   • HERNIA REPAIR     • NISSEN FUNDOPLICATION N/A 2/1/2017    Procedure: LAPAROSCOPIC NISSEN FUNDOPLICATION,  HIATAL HERNIA REPAIR  ;  Surgeon: Ced Wilson MD;  Location: [facility] OR;  Service:    • RI RT/LT HEART CATHETERS N/A 4/19/2017    Procedure: Percutaneous Coronary Intervention;  Surgeon: Kevin Erazo MD;  Location: [facility] CATH INVASIVE LOCATION;  Service: Cardiovascular   • TOTAL KNEE ARTHROPLASTY Left 12/27/2018    Procedure: TOTAL KNEE ARTHROPLASTY;  Surgeon: Grover Raymond MD;  Location: [facility] OR;  Service: Orthopedics   • TRANSESOPHAGEAL ECHOCARDIOGRAM (JOAO)  11/18/2015    With color flow-Ef of 60%.Early diastolic dysfunction.Normal RV size and function.Trace to mild mitral and trace tricuspid regurg.No pericardial effusion.   • TUBAL ABDOMINAL LIGATION  1984     Social History     Socioeconomic History   • Marital status:      Spouse name: Not on file   • Number of children: Not on file   • Years of education: Not on file   • Highest education level: Not on file   Tobacco Use   • Smoking status: Current Every Day Smoker     Packs/day: 0.25     Years: 43.00     Pack years: 10.75     Types: Cigarettes   • Smokeless tobacco: Never Used   • Tobacco comment: No cigarettes x 1 month but vape   Substance and Sexual Activity   • Alcohol use: No   • Drug use: No   • Sexual activity: Defer     Current Outpatient Medications on File Prior to Visit   Medication Sig Dispense Refill   • amitriptyline (ELAVIL) 50 MG tablet Take 50 mg by mouth Every Night.     • aspirin 81 MG chewable tablet  "Chew 1 tablet Daily. 30 tablet 1   • atorvastatin (LIPITOR) 20 MG tablet Take 1 tablet by mouth Daily. 90 tablet 3   • citalopram (CELEXA) 40 MG tablet Take 40 mg by mouth Daily.     • ezetimibe (ZETIA) 10 MG tablet Take 1 tablet by mouth Daily. 30 tablet 11   • hydrOXYzine (VISTARIL) 25 MG capsule Take 25 mg by mouth 3 (Three) Times a Day As Needed for Anxiety.     • lisinopril (PRINIVIL,ZESTRIL) 10 MG tablet Take 10 mg by mouth Daily.     • nitroglycerin (NITROSTAT) 0.4 MG SL tablet Place 1 tablet under the tongue Every 5 (Five) Minutes As Needed for Chest Pain. Take no more than 3 doses in 15 minutes. 30 tablet 1   • omeprazole (PRILOSEC) 40 MG capsule Take 40 mg by mouth Daily.     • ondansetron (ZOFRAN) 4 MG tablet Take 1 tablet by mouth Every 8 (Eight) Hours As Needed for Nausea or Vomiting. 30 tablet 0   • QUEtiapine (SEROquel) 100 MG tablet Take 100 mg by mouth Every Night.     • ticagrelor (BRILINTA) 60 MG tablet tablet Take 1 tablet by mouth 2 (Two) Times a Day. 60 tablet 12   • tiZANidine (ZANAFLEX) 4 MG tablet Take 4 mg by mouth Every 6 (Six) Hours As Needed.     • [DISCONTINUED] HYDROcodone-acetaminophen (NORCO) 7.5-325 MG per tablet Take 1 tablet by mouth Every 6 (Six) Hours As Needed for Moderate Pain . 60 tablet 0     No current facility-administered medications on file prior to visit.      PHYSICAL EXAMINATION:       Ht 154.9 cm (61\")   Wt 81.2 kg (179 lb)   BMI 33.82 kg/m²     Physical Exam    GAIT:     []  Normal  []  Antalgic    Assistive device: []  None  []  Walker     []  Crutches  []  Cane     []  Wheelchair  []  Stretcher    Right Knee Exam     Tenderness   The patient is experiencing tenderness in the lateral joint line and lateral retinaculum.    Range of Motion   Extension: 0   Flexion: 120       Left Knee Exam     Tenderness   The patient is experiencing tenderness in the lateral joint line and lateral retinaculum.    Range of Motion   Extension: 0   Flexion: 120     Other   Erythema: " absent  Scars: present              Xr Knee 1 Or 2 View Left    Result Date: 8/5/2019  Narrative: Ordering Provider:  Grover Raymond MD Ordering Diagnosis/Indication:  Chronic pain of left knee, Presence of total knee joint prosthesis, left Procedure:  XR KNEE 1 OR 2 VW LEFT Exam Date:  8/5/19 RELEVANT PRIOR IMAGES:  XR Knee 1 or 2 View Left 04/22/2019 [id number] Final COMPARISON:  Todays X-rays were compared to previous images dated 4/22/2019.     Impression:   Left total knee arthroplasty femoral and tibial component in good position, alignment is good, bone quality is good, femoral tibial alignment is good Bone quality: good Alignment:  Femoral and tibial component in good position femoral and tibial alignment is good Fracture: none Soft tissue: normal     Xr Knee Bilateral Ap Standing    Result Date: 8/5/2019  Narrative: Ordering Provider:  Grover Raymond MD Ordering Diagnosis/Indication:  Chronic pain of left knee, Presence of total knee joint prosthesis, left Procedure:  XR KNEE BILATERAL AP STANDING Exam Date:  8/5/19 RELEVANT PRIOR IMAGES:  XR Knee Bilateral AP Standing 04/22/2019 [id number] Final COMPARISON:  Todays X-rays were compared to previous images dated 4/22/2019.     Impression:  total knee arthroplasty, femoral tibial component and alignment position is good, bone quality is good, right knee loss of joint space femur-tibia Bone quality: good Alignment: femoral and tibial alignment is good Fracture: none Soft tissue: normal             ASSESSMENT:    Diagnoses and all orders for this visit:    Chronic pain of left knee  -     HYDROcodone-acetaminophen (NORCO) 7.5-325 MG per tablet; Take 1 tablet by mouth Every 6 (Six) Hours As Needed for Moderate Pain .    Presence of total knee joint prosthesis, left    Swelling of knee joint, left    Left knee pain, unspecified chronicity          PLAN      norco is prescribed for pain control, I cautioned her that narcotic medicines can  be addictive, abuse can lead to death or coma, do not drive, do not ingest alcohol or sedatives.  Excellent outcomes of total knee arthroplasty, she is very pleased with surgical outcomes of her total knee arthroplasty.    Grover Raymond MD

## 2019-08-27 ENCOUNTER — TRANSCRIBE ORDERS (OUTPATIENT)
Dept: ORTHOPEDIC SURGERY | Facility: CLINIC | Age: 64
End: 2019-08-27

## 2019-08-27 DIAGNOSIS — M25.561 ACUTE BILATERAL KNEE PAIN: Primary | ICD-10-CM

## 2019-08-27 DIAGNOSIS — M25.562 ACUTE BILATERAL KNEE PAIN: Primary | ICD-10-CM

## 2019-09-17 RX ORDER — DILTIAZEM HYDROCHLORIDE 180 MG/1
CAPSULE, COATED, EXTENDED RELEASE ORAL
Qty: 30 CAPSULE | Refills: 6 | Status: SHIPPED | OUTPATIENT
Start: 2019-09-17 | End: 2019-09-19 | Stop reason: SDUPTHER

## 2019-09-19 RX ORDER — DILTIAZEM HYDROCHLORIDE 180 MG/1
180 CAPSULE, COATED, EXTENDED RELEASE ORAL DAILY
Qty: 30 CAPSULE | Refills: 6 | Status: SHIPPED | OUTPATIENT
Start: 2019-09-19 | End: 2020-10-30

## 2019-11-14 ENCOUNTER — HOSPITAL ENCOUNTER (INPATIENT)
Facility: HOSPITAL | Age: 64
LOS: 4 days | Discharge: HOME HEALTH | End: 2019-11-18
Attending: FAMILY MEDICINE | Admitting: FAMILY MEDICINE

## 2019-11-14 ENCOUNTER — APPOINTMENT (OUTPATIENT)
Dept: GENERAL RADIOLOGY | Facility: HOSPITAL | Age: 64
End: 2019-11-14

## 2019-11-14 ENCOUNTER — APPOINTMENT (OUTPATIENT)
Dept: CT IMAGING | Facility: HOSPITAL | Age: 64
End: 2019-11-14

## 2019-11-14 DIAGNOSIS — R11.2 NAUSEA AND VOMITING, INTRACTABILITY OF VOMITING NOT SPECIFIED, UNSPECIFIED VOMITING TYPE: Primary | ICD-10-CM

## 2019-11-14 DIAGNOSIS — K92.2 ACUTE GI BLEEDING: ICD-10-CM

## 2019-11-14 DIAGNOSIS — K92.0 HEMATEMESIS WITH NAUSEA: ICD-10-CM

## 2019-11-14 DIAGNOSIS — K92.2 UPPER GI BLEEDING: ICD-10-CM

## 2019-11-14 DIAGNOSIS — R11.2 NON-INTRACTABLE VOMITING WITH NAUSEA, UNSPECIFIED VOMITING TYPE: ICD-10-CM

## 2019-11-14 LAB
ABO GROUP BLD: NORMAL
ALBUMIN SERPL-MCNC: 4 G/DL (ref 3.5–5.2)
ALBUMIN/GLOB SERPL: 1.3 G/DL
ALP SERPL-CCNC: 152 U/L (ref 39–117)
ALT SERPL W P-5'-P-CCNC: 12 U/L (ref 1–33)
ANION GAP SERPL CALCULATED.3IONS-SCNC: 14 MMOL/L (ref 5–15)
AST SERPL-CCNC: 13 U/L (ref 1–32)
BASOPHILS # BLD AUTO: 0.05 10*3/MM3 (ref 0–0.2)
BASOPHILS NFR BLD AUTO: 0.4 % (ref 0–1.5)
BILIRUB SERPL-MCNC: 0.2 MG/DL (ref 0.2–1.2)
BLD GP AB SCN SERPL QL: NEGATIVE
BUN BLD-MCNC: 8 MG/DL (ref 8–23)
BUN/CREAT SERPL: 9.3 (ref 7–25)
CALCIUM SPEC-SCNC: 9.9 MG/DL (ref 8.6–10.5)
CHLORIDE SERPL-SCNC: 99 MMOL/L (ref 98–107)
CO2 SERPL-SCNC: 25 MMOL/L (ref 22–29)
CREAT BLD-MCNC: 0.86 MG/DL (ref 0.57–1)
DEPRECATED RDW RBC AUTO: 50.3 FL (ref 37–54)
EOSINOPHIL # BLD AUTO: 0.06 10*3/MM3 (ref 0–0.4)
EOSINOPHIL NFR BLD AUTO: 0.4 % (ref 0.3–6.2)
ERYTHROCYTE [DISTWIDTH] IN BLOOD BY AUTOMATED COUNT: 16.1 % (ref 12.3–15.4)
GFR SERPL CREATININE-BSD FRML MDRD: 67 ML/MIN/1.73
GLOBULIN UR ELPH-MCNC: 3.2 GM/DL
GLUCOSE BLD-MCNC: 118 MG/DL (ref 65–99)
HCT VFR BLD AUTO: 38 % (ref 34–46.6)
HGB BLD-MCNC: 11.9 G/DL (ref 12–15.9)
HOLD SPECIMEN: NORMAL
HOLD SPECIMEN: NORMAL
IMM GRANULOCYTES # BLD AUTO: 0.06 10*3/MM3 (ref 0–0.05)
IMM GRANULOCYTES NFR BLD AUTO: 0.4 % (ref 0–0.5)
LYMPHOCYTES # BLD AUTO: 1.76 10*3/MM3 (ref 0.7–3.1)
LYMPHOCYTES NFR BLD AUTO: 12.7 % (ref 19.6–45.3)
Lab: NORMAL
MCH RBC QN AUTO: 27.1 PG (ref 26.6–33)
MCHC RBC AUTO-ENTMCNC: 31.3 G/DL (ref 31.5–35.7)
MCV RBC AUTO: 86.6 FL (ref 79–97)
MONOCYTES # BLD AUTO: 0.62 10*3/MM3 (ref 0.1–0.9)
MONOCYTES NFR BLD AUTO: 4.5 % (ref 5–12)
NEUTROPHILS # BLD AUTO: 11.28 10*3/MM3 (ref 1.7–7)
NEUTROPHILS NFR BLD AUTO: 81.6 % (ref 42.7–76)
NRBC BLD AUTO-RTO: 0 /100 WBC (ref 0–0.2)
PLATELET # BLD AUTO: 356 10*3/MM3 (ref 140–450)
PMV BLD AUTO: 10.8 FL (ref 6–12)
POTASSIUM BLD-SCNC: 3 MMOL/L (ref 3.5–5.2)
PROT SERPL-MCNC: 7.2 G/DL (ref 6–8.5)
RBC # BLD AUTO: 4.39 10*6/MM3 (ref 3.77–5.28)
RH BLD: POSITIVE
SODIUM BLD-SCNC: 138 MMOL/L (ref 136–145)
T&S EXPIRATION DATE: NORMAL
WBC NRBC COR # BLD: 13.83 10*3/MM3 (ref 3.4–10.8)
WHOLE BLOOD HOLD SPECIMEN: NORMAL
WHOLE BLOOD HOLD SPECIMEN: NORMAL

## 2019-11-14 PROCEDURE — G0378 HOSPITAL OBSERVATION PER HR: HCPCS

## 2019-11-14 PROCEDURE — 25010000002 IOPAMIDOL 61 % SOLUTION: Performed by: FAMILY MEDICINE

## 2019-11-14 PROCEDURE — 25010000002 MORPHINE PER 10 MG: Performed by: FAMILY MEDICINE

## 2019-11-14 PROCEDURE — 74177 CT ABD & PELVIS W/CONTRAST: CPT

## 2019-11-14 PROCEDURE — 25010000002 ONDANSETRON PER 1 MG: Performed by: FAMILY MEDICINE

## 2019-11-14 PROCEDURE — 99285 EMERGENCY DEPT VISIT HI MDM: CPT

## 2019-11-14 PROCEDURE — 85025 COMPLETE CBC W/AUTO DIFF WBC: CPT

## 2019-11-14 PROCEDURE — 99284 EMERGENCY DEPT VISIT MOD MDM: CPT

## 2019-11-14 PROCEDURE — 86901 BLOOD TYPING SEROLOGIC RH(D): CPT

## 2019-11-14 PROCEDURE — 86900 BLOOD TYPING SEROLOGIC ABO: CPT

## 2019-11-14 PROCEDURE — 86850 RBC ANTIBODY SCREEN: CPT

## 2019-11-14 PROCEDURE — 71046 X-RAY EXAM CHEST 2 VIEWS: CPT

## 2019-11-14 PROCEDURE — 25010000003 POTASSIUM CHLORIDE 10 MEQ/100ML SOLUTION: Performed by: FAMILY MEDICINE

## 2019-11-14 PROCEDURE — 80053 COMPREHEN METABOLIC PANEL: CPT

## 2019-11-14 RX ORDER — SODIUM CHLORIDE 0.9 % (FLUSH) 0.9 %
10 SYRINGE (ML) INJECTION AS NEEDED
Status: DISCONTINUED | OUTPATIENT
Start: 2019-11-14 | End: 2019-11-18 | Stop reason: HOSPADM

## 2019-11-14 RX ORDER — PANTOPRAZOLE SODIUM 40 MG/10ML
80 INJECTION, POWDER, LYOPHILIZED, FOR SOLUTION INTRAVENOUS ONCE
Status: COMPLETED | OUTPATIENT
Start: 2019-11-14 | End: 2019-11-14

## 2019-11-14 RX ORDER — POTASSIUM CHLORIDE 7.45 MG/ML
10 INJECTION INTRAVENOUS ONCE
Status: COMPLETED | OUTPATIENT
Start: 2019-11-14 | End: 2019-11-14

## 2019-11-14 RX ORDER — SODIUM CHLORIDE 9 MG/ML
INJECTION, SOLUTION INTRAVENOUS
Status: COMPLETED
Start: 2019-11-14 | End: 2019-11-15

## 2019-11-14 RX ORDER — SODIUM CHLORIDE 9 MG/ML
125 INJECTION, SOLUTION INTRAVENOUS CONTINUOUS
Status: DISCONTINUED | OUTPATIENT
Start: 2019-11-14 | End: 2019-11-15

## 2019-11-14 RX ORDER — ONDANSETRON 2 MG/ML
4 INJECTION INTRAMUSCULAR; INTRAVENOUS ONCE
Status: COMPLETED | OUTPATIENT
Start: 2019-11-14 | End: 2019-11-14

## 2019-11-14 RX ORDER — SODIUM CHLORIDE 0.9 % (FLUSH) 0.9 %
10 SYRINGE (ML) INJECTION EVERY 12 HOURS SCHEDULED
Status: DISCONTINUED | OUTPATIENT
Start: 2019-11-14 | End: 2019-11-18 | Stop reason: HOSPADM

## 2019-11-14 RX ADMIN — POTASSIUM CHLORIDE 10 MEQ: 7.46 INJECTION, SOLUTION INTRAVENOUS at 21:16

## 2019-11-14 RX ADMIN — SODIUM CHLORIDE 125 ML/HR: 9 INJECTION, SOLUTION INTRAVENOUS at 20:49

## 2019-11-14 RX ADMIN — IOPAMIDOL 95 ML: 612 INJECTION, SOLUTION INTRAVENOUS at 20:58

## 2019-11-14 RX ADMIN — MORPHINE SULFATE 4 MG: 4 INJECTION INTRAVENOUS at 20:38

## 2019-11-14 RX ADMIN — PANTOPRAZOLE SODIUM 80 MG: 40 INJECTION, POWDER, FOR SOLUTION INTRAVENOUS at 21:41

## 2019-11-14 RX ADMIN — ONDANSETRON 4 MG: 2 INJECTION INTRAMUSCULAR; INTRAVENOUS at 20:35

## 2019-11-15 ENCOUNTER — ANESTHESIA (OUTPATIENT)
Dept: GASTROENTEROLOGY | Facility: HOSPITAL | Age: 64
End: 2019-11-15

## 2019-11-15 ENCOUNTER — ANESTHESIA EVENT (OUTPATIENT)
Dept: GASTROENTEROLOGY | Facility: HOSPITAL | Age: 64
End: 2019-11-15

## 2019-11-15 LAB
ANION GAP SERPL CALCULATED.3IONS-SCNC: 9 MMOL/L (ref 5–15)
BUN BLD-MCNC: 15 MG/DL (ref 8–23)
BUN/CREAT SERPL: 21.7 (ref 7–25)
CALCIUM SPEC-SCNC: 8.6 MG/DL (ref 8.6–10.5)
CHLORIDE SERPL-SCNC: 106 MMOL/L (ref 98–107)
CO2 SERPL-SCNC: 25 MMOL/L (ref 22–29)
CREAT BLD-MCNC: 0.69 MG/DL (ref 0.57–1)
DEPRECATED RDW RBC AUTO: 50 FL (ref 37–54)
ERYTHROCYTE [DISTWIDTH] IN BLOOD BY AUTOMATED COUNT: 15.9 % (ref 12.3–15.4)
GFR SERPL CREATININE-BSD FRML MDRD: 86 ML/MIN/1.73
GLUCOSE BLD-MCNC: 104 MG/DL (ref 65–99)
HCT VFR BLD AUTO: 29 % (ref 34–46.6)
HGB BLD-MCNC: 9.1 G/DL (ref 12–15.9)
MAGNESIUM SERPL-MCNC: 2 MG/DL (ref 1.6–2.4)
MCH RBC QN AUTO: 26.9 PG (ref 26.6–33)
MCHC RBC AUTO-ENTMCNC: 31.4 G/DL (ref 31.5–35.7)
MCV RBC AUTO: 85.8 FL (ref 79–97)
PLATELET # BLD AUTO: 273 10*3/MM3 (ref 140–450)
PMV BLD AUTO: 11.3 FL (ref 6–12)
POTASSIUM BLD-SCNC: 3.2 MMOL/L (ref 3.5–5.2)
POTASSIUM BLD-SCNC: 3.3 MMOL/L (ref 3.5–5.2)
RBC # BLD AUTO: 3.38 10*6/MM3 (ref 3.77–5.28)
SODIUM BLD-SCNC: 140 MMOL/L (ref 136–145)
WBC NRBC COR # BLD: 12.47 10*3/MM3 (ref 3.4–10.8)

## 2019-11-15 PROCEDURE — 25010000002 ONDANSETRON PER 1 MG: Performed by: PHYSICIAN ASSISTANT

## 2019-11-15 PROCEDURE — 83735 ASSAY OF MAGNESIUM: CPT | Performed by: INTERNAL MEDICINE

## 2019-11-15 PROCEDURE — 84132 ASSAY OF SERUM POTASSIUM: CPT | Performed by: PHYSICIAN ASSISTANT

## 2019-11-15 PROCEDURE — 88342 IMHCHEM/IMCYTCHM 1ST ANTB: CPT | Performed by: INTERNAL MEDICINE

## 2019-11-15 PROCEDURE — 25010000002 PROMETHAZINE PER 50 MG: Performed by: PHYSICIAN ASSISTANT

## 2019-11-15 PROCEDURE — G0378 HOSPITAL OBSERVATION PER HR: HCPCS

## 2019-11-15 PROCEDURE — 88305 TISSUE EXAM BY PATHOLOGIST: CPT | Performed by: INTERNAL MEDICINE

## 2019-11-15 PROCEDURE — 25010000002 ONDANSETRON PER 1 MG: Performed by: INTERNAL MEDICINE

## 2019-11-15 PROCEDURE — 80048 BASIC METABOLIC PNL TOTAL CA: CPT | Performed by: INTERNAL MEDICINE

## 2019-11-15 PROCEDURE — 85027 COMPLETE CBC AUTOMATED: CPT | Performed by: INTERNAL MEDICINE

## 2019-11-15 PROCEDURE — 36415 COLL VENOUS BLD VENIPUNCTURE: CPT | Performed by: INTERNAL MEDICINE

## 2019-11-15 PROCEDURE — 25010000002 MORPHINE PER 10 MG: Performed by: PHYSICIAN ASSISTANT

## 2019-11-15 PROCEDURE — 43239 EGD BIOPSY SINGLE/MULTIPLE: CPT | Performed by: INTERNAL MEDICINE

## 2019-11-15 PROCEDURE — 88305 TISSUE EXAM BY PATHOLOGIST: CPT | Performed by: PATHOLOGY

## 2019-11-15 PROCEDURE — 0DB78ZX EXCISION OF STOMACH, PYLORUS, VIA NATURAL OR ARTIFICIAL OPENING ENDOSCOPIC, DIAGNOSTIC: ICD-10-PCS | Performed by: INTERNAL MEDICINE

## 2019-11-15 PROCEDURE — 88342 IMHCHEM/IMCYTCHM 1ST ANTB: CPT | Performed by: PATHOLOGY

## 2019-11-15 PROCEDURE — 25010000003 POTASSIUM CHLORIDE 10 MEQ/100ML SOLUTION: Performed by: PHYSICIAN ASSISTANT

## 2019-11-15 PROCEDURE — 25010000002 PROPOFOL 10 MG/ML EMULSION: Performed by: NURSE ANESTHETIST, CERTIFIED REGISTERED

## 2019-11-15 RX ORDER — LIDOCAINE HYDROCHLORIDE 20 MG/ML
INJECTION, SOLUTION INTRAVENOUS AS NEEDED
Status: DISCONTINUED | OUTPATIENT
Start: 2019-11-15 | End: 2019-11-15 | Stop reason: SURG

## 2019-11-15 RX ORDER — ONDANSETRON 2 MG/ML
4 INJECTION INTRAMUSCULAR; INTRAVENOUS EVERY 6 HOURS PRN
Status: DISCONTINUED | OUTPATIENT
Start: 2019-11-15 | End: 2019-11-15

## 2019-11-15 RX ORDER — POTASSIUM CHLORIDE 7.45 MG/ML
10 INJECTION INTRAVENOUS
Status: DISCONTINUED | OUTPATIENT
Start: 2019-11-15 | End: 2019-11-17 | Stop reason: SDUPTHER

## 2019-11-15 RX ORDER — CLOPIDOGREL BISULFATE 75 MG/1
75 TABLET ORAL DAILY
COMMUNITY
End: 2019-11-18 | Stop reason: HOSPADM

## 2019-11-15 RX ORDER — BUSPIRONE HYDROCHLORIDE 10 MG/1
10 TABLET ORAL DAILY
COMMUNITY
End: 2023-01-13

## 2019-11-15 RX ORDER — SODIUM CHLORIDE 9 MG/ML
75 INJECTION, SOLUTION INTRAVENOUS CONTINUOUS
Status: DISCONTINUED | OUTPATIENT
Start: 2019-11-15 | End: 2019-11-18 | Stop reason: HOSPADM

## 2019-11-15 RX ORDER — TIZANIDINE 4 MG/1
4 TABLET ORAL 3 TIMES DAILY
Status: DISCONTINUED | OUTPATIENT
Start: 2019-11-15 | End: 2019-11-18 | Stop reason: HOSPADM

## 2019-11-15 RX ORDER — PROMETHAZINE HYDROCHLORIDE 25 MG/ML
12.5 INJECTION, SOLUTION INTRAMUSCULAR; INTRAVENOUS EVERY 6 HOURS PRN
Status: DISCONTINUED | OUTPATIENT
Start: 2019-11-15 | End: 2019-11-18 | Stop reason: HOSPADM

## 2019-11-15 RX ORDER — BUSPIRONE HYDROCHLORIDE 10 MG/1
10 TABLET ORAL DAILY
Status: DISCONTINUED | OUTPATIENT
Start: 2019-11-15 | End: 2019-11-18 | Stop reason: HOSPADM

## 2019-11-15 RX ORDER — ONDANSETRON 2 MG/ML
4 INJECTION INTRAMUSCULAR; INTRAVENOUS EVERY 6 HOURS
Status: DISCONTINUED | OUTPATIENT
Start: 2019-11-15 | End: 2019-11-18 | Stop reason: HOSPADM

## 2019-11-15 RX ORDER — PROPOFOL 10 MG/ML
VIAL (ML) INTRAVENOUS AS NEEDED
Status: DISCONTINUED | OUTPATIENT
Start: 2019-11-15 | End: 2019-11-15 | Stop reason: SURG

## 2019-11-15 RX ORDER — MORPHINE SULFATE 2 MG/ML
2 INJECTION, SOLUTION INTRAMUSCULAR; INTRAVENOUS
Status: DISCONTINUED | OUTPATIENT
Start: 2019-11-15 | End: 2019-11-18 | Stop reason: HOSPADM

## 2019-11-15 RX ORDER — CITALOPRAM 40 MG/1
40 TABLET ORAL DAILY
Status: DISCONTINUED | OUTPATIENT
Start: 2019-11-15 | End: 2019-11-18 | Stop reason: HOSPADM

## 2019-11-15 RX ORDER — QUETIAPINE FUMARATE 100 MG/1
100 TABLET, FILM COATED ORAL NIGHTLY
Status: DISCONTINUED | OUTPATIENT
Start: 2019-11-15 | End: 2019-11-18 | Stop reason: HOSPADM

## 2019-11-15 RX ORDER — LISINOPRIL 10 MG/1
10 TABLET ORAL DAILY
Status: DISCONTINUED | OUTPATIENT
Start: 2019-11-15 | End: 2019-11-18 | Stop reason: HOSPADM

## 2019-11-15 RX ADMIN — ONDANSETRON 4 MG: 2 INJECTION INTRAMUSCULAR; INTRAVENOUS at 23:30

## 2019-11-15 RX ADMIN — POTASSIUM CHLORIDE 10 MEQ: 7.46 INJECTION, SOLUTION INTRAVENOUS at 14:32

## 2019-11-15 RX ADMIN — QUETIAPINE 100 MG: 100 TABLET ORAL at 20:47

## 2019-11-15 RX ADMIN — SODIUM CHLORIDE 75 ML/HR: 900 INJECTION, SOLUTION INTRAVENOUS at 10:15

## 2019-11-15 RX ADMIN — PROPOFOL 20 MG: 10 INJECTION, EMULSION INTRAVENOUS at 08:53

## 2019-11-15 RX ADMIN — POTASSIUM CHLORIDE 10 MEQ: 7.46 INJECTION, SOLUTION INTRAVENOUS at 23:30

## 2019-11-15 RX ADMIN — ONDANSETRON 4 MG: 2 INJECTION INTRAMUSCULAR; INTRAVENOUS at 12:02

## 2019-11-15 RX ADMIN — ONDANSETRON 4 MG: 2 INJECTION INTRAMUSCULAR; INTRAVENOUS at 01:34

## 2019-11-15 RX ADMIN — CITALOPRAM HYDROBROMIDE 40 MG: 40 TABLET ORAL at 11:07

## 2019-11-15 RX ADMIN — SODIUM CHLORIDE 125 ML/HR: 9 INJECTION, SOLUTION INTRAVENOUS at 00:41

## 2019-11-15 RX ADMIN — PROPOFOL 20 MG: 10 INJECTION, EMULSION INTRAVENOUS at 08:52

## 2019-11-15 RX ADMIN — SODIUM CHLORIDE, PRESERVATIVE FREE 10 ML: 5 INJECTION INTRAVENOUS at 20:48

## 2019-11-15 RX ADMIN — TIZANIDINE 4 MG: 4 TABLET ORAL at 20:47

## 2019-11-15 RX ADMIN — LIDOCAINE HYDROCHLORIDE 100 MG: 20 INJECTION, SOLUTION INTRAVENOUS at 08:51

## 2019-11-15 RX ADMIN — POTASSIUM CHLORIDE 10 MEQ: 7.46 INJECTION, SOLUTION INTRAVENOUS at 13:15

## 2019-11-15 RX ADMIN — POTASSIUM CHLORIDE 10 MEQ: 7.46 INJECTION, SOLUTION INTRAVENOUS at 22:31

## 2019-11-15 RX ADMIN — SODIUM CHLORIDE, PRESERVATIVE FREE 10 ML: 5 INJECTION INTRAVENOUS at 10:19

## 2019-11-15 RX ADMIN — SODIUM CHLORIDE 8 MG/HR: 900 INJECTION INTRAVENOUS at 04:11

## 2019-11-15 RX ADMIN — PROPOFOL 80 MG: 10 INJECTION, EMULSION INTRAVENOUS at 08:51

## 2019-11-15 RX ADMIN — ONDANSETRON 4 MG: 2 INJECTION INTRAMUSCULAR; INTRAVENOUS at 17:45

## 2019-11-15 RX ADMIN — SODIUM CHLORIDE 75 ML/HR: 900 INJECTION, SOLUTION INTRAVENOUS at 03:00

## 2019-11-15 RX ADMIN — SODIUM CHLORIDE 75 ML/HR: 900 INJECTION, SOLUTION INTRAVENOUS at 23:30

## 2019-11-15 RX ADMIN — LISINOPRIL 10 MG: 10 TABLET ORAL at 11:08

## 2019-11-15 RX ADMIN — MORPHINE SULFATE 2 MG: 2 INJECTION, SOLUTION INTRAMUSCULAR; INTRAVENOUS at 12:02

## 2019-11-15 RX ADMIN — SODIUM CHLORIDE 8 MG/HR: 900 INJECTION INTRAVENOUS at 10:15

## 2019-11-15 RX ADMIN — SODIUM CHLORIDE, PRESERVATIVE FREE 10 ML: 5 INJECTION INTRAVENOUS at 00:36

## 2019-11-15 RX ADMIN — SERTRALINE HYDROCHLORIDE 50 MG: 50 TABLET ORAL at 11:08

## 2019-11-15 RX ADMIN — SODIUM CHLORIDE 75 ML/HR: 900 INJECTION, SOLUTION INTRAVENOUS at 14:44

## 2019-11-15 RX ADMIN — MORPHINE SULFATE 2 MG: 2 INJECTION, SOLUTION INTRAMUSCULAR; INTRAVENOUS at 20:47

## 2019-11-15 RX ADMIN — PROMETHAZINE HYDROCHLORIDE 12.5 MG: 25 INJECTION INTRAMUSCULAR; INTRAVENOUS at 20:48

## 2019-11-15 RX ADMIN — SODIUM CHLORIDE 8 MG/HR: 900 INJECTION INTRAVENOUS at 20:47

## 2019-11-15 RX ADMIN — TIZANIDINE 4 MG: 4 TABLET ORAL at 11:08

## 2019-11-15 RX ADMIN — PROPOFOL 20 MG: 10 INJECTION, EMULSION INTRAVENOUS at 08:54

## 2019-11-15 RX ADMIN — BUSPIRONE HYDROCHLORIDE 10 MG: 10 TABLET ORAL at 11:07

## 2019-11-15 RX ADMIN — POTASSIUM CHLORIDE 10 MEQ: 7.46 INJECTION, SOLUTION INTRAVENOUS at 12:25

## 2019-11-15 RX ADMIN — POTASSIUM CHLORIDE 10 MEQ: 7.46 INJECTION, SOLUTION INTRAVENOUS at 15:56

## 2019-11-15 RX ADMIN — SODIUM CHLORIDE 8 MG/HR: 900 INJECTION INTRAVENOUS at 14:33

## 2019-11-15 NOTE — ANESTHESIA POSTPROCEDURE EVALUATION
Patient: Mary Nelson    Procedure Summary     Date:  11/15/19 Room / Location:  [facility] END[facility]COPY 3 / [facility]    Anesthesia Start:  0848 Anesthesia Stop:  0857    Procedure:  ESOPHAGOGASTRODUODENOSCOPY (N/A ) Diagnosis:       Acute GI bleeding      (Acute GI bleeding [K92.2])    Surgeon:  Sean Fernandez MD Provider:  Jacoby Vasquez CRNA    Anesthesia Type:  MAC ASA Status:  4          Anesthesia Type: MAC  Last vitals  BP   155/85 (11/15/19 0819)   Temp   97.6 °F (36.4 °C) (11/15/19 0819)   Pulse   86 (11/15/19 0819)   Resp   16 (11/15/19 0819)     SpO2   99 % (11/15/19 0819)     Post Anesthesia Care and Evaluation    Patient location during evaluation: bedside  Patient participation: complete - patient participated  Level of consciousness: awake  Pain score: 0  Pain management: adequate  Airway patency: patent  Anesthetic complications: No anesthetic complications  PONV Status: none  Cardiovascular status: acceptable  Respiratory status: acceptable  Hydration status: acceptable

## 2019-11-15 NOTE — PLAN OF CARE
Problem: Patient Care Overview  Goal: Plan of Care Review  Outcome: Ongoing (interventions implemented as appropriate)   11/15/19 0812   Coping/Psychosocial   Plan of Care Reviewed With patient     Goal: Individualization and Mutuality  Outcome: Ongoing (interventions implemented as appropriate)

## 2019-11-15 NOTE — ED PROVIDER NOTES
Subjective   64-year-old white female presents the emergency department complaint of abdominal pain, nausea, and vomiting blood.  She states that she started vomiting blood approximately 5 PM.  She ate a sandwich and then started vomiting.  She states that she is thrown up approximately 6 times.  The nurse notes that the toilet bowl had a significant amount of blood with clots which she had thrown up.  Patient seems very anxious in the room stating that the room is hot.  She is taking Brilinta and aspirin for her cardiovascular disease.            Review of Systems   Constitutional: Negative for activity change, appetite change, chills, fatigue, fever and unexpected weight change.   HENT: Negative for nosebleeds, rhinorrhea, sore throat, trouble swallowing and voice change.    Eyes: Negative for photophobia, pain and visual disturbance.   Respiratory: Negative for apnea, cough, chest tightness, shortness of breath, wheezing and stridor.    Cardiovascular: Negative for chest pain, palpitations and leg swelling.   Gastrointestinal: Positive for abdominal pain, nausea and vomiting. Negative for abdominal distention, blood in stool, constipation and diarrhea.   Endocrine: Negative for cold intolerance, heat intolerance, polydipsia and polyuria.   Genitourinary: Negative for decreased urine volume, difficulty urinating, dysuria, flank pain, hematuria and urgency.   Musculoskeletal: Negative for arthralgias, myalgias, neck pain and neck stiffness.   Skin: Negative for color change, pallor and rash.   Allergic/Immunologic: Negative for immunocompromised state.   Neurological: Negative for dizziness, seizures, syncope, weakness, light-headedness and numbness.   Hematological: Negative for adenopathy.   Psychiatric/Behavioral: Negative for agitation, confusion, dysphoric mood and suicidal ideas. The patient is not nervous/anxious.        Past Medical History:   Diagnosis Date   • Anxiety    • Arthritis    • COPD (chronic  obstructive pulmonary disease) (CMS/HCC)    • Coronary artery disease    • Depression    • Epigastric pain    • GERD (gastroesophageal reflux disease)    • Head injury 1990   • Hiatal hernia    • High cholesterol    • HTN (hypertension)        Allergies   Allergen Reactions   • Codeine Swelling       Past Surgical History:   Procedure Laterality Date   • ABDOMINAL SURGERY     • CARDIAC CATHETERIZATION N/A 4/19/2017    Procedure: Left Heart Cath;  Surgeon: Kevin Erazo MD;  Location: [facility] CATH INVASIVE LOCATION;  Service:    • CARDIAC CATHETERIZATION  4/19/2017    Procedure: Functional Flow [place];  Surgeon: Kevin Erazo MD;  Location: [facility] CATH INVASIVE LOCATION;  Service:    • CARDIAC CATHETERIZATION N/A 7/6/2017    Procedure: Left Heart Cath;  Surgeon: Kevin Erazo MD;  Location: [facility] CATH INVASIVE LOCATION;  Service:    • CARDIAC CATHETERIZATION N/A 3/9/2019    Procedure: Left Heart Cath;  Surgeon: Brenden Chaparro MD;  Location: [facility] CATH INVASIVE LOCATION;  Service: Cardiology   • COLONOSCOPY  06/27/2016   • ENDOSCOPY AND COLONOSCOPY  06/27/2016    External and internal hemorrhoids. No specimens collected   • ESOPHAGOSCOPY / EGD  06/27/2016    With biopsy-Mildly severe esophagitis. Gastritis. Normal examined duodenum. Biopsied. Medium-sized hiatus hernia. Several biopsies were obtained in the lower third of the esophagus. Several biopsies were obtained in the gastric antrum   • HERNIA REPAIR     • NISSEN FUNDOPLICATION N/A 2/1/2017    Procedure: LAPAROSCOPIC NISSEN FUNDOPLICATION,  HIATAL HERNIA REPAIR  ;  Surgeon: Ced Wilson MD;  Location: [facility] OR;  Service:    • HI RT/LT HEART CATHETERS N/A 4/19/2017    Procedure: Percutaneous Coronary Intervention;  Surgeon: Kevin Erazo MD;  Location: [facility] CATH INVASIVE LOCATION;  Service: Cardiovascular   • TOTAL KNEE ARTHROPLASTY Left 12/27/2018    Procedure: TOTAL KNEE ARTHROPLASTY;  Surgeon: Grover Raymond MD;  Location: [facility] OR;   Service: Orthopedics   • TRANSESOPHAGEAL ECHOCARDIOGRAM (JOAO)  11/18/2015    With color flow-Ef of 60%.Early diastolic dysfunction.Normal RV size and function.Trace to mild mitral and trace tricuspid regurg.No pericardial effusion.   • TUBAL ABDOMINAL LIGATION  1984       Family History   Problem Relation Age of Onset   • Hypertension Other    • Heart disease Father    • Hypertension Father    • COPD Father    • Arthritis Father        Social History     Socioeconomic History   • Marital status:      Spouse name: Not on file   • Number of children: Not on file   • Years of education: Not on file   • Highest education level: Not on file   Tobacco Use   • Smoking status: Current Every Day Smoker     Packs/day: 0.25     Years: 43.00     Pack years: 10.75     Types: Cigarettes   • Smokeless tobacco: Never Used   • Tobacco comment: No cigarettes x 1 month but vape   Substance and Sexual Activity   • Alcohol use: No   • Drug use: No   • Sexual activity: Defer           Objective   Physical Exam   Constitutional: She is oriented to person, place, and time. She appears well-developed and well-nourished. No distress.   HENT:   Head: Normocephalic and atraumatic.   Right Ear: External ear normal.   Left Ear: External ear normal.   Mouth/Throat: Oropharynx is clear and moist. No oropharyngeal exudate.   Eyes: Conjunctivae and EOM are normal. Pupils are equal, round, and reactive to light. Right eye exhibits no discharge. Left eye exhibits no discharge. No scleral icterus.   Neck: Neck supple. No JVD present. No tracheal deviation present. No thyromegaly present.   Cardiovascular: Normal rate, regular rhythm, normal heart sounds and intact distal pulses. Exam reveals no friction rub.   No murmur heard.  Pulmonary/Chest: Effort normal and breath sounds normal. No stridor. No respiratory distress. She has no wheezes. She has no rales. She exhibits no tenderness.   Abdominal: Soft. Bowel sounds are normal. She exhibits no  distension and no mass. There is no tenderness. There is no rebound and no guarding.   The abdomen is soft to palpation but she is having pain across the upper abdomen with palpation.  There is no guarding or rebound.   Genitourinary: Rectal exam shows guaiac negative stool.   Genitourinary Comments: External hemorrhoids are noted on rectal exam.  There is no gross blood.  Guaiac is negative.   Musculoskeletal: She exhibits no edema, tenderness or deformity.   Lymphadenopathy:     She has no cervical adenopathy.   Neurological: She is alert and oriented to person, place, and time. No cranial nerve deficit. She exhibits normal muscle tone. Coordination normal.   Skin: Skin is warm and dry. Capillary refill takes 2 to 3 seconds. No rash noted. She is not diaphoretic. No erythema.   Psychiatric: She has a normal mood and affect. Her behavior is normal. Judgment and thought content normal.   Nursing note and vitals reviewed.      Procedures           ED Course  ED Course as of Nov 14 2149   Thu Nov 14, 2019 2032 Patient was placed in room 14 and evaluated by me.  Her physical exam was essentially normal.  Guaiac was negative.  She seemed very anxious.  Her heart rate was normal at 76.  Blood pressure was elevated at 164/79.  I see no evidence of acute decompensation associated with blood loss.  She was given morphine for pain and Zofran for nausea control.  Labs were obtained and her hematocrit was 38.  On current chemistry, her BUN was 8.  Potassium was mildly low at 3.0 and she was given IV potassium in the emergency department.  CT the abdomen and pelvis was ordered.  [CE]   2148 CT showed a hiatal hernia.  Chest x-ray was not concerning.  She did have multiple bouts of hematemesis in the emergency department.  She was given IV pantoprazole and the case was discussed with Dr. Neumann who agrees to admit the patient.  [CE]      ED Course User Index  [CE] Fady Smalls, DO          Labs Reviewed    COMPREHENSIVE METABOLIC PANEL - Abnormal; Notable for the following components:       Result Value    Glucose 118 (*)     Potassium 3.0 (*)     Alkaline Phosphatase 152 (*)     All other components within normal limits    Narrative:     GFR Normal >60  Chronic Kidney Disease <60  Kidney Failure <15   CBC WITH AUTO DIFFERENTIAL - Abnormal; Notable for the following components:    WBC 13.83 (*)     Hemoglobin 11.9 (*)     MCHC 31.3 (*)     RDW 16.1 (*)     Neutrophil % 81.6 (*)     Lymphocyte % 12.7 (*)     Monocyte % 4.5 (*)     Neutrophils, Absolute 11.28 (*)     Immature Grans, Absolute 0.06 (*)     All other components within normal limits   RAINBOW DRAW    Narrative:     The following orders were created for panel order [place] Draw.  Procedure                               Abnormality         Status                     ---------                               -----------         ------                     Light Blue Top[[id number]]                                   Final result               Green Top (Gel)[[id number]]                                  Final result               Lavender Top[[id number]]                                     Final result               Gold Top - SST[[id number]]                                   Final result                 Please view results for these tests on the individual orders.   TYPE AND SCREEN   PREVIOUS HISTORY   CBC AND DIFFERENTIAL    Narrative:     The following orders were created for panel order CBC & Differential.  Procedure                               Abnormality         Status                     ---------                               -----------         ------                     CBC Auto Differential[[id number]]        Abnormal            Final result                 Please view results for these tests on the individual orders.   LIGHT BLUE TOP   GREEN TOP   LAVENDER TOP   GOLD TOP - SST     Xr Chest 2 View    Result Date: 11/14/2019  Narrative: Exam: AP lateral  chest INDICATION: Hematemesis FINDINGS: AP lateral chest. The bony structures are intact. The cardiomediastinal silhouette is unremarkable. There is a small hiatal hernia. Lungs are clear. No pneumothorax or pleural effusion.     Impression: No acute cardiopulmonary abnormality. Electronically signed by:  Ancelmo Guerrero MD  11/14/2019 9:36 PM CST Workstation: [id number]    Ct Abdomen Pelvis With Contrast    Result Date: 11/14/2019  Narrative: PROCEDURE:  CT ABDOMEN PELVIS W CONTRAST CLINICAL HISTORY:  64 years  Female  abd pain, N/V, R11.2 Nausea with vomiting, unspecified K92.0 Hematemesis  TECHNIQUE: Contiguous axial images obtained through the abdomen and pelvis following intravenous administration of 95 mL Isovue-300.  Coronal and sagittal reformatted images provided.  This CT exam was performed according to our departmental dose-optimization program, which includes one or more of the following dose reduction techniques: automated exposure control, adjustment of the mA and/or kV according to patient size, and/or use of iterative reconstruction technique. COMPARISON:  10/25/2016 FINDINGS: Again seen is a small to moderate hiatal hernia. This hernia appears mildly inflamed. There is no other bowel inflammation. There is no bowel obstruction, pneumatosis, free intraperitoneal air, abscess, or ascites. Normal appendix. Gallstones without evidence of acute cholecystitis or biliary dilatation. The liver, pancreas, spleen, adrenal glands, kidneys, uterus, ovaries, urinary bladder, and osseous structures are normal.     Impression: Small to moderate hiatal hernia again seen. It appears mildly inflamed on the prior exam. No other bowel inflammation. No bowel obstruction or perforation. Gallstones without evidence of acute cholecystitis or biliary dilatation. Electronically signed by:  Diana Phipps MD  11/14/2019 9:20 PM CST Workstation: [id number]          [facility]    Final diagnoses:   Nausea and vomiting, intractability of  vomiting not specified, unspecified vomiting type   Hematemesis with nausea              Fady Smalls,   11/14/19 2558

## 2019-11-15 NOTE — ANESTHESIA PREPROCEDURE EVALUATION
Anesthesia Evaluation     Patient summary reviewed and Nursing notes reviewed   NPO Solid Status: > 8 hours  NPO Liquid Status: > 8 hours           Airway   Mallampati: III  TM distance: <3 FB  Neck ROM: full  Possible difficult intubation  Dental    (+) poor dentition    Pulmonary    (+) COPD, shortness of breath, decreased breath sounds,   Cardiovascular - normal exam    (+) hypertension, CAD, hyperlipidemia,       Neuro/Psych  (+) psychiatric history Anxiety and Depression,     GI/Hepatic/Renal/Endo    (+)  hiatal hernia, GERD, GI bleeding ,     Musculoskeletal     (+) joint swelling,   Abdominal  - normal exam   Substance History      OB/GYN          Other   arthritis,                      Anesthesia Plan    ASA 4     MAC     intravenous induction

## 2019-11-15 NOTE — ED NOTES
Dr Fernandez and stated that we will hold up on the NG tube unless pt vomits again and call her then      Jackie Hernadez RN  11/14/19 2102

## 2019-11-15 NOTE — CONSULTS
SUBJECTIVE:   11/15/2019    Name: Mary Nelson  DOD: 1955    REASON FOR CONSULT: hematemesis    Chief Complaint:     Chief Complaint   Patient presents with   • Nausea   • Vomiting Blood   • Abdominal Pain       Subjective     Patient is 64 y.o. female with past medical history of coronary artery disease, COPD, chronic bronchitis, GERD, hiatal hernia, hypercholesterolemia, hypertension presents with hematemesis since 5 PM yesterday evening.  She had 2 bouts of vomiting last night in the emergency room and subsequently had one small emesis this morning at 1 AM.  Has not had any emesis since then.  Had abdominal pain prior to the onset of emesis.  Denied diarrhea, constipation, rectal bleeding, dysphagia or melena.  She takes aspirin and Plavix and daily basis.  Denied other NSAID usage.  No history of similar symptoms in the past     ROS/HISTORY/ CURRENT MEDICATIONS/OBJECTIVE/VS/PE:   Review of Systems:   Review of Systems   Constitutional: Negative for chills, fatigue, fever and unexpected weight change.   HENT: Negative for congestion, ear discharge, hearing loss, nosebleeds and sore throat.    Eyes: Negative for pain, discharge and redness.   Respiratory: Negative for cough, chest tightness, shortness of breath and wheezing.    Cardiovascular: Negative for chest pain and palpitations.   Gastrointestinal: Positive for abdominal pain, nausea and vomiting. Negative for abdominal distention, blood in stool, constipation and diarrhea.   Endocrine: Negative for cold intolerance, polydipsia, polyphagia and polyuria.   Genitourinary: Negative for dysuria, flank pain, frequency, hematuria and urgency.   Musculoskeletal: Negative for arthralgias, back pain, joint swelling and myalgias.   Skin: Negative for color change, pallor and rash.   Neurological: Negative for tremors, seizures, syncope, weakness and headaches.   Hematological: Negative for adenopathy. Does not bruise/bleed easily.   Psychiatric/Behavioral:  Negative for behavioral problems, confusion, dysphoric mood, hallucinations and suicidal ideas. The patient is not nervous/anxious.      Had hematemesis.  History:     Past Medical History:   Diagnosis Date   • Anxiety    • Chronic bronchitis (CMS/HCC)    • COPD (chronic obstructive pulmonary disease) (CMS/HCC)    • Coronary artery disease    • Depression    • Gastritis    • GERD (gastroesophageal reflux disease)    • Hiatal hernia    • Hypercholesteremia    • Hypertension    • Osteoarthritis      Past Surgical History:   Procedure Laterality Date   • CARDIAC CATHETERIZATION N/A 4/19/2017   • CARDIAC CATHETERIZATION  4/19/2017   • CARDIAC CATHETERIZATION N/A 7/6/2017   • CARDIAC CATHETERIZATION N/A 3/9/2019   • ENDOSCOPY AND COLONOSCOPY     • HERNIA REPAIR     • JOINT REPLACEMENT     • NISSEN FUNDOPLICATION N/A 2/1/2017   • [place] RT/LT HEART CATHETERS N/A 4/19/2017   • TOTAL KNEE ARTHROPLASTY Left 12/27/2018   • TRANSESOPHAGEAL ECHOCARDIOGRAM (JOAO)       Family History   Problem Relation Age of Onset   • Hypertension Father      Social History     Tobacco Use   • Smoking status: Current Every Day Smoker     Packs/day: 0.25     Years: 43.00     Pack years: 10.75     Types: Cigarettes   • Smokeless tobacco: Never Used   Substance Use Topics   • Alcohol use: No   • Drug use: No     Medications Prior to Admission   Medication Sig Dispense Refill Last Dose   • busPIRone (BUSPAR) 10 MG tablet Take 10 mg by mouth Daily.   11/15/2019 at Unknown time   • clopidogrel (PLAVIX) 75 MG tablet Take 75 mg by mouth Daily.   11/15/2019 at Unknown time   • diltiaZEM CD (CARDIZEM CD) 180 MG 24 hr capsule Take 1 capsule by mouth Daily. 30 capsule 6 11/15/2019 at Unknown time   • ezetimibe (ZETIA) 10 MG tablet Take 1 tablet by mouth Daily. 30 tablet 11 11/15/2019 at Unknown time   • lisinopril (PRINIVIL,ZESTRIL) 10 MG tablet Take 10 mg by mouth Daily.   11/15/2019 at Unknown time   • QUEtiapine (SEROquel) 100 MG tablet Take 100 mg by mouth  Every Night.   11/14/2019 at Unknown time   • sertraline (ZOLOFT) 50 MG tablet Take 50 mg by mouth Daily.   11/15/2019 at Unknown time   • tiZANidine (ZANAFLEX) 4 MG tablet Take 4 mg by mouth 3 (Three) Times a Day.   11/15/2019 at Unknown time   • amitriptyline (ELAVIL) 50 MG tablet Take 50 mg by mouth Every Night.   Taking   • aspirin 81 MG chewable tablet Chew 1 tablet Daily. (Patient not taking: Reported on 11/15/2019) 30 tablet 1 Not Taking at Unknown time   • atorvastatin (LIPITOR) 20 MG tablet Take 1 tablet by mouth Daily. (Patient not taking: Reported on 11/15/2019) 90 tablet 3 Not Taking at Unknown time   • citalopram (CELEXA) 40 MG tablet Take 40 mg by mouth Daily.   Taking   • HYDROcodone-acetaminophen (NORCO) 7.5-325 MG per tablet Take 1 tablet by mouth Every 6 (Six) Hours As Needed for Moderate Pain . (Patient not taking: Reported on 11/15/2019) 80 tablet 0 Not Taking at Unknown time   • hydrOXYzine (VISTARIL) 25 MG capsule Take 25 mg by mouth 3 (Three) Times a Day As Needed for Anxiety. Not taking medication   Taking   • nitroglycerin (NITROSTAT) 0.4 MG SL tablet Place 1 tablet under the tongue Every 5 (Five) Minutes As Needed for Chest Pain. Take no more than 3 doses in 15 minutes. 30 tablet 1 Taking   • omeprazole (PRILOSEC) 40 MG capsule Take 40 mg by mouth Daily. Not taking medication   Taking   • ondansetron (ZOFRAN) 4 MG tablet Take 1 tablet by mouth Every 8 (Eight) Hours As Needed for Nausea or Vomiting. 30 tablet 0 Unknown at Unknown time   • ticagrelor (BRILINTA) 60 MG tablet tablet Take 1 tablet by mouth 2 (Two) Times a Day. (Patient not taking: Reported on 11/15/2019) 60 tablet 12 Not Taking at Unknown time     Allergies:  Codeine    I have reviewed the patient's medical history, surgical history and family history in the available medical record system.     Current Medications:     Current Facility-Administered Medications   Medication Dose Route Frequency Provider Last Rate Last Dose   •  Influenza Vac Subunit Quad (FLUCELVAX) injection 0.5 mL  0.5 mL Intramuscular During Hospitalization Matheus Neumann MD       • ondansetron (ZOFRAN) injection 4 mg  4 mg Intravenous Q6H PRN Matheus Neumann MD   4 mg at 11/15/19 0134   • pantoprazole (PROTONIX) 40 mg/100 mL (0.4 mg/mL) in 0.9% NS IVPB  8 mg/hr Intravenous Continuous Matheus Neumann MD 20 mL/hr at 11/15/19 0411 8 mg/hr at 11/15/19 0411   • sodium chloride 0.9 % flush 10 mL  10 mL Intravenous PRN Fady Smalls DO       • sodium chloride 0.9 % flush 10 mL  10 mL Intravenous Q12H Matheus Neumann MD   10 mL at 11/15/19 0036   • sodium chloride 0.9 % flush 10 mL  10 mL Intravenous PRN Matheus Neumann MD       • sodium chloride 0.9 % infusion  75 mL/hr Intravenous Continuous Matheus Neumann MD 75 mL/hr at 11/15/19 0300 75 mL/hr at 11/15/19 0300       Objective     Physical Exam:   Temp:  [97.6 °F (36.4 °C)-97.8 °F (36.6 °C)] 97.6 °F (36.4 °C)  Heart Rate:  [] 86  Resp:  [16-22] 16  BP: (138-195)/() 155/85    Physical Exam:  General Appearance:    Alert, cooperative, in no acute distress   Head:    Normocephalic, without obvious abnormality, atraumatic   Eyes:            Lids and lashes normal, conjunctivae and sclerae normal, no   icterus, no pallor, corneas clear, PERRLA   Ears:    Ears appear intact with no abnormalities noted   Throat:   No oral lesions, no thrush, oral mucosa moist   Neck:   No adenopathy, supple, trachea midline, no thyromegaly, no     carotid bruit, no JVD   Back:     No kyphosis present, no scoliosis present, no skin lesions,       erythema or scars, no tenderness to percussion or                   palpation,   range of motion normal   Lungs:     Clear to auscultation,respirations regular, even and                   unlabored    Heart:    Regular rhythm and normal rate, normal S1 and S2, no            murmur, no gallop, no rub, no click   Breast Exam:    Deferred   Abdomen:     Normal bowel sounds, no masses, no  organomegaly, soft        non-tender, non-distended, no guarding, no rebound                 tenderness   Genitalia:    Deferred   Extremities:   Moves all extremities well, no edema, no cyanosis, no              redness   Pulses:   Pulses palpable and equal bilaterally   Skin:   No bleeding, bruising or rash   Lymph nodes:   No palpable adenopathy   Neurologic:   Cranial nerves 2 - 12 grossly intact, sensation intact, DTR        present and equal bilaterally      Results Review:     Lab Results   Component Value Date    WBC 12.47 (H) 11/15/2019    WBC 13.83 (H) 11/14/2019    WBC 9.87 06/24/2019    HGB 9.1 (L) 11/15/2019    HGB 11.9 (L) 11/14/2019    HGB 11.4 (L) 06/24/2019    HCT 29.0 (L) 11/15/2019    HCT 38.0 11/14/2019    HCT 35.9 06/24/2019     11/15/2019     11/14/2019     06/24/2019     Results from last 7 days   Lab Units 11/14/19  1923   ALK PHOS U/L 152*   ALT (SGPT) U/L 12   AST (SGOT) U/L 13     Results from last 7 days   Lab Units 11/14/19  1923   BILIRUBIN mg/dL 0.2   ALK PHOS U/L 152*     No results found for: LIPASE  Lab Results   Component Value Date    INR 0.87 06/24/2019    INR 1.01 03/09/2019    INR 0.95 03/09/2019         Radiology Review:  Imaging Results (Last 72 Hours)     Procedure Component Value Units Date/Time    XR Chest 2 View [[id number]] Collected:  11/14/19 2115     Updated:  11/14/19 2137    Narrative:       Exam: AP lateral chest    INDICATION: Hematemesis    FINDINGS: AP lateral chest. The bony structures are intact. The  cardiomediastinal silhouette is unremarkable. There is a small  hiatal hernia. Lungs are clear. No pneumothorax or pleural  effusion.      Impression:       No acute cardiopulmonary abnormality.    Electronically signed by:  Ancelmo Guerrero MD  11/14/2019 9:36 PM  CST Workstation: [id number]    CT Abdomen Pelvis With Contrast [[id number]] Collected:  11/14/19 2055     Updated:  11/14/19 2121    Narrative:       PROCEDURE:  CT ABDOMEN PELVIS W  CONTRAST    CLINICAL HISTORY:  64 years  Female  abd pain, N/V, R11.2 Nausea  with vomiting, unspecified K92.0 Hematemesis      TECHNIQUE: Contiguous axial images obtained through the abdomen  and pelvis following intravenous administration of 95 mL  Isovue-300.  Coronal and sagittal reformatted images provided.      This CT exam was performed according to our departmental  dose-optimization program, which includes one or more of the  following dose reduction techniques: automated exposure control,  adjustment of the mA and/or kV according to patient size, and/or  use of iterative reconstruction technique.    COMPARISON:  10/25/2016    FINDINGS:    Again seen is a small to moderate hiatal hernia. This hernia  appears mildly inflamed.    There is no other bowel inflammation. There is no bowel  obstruction, pneumatosis, free intraperitoneal air, abscess, or  ascites. Normal appendix.    Gallstones without evidence of acute cholecystitis or biliary  dilatation.    The liver, pancreas, spleen, adrenal glands, kidneys, uterus,  ovaries, urinary bladder, and osseous structures are normal.       Impression:         Small to moderate hiatal hernia again seen. It appears mildly  inflamed on the prior exam.    No other bowel inflammation. No bowel obstruction or perforation.    Gallstones without evidence of acute cholecystitis or biliary  dilatation.    Electronically signed by:  Diana Phipps MD  11/14/2019 9:20 PM CST  Workstation: [id number]          I reviewed the patient's new clinical results.    I reviewed the patient's new imaging results and agree with the interpretation.     ASSESSMENT/PLAN:   ASSESSMENT: Hematemesis with inflammation of hiatal hernia noted upon CT abdomen and pelvis most likely due to esophagitis or Teetee-Zimmerman tear.  Could also be due to peptic ulcer disease or gastritis.  Patient is stable clinically and hemodynamically    PLAN: Continue bowel rest, antiemetics, IV PPI therapy and pain  management.  Follow H&H closely and transfuse as needed.  Hold aspirin and Plavix.  Proceed with EGD today for further evaluation.  The risks, benefits, and alternatives of this procedure have been discussed with the patient or the responsible party. The patient understands and agrees to proceed.         Sean Fernandez MD  11/15/19  8:47 AM

## 2019-11-15 NOTE — ED NOTES
Spoke with Dr Neumann about patient having an episode of vomiting, orders received.      Debora Stoll, RN  11/15/19 0118

## 2019-11-15 NOTE — ED NOTES
Patient presents to ED with complaint of vomiting blood and upper abdominal pain. She states this started around 1700. She had incident of vomiting blood while in ED with blood clots presents. She states she is taking a blood thinner, Plavix. Denies any recent trauma or surgery.      Debora Stoll, RN  11/14/19 1926

## 2019-11-15 NOTE — PROGRESS NOTES
"    [facility]  INPATIENT PROGRESS NOTE    Length of Stay: 0  Date of Admission: 11/14/2019  Primary Care Physician: Ge Jean MD    Subjective   Chief Complaint/HPI: This 64-year-old  female was admitted to hospital services secondary to nausea, abdominal pain, and hematemesis.  Patient underwent EGD with gastroenterology today and a large Teetee-Zimmerman tear was identified.  There was no active bleeding.  Per gastroenterology, Dr. Fernandez, patient should remain n.p.o. except sips with meds.  She recommended continuing to hold Plavix or any other antiplatelets.  Patient reports that she is on Plavix alone and is no longer on Brilinta.  Patient reports \"I have a lot of pain when I burp.\"  Will continue to monitor hemoglobin and proceed per gastroenterology recommendations.    Review of Systems   Constitutional: Negative for chills and fever.   Respiratory: Negative for shortness of breath.    Cardiovascular: Negative for chest pain.   Gastrointestinal: Positive for abdominal pain.        Epigastric discomfort    Genitourinary: Negative for difficulty urinating and dysuria.   Neurological: Negative for dizziness and light-headedness.   Psychiatric/Behavioral: Negative for confusion.      All pertinent negatives and positives are as above. All other systems have been reviewed and are negative unless otherwise stated.     Objective    Temp:  [97.6 °F (36.4 °C)-99 °F (37.2 °C)] 99 °F (37.2 °C)  Heart Rate:  [] 77  Resp:  [16-22] 18  BP: (138-195)/() 169/75    Physical Exam   Constitutional: She is oriented to person, place, and time. She appears well-developed and well-nourished.   HENT:   Head: Normocephalic and atraumatic.   Cardiovascular: Normal rate and regular rhythm.   Pulmonary/Chest: Effort normal and breath sounds normal. No stridor. No respiratory distress.   Abdominal: Soft. Bowel sounds are normal. She exhibits no " distension. There is tenderness.   Mild epigastric tenderness   Neurological: She is alert and oriented to person, place, and time.   Skin: Skin is warm and dry.   Psychiatric: She has a normal mood and affect. Her behavior is normal.     Results Review:  I have reviewed the labs, radiology results, and diagnostic studies.    Laboratory Data:   Results from last 7 days   Lab Units 11/15/19  0341 11/14/19  1923   SODIUM mmol/L 140 138   POTASSIUM mmol/L 3.3* 3.0*   CHLORIDE mmol/L 106 99   CO2 mmol/L 25.0 25.0   BUN mg/dL 15 8   CREATININE mg/dL 0.69 0.86   GLUCOSE mg/dL 104* 118*   CALCIUM mg/dL 8.6 9.9   BILIRUBIN mg/dL  --  0.2   ALK PHOS U/L  --  152*   ALT (SGPT) U/L  --  12   AST (SGOT) U/L  --  13   ANION GAP mmol/L 9.0 14.0     Estimated Creatinine Clearance: 82.1 mL/min (by C-G formula based on SCr of 0.69 mg/dL).          Results from last 7 days   Lab Units 11/15/19  0341 11/14/19  1923   WBC 10*3/mm3 12.47* 13.83*   HEMOGLOBIN g/dL 9.1* 11.9*   HEMATOCRIT % 29.0* 38.0   PLATELETS 10*3/mm3 273 356           Culture Data:   No results found for: BLOODCX  No results found for: URINECX  No results found for: RESPCX  No results found for: WOUNDCX  No results found for: STOOLCX  No components found for: BODYFLD    Radiology Data:   Imaging Results (Last 24 Hours)     Procedure Component Value Units Date/Time    XR Chest 2 View [[id number]] Collected:  11/14/19 2115     Updated:  11/14/19 2137    Narrative:       Exam: AP lateral chest    INDICATION: Hematemesis    FINDINGS: AP lateral chest. The bony structures are intact. The  cardiomediastinal silhouette is unremarkable. There is a small  hiatal hernia. Lungs are clear. No pneumothorax or pleural  effusion.      Impression:       No acute cardiopulmonary abnormality.    Electronically signed by:  Ancelmo Guerrero MD  11/14/2019 9:36 PM  CST Workstation: [id number]    CT Abdomen Pelvis With Contrast [[id number]] Collected:  11/14/19 2055     Updated:  11/14/19 2121     Narrative:       PROCEDURE:  CT ABDOMEN PELVIS W CONTRAST    CLINICAL HISTORY:  64 years  Female  abd pain, N/V, R11.2 Nausea  with vomiting, unspecified K92.0 Hematemesis      TECHNIQUE: Contiguous axial images obtained through the abdomen  and pelvis following intravenous administration of 95 mL  Isovue-300.  Coronal and sagittal reformatted images provided.      This CT exam was performed according to our departmental  dose-optimization program, which includes one or more of the  following dose reduction techniques: automated exposure control,  adjustment of the mA and/or kV according to patient size, and/or  use of iterative reconstruction technique.    COMPARISON:  10/25/2016    FINDINGS:    Again seen is a small to moderate hiatal hernia. This hernia  appears mildly inflamed.    There is no other bowel inflammation. There is no bowel  obstruction, pneumatosis, free intraperitoneal air, abscess, or  ascites. Normal appendix.    Gallstones without evidence of acute cholecystitis or biliary  dilatation.    The liver, pancreas, spleen, adrenal glands, kidneys, uterus,  ovaries, urinary bladder, and osseous structures are normal.       Impression:         Small to moderate hiatal hernia again seen. It appears mildly  inflamed on the prior exam.    No other bowel inflammation. No bowel obstruction or perforation.    Gallstones without evidence of acute cholecystitis or biliary  dilatation.    Electronically signed by:  Diana Phipps MD  11/14/2019 9:20 PM CST  Workstation: [id number]          I have reviewed the patient's current medications.     Assessment/Plan     Active Hospital Problems    Diagnosis   • **Nausea and vomiting   • Upper GI bleeding   Teetee-Zimmerman tear  History of coronary artery disease  Anemia of acute blood loss  Hypokalemia      Plan: N.p.o. except sips with meds, scheduled antiemetics per gastroenterology, hold Plavix, replete potassium IV, continue to treat as hospital course  dictates.                This document has been electronically signed by DERECK Galan on November 15, 2019 11:37 AM

## 2019-11-15 NOTE — PLAN OF CARE
Problem: Patient Care Overview  Goal: Plan of Care Review  Outcome: Ongoing (interventions implemented as appropriate)  Pt had EGD   11/15/19 3353   Coping/Psychosocial   Plan of Care Reviewed With patient   Plan of Care Review   Progress no change    today.  Goal: Individualization and Mutuality  Outcome: Ongoing (interventions implemented as appropriate)    Goal: Discharge Needs Assessment  Outcome: Ongoing (interventions implemented as appropriate)    Goal: Interprofessional Rounds/Family Conf  Outcome: Ongoing (interventions implemented as appropriate)      Problem: Fall Risk (Adult)  Goal: Identify Related Risk Factors and Signs and Symptoms  Outcome: Ongoing (interventions implemented as appropriate)    Goal: Absence of Fall  Outcome: Ongoing (interventions implemented as appropriate)      Problem: Nausea/Vomiting (Adult)  Goal: Identify Related Risk Factors and Signs and Symptoms  Outcome: Ongoing (interventions implemented as appropriate)    Goal: Symptom Relief  Outcome: Ongoing (interventions implemented as appropriate)    Goal: Adequate Hydration  Outcome: Ongoing (interventions implemented as appropriate)      Problem: Pain, Acute (Adult)  Goal: Identify Related Risk Factors and Signs and Symptoms  Outcome: Ongoing (interventions implemented as appropriate)    Goal: Acceptable Pain Control/Comfort Level  Outcome: Ongoing (interventions implemented as appropriate)

## 2019-11-15 NOTE — H&P
History and Physical  Matheus Neumann MD  Hospitalist    Date of admission: 11/14/2019    Patient Care Team:  Ge Jean MD as PCP - General    Chief complaint   Chief Complaint   Patient presents with   • Nausea   • Vomiting Blood   • Abdominal Pain     Subjective     Patient is a 64 y.o. female admitted for repeated episodes of hematemesis accompanied by weakness, dizziness, poor appetite. She has been on Aspirin/Brilinta for some time now but she has never experienced such episodes of bleeding.    History  Past Medical History:   Diagnosis Date   • Anxiety    • Chronic bronchitis (CMS/HCC)    • COPD (chronic obstructive pulmonary disease) (CMS/HCC)    • Coronary artery disease    • Depression    • Gastritis    • GERD (gastroesophageal reflux disease)    • Hiatal hernia    • Hypercholesteremia    • Hypertension    • Osteoarthritis      Past Surgical History:   Procedure Laterality Date   • CARDIAC CATHETERIZATION N/A 4/19/2017   • CARDIAC CATHETERIZATION  4/19/2017   • CARDIAC CATHETERIZATION N/A 7/6/2017   • CARDIAC CATHETERIZATION N/A 3/9/2019   • ENDOSCOPY AND COLONOSCOPY     • HERNIA REPAIR     • NISSEN FUNDOPLICATION N/A 2/1/2017   • MN RT/LT HEART CATHETERS N/A 4/19/2017   • TOTAL KNEE ARTHROPLASTY Left 12/27/2018   • TRANSESOPHAGEAL ECHOCARDIOGRAM (JOAO)       Family History   Problem Relation Age of Onset   • Hypertension Father      Social History     Tobacco Use   • Smoking status: Current Every Day Smoker     Packs/day: 0.25     Years: 43.00     Pack years: 10.75     Types: Cigarettes   • Smokeless tobacco: Never Used   Substance Use Topics   • Alcohol use: No   • Drug use: No     Medications Prior to Admission   Medication Sig Dispense Refill Last Dose   • busPIRone (BUSPAR) 10 MG tablet Take 10 mg by mouth Daily.   11/15/2019 at Unknown time   • clopidogrel (PLAVIX) 75 MG tablet Take 75 mg by mouth Daily.   11/15/2019 at Unknown time   • diltiaZEM CD (CARDIZEM CD) 180 MG 24 hr capsule Take  1 capsule by mouth Daily. 30 capsule 6 11/15/2019 at Unknown time   • ezetimibe (ZETIA) 10 MG tablet Take 1 tablet by mouth Daily. 30 tablet 11 11/15/2019 at Unknown time   • lisinopril (PRINIVIL,ZESTRIL) 10 MG tablet Take 10 mg by mouth Daily.   11/15/2019 at Unknown time   • QUEtiapine (SEROquel) 100 MG tablet Take 100 mg by mouth Every Night.   11/14/2019 at Unknown time   • sertraline (ZOLOFT) 50 MG tablet Take 50 mg by mouth Daily.   11/15/2019 at Unknown time   • tiZANidine (ZANAFLEX) 4 MG tablet Take 4 mg by mouth 3 (Three) Times a Day.   11/15/2019 at Unknown time   • amitriptyline (ELAVIL) 50 MG tablet Take 50 mg by mouth Every Night.   Taking   • aspirin 81 MG chewable tablet Chew 1 tablet Daily. (Patient not taking: Reported on 11/15/2019) 30 tablet 1 Not Taking at Unknown time   • atorvastatin (LIPITOR) 20 MG tablet Take 1 tablet by mouth Daily. (Patient not taking: Reported on 11/15/2019) 90 tablet 3 Not Taking at Unknown time   • citalopram (CELEXA) 40 MG tablet Take 40 mg by mouth Daily.   Taking   • HYDROcodone-acetaminophen (NORCO) 7.5-325 MG per tablet Take 1 tablet by mouth Every 6 (Six) Hours As Needed for Moderate Pain . (Patient not taking: Reported on 11/15/2019) 80 tablet 0 Not Taking at Unknown time   • hydrOXYzine (VISTARIL) 25 MG capsule Take 25 mg by mouth 3 (Three) Times a Day As Needed for Anxiety. Not taking medication   Taking   • nitroglycerin (NITROSTAT) 0.4 MG SL tablet Place 1 tablet under the tongue Every 5 (Five) Minutes As Needed for Chest Pain. Take no more than 3 doses in 15 minutes. 30 tablet 1 Taking   • omeprazole (PRILOSEC) 40 MG capsule Take 40 mg by mouth Daily. Not taking medication   Taking   • ondansetron (ZOFRAN) 4 MG tablet Take 1 tablet by mouth Every 8 (Eight) Hours As Needed for Nausea or Vomiting. 30 tablet 0 Unknown at Unknown time   • ticagrelor (BRILINTA) 60 MG tablet tablet Take 1 tablet by mouth 2 (Two) Times a Day. (Patient not taking: Reported on  11/15/2019) 60 tablet 12 Not Taking at Unknown time     Allergies:  Codeine    Review of Systems  Review of Systems   Constitutional: Positive for fatigue. Negative for fever.   Respiratory: Negative for cough, choking, shortness of breath and wheezing.    Cardiovascular: Negative for chest pain and palpitations.   Gastrointestinal: Positive for abdominal pain, blood in stool and nausea (Vomiting blood). Negative for abdominal distention.   Genitourinary: Negative for dysuria, frequency, pelvic pain, urgency, vaginal discharge and vaginal pain.   Musculoskeletal: Positive for arthralgias. Negative for back pain and gait problem.   Skin: Positive for pallor. Negative for color change and rash.   Neurological: Positive for weakness. Negative for seizures, syncope, light-headedness, numbness and headaches.   Psychiatric/Behavioral: Negative for agitation, behavioral problems and confusion.   All other systems reviewed and are negative.      Objective     Vital Signs  Temp:  [97.7 °F (36.5 °C)] 97.7 °F (36.5 °C)  Heart Rate:  [] 89  Resp:  [20-22] 22  BP: (144-195)/() 166/77    Physical Exam:  Physical Exam   Constitutional: She is oriented to person, place, and time. She appears well-developed and well-nourished. No distress.   HENT:   Head: Normocephalic and atraumatic.   Eyes: EOM are normal. Pupils are equal, round, and reactive to light. No scleral icterus.   Neck: Normal range of motion. Neck supple.   Cardiovascular: Normal rate and regular rhythm.   Pulmonary/Chest: Effort normal and breath sounds normal. No stridor. No respiratory distress.   Abdominal: Soft. Bowel sounds are normal. She exhibits no distension and no mass. There is tenderness. There is no guarding.   Musculoskeletal: Normal range of motion. She exhibits no edema, tenderness or deformity.   Neurological: She is alert and oriented to person, place, and time. No cranial nerve deficit. Coordination normal.   Skin: Skin is warm and dry.  No rash noted. No erythema. There is pallor.   Psychiatric: She has a normal mood and affect. Her behavior is normal.   Vitals reviewed.      Results Review:   Lab Results (last 24 hours)     Procedure Component Value Units Date/Time    [place] Draw [[id number]] Collected:  11/14/19 1923    Specimen:  Blood Updated:  11/14/19 2140    Narrative:       The following orders were created for panel order [place] Draw.  Procedure                               Abnormality         Status                     ---------                               -----------         ------                     Light Blue Top[[id number]]                                   Final result               Green Top (Gel)[[id number]]                                  Final result               Lavender Top[[id number]]                                     Final result               Gold Top - SST[[id number]]                                   Final result                 Please view results for these tests on the individual orders.    [place]ender Top [[id number]] Collected:  11/14/19 1923    Specimen:  Blood Updated:  11/14/19 2140     Extra Tube hold for add-on     Comment: Auto resulted       Light Blue Top [[id number]] Collected:  11/14/19 1923    Specimen:  Blood Updated:  11/14/19 2140     Extra Tube hold for add-on     Comment: Auto resulted       Green Top (Gel) [[id number]] Collected:  11/14/19 1923    Specimen:  Blood Updated:  11/14/19 2140     Extra Tube Hold for add-ons.     Comment: Auto resulted.       Gold Top - SST [[id number]] Collected:  11/14/19 1923    Specimen:  Blood Updated:  11/14/19 2140     Extra Tube Hold for add-ons.     Comment: Auto resulted.       Comprehensive Metabolic Panel [[id number]]  (Abnormal) Collected:  11/14/19 1923    Specimen:  Blood Updated:  11/14/19 1949     Glucose 118 mg/dL      BUN 8 mg/dL      Creatinine 0.86 mg/dL      Sodium 138 mmol/L      Potassium 3.0 mmol/L      Chloride 99 mmol/L      CO2 25.0 mmol/L       Calcium 9.9 mg/dL      Total Protein 7.2 g/dL      Albumin 4.00 g/dL      ALT (SGPT) 12 U/L      AST (SGOT) 13 U/L      Alkaline Phosphatase 152 U/L      Total Bilirubin 0.2 mg/dL      eGFR Non African Amer 67 mL/min/1.73      Globulin 3.2 gm/dL      A/G Ratio 1.3 g/dL      BUN/Creatinine Ratio 9.3     Anion Gap 14.0 mmol/L     Narrative:       GFR Normal >60  Chronic Kidney Disease <60  Kidney Failure <15    CBC & Differential [[id number]] Collected:  11/14/19 1923    Specimen:  Blood Updated:  11/14/19 1929    Narrative:       The following orders were created for panel order CBC & Differential.  Procedure                               Abnormality         Status                     ---------                               -----------         ------                     CBC Auto Differential[[id number]]        Abnormal            Final result                 Please view results for these tests on the individual orders.    CBC Auto Differential [[id number]]  (Abnormal) Collected:  11/14/19 1923    Specimen:  Blood Updated:  11/14/19 1929     WBC 13.83 10*3/mm3      RBC 4.39 10*6/mm3      Hemoglobin 11.9 g/dL      Hematocrit 38.0 %      MCV 86.6 fL      MCH 27.1 pg      MCHC 31.3 g/dL      RDW 16.1 %      RDW-SD 50.3 fl      MPV 10.8 fL      Platelets 356 10*3/mm3      Neutrophil % 81.6 %      Lymphocyte % 12.7 %      Monocyte % 4.5 %      Eosinophil % 0.4 %      Basophil % 0.4 %      Immature Grans % 0.4 %      Neutrophils, Absolute 11.28 10*3/mm3      Lymphocytes, Absolute 1.76 10*3/mm3      Monocytes, Absolute 0.62 10*3/mm3      Eosinophils, Absolute 0.06 10*3/mm3      Basophils, Absolute 0.05 10*3/mm3      Immature Grans, Absolute 0.06 10*3/mm3      nRBC 0.0 /100 WBC           Imaging Results (Last 24 Hours)     Procedure Component Value Units Date/Time    XR Chest 2 View [[id number]] Collected:  11/14/19 2115     Updated:  11/14/19 2137    Narrative:       Exam: AP lateral chest    INDICATION: Hematemesis    FINDINGS:  AP lateral chest. The bony structures are intact. The  cardiomediastinal silhouette is unremarkable. There is a small  hiatal hernia. Lungs are clear. No pneumothorax or pleural  effusion.      Impression:       No acute cardiopulmonary abnormality.    Electronically signed by:  Ancelmo Guerrero MD  11/14/2019 9:36 PM  CST Workstation: [id number]    CT Abdomen Pelvis With Contrast [[id number]] Collected:  11/14/19 2055     Updated:  11/14/19 2121    Narrative:       PROCEDURE:  CT ABDOMEN PELVIS W CONTRAST    CLINICAL HISTORY:  64 years  Female  abd pain, N/V, R11.2 Nausea  with vomiting, unspecified K92.0 Hematemesis      TECHNIQUE: Contiguous axial images obtained through the abdomen  and pelvis following intravenous administration of 95 mL  Isovue-300.  Coronal and sagittal reformatted images provided.      This CT exam was performed according to our departmental  dose-optimization program, which includes one or more of the  following dose reduction techniques: automated exposure control,  adjustment of the mA and/or kV according to patient size, and/or  use of iterative reconstruction technique.    COMPARISON:  10/25/2016    FINDINGS:    Again seen is a small to moderate hiatal hernia. This hernia  appears mildly inflamed.    There is no other bowel inflammation. There is no bowel  obstruction, pneumatosis, free intraperitoneal air, abscess, or  ascites. Normal appendix.    Gallstones without evidence of acute cholecystitis or biliary  dilatation.    The liver, pancreas, spleen, adrenal glands, kidneys, uterus,  ovaries, urinary bladder, and osseous structures are normal.       Impression:         Small to moderate hiatal hernia again seen. It appears mildly  inflamed on the prior exam.    No other bowel inflammation. No bowel obstruction or perforation.    Gallstones without evidence of acute cholecystitis or biliary  dilatation.    Electronically signed by:  Diana Phipps MD  11/14/2019 9:20 PM CST  Workstation:  [id number]          Assessment/Plan       Nausea and vomiting    Upper GI bleeding    Stop Aspirin / Brilinta, start IV Protonix drip, obtain serial H/H, provide her with symptomatic treatment, place NG tube if she continues to vomit and consult GI. She will require an upper endoscopy.    Matheus Neumann MD  11/15/19  2:35 AM

## 2019-11-15 NOTE — ED NOTES
Spoke with Dr Ricci, notified of vomiting blood and zofran orders from [facility]. No orders for nasogastric tube insertion.      Debora Stoll RN  11/15/19 0125

## 2019-11-16 ENCOUNTER — APPOINTMENT (OUTPATIENT)
Dept: GENERAL RADIOLOGY | Facility: HOSPITAL | Age: 64
End: 2019-11-16

## 2019-11-16 LAB
ALBUMIN SERPL-MCNC: 3.2 G/DL (ref 3.5–5.2)
ALBUMIN/GLOB SERPL: 1.3 G/DL
ALP SERPL-CCNC: 111 U/L (ref 39–117)
ALT SERPL W P-5'-P-CCNC: 9 U/L (ref 1–33)
ANION GAP SERPL CALCULATED.3IONS-SCNC: 8 MMOL/L (ref 5–15)
AST SERPL-CCNC: 12 U/L (ref 1–32)
BASOPHILS # BLD AUTO: 0.03 10*3/MM3 (ref 0–0.2)
BASOPHILS NFR BLD AUTO: 0.5 % (ref 0–1.5)
BILIRUB SERPL-MCNC: 0.2 MG/DL (ref 0.2–1.2)
BUN BLD-MCNC: 6 MG/DL (ref 8–23)
BUN/CREAT SERPL: 7.3 (ref 7–25)
CALCIUM SPEC-SCNC: 8.6 MG/DL (ref 8.6–10.5)
CHLORIDE SERPL-SCNC: 107 MMOL/L (ref 98–107)
CO2 SERPL-SCNC: 26 MMOL/L (ref 22–29)
CREAT BLD-MCNC: 0.82 MG/DL (ref 0.57–1)
DEPRECATED RDW RBC AUTO: 52.8 FL (ref 37–54)
EOSINOPHIL # BLD AUTO: 0.02 10*3/MM3 (ref 0–0.4)
EOSINOPHIL NFR BLD AUTO: 0.3 % (ref 0.3–6.2)
ERYTHROCYTE [DISTWIDTH] IN BLOOD BY AUTOMATED COUNT: 16.3 % (ref 12.3–15.4)
GFR SERPL CREATININE-BSD FRML MDRD: 71 ML/MIN/1.73
GLOBULIN UR ELPH-MCNC: 2.4 GM/DL
GLUCOSE BLD-MCNC: 94 MG/DL (ref 65–99)
HCT VFR BLD AUTO: 26.6 % (ref 34–46.6)
HGB BLD-MCNC: 8 G/DL (ref 12–15.9)
IMM GRANULOCYTES # BLD AUTO: 0.01 10*3/MM3 (ref 0–0.05)
IMM GRANULOCYTES NFR BLD AUTO: 0.2 % (ref 0–0.5)
LYMPHOCYTES # BLD AUTO: 1.44 10*3/MM3 (ref 0.7–3.1)
LYMPHOCYTES NFR BLD AUTO: 24 % (ref 19.6–45.3)
MCH RBC QN AUTO: 26.5 PG (ref 26.6–33)
MCHC RBC AUTO-ENTMCNC: 30.1 G/DL (ref 31.5–35.7)
MCV RBC AUTO: 88.1 FL (ref 79–97)
MONOCYTES # BLD AUTO: 0.28 10*3/MM3 (ref 0.1–0.9)
MONOCYTES NFR BLD AUTO: 4.7 % (ref 5–12)
NEUTROPHILS # BLD AUTO: 4.22 10*3/MM3 (ref 1.7–7)
NEUTROPHILS NFR BLD AUTO: 70.3 % (ref 42.7–76)
NRBC BLD AUTO-RTO: 0 /100 WBC (ref 0–0.2)
PLATELET # BLD AUTO: 204 10*3/MM3 (ref 140–450)
PMV BLD AUTO: 10.7 FL (ref 6–12)
POTASSIUM BLD-SCNC: 3.8 MMOL/L (ref 3.5–5.2)
POTASSIUM BLD-SCNC: 3.8 MMOL/L (ref 3.5–5.2)
PROT SERPL-MCNC: 5.6 G/DL (ref 6–8.5)
RBC # BLD AUTO: 3.02 10*6/MM3 (ref 3.77–5.28)
SODIUM BLD-SCNC: 141 MMOL/L (ref 136–145)
WBC NRBC COR # BLD: 6 10*3/MM3 (ref 3.4–10.8)

## 2019-11-16 PROCEDURE — 84132 ASSAY OF SERUM POTASSIUM: CPT | Performed by: INTERNAL MEDICINE

## 2019-11-16 PROCEDURE — 25010000003 POTASSIUM CHLORIDE 10 MEQ/100ML SOLUTION: Performed by: PHYSICIAN ASSISTANT

## 2019-11-16 PROCEDURE — 80053 COMPREHEN METABOLIC PANEL: CPT | Performed by: PHYSICIAN ASSISTANT

## 2019-11-16 PROCEDURE — 71046 X-RAY EXAM CHEST 2 VIEWS: CPT

## 2019-11-16 PROCEDURE — 85025 COMPLETE CBC W/AUTO DIFF WBC: CPT | Performed by: PHYSICIAN ASSISTANT

## 2019-11-16 PROCEDURE — 25010000002 PROMETHAZINE PER 50 MG: Performed by: PHYSICIAN ASSISTANT

## 2019-11-16 PROCEDURE — 25010000002 ONDANSETRON PER 1 MG: Performed by: PHYSICIAN ASSISTANT

## 2019-11-16 PROCEDURE — 25010000002 MORPHINE PER 10 MG: Performed by: PHYSICIAN ASSISTANT

## 2019-11-16 RX ORDER — ACETAMINOPHEN 325 MG/1
650 TABLET ORAL EVERY 6 HOURS PRN
Status: DISCONTINUED | OUTPATIENT
Start: 2019-11-16 | End: 2019-11-18 | Stop reason: HOSPADM

## 2019-11-16 RX ADMIN — PROMETHAZINE HYDROCHLORIDE 12.5 MG: 25 INJECTION INTRAMUSCULAR; INTRAVENOUS at 20:47

## 2019-11-16 RX ADMIN — ONDANSETRON 4 MG: 2 INJECTION INTRAMUSCULAR; INTRAVENOUS at 18:01

## 2019-11-16 RX ADMIN — ACETAMINOPHEN 650 MG: 325 TABLET, FILM COATED ORAL at 10:26

## 2019-11-16 RX ADMIN — BUSPIRONE HYDROCHLORIDE 10 MG: 10 TABLET ORAL at 09:51

## 2019-11-16 RX ADMIN — MORPHINE SULFATE 2 MG: 2 INJECTION, SOLUTION INTRAMUSCULAR; INTRAVENOUS at 20:47

## 2019-11-16 RX ADMIN — TIZANIDINE 4 MG: 4 TABLET ORAL at 09:50

## 2019-11-16 RX ADMIN — ONDANSETRON 4 MG: 2 INJECTION INTRAMUSCULAR; INTRAVENOUS at 23:54

## 2019-11-16 RX ADMIN — ONDANSETRON 4 MG: 2 INJECTION INTRAMUSCULAR; INTRAVENOUS at 05:59

## 2019-11-16 RX ADMIN — SODIUM CHLORIDE 8 MG/HR: 900 INJECTION INTRAVENOUS at 20:48

## 2019-11-16 RX ADMIN — POTASSIUM CHLORIDE 10 MEQ: 7.46 INJECTION, SOLUTION INTRAVENOUS at 00:33

## 2019-11-16 RX ADMIN — LISINOPRIL 10 MG: 10 TABLET ORAL at 09:51

## 2019-11-16 RX ADMIN — SODIUM CHLORIDE 75 ML/HR: 900 INJECTION, SOLUTION INTRAVENOUS at 14:29

## 2019-11-16 RX ADMIN — QUETIAPINE 100 MG: 100 TABLET ORAL at 20:47

## 2019-11-16 RX ADMIN — CITALOPRAM HYDROBROMIDE 40 MG: 40 TABLET ORAL at 09:50

## 2019-11-16 RX ADMIN — SODIUM CHLORIDE, PRESERVATIVE FREE 10 ML: 5 INJECTION INTRAVENOUS at 09:51

## 2019-11-16 RX ADMIN — ONDANSETRON 4 MG: 2 INJECTION INTRAMUSCULAR; INTRAVENOUS at 12:15

## 2019-11-16 RX ADMIN — POTASSIUM CHLORIDE 10 MEQ: 7.46 INJECTION, SOLUTION INTRAVENOUS at 01:40

## 2019-11-16 RX ADMIN — SERTRALINE HYDROCHLORIDE 50 MG: 50 TABLET ORAL at 09:51

## 2019-11-16 RX ADMIN — SODIUM CHLORIDE, PRESERVATIVE FREE 10 ML: 5 INJECTION INTRAVENOUS at 22:05

## 2019-11-16 RX ADMIN — SODIUM CHLORIDE 8 MG/HR: 900 INJECTION INTRAVENOUS at 09:52

## 2019-11-16 RX ADMIN — SODIUM CHLORIDE 8 MG/HR: 900 INJECTION INTRAVENOUS at 14:26

## 2019-11-16 RX ADMIN — ACETAMINOPHEN 650 MG: 325 TABLET, FILM COATED ORAL at 18:11

## 2019-11-16 RX ADMIN — TIZANIDINE 4 MG: 4 TABLET ORAL at 20:47

## 2019-11-16 RX ADMIN — TIZANIDINE 4 MG: 4 TABLET ORAL at 15:23

## 2019-11-16 NOTE — PROGRESS NOTES
Jc Patel DO,[facility]  Gastroenterology  Hepatology  Endoscopy  Board Certified in Internal Medicine and gastroenterology  [address], [address]  [place]. [address]  - [phone]   F - [phone]     GASTROENTEROLOGY PROGRESS NOTE   JC PATEL DO.         SUBJECTIVE:   11/16/2019  Chief Complaint:     Subjective  : Still having moderately severe chest discomfort.  This happens with any type of reflux or nausea that does occur.  No fevers or chills.  No shortness of air.  Deep Teetee-Zimmerman tear was noted.  Asked to see per Dr. Fernandez         CURRENT MEDICATIONS/OBJECTIVE/VS/PE:     Current Medications:     Current Facility-Administered Medications   Medication Dose Route Frequency Provider Last Rate Last Dose   • busPIRone (BUSPAR) tablet 10 mg  10 mg Oral Daily Gilbert Hearn PA   10 mg at 11/15/19 1107   • citalopram (CeleXA) tablet 40 mg  40 mg Oral Daily Gilbert Hearn PA   40 mg at 11/15/19 1107   • Influenza Vac Subunit Quad (FLUCELVAX) injection 0.5 mL  0.5 mL Intramuscular During Hospitalization Matheus Neumann MD       • lisinopril (PRINIVIL,ZESTRIL) tablet 10 mg  10 mg Oral Daily Gilbert Hearn PA   10 mg at 11/15/19 1108   • morphine injection 2 mg  2 mg Intravenous Q2H PRN Gilbert Hearn PA   2 mg at 11/15/19 2047   • ondansetron (ZOFRAN) injection 4 mg  4 mg Intravenous Q6H Gilbert Hearn PA   4 mg at 11/16/19 0559   • pantoprazole (PROTONIX) 40 mg/100 mL (0.4 mg/mL) in 0.9% NS IVPB  8 mg/hr Intravenous Continuous Matheus Neumann MD 20 mL/hr at 11/16/19 0559 8 mg/hr at 11/16/19 0559   • potassium chloride 10 mEq in 100 mL IVPB  10 mEq Intravenous Q1H PRN Gilbert Hearn PA   Stopped at 11/16/19 0515   • promethazine (PHENERGAN) injection 12.5 mg  12.5 mg Intravenous Q6H PRN Gilbert Hearn PA   12.5 mg at 11/15/19 2048   • QUEtiapine (SEROquel) tablet 100 mg  100 mg Oral Nightly Gilbert Hearn PA   100 mg at 11/15/19 2047   • sertraline  (ZOLOFT) tablet 50 mg  50 mg Oral Daily Gilbert Hearn PA   50 mg at 11/15/19 1108   • sodium chloride 0.9 % flush 10 mL  10 mL Intravenous PRN Fady Smalls DO       • sodium chloride 0.9 % flush 10 mL  10 mL Intravenous Q12H Matheus Neumann MD   10 mL at 11/15/19 2048   • sodium chloride 0.9 % flush 10 mL  10 mL Intravenous PRN Matheus Neumann MD       • sodium chloride 0.9 % infusion  75 mL/hr Intravenous Continuous Matheus Neumann MD 75 mL/hr at 11/16/19 0559 75 mL/hr at 11/16/19 0559   • tiZANidine (ZANAFLEX) tablet 4 mg  4 mg Oral TID Gilbert Hearn PA   4 mg at 11/15/19 2047       Objective     Physical Exam:   Temp:  [97.6 °F (36.4 °C)-99 °F (37.2 °C)] 97.9 °F (36.6 °C)  Heart Rate:  [75-91] 91  Resp:  [16-18] 16  BP: ()/(48-93) 110/73     Physical Exam:  General Appearance:    Alert, cooperative, in moderate distress   Head:    Normocephalic, without obvious abnormality, atraumatic   Eyes:            Lids and lashes normal, conjunctivae and sclerae normal, no   icterus, no pallor, corneas clear, PERRLA   Ears:    Ears appear intact with no abnormalities noted   Throat:   No oral lesions, no thrush, oral mucosa moist   Neck:   No adenopathy, supple, trachea midline, no thyromegaly, no     carotid bruit, no JVD   Back:     No kyphosis present, no scoliosis present, no skin lesions,       erythema or scars, no tenderness to percussion or                   palpation,   range of motion normal   Lungs:     Clear to auscultation,respirations regular, even and                   unlabored    Heart:    Regular rhythm and normal rate, normal S1 and S2, no            murmur, no gallop, no rub, no click   Breast Exam:    Deferred   Abdomen:     Normal bowel sounds, no masses, no organomegaly, soft        non-tender, non-distended, no guarding, no rebound                 tenderness   Genitalia:    Deferred   Extremities:   Moves all extremities well, no edema, no cyanosis, no              redness    Pulses:   Pulses palpable and equal bilaterally   Skin:   No bleeding, bruising or rash   Lymph nodes:   No palpable adenopathy   Neurologic:   Cranial nerves 2 - 12 grossly intact, sensation intact, DTR        present and equal bilaterally      Results Review:     Lab Results (last 24 hours)     Procedure Component Value Units Date/Time    Magnesium [[id number]]  (Normal) Collected:  11/15/19 2055    Specimen:  Blood Updated:  11/15/19 2148     Magnesium 2.0 mg/dL     Potassium [[id number]]  (Abnormal) Collected:  11/15/19 2055    Specimen:  Blood Updated:  11/15/19 2112     Potassium 3.2 mmol/L     Tissue Pathology Exam [[id number]] Collected:  11/15/19 0854    Specimen:  Tissue from Gastric, Antrum Updated:  11/15/19 1152           I reviewed the patient's new clinical results.  I reviewed the patient's new imaging results and agree with the interpretation.     ASSESSMENT/PLAN:   ASSESSMENT:  1.  Upper GI bleeding secondary to Teetee-Zimmerman tear  2.  Odynophagia secondary to Teetee-Zimmerman tear  3.  Chest pain syndrome secondary to Teetee-Zimmerman tear.  Ensure that there is no evidence of any mediastinal air  4.  Gallstones without cholecystitis    PLAN:  1.  Chest x-ray to exclude the possibility of mediastinal air.  Low threshold for doing CT of the chest  2.  Remain n.p.o.     Jc Patel DO  11/16/19  9:46 AM

## 2019-11-16 NOTE — PROGRESS NOTES
"    [facility] Medicine Services  INPATIENT PROGRESS NOTE    Length of Stay: 0  Date of Admission: 11/14/2019  Primary Care Physician: Ge Jean MD    Subjective   Please note that all previous progress notes, lab findings, radiographical findings, medication changes, and physical exam findings have been noted and updated as appropriate.    11/16/2019: Patient continues to complain of some epigastric discomfort especially with eructation.  Gastroneurology has recommended repeat chest x-ray to rule out any mediastinal involvement.  Continue with scheduled antiemetics and per GI, continue n.p.o. status except sips with meds.    Chief Complaint/HPI: This 64-year-old  female was admitted to hospital services secondary to nausea, abdominal pain, and hematemesis.  Patient underwent EGD with gastroenterology today and a large Teetee-Zimmerman tear was identified.  There was no active bleeding.  Per gastroenterology, Dr. Fernandez, patient should remain n.p.o. except sips with meds.  She recommended continuing to hold Plavix or any other antiplatelets.  Patient reports that she is on Plavix alone and is no longer on Brilinta.  Patient reports \"I have a lot of pain when I burp.\"  Will continue to monitor hemoglobin and proceed per gastroenterology recommendations.    Review of Systems   Constitutional: Negative for chills and fever.   Respiratory: Negative for shortness of breath.    Cardiovascular: Negative for chest pain.   Gastrointestinal: Positive for abdominal pain.        Epigastric discomfort    Genitourinary: Negative for difficulty urinating and dysuria.   Neurological: Negative for dizziness and light-headedness.   Psychiatric/Behavioral: Negative for confusion.      All pertinent negatives and positives are as above. All other systems have been reviewed and are negative unless otherwise stated.     Objective    Temp:  [97.6 °F (36.4 °C)-98.3 °F (36.8 °C)] " 98.3 °F (36.8 °C)  Heart Rate:  [75-91] 87  Resp:  [16-18] 16  BP: ()/(48-82) 139/66    Physical Exam   Constitutional: She is oriented to person, place, and time. She appears well-developed and well-nourished.   HENT:   Head: Normocephalic and atraumatic.   Cardiovascular: Normal rate and regular rhythm.   Pulmonary/Chest: Effort normal and breath sounds normal. No stridor. No respiratory distress.   Abdominal: Soft. Bowel sounds are normal. She exhibits no distension. There is tenderness.   Mild epigastric tenderness   Neurological: She is alert and oriented to person, place, and time.   Skin: Skin is warm and dry.   Psychiatric: She has a normal mood and affect. Her behavior is normal.     Results Review:  I have reviewed the labs, radiology results, and diagnostic studies.    Laboratory Data:   Results from last 7 days   Lab Units 11/16/19  0947 11/15/19  2055 11/15/19  0341 11/14/19  1923   SODIUM mmol/L 141  --  140 138   POTASSIUM mmol/L 3.8  3.8 3.2* 3.3* 3.0*   CHLORIDE mmol/L 107  --  106 99   CO2 mmol/L 26.0  --  25.0 25.0   BUN mg/dL 6*  --  15 8   CREATININE mg/dL 0.82  --  0.69 0.86   GLUCOSE mg/dL 94  --  104* 118*   CALCIUM mg/dL 8.6  --  8.6 9.9   BILIRUBIN mg/dL 0.2  --   --  0.2   ALK PHOS U/L 111  --   --  152*   ALT (SGPT) U/L 9  --   --  12   AST (SGOT) U/L 12  --   --  13   ANION GAP mmol/L 8.0  --  9.0 14.0     Estimated Creatinine Clearance: 69 mL/min (by C-G formula based on SCr of 0.82 mg/dL).  Results from last 7 days   Lab Units 11/15/19  2055   MAGNESIUM mg/dL 2.0         Results from last 7 days   Lab Units 11/16/19  0947 11/15/19  0341 11/14/19  1923   WBC 10*3/mm3 6.00 12.47* 13.83*   HEMOGLOBIN g/dL 8.0* 9.1* 11.9*   HEMATOCRIT % 26.6* 29.0* 38.0   PLATELETS 10*3/mm3 204 273 356           Culture Data:   No results found for: BLOODCX  No results found for: URINECX  No results found for: RESPCX  No results found for: WOUNDCX  No results found for: STOOLCX  No components found  for: BODYFLD    Radiology Data:   Imaging Results (Last 24 Hours)     Procedure Component Value Units Date/Time    XR Chest PA & Lateral [[id number]] Updated:  11/16/19 7031          I have reviewed the patient's current medications.     Assessment/Plan     Active Hospital Problems    Diagnosis   • **Nausea and vomiting   • Upper GI bleeding   Teetee-Zimmerman tear  History of coronary artery disease  Anemia of acute blood loss  Hypokalemia      Plan: Continue N.p.o. except sips with meds, chest xray per GI, continue scheduled antiemetics per gastroenterology, hold Plavix, replete potassium IV, continue to treat as hospital course dictates.                This document has been electronically signed by DERECK Galan on November 16, 2019 10:57 AM

## 2019-11-16 NOTE — PLAN OF CARE
Problem: Patient Care Overview  Goal: Plan of Care Review  Outcome: Ongoing (interventions implemented as appropriate)   11/16/19 0424   Coping/Psychosocial   Plan of Care Reviewed With patient   Plan of Care Review   Progress no change       Problem: Fall Risk (Adult)  Goal: Identify Related Risk Factors and Signs and Symptoms  Outcome: Ongoing (interventions implemented as appropriate)    Goal: Absence of Fall  Outcome: Ongoing (interventions implemented as appropriate)      Problem: Nausea/Vomiting (Adult)  Goal: Identify Related Risk Factors and Signs and Symptoms  Outcome: Ongoing (interventions implemented as appropriate)    Goal: Symptom Relief  Outcome: Ongoing (interventions implemented as appropriate)    Goal: Adequate Hydration  Outcome: Ongoing (interventions implemented as appropriate)      Problem: Pain, Acute (Adult)  Goal: Identify Related Risk Factors and Signs and Symptoms  Outcome: Ongoing (interventions implemented as appropriate)    Goal: Acceptable Pain Control/Comfort Level  Outcome: Ongoing (interventions implemented as appropriate)

## 2019-11-17 LAB
ALBUMIN SERPL-MCNC: 3.1 G/DL (ref 3.5–5.2)
ALBUMIN/GLOB SERPL: 1.3 G/DL
ALP SERPL-CCNC: 113 U/L (ref 39–117)
ALT SERPL W P-5'-P-CCNC: 11 U/L (ref 1–33)
ANION GAP SERPL CALCULATED.3IONS-SCNC: 10 MMOL/L (ref 5–15)
AST SERPL-CCNC: 19 U/L (ref 1–32)
BASOPHILS # BLD AUTO: 0.03 10*3/MM3 (ref 0–0.2)
BASOPHILS NFR BLD AUTO: 0.6 % (ref 0–1.5)
BILIRUB SERPL-MCNC: 0.3 MG/DL (ref 0.2–1.2)
BUN BLD-MCNC: 4 MG/DL (ref 8–23)
BUN/CREAT SERPL: 4.4 (ref 7–25)
CALCIUM SPEC-SCNC: 8.7 MG/DL (ref 8.6–10.5)
CHLORIDE SERPL-SCNC: 105 MMOL/L (ref 98–107)
CO2 SERPL-SCNC: 26 MMOL/L (ref 22–29)
CREAT BLD-MCNC: 0.9 MG/DL (ref 0.57–1)
DEPRECATED RDW RBC AUTO: 49.8 FL (ref 37–54)
EOSINOPHIL # BLD AUTO: 0.06 10*3/MM3 (ref 0–0.4)
EOSINOPHIL NFR BLD AUTO: 1.2 % (ref 0.3–6.2)
ERYTHROCYTE [DISTWIDTH] IN BLOOD BY AUTOMATED COUNT: 15.9 % (ref 12.3–15.4)
GFR SERPL CREATININE-BSD FRML MDRD: 63 ML/MIN/1.73
GLOBULIN UR ELPH-MCNC: 2.4 GM/DL
GLUCOSE BLD-MCNC: 90 MG/DL (ref 65–99)
HCT VFR BLD AUTO: 26 % (ref 34–46.6)
HGB BLD-MCNC: 8 G/DL (ref 12–15.9)
IMM GRANULOCYTES # BLD AUTO: 0.01 10*3/MM3 (ref 0–0.05)
IMM GRANULOCYTES NFR BLD AUTO: 0.2 % (ref 0–0.5)
LYMPHOCYTES # BLD AUTO: 1.08 10*3/MM3 (ref 0.7–3.1)
LYMPHOCYTES NFR BLD AUTO: 22.4 % (ref 19.6–45.3)
MAGNESIUM SERPL-MCNC: 1.8 MG/DL (ref 1.6–2.4)
MCH RBC QN AUTO: 26.8 PG (ref 26.6–33)
MCHC RBC AUTO-ENTMCNC: 30.8 G/DL (ref 31.5–35.7)
MCV RBC AUTO: 87.2 FL (ref 79–97)
MONOCYTES # BLD AUTO: 0.25 10*3/MM3 (ref 0.1–0.9)
MONOCYTES NFR BLD AUTO: 5.2 % (ref 5–12)
NEUTROPHILS # BLD AUTO: 3.39 10*3/MM3 (ref 1.7–7)
NEUTROPHILS NFR BLD AUTO: 70.4 % (ref 42.7–76)
NRBC BLD AUTO-RTO: 0 /100 WBC (ref 0–0.2)
PLATELET # BLD AUTO: 192 10*3/MM3 (ref 140–450)
PMV BLD AUTO: 10.6 FL (ref 6–12)
POTASSIUM BLD-SCNC: 3.2 MMOL/L (ref 3.5–5.2)
POTASSIUM BLD-SCNC: 3.5 MMOL/L (ref 3.5–5.2)
PROT SERPL-MCNC: 5.5 G/DL (ref 6–8.5)
RBC # BLD AUTO: 2.98 10*6/MM3 (ref 3.77–5.28)
SODIUM BLD-SCNC: 141 MMOL/L (ref 136–145)
WBC NRBC COR # BLD: 4.82 10*3/MM3 (ref 3.4–10.8)

## 2019-11-17 PROCEDURE — 25010000002 MORPHINE PER 10 MG: Performed by: PHYSICIAN ASSISTANT

## 2019-11-17 PROCEDURE — 25010000002 ONDANSETRON PER 1 MG: Performed by: PHYSICIAN ASSISTANT

## 2019-11-17 PROCEDURE — 80053 COMPREHEN METABOLIC PANEL: CPT | Performed by: PHYSICIAN ASSISTANT

## 2019-11-17 PROCEDURE — 25010000002 PROMETHAZINE PER 50 MG: Performed by: PHYSICIAN ASSISTANT

## 2019-11-17 PROCEDURE — 25010000003 POTASSIUM CHLORIDE 10 MEQ/100ML SOLUTION: Performed by: PHYSICIAN ASSISTANT

## 2019-11-17 PROCEDURE — 83735 ASSAY OF MAGNESIUM: CPT | Performed by: INTERNAL MEDICINE

## 2019-11-17 PROCEDURE — 85025 COMPLETE CBC W/AUTO DIFF WBC: CPT | Performed by: PHYSICIAN ASSISTANT

## 2019-11-17 PROCEDURE — 84132 ASSAY OF SERUM POTASSIUM: CPT | Performed by: PHYSICIAN ASSISTANT

## 2019-11-17 RX ORDER — POTASSIUM CHLORIDE 1.5 G/1.77G
40 POWDER, FOR SOLUTION ORAL AS NEEDED
Status: DISCONTINUED | OUTPATIENT
Start: 2019-11-17 | End: 2019-11-18 | Stop reason: HOSPADM

## 2019-11-17 RX ORDER — POTASSIUM CHLORIDE 750 MG/1
40 CAPSULE, EXTENDED RELEASE ORAL AS NEEDED
Status: DISCONTINUED | OUTPATIENT
Start: 2019-11-17 | End: 2019-11-17 | Stop reason: SDUPTHER

## 2019-11-17 RX ORDER — POTASSIUM CHLORIDE 1.5 G/1.77G
40 POWDER, FOR SOLUTION ORAL AS NEEDED
Status: DISCONTINUED | OUTPATIENT
Start: 2019-11-17 | End: 2019-11-17 | Stop reason: SDUPTHER

## 2019-11-17 RX ORDER — POTASSIUM CHLORIDE 7.45 MG/ML
10 INJECTION INTRAVENOUS
Status: DISCONTINUED | OUTPATIENT
Start: 2019-11-17 | End: 2019-11-18 | Stop reason: HOSPADM

## 2019-11-17 RX ORDER — POTASSIUM CHLORIDE 750 MG/1
40 CAPSULE, EXTENDED RELEASE ORAL AS NEEDED
Status: DISCONTINUED | OUTPATIENT
Start: 2019-11-17 | End: 2019-11-18 | Stop reason: HOSPADM

## 2019-11-17 RX ADMIN — SODIUM CHLORIDE 8 MG/HR: 900 INJECTION INTRAVENOUS at 20:32

## 2019-11-17 RX ADMIN — SODIUM CHLORIDE 75 ML/HR: 900 INJECTION, SOLUTION INTRAVENOUS at 20:32

## 2019-11-17 RX ADMIN — BUSPIRONE HYDROCHLORIDE 10 MG: 10 TABLET ORAL at 09:11

## 2019-11-17 RX ADMIN — MORPHINE SULFATE 2 MG: 2 INJECTION, SOLUTION INTRAMUSCULAR; INTRAVENOUS at 11:54

## 2019-11-17 RX ADMIN — SODIUM CHLORIDE 8 MG/HR: 900 INJECTION INTRAVENOUS at 15:11

## 2019-11-17 RX ADMIN — POTASSIUM CHLORIDE 10 MEQ: 7.46 INJECTION, SOLUTION INTRAVENOUS at 15:14

## 2019-11-17 RX ADMIN — ONDANSETRON 4 MG: 2 INJECTION INTRAMUSCULAR; INTRAVENOUS at 11:54

## 2019-11-17 RX ADMIN — POTASSIUM CHLORIDE 40 MEQ: 750 CAPSULE, EXTENDED RELEASE ORAL at 23:46

## 2019-11-17 RX ADMIN — POTASSIUM CHLORIDE 10 MEQ: 7.46 INJECTION, SOLUTION INTRAVENOUS at 10:55

## 2019-11-17 RX ADMIN — ONDANSETRON 4 MG: 2 INJECTION INTRAMUSCULAR; INTRAVENOUS at 17:21

## 2019-11-17 RX ADMIN — MORPHINE SULFATE 2 MG: 2 INJECTION, SOLUTION INTRAMUSCULAR; INTRAVENOUS at 20:32

## 2019-11-17 RX ADMIN — CITALOPRAM HYDROBROMIDE 40 MG: 40 TABLET ORAL at 09:11

## 2019-11-17 RX ADMIN — SODIUM CHLORIDE, PRESERVATIVE FREE 10 ML: 5 INJECTION INTRAVENOUS at 09:11

## 2019-11-17 RX ADMIN — TIZANIDINE 4 MG: 4 TABLET ORAL at 20:32

## 2019-11-17 RX ADMIN — PROMETHAZINE HYDROCHLORIDE 12.5 MG: 25 INJECTION INTRAMUSCULAR; INTRAVENOUS at 22:17

## 2019-11-17 RX ADMIN — SODIUM CHLORIDE 75 ML/HR: 900 INJECTION, SOLUTION INTRAVENOUS at 05:30

## 2019-11-17 RX ADMIN — TIZANIDINE 4 MG: 4 TABLET ORAL at 15:11

## 2019-11-17 RX ADMIN — POTASSIUM CHLORIDE 10 MEQ: 7.46 INJECTION, SOLUTION INTRAVENOUS at 14:11

## 2019-11-17 RX ADMIN — LISINOPRIL 10 MG: 10 TABLET ORAL at 09:11

## 2019-11-17 RX ADMIN — ONDANSETRON 4 MG: 2 INJECTION INTRAMUSCULAR; INTRAVENOUS at 23:46

## 2019-11-17 RX ADMIN — POTASSIUM CHLORIDE 10 MEQ: 7.46 INJECTION, SOLUTION INTRAVENOUS at 11:53

## 2019-11-17 RX ADMIN — SODIUM CHLORIDE 8 MG/HR: 900 INJECTION INTRAVENOUS at 09:07

## 2019-11-17 RX ADMIN — ONDANSETRON 4 MG: 2 INJECTION INTRAMUSCULAR; INTRAVENOUS at 05:30

## 2019-11-17 RX ADMIN — ACETAMINOPHEN 650 MG: 325 TABLET, FILM COATED ORAL at 17:21

## 2019-11-17 RX ADMIN — TIZANIDINE 4 MG: 4 TABLET ORAL at 09:10

## 2019-11-17 RX ADMIN — SODIUM CHLORIDE 8 MG/HR: 900 INJECTION INTRAVENOUS at 02:40

## 2019-11-17 RX ADMIN — PROMETHAZINE HYDROCHLORIDE 12.5 MG: 25 INJECTION INTRAMUSCULAR; INTRAVENOUS at 16:13

## 2019-11-17 RX ADMIN — QUETIAPINE 100 MG: 100 TABLET ORAL at 20:32

## 2019-11-17 RX ADMIN — SODIUM CHLORIDE, PRESERVATIVE FREE 10 ML: 5 INJECTION INTRAVENOUS at 20:33

## 2019-11-17 RX ADMIN — SERTRALINE HYDROCHLORIDE 50 MG: 50 TABLET ORAL at 09:11

## 2019-11-17 NOTE — PLAN OF CARE
Problem: Patient Care Overview  Goal: Plan of Care Review  Outcome: Ongoing (interventions implemented as appropriate)   11/16/19 4322   Coping/Psychosocial   Plan of Care Reviewed With patient   Plan of Care Review   Progress no change       Problem: Fall Risk (Adult)  Goal: Identify Related Risk Factors and Signs and Symptoms  Outcome: Ongoing (interventions implemented as appropriate)    Goal: Absence of Fall  Outcome: Ongoing (interventions implemented as appropriate)      Problem: Nausea/Vomiting (Adult)  Goal: Identify Related Risk Factors and Signs and Symptoms  Outcome: Ongoing (interventions implemented as appropriate)    Goal: Symptom Relief  Outcome: Ongoing (interventions implemented as appropriate)    Goal: Adequate Hydration  Outcome: Ongoing (interventions implemented as appropriate)      Problem: Pain, Acute (Adult)  Goal: Identify Related Risk Factors and Signs and Symptoms  Outcome: Ongoing (interventions implemented as appropriate)    Goal: Acceptable Pain Control/Comfort Level  Outcome: Ongoing (interventions implemented as appropriate)

## 2019-11-17 NOTE — PROGRESS NOTES
"    [facility] Medicine Services  INPATIENT PROGRESS NOTE    Length of Stay: 1  Date of Admission: 11/14/2019  Primary Care Physician: Ge Jean MD    Subjective   Please note that all previous progress notes, lab findings, radiographical findings, medication changes, and physical exam findings have been noted and updated as appropriate.    11/17/2019: No nausea, however continues to have some epigastric pain.  Chest x-ray shows no concerns for pneumomediastinum.  Will defer to gastroenterology for restarting of diet and any antiplatelet medications.  Globin has been stable at 8.0.    11/16/2019: Patient continues to complain of some epigastric discomfort especially with eructation.  Gastroenterology has recommended repeat chest x-ray to rule out any mediastinal involvement.  Continue with scheduled antiemetics and per GI, continue n.p.o. status except sips with meds.    Chief Complaint/HPI: This 64-year-old  female was admitted to hospital services secondary to nausea, abdominal pain, and hematemesis.  Patient underwent EGD with gastroenterology today and a large Teetee-Zimmerman tear was identified.  There was no active bleeding.  Per gastroenterology, Dr. Fernandez, patient should remain n.p.o. except sips with meds.  She recommended continuing to hold Plavix or any other antiplatelets.  Patient reports that she is on Plavix alone and is no longer on Brilinta.  Patient reports \"I have a lot of pain when I burp.\"  Will continue to monitor hemoglobin and proceed per gastroenterology recommendations.    Review of Systems   Constitutional: Negative for chills and fever.   Respiratory: Negative for shortness of breath.    Cardiovascular: Negative for chest pain.   Gastrointestinal: Positive for abdominal pain.        Epigastric discomfort    Genitourinary: Negative for difficulty urinating and dysuria.   Neurological: Negative for dizziness and light-headedness. "   Psychiatric/Behavioral: Negative for confusion.      All pertinent negatives and positives are as above. All other systems have been reviewed and are negative unless otherwise stated.     Objective    Temp:  [96 °F (35.6 °C)-99.7 °F (37.6 °C)] 99.7 °F (37.6 °C)  Heart Rate:  [71-87] 87  Resp:  [16-18] 18  BP: (102-154)/(61-96) 136/96    Physical Exam   Constitutional: She is oriented to person, place, and time. She appears well-developed and well-nourished.   HENT:   Head: Normocephalic and atraumatic.   Cardiovascular: Normal rate and regular rhythm.   Pulmonary/Chest: Effort normal and breath sounds normal. No stridor. No respiratory distress.   Abdominal: Soft. Bowel sounds are normal. She exhibits no distension. There is tenderness.   Mild epigastric tenderness   Neurological: She is alert and oriented to person, place, and time.   Skin: Skin is warm and dry.   Psychiatric: She has a normal mood and affect. Her behavior is normal.     Results Review:  I have reviewed the labs, radiology results, and diagnostic studies.    Laboratory Data:   Results from last 7 days   Lab Units 11/17/19  0655 11/16/19  0947 11/15/19  2055 11/15/19  0341 11/14/19  1923   SODIUM mmol/L 141 141  --  140 138   POTASSIUM mmol/L 3.5 3.8  3.8 3.2* 3.3* 3.0*   CHLORIDE mmol/L 105 107  --  106 99   CO2 mmol/L 26.0 26.0  --  25.0 25.0   BUN mg/dL 4* 6*  --  15 8   CREATININE mg/dL 0.90 0.82  --  0.69 0.86   GLUCOSE mg/dL 90 94  --  104* 118*   CALCIUM mg/dL 8.7 8.6  --  8.6 9.9   BILIRUBIN mg/dL 0.3 0.2  --   --  0.2   ALK PHOS U/L 113 111  --   --  152*   ALT (SGPT) U/L 11 9  --   --  12   AST (SGOT) U/L 19 12  --   --  13   ANION GAP mmol/L 10.0 8.0  --  9.0 14.0     Estimated Creatinine Clearance: 63 mL/min (by C-G formula based on SCr of 0.9 mg/dL).  Results from last 7 days   Lab Units 11/15/19  2055   MAGNESIUM mg/dL 2.0         Results from last 7 days   Lab Units 11/17/19  0655 11/16/19  0947 11/15/19  0341 11/14/19  1923   WBC  10*3/mm3 4.82 6.00 12.47* 13.83*   HEMOGLOBIN g/dL 8.0* 8.0* 9.1* 11.9*   HEMATOCRIT % 26.0* 26.6* 29.0* 38.0   PLATELETS 10*3/mm3 192 204 273 356           Culture Data:   No results found for: BLOODCX  No results found for: URINECX  No results found for: RESPCX  No results found for: WOUNDCX  No results found for: STOOLCX  No components found for: BODYFLD    Radiology Data:   Imaging Results (Last 24 Hours)     Procedure Component Value Units Date/Time    XR Chest PA & Lateral [[id number]] Collected:  11/16/19 1049     Updated:  11/16/19 1113    Narrative:       Chest x-ray PA and lateral.    CLINICAL INDICATION: Nausea vomiting, pain    COMPARISON: Chest November 14, 2019.    FINDINGS: Cardiac silhouette is normal in size. Pulmonary  vascularity is unremarkable.     No focal infiltrate or consolidation.  No pleural effusion.  No  pneumothorax.  No evidence of any pneumomediastinum.        Impression:       CONCLUSION: No evidence of active disease. Lung fields clear. No  evidence of pneumomediastinum.    Electronically signed by:  Saurav Adorno MD  11/16/2019 11:12 AM  CST Workstation: [id number]          I have reviewed the patient's current medications.     Assessment/Plan     Active Hospital Problems    Diagnosis   • **Nausea and vomiting   • Upper GI bleeding   Teetee-Zimmerman tear  History of coronary artery disease  Anemia of acute blood loss  Hypokalemia      Plan: Continue N.p.o. except sips with meds, continue scheduled antiemetics per gastroenterology, hold Plavix, replete potassium IV, gastroenterology consult appreciated, continue to treat as hospital course dictates.                This document has been electronically signed by DERECK Galan on November 17, 2019 10:20 AM

## 2019-11-17 NOTE — PROGRESS NOTES
Niko Farfan DO,[facility]  Gastroenterology  Hepatology  Endoscopy  Board Certified in Internal Medicine and gastroenterology  [address], [address]  [place]. [address]  - [phone]   F [phone]     GASTROENTEROLOGY PROGRESS NOTE   NIKO FARFAN DO.         SUBJECTIVE:   11/17/2019  Chief Complaint:     Subjective  : No further episodes of any gastrointestinal bleeding.  The odynophagia and chest pain and upper abdominal pain has resolved.  Chest x-ray shows no evidence of any pneumomediastinum         CURRENT MEDICATIONS/OBJECTIVE/VS/PE:     Current Medications:     Current Facility-Administered Medications   Medication Dose Route Frequency Provider Last Rate Last Dose   • acetaminophen (TYLENOL) tablet 650 mg  650 mg Oral Q6H PRN Gilbert Hearn PA   650 mg at 11/16/19 1811   • busPIRone (BUSPAR) tablet 10 mg  10 mg Oral Daily Gilbert Hearn PA   10 mg at 11/17/19 0911   • citalopram (CeleXA) tablet 40 mg  40 mg Oral Daily Gilbert Hearn PA   40 mg at 11/17/19 0911   • Influenza Vac Subunit Quad (FLUCELVAX) injection 0.5 mL  0.5 mL Intramuscular During Hospitalization Matheus Neumann MD       • lisinopril (PRINIVIL,ZESTRIL) tablet 10 mg  10 mg Oral Daily Gilbert Hearn PA   10 mg at 11/17/19 0911   • morphine injection 2 mg  2 mg Intravenous Q2H PRN Gilbert Hearn PA   2 mg at 11/17/19 1154   • ondansetron (ZOFRAN) injection 4 mg  4 mg Intravenous Q6H Gilbert Hearn PA   4 mg at 11/17/19 1154   • pantoprazole (PROTONIX) 40 mg/100 mL (0.4 mg/mL) in 0.9% NS IVPB  8 mg/hr Intravenous Continuous Matheus Neumann MD 20 mL/hr at 11/17/19 0907 8 mg/hr at 11/17/19 0907   • potassium chloride 10 mEq in 100 mL IVPB  10 mEq Intravenous Q1H PRN Gilbert Hearn  mL/hr at 11/17/19 1153 10 mEq at 11/17/19 1153   • promethazine (PHENERGAN) injection 12.5 mg  12.5 mg Intravenous Q6H PRN Gilbert Hearn, PA   12.5 mg at 11/16/19 2047   • QUEtiapine (SEROquel) tablet 100  mg  100 mg Oral Nightly Gilbert Hearn PA   100 mg at 11/16/19 2047   • sertraline (ZOLOFT) tablet 50 mg  50 mg Oral Daily Gilbert Hearn PA   50 mg at 11/17/19 0911   • sodium chloride 0.9 % flush 10 mL  10 mL Intravenous PRN Fady Smalls, DO       • sodium chloride 0.9 % flush 10 mL  10 mL Intravenous Q12H Matheus Neumann MD   10 mL at 11/17/19 0911   • sodium chloride 0.9 % flush 10 mL  10 mL Intravenous PRN Matheus Neumann MD       • sodium chloride 0.9 % infusion  75 mL/hr Intravenous Continuous Matheus Neumann MD 75 mL/hr at 11/17/19 0530 75 mL/hr at 11/17/19 0530   • tiZANidine (ZANAFLEX) tablet 4 mg  4 mg Oral TID Gilbert Hearn PA   4 mg at 11/17/19 0910       Objective     Physical Exam:   Temp:  [96 °F (35.6 °C)-99.7 °F (37.6 °C)] 99.7 °F (37.6 °C)  Heart Rate:  [71-87] 87  Resp:  [16-18] 18  BP: (102-154)/(61-96) 136/96     Physical Exam:  General Appearance:    Alert, cooperative, in no acute distress   Head:    Normocephalic, without obvious abnormality, atraumatic   Eyes:            Lids and lashes normal, conjunctivae and sclerae normal, no   icterus, no pallor, corneas clear, PERRLA   Ears:    Ears appear intact with no abnormalities noted   Throat:   No oral lesions, no thrush, oral mucosa moist   Neck:   No adenopathy, supple, trachea midline, no thyromegaly, no     carotid bruit, no JVD   Back:     No kyphosis present, no scoliosis present, no skin lesions,       erythema or scars, no tenderness to percussion or                   palpation,   range of motion normal   Lungs:     Clear to auscultation,respirations regular, even and                   unlabored    Heart:    Regular rhythm and normal rate, normal S1 and S2, no            murmur, no gallop, no rub, no click   Breast Exam:    Deferred   Abdomen:     Normal bowel sounds, no masses, no organomegaly, soft        non-tender, non-distended, no guarding, no rebound                 tenderness   Genitalia:    Deferred    Extremities:   Moves all extremities well, no edema, no cyanosis, no              redness   Pulses:   Pulses palpable and equal bilaterally   Skin:   No bleeding, bruising or rash   Lymph nodes:   No palpable adenopathy   Neurologic:   Cranial nerves 2 - 12 grossly intact, sensation intact, DTR        present and equal bilaterally      Results Review:     Lab Results (last 24 hours)     Procedure Component Value Units Date/Time    Comprehensive Metabolic Panel [[id number]]  (Abnormal) Collected:  11/17/19 0655    Specimen:  Blood Updated:  11/17/19 0739     Glucose 90 mg/dL      BUN 4 mg/dL      Creatinine 0.90 mg/dL      Sodium 141 mmol/L      Potassium 3.5 mmol/L      Chloride 105 mmol/L      CO2 26.0 mmol/L      Calcium 8.7 mg/dL      Total Protein 5.5 g/dL      Albumin 3.10 g/dL      ALT (SGPT) 11 U/L      AST (SGOT) 19 U/L      Alkaline Phosphatase 113 U/L      Total Bilirubin 0.3 mg/dL      eGFR Non African Amer 63 mL/min/1.73      Globulin 2.4 gm/dL      A/G Ratio 1.3 g/dL      BUN/Creatinine Ratio 4.4     Anion Gap 10.0 mmol/L     Narrative:       GFR Normal >60  Chronic Kidney Disease <60  Kidney Failure <15    CBC & Differential [[id number]] Collected:  11/17/19 0655    Specimen:  Blood Updated:  11/17/19 0724    Narrative:       The following orders were created for panel order CBC & Differential.  Procedure                               Abnormality         Status                     ---------                               -----------         ------                     CBC Auto Differential[[id number]]        Abnormal            Final result                 Please view results for these tests on the individual orders.    CBC Auto Differential [[id number]]  (Abnormal) Collected:  11/17/19 0655    Specimen:  Blood Updated:  11/17/19 0724     WBC 4.82 10*3/mm3      RBC 2.98 10*6/mm3      Hemoglobin 8.0 g/dL      Hematocrit 26.0 %      MCV 87.2 fL      MCH 26.8 pg      MCHC 30.8 g/dL      RDW 15.9 %      RDW-SD  49.8 fl      MPV 10.6 fL      Platelets 192 10*3/mm3      Neutrophil % 70.4 %      Lymphocyte % 22.4 %      Monocyte % 5.2 %      Eosinophil % 1.2 %      Basophil % 0.6 %      Immature Grans % 0.2 %      Neutrophils, Absolute 3.39 10*3/mm3      Lymphocytes, Absolute 1.08 10*3/mm3      Monocytes, Absolute 0.25 10*3/mm3      Eosinophils, Absolute 0.06 10*3/mm3      Basophils, Absolute 0.03 10*3/mm3      Immature Grans, Absolute 0.01 10*3/mm3      nRBC 0.0 /100 WBC            I reviewed the patient's new clinical results.  I reviewed the patient's new imaging results and agree with the interpretation.     ASSESSMENT/PLAN:   ASSESSMENT:  1.  Deep Teetee-Zimmerman tear with bleeding without evidence of any pneumomediastinum    PLAN:  1.  Soft GI diet  2.  Dr. Fernandez to resume care tomorrow.     Jc Patel,   11/17/19  12:53 PM

## 2019-11-18 VITALS
OXYGEN SATURATION: 97 % | DIASTOLIC BLOOD PRESSURE: 79 MMHG | TEMPERATURE: 97.4 F | SYSTOLIC BLOOD PRESSURE: 110 MMHG | WEIGHT: 181.9 LBS | BODY MASS INDEX: 34.34 KG/M2 | RESPIRATION RATE: 16 BRPM | HEART RATE: 63 BPM | HEIGHT: 61 IN

## 2019-11-18 LAB
ALBUMIN SERPL-MCNC: 3.6 G/DL (ref 3.5–5.2)
ALBUMIN/GLOB SERPL: 1.3 G/DL
ALP SERPL-CCNC: 137 U/L (ref 39–117)
ALT SERPL W P-5'-P-CCNC: 32 U/L (ref 1–33)
ANION GAP SERPL CALCULATED.3IONS-SCNC: 10 MMOL/L (ref 5–15)
AST SERPL-CCNC: 48 U/L (ref 1–32)
BASOPHILS # BLD AUTO: 0.02 10*3/MM3 (ref 0–0.2)
BASOPHILS NFR BLD AUTO: 0.4 % (ref 0–1.5)
BILIRUB SERPL-MCNC: 0.2 MG/DL (ref 0.2–1.2)
BUN BLD-MCNC: 4 MG/DL (ref 8–23)
BUN/CREAT SERPL: 4.3 (ref 7–25)
CALCIUM SPEC-SCNC: 9 MG/DL (ref 8.6–10.5)
CHLORIDE SERPL-SCNC: 107 MMOL/L (ref 98–107)
CO2 SERPL-SCNC: 23 MMOL/L (ref 22–29)
CREAT BLD-MCNC: 0.94 MG/DL (ref 0.57–1)
DEPRECATED RDW RBC AUTO: 50.9 FL (ref 37–54)
EOSINOPHIL # BLD AUTO: 0.06 10*3/MM3 (ref 0–0.4)
EOSINOPHIL NFR BLD AUTO: 1.2 % (ref 0.3–6.2)
ERYTHROCYTE [DISTWIDTH] IN BLOOD BY AUTOMATED COUNT: 16.1 % (ref 12.3–15.4)
GFR SERPL CREATININE-BSD FRML MDRD: 60 ML/MIN/1.73
GLOBULIN UR ELPH-MCNC: 2.7 GM/DL
GLUCOSE BLD-MCNC: 119 MG/DL (ref 65–99)
HCT VFR BLD AUTO: 28 % (ref 34–46.6)
HGB BLD-MCNC: 8.6 G/DL (ref 12–15.9)
HOLD SPECIMEN: NORMAL
IMM GRANULOCYTES # BLD AUTO: 0.01 10*3/MM3 (ref 0–0.05)
IMM GRANULOCYTES NFR BLD AUTO: 0.2 % (ref 0–0.5)
LAB AP CASE REPORT: NORMAL
LAB AP SPECIAL STAINS: NORMAL
LYMPHOCYTES # BLD AUTO: 1.1 10*3/MM3 (ref 0.7–3.1)
LYMPHOCYTES NFR BLD AUTO: 22.4 % (ref 19.6–45.3)
MCH RBC QN AUTO: 26.7 PG (ref 26.6–33)
MCHC RBC AUTO-ENTMCNC: 30.7 G/DL (ref 31.5–35.7)
MCV RBC AUTO: 87 FL (ref 79–97)
MONOCYTES # BLD AUTO: 0.21 10*3/MM3 (ref 0.1–0.9)
MONOCYTES NFR BLD AUTO: 4.3 % (ref 5–12)
NEUTROPHILS # BLD AUTO: 3.5 10*3/MM3 (ref 1.7–7)
NEUTROPHILS NFR BLD AUTO: 71.5 % (ref 42.7–76)
NRBC BLD AUTO-RTO: 0 /100 WBC (ref 0–0.2)
PATH REPORT.FINAL DX SPEC: NORMAL
PATH REPORT.GROSS SPEC: NORMAL
PLATELET # BLD AUTO: 226 10*3/MM3 (ref 140–450)
PMV BLD AUTO: 11.2 FL (ref 6–12)
POTASSIUM BLD-SCNC: 4.5 MMOL/L (ref 3.5–5.2)
PROT SERPL-MCNC: 6.3 G/DL (ref 6–8.5)
RBC # BLD AUTO: 3.22 10*6/MM3 (ref 3.77–5.28)
SODIUM BLD-SCNC: 140 MMOL/L (ref 136–145)
WBC NRBC COR # BLD: 4.9 10*3/MM3 (ref 3.4–10.8)

## 2019-11-18 PROCEDURE — 80053 COMPREHEN METABOLIC PANEL: CPT | Performed by: PHYSICIAN ASSISTANT

## 2019-11-18 PROCEDURE — 25010000002 ONDANSETRON PER 1 MG: Performed by: PHYSICIAN ASSISTANT

## 2019-11-18 PROCEDURE — 85025 COMPLETE CBC W/AUTO DIFF WBC: CPT | Performed by: PHYSICIAN ASSISTANT

## 2019-11-18 PROCEDURE — 25010000002 MORPHINE PER 10 MG: Performed by: PHYSICIAN ASSISTANT

## 2019-11-18 PROCEDURE — 99231 SBSQ HOSP IP/OBS SF/LOW 25: CPT | Performed by: INTERNAL MEDICINE

## 2019-11-18 RX ORDER — SUCRALFATE 1 G/1
1 TABLET ORAL 4 TIMES DAILY
Qty: 120 TABLET | Refills: 1 | Status: SHIPPED | OUTPATIENT
Start: 2019-11-18 | End: 2020-04-08

## 2019-11-18 RX ORDER — PROMETHAZINE HYDROCHLORIDE 12.5 MG/1
12.5 TABLET ORAL EVERY 6 HOURS PRN
Qty: 30 TABLET | Refills: 0 | Status: SHIPPED | OUTPATIENT
Start: 2019-11-18 | End: 2020-01-23

## 2019-11-18 RX ORDER — ONDANSETRON 4 MG/1
4 TABLET, FILM COATED ORAL EVERY 6 HOURS PRN
Qty: 60 TABLET | Refills: 1 | Status: SHIPPED | OUTPATIENT
Start: 2019-11-18 | End: 2020-01-23

## 2019-11-18 RX ORDER — PANTOPRAZOLE SODIUM 40 MG/1
40 TABLET, DELAYED RELEASE ORAL DAILY
Qty: 30 TABLET | Refills: 2 | Status: SHIPPED | OUTPATIENT
Start: 2019-11-18 | End: 2020-04-08

## 2019-11-18 RX ADMIN — ACETAMINOPHEN 650 MG: 325 TABLET, FILM COATED ORAL at 08:53

## 2019-11-18 RX ADMIN — MORPHINE SULFATE 2 MG: 2 INJECTION, SOLUTION INTRAMUSCULAR; INTRAVENOUS at 03:42

## 2019-11-18 RX ADMIN — SERTRALINE HYDROCHLORIDE 50 MG: 50 TABLET ORAL at 08:54

## 2019-11-18 RX ADMIN — POTASSIUM CHLORIDE 40 MEQ: 750 CAPSULE, EXTENDED RELEASE ORAL at 03:42

## 2019-11-18 RX ADMIN — SODIUM CHLORIDE, PRESERVATIVE FREE 10 ML: 5 INJECTION INTRAVENOUS at 08:54

## 2019-11-18 RX ADMIN — ONDANSETRON 4 MG: 2 INJECTION INTRAMUSCULAR; INTRAVENOUS at 05:31

## 2019-11-18 RX ADMIN — LISINOPRIL 10 MG: 10 TABLET ORAL at 08:53

## 2019-11-18 RX ADMIN — BUSPIRONE HYDROCHLORIDE 10 MG: 10 TABLET ORAL at 08:53

## 2019-11-18 RX ADMIN — TIZANIDINE 4 MG: 4 TABLET ORAL at 08:53

## 2019-11-18 RX ADMIN — CITALOPRAM HYDROBROMIDE 40 MG: 40 TABLET ORAL at 08:53

## 2019-11-18 NOTE — DISCHARGE SUMMARY
[facility]  DISCHARGE SUMMARY       Date of Admission: 11/14/2019  Date of Discharge:  11/18/2019  Primary Care Physician: Ge Jean MD    Presenting Problem/History of Present Illness:  Hematemesis with nausea [K92.0]  Nausea and vomiting, intractability of vomiting not specified, unspecified vomiting type [R11.2]  Nausea and vomiting, intractability of vomiting not specified, unspecified vomiting type [R11.2]     Final Discharge Diagnoses:  Active Hospital Problems    Diagnosis   • **Nausea and vomiting   • Upper GI bleeding       Consults:   Consults     Date and Time Order Name Status Description    11/14/2019 2327 Inpatient Gastroenterology Consult Completed     11/14/2019 2147 Hospitalist (on-call MD unless specified)            Procedures Performed: Procedure(s):  ESOPHAGOGASTRODUODENOSCOPY                Pertinent Test Results:   Lab Results (most recent)     Procedure Component Value Units Date/Time    Comprehensive Metabolic Panel [[id number]]  (Abnormal) Collected:  11/18/19 0758    Specimen:  Blood Updated:  11/18/19 0911     Glucose 119 mg/dL      BUN 4 mg/dL      Creatinine 0.94 mg/dL      Sodium 140 mmol/L      Potassium 4.5 mmol/L      Chloride 107 mmol/L      CO2 23.0 mmol/L      Calcium 9.0 mg/dL      Total Protein 6.3 g/dL      Albumin 3.60 g/dL      ALT (SGPT) 32 U/L      AST (SGOT) 48 U/L      Alkaline Phosphatase 137 U/L      Total Bilirubin 0.2 mg/dL      eGFR Non African Amer 60 mL/min/1.73      Globulin 2.7 gm/dL      A/G Ratio 1.3 g/dL      BUN/Creatinine Ratio 4.3     Anion Gap 10.0 mmol/L     Narrative:       GFR Normal >60  Chronic Kidney Disease <60  Kidney Failure <15    Extra Tubes [[id number]] Collected:  11/18/19 0758    Specimen:  Blood, Venous Line Updated:  11/18/19 0902    Narrative:       The following orders were created for panel order Extra Tubes.  Procedure                               Abnormality          Status                     ---------                               -----------         ------                     Gold Top - SST[[id number]]                                   Final result                 Please view results for these tests on the individual orders.    [facility] - SST [[id number]] Collected:  11/18/19 0758    Specimen:  Blood Updated:  11/18/19 0902     Extra Tube Hold for add-ons.     Comment: Auto resulted.       CBC & Differential [[id number]] Collected:  11/18/19 0758    Specimen:  Blood Updated:  11/18/19 0851    Narrative:       The following orders were created for panel order CBC & Differential.  Procedure                               Abnormality         Status                     ---------                               -----------         ------                     CBC Auto Differential[[id number]]        Abnormal            Final result                 Please view results for these tests on the individual orders.    CBC Auto Differential [[id number]]  (Abnormal) Collected:  11/18/19 0758    Specimen:  Blood Updated:  11/18/19 0851     WBC 4.90 10*3/mm3      RBC 3.22 10*6/mm3      Hemoglobin 8.6 g/dL      Hematocrit 28.0 %      MCV 87.0 fL      MCH 26.7 pg      MCHC 30.7 g/dL      RDW 16.1 %      RDW-SD 50.9 fl      MPV 11.2 fL      Platelets 226 10*3/mm3      Neutrophil % 71.5 %      Lymphocyte % 22.4 %      Monocyte % 4.3 %      Eosinophil % 1.2 %      Basophil % 0.4 %      Immature Grans % 0.2 %      Neutrophils, Absolute 3.50 10*3/mm3      Lymphocytes, Absolute 1.10 10*3/mm3      Monocytes, Absolute 0.21 10*3/mm3      Eosinophils, Absolute 0.06 10*3/mm3      Basophils, Absolute 0.02 10*3/mm3      Immature Grans, Absolute 0.01 10*3/mm3      nRBC 0.0 /100 WBC     Magnesium [[id number]]  (Normal) Collected:  11/17/19 2028    Specimen:  Blood Updated:  11/17/19 2249     Magnesium 1.8 mg/dL     Potassium [[id number]]  (Abnormal) Collected:  11/17/19 2028    Specimen:  Blood Updated:  11/17/19  2109     Potassium 3.2 mmol/L     Comprehensive Metabolic Panel [[id number]]  (Abnormal) Collected:  11/17/19 0655    Specimen:  Blood Updated:  11/17/19 0739     Glucose 90 mg/dL      BUN 4 mg/dL      Creatinine 0.90 mg/dL      Sodium 141 mmol/L      Potassium 3.5 mmol/L      Chloride 105 mmol/L      CO2 26.0 mmol/L      Calcium 8.7 mg/dL      Total Protein 5.5 g/dL      Albumin 3.10 g/dL      ALT (SGPT) 11 U/L      AST (SGOT) 19 U/L      Alkaline Phosphatase 113 U/L      Total Bilirubin 0.3 mg/dL      eGFR Non African Amer 63 mL/min/1.73      Globulin 2.4 gm/dL      A/G Ratio 1.3 g/dL      BUN/Creatinine Ratio 4.4     Anion Gap 10.0 mmol/L     Narrative:       GFR Normal >60  Chronic Kidney Disease <60  Kidney Failure <15    CBC & Differential [[id number]] Collected:  11/17/19 0655    Specimen:  Blood Updated:  11/17/19 0724    Narrative:       The following orders were created for panel order CBC & Differential.  Procedure                               Abnormality         Status                     ---------                               -----------         ------                     CBC Auto Differential[[id number]]        Abnormal            Final result                 Please view results for these tests on the individual orders.    CBC Auto Differential [[id number]]  (Abnormal) Collected:  11/17/19 0655    Specimen:  Blood Updated:  11/17/19 0724     WBC 4.82 10*3/mm3      RBC 2.98 10*6/mm3      Hemoglobin 8.0 g/dL      Hematocrit 26.0 %      MCV 87.2 fL      MCH 26.8 pg      MCHC 30.8 g/dL      RDW 15.9 %      RDW-SD 49.8 fl      MPV 10.6 fL      Platelets 192 10*3/mm3      Neutrophil % 70.4 %      Lymphocyte % 22.4 %      Monocyte % 5.2 %      Eosinophil % 1.2 %      Basophil % 0.6 %      Immature Grans % 0.2 %      Neutrophils, Absolute 3.39 10*3/mm3      Lymphocytes, Absolute 1.08 10*3/mm3      Monocytes, Absolute 0.25 10*3/mm3      Eosinophils, Absolute 0.06 10*3/mm3      Basophils, Absolute 0.03 10*3/mm3       Immature Grans, Absolute 0.01 10*3/mm3      nRBC 0.0 /100 WBC     Potassium [[id number]]  (Normal) Collected:  11/16/19 0947    Specimen:  Blood Updated:  11/16/19 1019     Potassium 3.8 mmol/L     Magnesium [[id number]]  (Normal) Collected:  11/15/19 2055    Specimen:  Blood Updated:  11/15/19 2148     Magnesium 2.0 mg/dL     Tissue Pathology Exam [[id number]] Collected:  11/15/19 0854    Specimen:  Tissue from Gastric, Antrum Updated:  11/15/19 1152    Basic Metabolic Panel [[id number]]  (Abnormal) Collected:  11/15/19 0341    Specimen:  Blood Updated:  11/15/19 0538     Glucose 104 mg/dL      BUN 15 mg/dL      Creatinine 0.69 mg/dL      Sodium 140 mmol/L      Potassium 3.3 mmol/L      Chloride 106 mmol/L      CO2 25.0 mmol/L      Calcium 8.6 mg/dL      eGFR Non African Amer 86 mL/min/1.73      BUN/Creatinine Ratio 21.7     Anion Gap 9.0 mmol/L     Narrative:       GFR Normal >60  Chronic Kidney Disease <60  Kidney Failure <15    CBC (No Diff) [[id number]]  (Abnormal) Collected:  11/15/19 0341    Specimen:  Blood Updated:  11/15/19 0515     WBC 12.47 10*3/mm3      RBC 3.38 10*6/mm3      Hemoglobin 9.1 g/dL      Hematocrit 29.0 %      MCV 85.8 fL      MCH 26.9 pg      MCHC 31.4 g/dL      RDW 15.9 %      RDW-SD 50.0 fl      MPV 11.3 fL      Platelets 273 10*3/mm3     [place] Draw [[id number]] Collected:  11/14/19 1923    Specimen:  Blood Updated:  11/14/19 2140    Narrative:       The following orders were created for panel order [place] Draw.  Procedure                               Abnormality         Status                     ---------                               -----------         ------                     Light Blue Top[[id number]]                                   Final result               Green Top (Gel)[[id number]]                                  Final result               Lavender Top[[id number]]                                     Final result               Gold Top - [facility][[id number]]                                    Final result                 Please view results for these tests on the individual orders.    Lavender Top [[id number]] Collected:  11/14/19 1923    Specimen:  Blood Updated:  11/14/19 2140     Extra Tube hold for add-on     Comment: Auto resulted       Light Blue Top [[id number]] Collected:  11/14/19 1923    Specimen:  Blood Updated:  11/14/19 2140     Extra Tube hold for add-on     Comment: Auto resulted       Green Top (Gel) [[id number]] Collected:  11/14/19 1923    Specimen:  Blood Updated:  11/14/19 2140     Extra Tube Hold for add-ons.     Comment: Auto resulted.       Gold Top - SST [[id number]] Collected:  11/14/19 1923    Specimen:  Blood Updated:  11/14/19 2140     Extra Tube Hold for add-ons.     Comment: Auto resulted.           Imaging Results (Most Recent)     Procedure Component Value Units Date/Time    XR Chest PA & Lateral [[id number]] Collected:  11/16/19 1049     Updated:  11/16/19 1113    Narrative:       Chest x-ray PA and lateral.    CLINICAL INDICATION: Nausea vomiting, pain    COMPARISON: Chest November 14, 2019.    FINDINGS: Cardiac silhouette is normal in size. Pulmonary  vascularity is unremarkable.     No focal infiltrate or consolidation.  No pleural effusion.  No  pneumothorax.  No evidence of any pneumomediastinum.        Impression:       CONCLUSION: No evidence of active disease. Lung fields clear. No  evidence of pneumomediastinum.    Electronically signed by:  Saurav Adorno MD  11/16/2019 11:12 AM  CST Workstation: [id number]    XR Chest 2 View [[id number]] Collected:  11/14/19 2115     Updated:  11/14/19 2137    Narrative:       Exam: AP lateral chest    INDICATION: Hematemesis    FINDINGS: AP lateral chest. The bony structures are intact. The  cardiomediastinal silhouette is unremarkable. There is a small  hiatal hernia. Lungs are clear. No pneumothorax or pleural  effusion.      Impression:       No acute cardiopulmonary abnormality.    Electronically signed by:  Ancelmo Guerrero  MD  11/14/2019 9:36 PM  CST Workstation: [id number]    CT Abdomen Pelvis With Contrast [[id number]] Collected:  11/14/19 2055     Updated:  11/14/19 2121    Narrative:       PROCEDURE:  CT ABDOMEN PELVIS W CONTRAST    CLINICAL HISTORY:  64 years  Female  abd pain, N/V, R11.2 Nausea  with vomiting, unspecified K92.0 Hematemesis      TECHNIQUE: Contiguous axial images obtained through the abdomen  and pelvis following intravenous administration of 95 mL  Isovue-300.  Coronal and sagittal reformatted images provided.      This CT exam was performed according to our departmental  dose-optimization program, which includes one or more of the  following dose reduction techniques: automated exposure control,  adjustment of the mA and/or kV according to patient size, and/or  use of iterative reconstruction technique.    COMPARISON:  10/25/2016    FINDINGS:    Again seen is a small to moderate hiatal hernia. This hernia  appears mildly inflamed.    There is no other bowel inflammation. There is no bowel  obstruction, pneumatosis, free intraperitoneal air, abscess, or  ascites. Normal appendix.    Gallstones without evidence of acute cholecystitis or biliary  dilatation.    The liver, pancreas, spleen, adrenal glands, kidneys, uterus,  ovaries, urinary bladder, and osseous structures are normal.       Impression:         Small to moderate hiatal hernia again seen. It appears mildly  inflamed on the prior exam.    No other bowel inflammation. No bowel obstruction or perforation.    Gallstones without evidence of acute cholecystitis or biliary  dilatation.    Electronically signed by:  Diana Phipps MD  11/14/2019 9:20 PM CST  Workstation: [id number]        Hospital Course:  The patient is a 64 y.o. female who presented to [facility] with nausea, abdominal pain, and hematemesis.  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  There was no active bleeding.  Initially  "patient was kept n.p.o. for 2 days and then advance to a soft diet which she tolerated.  Gastroenterology recommended holding Plavix and all other antiplatelets at this time.  Patient will be referred back to their service and it will be deferred to gastroenterology when the patient can safely restart her antiplatelet.  Patient hemoglobin dropped to a low of 8 and is now increasing.  On the day of discharge was 8.6.  Gastroenterology cleared the patient for discharge and outpatient follow-up.  She will also be seen by her PCP and home health.  Patient was instructed to take nausea medicine if she had any nausea at all and to attempt avoiding any type of vomiting.  She was also told not to partake in any strenuous activity.  Patient verbalized understanding.  She was instructed to return with any concerning or worsening symptoms including nausea, vomiting, hematemesis, hematochezia, melena, or any other complaints.    Condition on Discharge: Stable, improved    Physical Exam on Discharge:  /79 (BP Location: Right arm, Patient Position: Lying)   Pulse 63   Temp 97.4 °F (36.3 °C) (Oral)   Resp 16   Ht 154.9 cm (61\")   Wt 82.5 kg (181 lb 14.4 oz)   SpO2 97%   BMI 34.37 kg/m²   Physical Exam   Constitutional: She is oriented to person, place, and time. She appears well-developed and well-nourished.   HENT:   Head: Normocephalic and atraumatic.   Cardiovascular: Normal rate and regular rhythm.   Pulmonary/Chest: Effort normal and breath sounds normal. No stridor. No respiratory distress.   Abdominal: Soft. Bowel sounds are normal. She exhibits no distension.   Neurological: She is alert and oriented to person, place, and time.   Skin: Skin is warm and dry. Capillary refill takes less than 2 seconds.   Psychiatric: She has a normal mood and affect. Her behavior is normal.     [place] [facility] [place] Medications:     Discharge Medications      New Medications      Instructions " Start Date   pantoprazole 40 MG EC tablet  Commonly known as:  PROTONIX   40 mg, Oral, Daily      promethazine 12.5 MG tablet  Commonly known as:  PHENERGAN   12.5 mg, Oral, Every 6 Hours PRN      sucralfate 1 g tablet  Commonly known as:  CARAFATE   1 g, Oral, 4 Times Daily         Changes to Medications      Instructions Start Date   ondansetron 4 MG tablet  Commonly known as:  ZOFRAN  What changed:  when to take this   4 mg, Oral, Every 6 Hours PRN         Continue These Medications      Instructions Start Date   amitriptyline 50 MG tablet  Commonly known as:  ELAVIL   50 mg, Oral, Nightly      atorvastatin 20 MG tablet  Commonly known as:  LIPITOR   20 mg, Oral, Daily      busPIRone 10 MG tablet  Commonly known as:  BUSPAR   10 mg, Oral, Daily      CELEXA 40 MG tablet  Generic drug:  citalopram   40 mg, Oral, Daily      dilTIAZem  MG 24 hr capsule  Commonly known as:  CARDIZEM CD   180 mg, Oral, Daily      hydrOXYzine pamoate 25 MG capsule  Commonly known as:  VISTARIL   25 mg, Oral, 3 Times Daily PRN, Not taking medication      lisinopril 10 MG tablet  Commonly known as:  PRINIVIL,ZESTRIL   10 mg, Oral, Daily      nitroglycerin 0.4 MG SL tablet  Commonly known as:  NITROSTAT   0.4 mg, Sublingual, Every 5 Minutes PRN, Take no more than 3 doses in 15 minutes.      QUEtiapine 100 MG tablet  Commonly known as:  SEROquel   100 mg, Oral, Nightly      sertraline 50 MG tablet  Commonly known as:  ZOLOFT   50 mg, Oral, Daily      ZANAFLEX 4 MG tablet  Generic drug:  tiZANidine   4 mg, Oral, 3 Times Daily         Stop These Medications    aspirin 81 MG chewable tablet     clopidogrel 75 MG tablet  Commonly known as:  PLAVIX     HYDROcodone-acetaminophen 7.5-325 MG per tablet  Commonly known as:  NORCO     PrilOSEC 40 MG capsule  Generic drug:  omeprazole     ticagrelor 60 MG tablet tablet  Commonly known as:  BRILINTA            Discharge Diet:   Diet Instructions     Diet: Cardiac, Soft Texture; Thin Liquids, No  Restrictions; Ground; Thin      Discharge Diet:   Cardiac  Soft Texture       Fluid Consistency:  Thin Liquids, No Restrictions    Soft Options:  Ground    Fluid Consistency:  Thin          Activity at Discharge:   Activity Instructions     Other Activity Instructions      Activity Instructions: No heavy, pushing, bending, pulling, or strenuous activity until cleared by GI.  Hold your plavix until GI clears you to take it again.          Discharge Care Plan/Instructions: Hold blood thinners and antiplatelets at this time per GI, will defer to GI as to when antiplatelets get restarted.  Home health.  GI and PCP follow up.  Return with any concerning or worsening symptoms.    Follow-up Appointments:   Future Appointments   Date Time Provider Department Center   11/20/2019  9:45 AM Emanuel Olmos MD [facility] None       Test Results Pending at Discharge:    Order Current Status    Tissue Pathology Exam In process          [unfilled]    Time: Please note the discharge planning summary exceeded 30 minutes

## 2019-11-18 NOTE — PLAN OF CARE
Problem: Patient Care Overview  Goal: Plan of Care Review  Outcome: Ongoing (interventions implemented as appropriate)   11/17/19 0609   Coping/Psychosocial   Plan of Care Reviewed With patient   Plan of Care Review   Progress improving       Problem: Fall Risk (Adult)  Goal: Identify Related Risk Factors and Signs and Symptoms  Outcome: Ongoing (interventions implemented as appropriate)    Goal: Absence of Fall  Outcome: Ongoing (interventions implemented as appropriate)      Problem: Nausea/Vomiting (Adult)  Goal: Identify Related Risk Factors and Signs and Symptoms  Outcome: Ongoing (interventions implemented as appropriate)    Goal: Symptom Relief  Outcome: Ongoing (interventions implemented as appropriate)    Goal: Adequate Hydration  Outcome: Ongoing (interventions implemented as appropriate)      Problem: Pain, Acute (Adult)  Goal: Identify Related Risk Factors and Signs and Symptoms  Outcome: Ongoing (interventions implemented as appropriate)    Goal: Acceptable Pain Control/Comfort Level  Outcome: Ongoing (interventions implemented as appropriate)

## 2019-11-18 NOTE — PROGRESS NOTES
SUBJECTIVE:   11/18/2019  Chief Complaint:     Subjective      Patient feels better today.  No further nausea vomiting or hematemesis.  Tolerating diet.  Odynophagia is improving.  Hemoglobin today was 8.6.    History:  Past Medical History:   Diagnosis Date   • Anxiety    • Chronic bronchitis (CMS/HCC)    • COPD (chronic obstructive pulmonary disease) (CMS/HCC)    • Coronary artery disease    • Depression    • Gastritis    • GERD (gastroesophageal reflux disease)    • Hiatal hernia    • Hypercholesteremia    • Hypertension    • Osteoarthritis      Past Surgical History:   Procedure Laterality Date   • CARDIAC CATHETERIZATION N/A 4/19/2017   • CARDIAC CATHETERIZATION  4/19/2017   • CARDIAC CATHETERIZATION N/A 7/6/2017   • CARDIAC CATHETERIZATION N/A 3/9/2019   • ENDOSCOPY AND COLONOSCOPY     • HERNIA REPAIR     • JOINT REPLACEMENT     • NISSEN FUNDOPLICATION N/A 2/1/2017   • TN RT/LT HEART CATHETERS N/A 4/19/2017   • TOTAL KNEE ARTHROPLASTY Left 12/27/2018   • TRANSESOPHAGEAL ECHOCARDIOGRAM (JOAO)       Family History   Problem Relation Age of Onset   • Hypertension Father      Social History     Tobacco Use   • Smoking status: Current Every Day Smoker     Packs/day: 0.25     Years: 43.00     Pack years: 10.75     Types: Cigarettes   • Smokeless tobacco: Never Used   Substance Use Topics   • Alcohol use: No   • Drug use: No     Medications Prior to Admission   Medication Sig Dispense Refill Last Dose   • busPIRone (BUSPAR) 10 MG tablet Take 10 mg by mouth Daily.   11/15/2019 at Unknown time   • clopidogrel (PLAVIX) 75 MG tablet Take 75 mg by mouth Daily.   11/15/2019 at Unknown time   • diltiaZEM CD (CARDIZEM CD) 180 MG 24 hr capsule Take 1 capsule by mouth Daily. 30 capsule 6 11/15/2019 at Unknown time   • lisinopril (PRINIVIL,ZESTRIL) 10 MG tablet Take 10 mg by mouth Daily.   11/15/2019 at Unknown time   • QUEtiapine (SEROquel) 100 MG tablet Take 100 mg by mouth Every Night.   11/14/2019 at Unknown time   •  Arrived via personal transportation. Fever, body aches, and cough x 3 days. Mother at bedside   sertraline (ZOLOFT) 50 MG tablet Take 50 mg by mouth Daily.   11/15/2019 at Unknown time   • tiZANidine (ZANAFLEX) 4 MG tablet Take 4 mg by mouth 3 (Three) Times a Day.   11/15/2019 at Unknown time   • amitriptyline (ELAVIL) 50 MG tablet Take 50 mg by mouth Every Night.   Taking   • aspirin 81 MG chewable tablet Chew 1 tablet Daily. (Patient not taking: Reported on 11/15/2019) 30 tablet 1 Not Taking at Unknown time   • atorvastatin (LIPITOR) 20 MG tablet Take 1 tablet by mouth Daily. (Patient not taking: Reported on 11/15/2019) 90 tablet 3 Not Taking at Unknown time   • citalopram (CELEXA) 40 MG tablet Take 40 mg by mouth Daily.   Taking   • HYDROcodone-acetaminophen (NORCO) 7.5-325 MG per tablet Take 1 tablet by mouth Every 6 (Six) Hours As Needed for Moderate Pain . (Patient not taking: Reported on 11/15/2019) 80 tablet 0 Not Taking at Unknown time   • hydrOXYzine (VISTARIL) 25 MG capsule Take 25 mg by mouth 3 (Three) Times a Day As Needed for Anxiety. Not taking medication   Taking   • nitroglycerin (NITROSTAT) 0.4 MG SL tablet Place 1 tablet under the tongue Every 5 (Five) Minutes As Needed for Chest Pain. Take no more than 3 doses in 15 minutes. 30 tablet 1 Taking   • omeprazole (PRILOSEC) 40 MG capsule Take 40 mg by mouth Daily. Not taking medication   Taking   • ticagrelor (BRILINTA) 60 MG tablet tablet Take 1 tablet by mouth 2 (Two) Times a Day. (Patient not taking: Reported on 11/15/2019) 60 tablet 12 Not Taking at Unknown time   • [DISCONTINUED] ondansetron (ZOFRAN) 4 MG tablet Take 1 tablet by mouth Every 8 (Eight) Hours As Needed for Nausea or Vomiting. 30 tablet 0 Unknown at Unknown time     Allergies:  Codeine     CURRENT MEDICATIONS/OBJECTIVE/VS/PE:     Current Medications:     Current Facility-Administered Medications   Medication Dose Route Frequency Provider Last Rate Last Dose   • acetaminophen (TYLENOL) tablet 650 mg  650 mg Oral Q6H PRN Gilbert Hearn PA   650 mg at 11/18/19 0853   •  busPIRone (BUSPAR) tablet 10 mg  10 mg Oral Daily Gilbert Hearn PA   10 mg at 11/18/19 0853   • citalopram (CeleXA) tablet 40 mg  40 mg Oral Daily Gilbert Hearn PA   40 mg at 11/18/19 0853   • Influenza Vac Subunit Quad (FLUCELVAX) injection 0.5 mL  0.5 mL Intramuscular During Hospitalization Matheus Neumann MD       • lisinopril (PRINIVIL,ZESTRIL) tablet 10 mg  10 mg Oral Daily Gilbert Hearn PA   10 mg at 11/18/19 0853   • morphine injection 2 mg  2 mg Intravenous Q2H PRN Gilbert Hearn PA   2 mg at 11/18/19 0342   • ondansetron (ZOFRAN) injection 4 mg  4 mg Intravenous Q6H Gilbert Hearn PA   4 mg at 11/18/19 0531   • pantoprazole (PROTONIX) 40 mg/100 mL (0.4 mg/mL) in 0.9% NS IVPB  8 mg/hr Intravenous Continuous Matheus Neumann MD 20 mL/hr at 11/17/19 2032 8 mg/hr at 11/17/19 2032   • potassium chloride (MICRO-K) CR capsule 40 mEq  40 mEq Oral PRN Matheus Neumann MD   40 mEq at 11/18/19 0342    Or   • potassium chloride (KLOR-CON) packet 40 mEq  40 mEq Oral PRN Matheus Neumann MD        Or   • potassium chloride 10 mEq in 100 mL IVPB  10 mEq Intravenous Q1H PRN Matheus Neumann MD       • promethazine (PHENERGAN) injection 12.5 mg  12.5 mg Intravenous Q6H PRN Gilbert Hearn PA   12.5 mg at 11/17/19 2217   • QUEtiapine (SEROquel) tablet 100 mg  100 mg Oral Nightly Gilbert Hearn PA   100 mg at 11/17/19 2032   • sertraline (ZOLOFT) tablet 50 mg  50 mg Oral Daily Gilbert Hearn PA   50 mg at 11/18/19 0854   • sodium chloride 0.9 % flush 10 mL  10 mL Intravenous PRN Smalls, Fady Maxwell, DO       • sodium chloride 0.9 % flush 10 mL  10 mL Intravenous Q12H Matheus Neumann MD   10 mL at 11/18/19 0854   • sodium chloride 0.9 % flush 10 mL  10 mL Intravenous PRN Matheus Neumann MD       • sodium chloride 0.9 % infusion  75 mL/hr Intravenous Continuous Matheus Neumann MD 75 mL/hr at 11/17/19 2032 75 mL/hr at 11/17/19 2032   • tiZANidine (ZANAFLEX) tablet 4 mg  4 mg Oral TID Nadiya,  DERECK Florentino   4 mg at 11/18/19 0853       Objective     Review of Systems:   Review of Systems   Review of Systems   Constitutional: Negative for activity change, appetite change, chills, diaphoresis, fatigue, fever and unexpected weight change.   HENT: Negative for congestion, sore throat and trouble swallowing.    Neck: no adenopathy, thyromegaly or masses.  Respiratory: Negative for apnea, cough, choking, chest tightness, shortness of breath, wheezing and stridor.    Cardiovascular: Negative for chest pain, palpitations and leg swelling.   Gastrointestinal: Negative for abdominal distention, abdominal pain, anal bleeding, blood in stool, constipation, diarrhea, nausea, rectal pain and vomiting.   Musculoskeletal: Negative for arthralgias.   Extremities: no edema, cyanosis or clubbing.  Skin: Negative for color change, pallor, rash and wound.   Neurological: Negative for dizziness, syncope, weakness, light-headedness and headaches.   Psychiatric/Behavioral: Negative for agitation, behavioral problems, confusion and decreased concentration.       Physical Exam:   Temp:  [97.1 °F (36.2 °C)-99.2 °F (37.3 °C)] 97.4 °F (36.3 °C)  Heart Rate:  [63-92] 63  Resp:  [16-18] 16  BP: (108-168)/(59-88) 110/79     Physical Exam:  General Appearance:    Alert, cooperative, in no acute distress   Head:    Normocephalic, without obvious abnormality, atraumatic   Eyes:            Lids and lashes normal, conjunctivae and sclerae normal, no   icterus, no pallor, corneas clear, PERRLA   Ears:    Ears appear intact with no abnormalities noted   Throat:   No oral lesions, no thrush, oral mucosa moist   Neck:   No adenopathy, supple, trachea midline, no thyromegaly, no     carotid bruit, no JVD   Back:     No kyphosis present, no scoliosis present, no skin lesions,       erythema or scars, no tenderness to percussion or                   palpation,   range of motion normal   Lungs:     Clear to auscultation,respirations regular, even  and                   unlabored    Heart:    Regular rhythm and normal rate, normal S1 and S2, no            murmur, no gallop, no rub, no click   Breast Exam:    Deferred   Abdomen:     Normal bowel sounds, no masses, no organomegaly, soft        non-tender, non-distended, no guarding, no rebound                 tenderness   Genitalia:    Deferred   Extremities:   Moves all extremities well, no edema, no cyanosis, no              redness   Pulses:   Pulses palpable and equal bilaterally   Skin:   No bleeding, bruising or rash   Lymph nodes:   No palpable adenopathy   Neurologic:   Cranial nerves 2 - 12 grossly intact, sensation intact, DTR        present and equal bilaterally      Results Review:     Lab Results (last 24 hours)     Procedure Component Value Units Date/Time    Comprehensive Metabolic Panel [[id number]]  (Abnormal) Collected:  11/18/19 0758    Specimen:  Blood Updated:  11/18/19 0911     Glucose 119 mg/dL      BUN 4 mg/dL      Creatinine 0.94 mg/dL      Sodium 140 mmol/L      Potassium 4.5 mmol/L      Chloride 107 mmol/L      CO2 23.0 mmol/L      Calcium 9.0 mg/dL      Total Protein 6.3 g/dL      Albumin 3.60 g/dL      ALT (SGPT) 32 U/L      AST (SGOT) 48 U/L      Alkaline Phosphatase 137 U/L      Total Bilirubin 0.2 mg/dL      eGFR Non African Amer 60 mL/min/1.73      Globulin 2.7 gm/dL      A/G Ratio 1.3 g/dL      BUN/Creatinine Ratio 4.3     Anion Gap 10.0 mmol/L     Narrative:       GFR Normal >60  Chronic Kidney Disease <60  Kidney Failure <15    Extra Tubes [[id number]] Collected:  11/18/19 0758    Specimen:  Blood, Venous Line Updated:  11/18/19 0902    Narrative:       The following orders were created for panel order Extra Tubes.  Procedure                               Abnormality         Status                     ---------                               -----------         ------                     [facility] - [facility][[id number]]                                   Final result                  Please view results for these tests on the individual orders.    Gold Top - [facility] [[id number]] Collected:  11/18/19 0758    Specimen:  Blood Updated:  11/18/19 0902     Extra Tube Hold for add-ons.     Comment: Auto resulted.       CBC & Differential [[id number]] Collected:  11/18/19 0758    Specimen:  Blood Updated:  11/18/19 0851    Narrative:       The following orders were created for panel order CBC & Differential.  Procedure                               Abnormality         Status                     ---------                               -----------         ------                     CBC Auto Differential[[id number]]        Abnormal            Final result                 Please view results for these tests on the individual orders.    CBC Auto Differential [[id number]]  (Abnormal) Collected:  11/18/19 0758    Specimen:  Blood Updated:  11/18/19 0851     WBC 4.90 10*3/mm3      RBC 3.22 10*6/mm3      Hemoglobin 8.6 g/dL      Hematocrit 28.0 %      MCV 87.0 fL      MCH 26.7 pg      MCHC 30.7 g/dL      RDW 16.1 %      RDW-SD 50.9 fl      MPV 11.2 fL      Platelets 226 10*3/mm3      Neutrophil % 71.5 %      Lymphocyte % 22.4 %      Monocyte % 4.3 %      Eosinophil % 1.2 %      Basophil % 0.4 %      Immature Grans % 0.2 %      Neutrophils, Absolute 3.50 10*3/mm3      Lymphocytes, Absolute 1.10 10*3/mm3      Monocytes, Absolute 0.21 10*3/mm3      Eosinophils, Absolute 0.06 10*3/mm3      Basophils, Absolute 0.02 10*3/mm3      Immature Grans, Absolute 0.01 10*3/mm3      nRBC 0.0 /100 WBC     Magnesium [[id number]]  (Normal) Collected:  11/17/19 2028    Specimen:  Blood Updated:  11/17/19 2249     Magnesium 1.8 mg/dL     Potassium [[id number]]  (Abnormal) Collected:  11/17/19 2028    Specimen:  Blood Updated:  11/17/19 2109     Potassium 3.2 mmol/L            I reviewed the patient's new clinical results.  I reviewed the patient's new imaging results and agree with the interpretation.     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  Okay to D/C from GI standpoint.  Add Carafate and continue Zofran.  Follow-up in clinic in 1 to 2 weeks.  The risks, benefits, and alternatives of this procedure have been discussed with the patient or the responsible party- the patient understands and agrees to proceed.         Sean Fernandez MD  11/18/19  11:49 AM

## 2019-11-18 NOTE — PAYOR COMM NOTE
"Leeanne Bah  [facility]  (P)[phone]  (F)[phone]          Mary Nelson (64 y.o. Female)     Date of Birth Social Security Number Address Home Phone MRN    1955  [address] [facility] [address] [phone] [id number]    [employer] Marital Status          Religious        Admission Date Admission Type Admitting Provider Attending Provider Department, Room/Bed    11/14/19 Emergency Gustavo Santana MD Popescu, Tudor, MD [address], 416/1    Discharge Date Discharge Disposition Discharge Destination                       Attending Provider:  Matheus Neumann MD    Allergies:  Codeine    Isolation:  None   Infection:  None   Code Status:  CPR    Ht:  154.9 cm (61\")   Wt:  82.5 kg (181 lb 14.4 oz)    Admission Cmt:  None   Principal Problem:  Nausea and vomiting [R11.2]                 Active Insurance as of 11/14/2019     Primary Coverage     Payor Plan Insurance Group Employer/Plan Group    [employer] [id number]     Payor Plan Address Payor Plan Phone Number Payor Plan Fax Number Effective Dates    [address] [phone]  11/1/1997 - None Entered    [facility] [id number]       Subscriber Name Subscriber Birth Date Member ID       MARY NELSON 1955 [id number]                 Emergency Contacts      (Rel.) Home Phone Work Phone Mobile Phone    Rylee Yee (Sister) [phone] -- [phone]    JEY NELSON (Daughter) [phone] -- [phone]               History & Physical      Matheus Neumann MD at 11/14/19 3336          History and Physical  Matheus Neumann MD  Hospitalist    Date of admission: 11/14/2019    Patient Care Team:  Ge Jean MD as PCP - General    Chief complaint   Chief Complaint   Patient presents with   • Nausea   • Vomiting Blood   • Abdominal Pain     Subjective     Patient is a 64 y.o. female admitted for repeated episodes of hematemesis accompanied by " weakness, dizziness, poor appetite. She has been on Aspirin/Brilinta for some time now but she has never experienced such episodes of bleeding.    History  Past Medical History:   Diagnosis Date   • Anxiety    • Chronic bronchitis (CMS/HCC)    • COPD (chronic obstructive pulmonary disease) (CMS/HCC)    • Coronary artery disease    • Depression    • Gastritis    • GERD (gastroesophageal reflux disease)    • Hiatal hernia    • Hypercholesteremia    • Hypertension    • Osteoarthritis      Past Surgical History:   Procedure Laterality Date   • CARDIAC CATHETERIZATION N/A 4/19/2017   • CARDIAC CATHETERIZATION  4/19/2017   • CARDIAC CATHETERIZATION N/A 7/6/2017   • CARDIAC CATHETERIZATION N/A 3/9/2019   • ENDOSCOPY AND COLONOSCOPY     • HERNIA REPAIR     • NISSEN FUNDOPLICATION N/A 2/1/2017   • OK RT/LT HEART CATHETERS N/A 4/19/2017   • TOTAL KNEE ARTHROPLASTY Left 12/27/2018   • TRANSESOPHAGEAL ECHOCARDIOGRAM (JOAO)       Family History   Problem Relation Age of Onset   • Hypertension Father      Social History     Tobacco Use   • Smoking status: Current Every Day Smoker     Packs/day: 0.25     Years: 43.00     Pack years: 10.75     Types: Cigarettes   • Smokeless tobacco: Never Used   Substance Use Topics   • Alcohol use: No   • Drug use: No     Medications Prior to Admission   Medication Sig Dispense Refill Last Dose   • busPIRone (BUSPAR) 10 MG tablet Take 10 mg by mouth Daily.   11/15/2019 at Unknown time   • clopidogrel (PLAVIX) 75 MG tablet Take 75 mg by mouth Daily.   11/15/2019 at Unknown time   • diltiaZEM CD (CARDIZEM CD) 180 MG 24 hr capsule Take 1 capsule by mouth Daily. 30 capsule 6 11/15/2019 at Unknown time   • ezetimibe (ZETIA) 10 MG tablet Take 1 tablet by mouth Daily. 30 tablet 11 11/15/2019 at Unknown time   • lisinopril (PRINIVIL,ZESTRIL) 10 MG tablet Take 10 mg by mouth Daily.   11/15/2019 at Unknown time   • QUEtiapine (SEROquel) 100 MG tablet Take 100 mg by mouth Every Night.   11/14/2019 at Unknown  time   • sertraline (ZOLOFT) 50 MG tablet Take 50 mg by mouth Daily.   11/15/2019 at Unknown time   • tiZANidine (ZANAFLEX) 4 MG tablet Take 4 mg by mouth 3 (Three) Times a Day.   11/15/2019 at Unknown time   • amitriptyline (ELAVIL) 50 MG tablet Take 50 mg by mouth Every Night.   Taking   • aspirin 81 MG chewable tablet Chew 1 tablet Daily. (Patient not taking: Reported on 11/15/2019) 30 tablet 1 Not Taking at Unknown time   • atorvastatin (LIPITOR) 20 MG tablet Take 1 tablet by mouth Daily. (Patient not taking: Reported on 11/15/2019) 90 tablet 3 Not Taking at Unknown time   • citalopram (CELEXA) 40 MG tablet Take 40 mg by mouth Daily.   Taking   • HYDROcodone-acetaminophen (NORCO) 7.5-325 MG per tablet Take 1 tablet by mouth Every 6 (Six) Hours As Needed for Moderate Pain . (Patient not taking: Reported on 11/15/2019) 80 tablet 0 Not Taking at Unknown time   • hydrOXYzine (VISTARIL) 25 MG capsule Take 25 mg by mouth 3 (Three) Times a Day As Needed for Anxiety. Not taking medication   Taking   • nitroglycerin (NITROSTAT) 0.4 MG SL tablet Place 1 tablet under the tongue Every 5 (Five) Minutes As Needed for Chest Pain. Take no more than 3 doses in 15 minutes. 30 tablet 1 Taking   • omeprazole (PRILOSEC) 40 MG capsule Take 40 mg by mouth Daily. Not taking medication   Taking   • ondansetron (ZOFRAN) 4 MG tablet Take 1 tablet by mouth Every 8 (Eight) Hours As Needed for Nausea or Vomiting. 30 tablet 0 Unknown at Unknown time   • ticagrelor (BRILINTA) 60 MG tablet tablet Take 1 tablet by mouth 2 (Two) Times a Day. (Patient not taking: Reported on 11/15/2019) 60 tablet 12 Not Taking at Unknown time     Allergies:  Codeine    Review of Systems  Review of Systems   Constitutional: Positive for fatigue. Negative for fever.   Respiratory: Negative for cough, choking, shortness of breath and wheezing.    Cardiovascular: Negative for chest pain and palpitations.   Gastrointestinal: Positive for abdominal pain, blood in stool  and nausea (Vomiting blood). Negative for abdominal distention.   Genitourinary: Negative for dysuria, frequency, pelvic pain, urgency, vaginal discharge and vaginal pain.   Musculoskeletal: Positive for arthralgias. Negative for back pain and gait problem.   Skin: Positive for pallor. Negative for color change and rash.   Neurological: Positive for weakness. Negative for seizures, syncope, light-headedness, numbness and headaches.   Psychiatric/Behavioral: Negative for agitation, behavioral problems and confusion.   All other systems reviewed and are negative.      Objective     Vital Signs  Temp:  [97.7 °F (36.5 °C)] 97.7 °F (36.5 °C)  Heart Rate:  [] 89  Resp:  [20-22] 22  BP: (144-195)/() 166/77    Physical Exam:  Physical Exam   Constitutional: She is oriented to person, place, and time. She appears well-developed and well-nourished. No distress.   HENT:   Head: Normocephalic and atraumatic.   Eyes: EOM are normal. Pupils are equal, round, and reactive to light. No scleral icterus.   Neck: Normal range of motion. Neck supple.   Cardiovascular: Normal rate and regular rhythm.   Pulmonary/Chest: Effort normal and breath sounds normal. No stridor. No respiratory distress.   Abdominal: Soft. Bowel sounds are normal. She exhibits no distension and no mass. There is tenderness. There is no guarding.   Musculoskeletal: Normal range of motion. She exhibits no edema, tenderness or deformity.   Neurological: She is alert and oriented to person, place, and time. No cranial nerve deficit. Coordination normal.   Skin: Skin is warm and dry. No rash noted. No erythema. There is pallor.   Psychiatric: She has a normal mood and affect. Her behavior is normal.   Vitals reviewed.      Results Review:   Lab Results (last 24 hours)     Procedure Component Value Units Date/Time    [place] Draw [[id number]] Collected:  11/14/19 1923    Specimen:  Blood Updated:  11/14/19 2140    Narrative:       The following orders were  created for panel order [place] Draw.  Procedure                               Abnormality         Status                     ---------                               -----------         ------                     Light Blue Top[[id number]]                                   Final result               Green Top (Gel)[[id number]]                                  Final result               Lavender Top[[id number]]                                     Final result               Gold Top - SST[[id number]]                                   Final result                 Please view results for these tests on the individual orders.    Lavender Top [[id number]] Collected:  11/14/19 1923    Specimen:  Blood Updated:  11/14/19 2140     Extra Tube hold for add-on     Comment: Auto resulted       Light Blue Top [[id number]] Collected:  11/14/19 1923    Specimen:  Blood Updated:  11/14/19 2140     Extra Tube hold for add-on     Comment: Auto resulted       Green Top (Gel) [[id number]] Collected:  11/14/19 1923    Specimen:  Blood Updated:  11/14/19 2140     Extra Tube Hold for add-ons.     Comment: Auto resulted.       Gold Top - SST [[id number]] Collected:  11/14/19 1923    Specimen:  Blood Updated:  11/14/19 2140     Extra Tube Hold for add-ons.     Comment: Auto resulted.       Comprehensive Metabolic Panel [[id number]]  (Abnormal) Collected:  11/14/19 1923    Specimen:  Blood Updated:  11/14/19 1949     Glucose 118 mg/dL      BUN 8 mg/dL      Creatinine 0.86 mg/dL      Sodium 138 mmol/L      Potassium 3.0 mmol/L      Chloride 99 mmol/L      CO2 25.0 mmol/L      Calcium 9.9 mg/dL      Total Protein 7.2 g/dL      Albumin 4.00 g/dL      ALT (SGPT) 12 U/L      AST (SGOT) 13 U/L      Alkaline Phosphatase 152 U/L      Total Bilirubin 0.2 mg/dL      eGFR Non African Amer 67 mL/min/1.73      Globulin 3.2 gm/dL      A/G Ratio 1.3 g/dL      BUN/Creatinine Ratio 9.3     Anion Gap 14.0 mmol/L     Narrative:       GFR Normal >60  Chronic Kidney  Disease <60  Kidney Failure <15    CBC & Differential [[id number]] Collected:  11/14/19 1923    Specimen:  Blood Updated:  11/14/19 1929    Narrative:       The following orders were created for panel order CBC & Differential.  Procedure                               Abnormality         Status                     ---------                               -----------         ------                     CBC Auto Differential[[id number]]        Abnormal            Final result                 Please view results for these tests on the individual orders.    CBC Auto Differential [[id number]]  (Abnormal) Collected:  11/14/19 1923    Specimen:  Blood Updated:  11/14/19 1929     WBC 13.83 10*3/mm3      RBC 4.39 10*6/mm3      Hemoglobin 11.9 g/dL      Hematocrit 38.0 %      MCV 86.6 fL      MCH 27.1 pg      MCHC 31.3 g/dL      RDW 16.1 %      RDW-SD 50.3 fl      MPV 10.8 fL      Platelets 356 10*3/mm3      Neutrophil % 81.6 %      Lymphocyte % 12.7 %      Monocyte % 4.5 %      Eosinophil % 0.4 %      Basophil % 0.4 %      Immature Grans % 0.4 %      Neutrophils, Absolute 11.28 10*3/mm3      Lymphocytes, Absolute 1.76 10*3/mm3      Monocytes, Absolute 0.62 10*3/mm3      Eosinophils, Absolute 0.06 10*3/mm3      Basophils, Absolute 0.05 10*3/mm3      Immature Grans, Absolute 0.06 10*3/mm3      nRBC 0.0 /100 WBC           Imaging Results (Last 24 Hours)     Procedure Component Value Units Date/Time    XR Chest 2 View [[id number]] Collected:  11/14/19 2115     Updated:  11/14/19 2137    Narrative:       Exam: AP lateral chest    INDICATION: Hematemesis    FINDINGS: AP lateral chest. The bony structures are intact. The  cardiomediastinal silhouette is unremarkable. There is a small  hiatal hernia. Lungs are clear. No pneumothorax or pleural  effusion.      Impression:       No acute cardiopulmonary abnormality.    Electronically signed by:  Ancelmo Guerrero MD  11/14/2019 9:36 PM  CST Workstation: [id number]    CT Abdomen Pelvis With  Contrast [[id number]] Collected:  11/14/19 2055     Updated:  11/14/19 2121    Narrative:       PROCEDURE:  CT ABDOMEN PELVIS W CONTRAST    CLINICAL HISTORY:  64 years  Female  abd pain, N/V, R11.2 Nausea  with vomiting, unspecified K92.0 Hematemesis      TECHNIQUE: Contiguous axial images obtained through the abdomen  and pelvis following intravenous administration of 95 mL  Isovue-300.  Coronal and sagittal reformatted images provided.      This CT exam was performed according to our departmental  dose-optimization program, which includes one or more of the  following dose reduction techniques: automated exposure control,  adjustment of the mA and/or kV according to patient size, and/or  use of iterative reconstruction technique.    COMPARISON:  10/25/2016    FINDINGS:    Again seen is a small to moderate hiatal hernia. This hernia  appears mildly inflamed.    There is no other bowel inflammation. There is no bowel  obstruction, pneumatosis, free intraperitoneal air, abscess, or  ascites. Normal appendix.    Gallstones without evidence of acute cholecystitis or biliary  dilatation.    The liver, pancreas, spleen, adrenal glands, kidneys, uterus,  ovaries, urinary bladder, and osseous structures are normal.       Impression:         Small to moderate hiatal hernia again seen. It appears mildly  inflamed on the prior exam.    No other bowel inflammation. No bowel obstruction or perforation.    Gallstones without evidence of acute cholecystitis or biliary  dilatation.    Electronically signed by:  Diana Phipps MD  11/14/2019 9:20 PM CST  Workstation: [id number]          Assessment/Plan       Nausea and vomiting    Upper GI bleeding    Stop Aspirin / Brilinta, start IV Protonix drip, obtain serial H/H, provide her with symptomatic treatment, place NG tube if she continues to vomit and consult GI. She will require an upper endoscopy.    Matheus Neumann MD  11/15/19  2:35 AM      Electronically signed by Matheus Neumann MD  at 11/15/19 0238          Emergency Department Notes      Debora Stoll, RN at 11/14/19 1925        Patient presents to ED with complaint of vomiting blood and upper abdominal pain. She states this started around 1700. She had incident of vomiting blood while in ED with blood clots presents. She states she is taking a blood thinner, Plavix. Denies any recent trauma or surgery.      Debora Stoll RN  11/14/19 1926      Electronically signed by Debora Stoll RN at 11/14/19 1926     Fady Smalls DO at 11/14/19 2030          Subjective   64-year-old white female presents the emergency department complaint of abdominal pain, nausea, and vomiting blood.  She states that she started vomiting blood approximately 5 PM.  She ate a sandwich and then started vomiting.  She states that she is thrown up approximately 6 times.  The nurse notes that the toilet bowl had a significant amount of blood with clots which she had thrown up.  Patient seems very anxious in the room stating that the room is hot.  She is taking Brilinta and aspirin for her cardiovascular disease.            Review of Systems   Constitutional: Negative for activity change, appetite change, chills, fatigue, fever and unexpected weight change.   HENT: Negative for nosebleeds, rhinorrhea, sore throat, trouble swallowing and voice change.    Eyes: Negative for photophobia, pain and visual disturbance.   Respiratory: Negative for apnea, cough, chest tightness, shortness of breath, wheezing and stridor.    Cardiovascular: Negative for chest pain, palpitations and leg swelling.   Gastrointestinal: Positive for abdominal pain, nausea and vomiting. Negative for abdominal distention, blood in stool, constipation and diarrhea.   Endocrine: Negative for cold intolerance, heat intolerance, polydipsia and polyuria.   Genitourinary: Negative for decreased urine volume, difficulty urinating, dysuria, flank pain, hematuria and urgency.    Musculoskeletal: Negative for arthralgias, myalgias, neck pain and neck stiffness.   Skin: Negative for color change, pallor and rash.   Allergic/Immunologic: Negative for immunocompromised state.   Neurological: Negative for dizziness, seizures, syncope, weakness, light-headedness and numbness.   Hematological: Negative for adenopathy.   Psychiatric/Behavioral: Negative for agitation, confusion, dysphoric mood and suicidal ideas. The patient is not nervous/anxious.        Past Medical History:   Diagnosis Date   • Anxiety    • Arthritis    • COPD (chronic obstructive pulmonary disease) (CMS/[facility])    • Coronary artery disease    • Depression    • Epigastric pain    • GERD (gastroesophageal reflux disease)    • Head injury 1990   • Hiatal hernia    • High cholesterol    • HTN (hypertension)        Allergies   Allergen Reactions   • Codeine Swelling       Past Surgical History:   Procedure Laterality Date   • ABDOMINAL SURGERY     • CARDIAC CATHETERIZATION N/A 4/19/2017    Procedure: Left Heart Cath;  Surgeon: Kevin Erazo MD;  Location: [facility] INVASIVE LOCATION;  Service:    • CARDIAC CATHETERIZATION  4/19/2017    Procedure: Functional Flow [place];  Surgeon: Kevin Erazo MD;  Location: [facility] INVASIVE LOCATION;  Service:    • CARDIAC CATHETERIZATION N/A 7/6/2017    Procedure: Left Heart Cath;  Surgeon: Kevin Erazo MD;  Location: [facility] INVASIVE LOCATION;  Service:    • CARDIAC CATHETERIZATION N/A 3/9/2019    Procedure: Left Heart Cath;  Surgeon: Brenden Chaparro MD;  Location: [facility] INVASIVE LOCATION;  Service: Cardiology   • COLONOSCOPY  06/27/2016   • ENDOSCOPY AND COLONOSCOPY  06/27/2016    External and internal hemorrhoids. No specimens collected   • ESOPHAGOSCOPY / EGD  06/27/2016    With biopsy-Mildly severe esophagitis. Gastritis. Normal examined duodenum. Biopsied. Medium-sized hiatus hernia. Several biopsies were obtained in the lower third of the esophagus. Several biopsies were  obtained in the gastric antrum   • HERNIA REPAIR     • NISSEN FUNDOPLICATION N/A 2/1/2017    Procedure: LAPAROSCOPIC NISSEN FUNDOPLICATION,  HIATAL HERNIA REPAIR  ;  Surgeon: Ced Wilson MD;  Location: [facility] OR;  Service:    • MO RT/LT HEART CATHETERS N/A 4/19/2017    Procedure: Percutaneous Coronary Intervention;  Surgeon: Kevin Erazo MD;  Location: [facility] CATH INVASIVE LOCATION;  Service: Cardiovascular   • TOTAL KNEE ARTHROPLASTY Left 12/27/2018    Procedure: TOTAL KNEE ARTHROPLASTY;  Surgeon: Grover Raymond MD;  Location: [facility] OR;  Service: Orthopedics   • TRANSESOPHAGEAL ECHOCARDIOGRAM (JOAO)  11/18/2015    With color flow-Ef of 60%.Early diastolic dysfunction.Normal RV size and function.Trace to mild mitral and trace tricuspid regurg.No pericardial effusion.   • TUBAL ABDOMINAL LIGATION  1984       Family History   Problem Relation Age of Onset   • Hypertension Other    • Heart disease Father    • Hypertension Father    • COPD Father    • Arthritis Father        Social History     Socioeconomic History   • Marital status:      Spouse name: Not on file   • Number of children: Not on file   • Years of education: Not on file   • Highest education level: Not on file   Tobacco Use   • Smoking status: Current Every Day Smoker     Packs/day: 0.25     Years: 43.00     Pack years: 10.75     Types: Cigarettes   • Smokeless tobacco: Never Used   • Tobacco comment: No cigarettes x 1 month but vape   Substance and Sexual Activity   • Alcohol use: No   • Drug use: No   • Sexual activity: Defer           Objective   Physical Exam   Constitutional: She is oriented to person, place, and time. She appears well-developed and well-nourished. No distress.   HENT:   Head: Normocephalic and atraumatic.   Right Ear: External ear normal.   Left Ear: External ear normal.   Mouth/Throat: Oropharynx is clear and moist. No oropharyngeal exudate.   Eyes: Conjunctivae and EOM are normal. Pupils are equal,  round, and reactive to light. Right eye exhibits no discharge. Left eye exhibits no discharge. No scleral icterus.   Neck: Neck supple. No JVD present. No tracheal deviation present. No thyromegaly present.   Cardiovascular: Normal rate, regular rhythm, normal heart sounds and intact distal pulses. Exam reveals no friction rub.   No murmur heard.  Pulmonary/Chest: Effort normal and breath sounds normal. No stridor. No respiratory distress. She has no wheezes. She has no rales. She exhibits no tenderness.   Abdominal: Soft. Bowel sounds are normal. She exhibits no distension and no mass. There is no tenderness. There is no rebound and no guarding.   The abdomen is soft to palpation but she is having pain across the upper abdomen with palpation.  There is no guarding or rebound.   Genitourinary: Rectal exam shows guaiac negative stool.   Genitourinary Comments: External hemorrhoids are noted on rectal exam.  There is no gross blood.  Guaiac is negative.   Musculoskeletal: She exhibits no edema, tenderness or deformity.   Lymphadenopathy:     She has no cervical adenopathy.   Neurological: She is alert and oriented to person, place, and time. No cranial nerve deficit. She exhibits normal muscle tone. Coordination normal.   Skin: Skin is warm and dry. Capillary refill takes 2 to 3 seconds. No rash noted. She is not diaphoretic. No erythema.   Psychiatric: She has a normal mood and affect. Her behavior is normal. Judgment and thought content normal.   Nursing note and vitals reviewed.      Procedures          ED Course  ED Course as of Nov 14 2149   Thu Nov 14, 2019 2032 Patient was placed in room 14 and evaluated by me.  Her physical exam was essentially normal.  Guaiac was negative.  She seemed very anxious.  Her heart rate was normal at 76.  Blood pressure was elevated at 164/79.  I see no evidence of acute decompensation associated with blood loss.  She was given morphine for pain and Zofran for nausea control.   Labs were obtained and her hematocrit was 38.  On current chemistry, her BUN was 8.  Potassium was mildly low at 3.0 and she was given IV potassium in the emergency department.  CT the abdomen and pelvis was ordered.  [CE]   2148 CT showed a hiatal hernia.  Chest x-ray was not concerning.  She did have multiple bouts of hematemesis in the emergency department.  She was given IV pantoprazole and the case was discussed with Dr. Neumann who agrees to admit the patient.  [CE]      ED Course User Index  [CE] Fady Smalls, DO          Labs Reviewed   COMPREHENSIVE METABOLIC PANEL - Abnormal; Notable for the following components:       Result Value    Glucose 118 (*)     Potassium 3.0 (*)     Alkaline Phosphatase 152 (*)     All other components within normal limits    Narrative:     GFR Normal >60  Chronic Kidney Disease <60  Kidney Failure <15   CBC WITH AUTO DIFFERENTIAL - Abnormal; Notable for the following components:    WBC 13.83 (*)     Hemoglobin 11.9 (*)     MCHC 31.3 (*)     RDW 16.1 (*)     Neutrophil % 81.6 (*)     Lymphocyte % 12.7 (*)     Monocyte % 4.5 (*)     Neutrophils, Absolute 11.28 (*)     Immature Grans, Absolute 0.06 (*)     All other components within normal limits   RAINBOW DRAW    Narrative:     The following orders were created for panel order [place] Draw.  Procedure                               Abnormality         Status                     ---------                               -----------         ------                     Light Blue Top[[id number]]                                   Final result               Green Top (Gel)[[id number]]                                  Final result               Lavender Top[[id number]]                                     Final result               Gold Top - SST[[id number]]                                   Final result                 Please view results for these tests on the individual orders.   TYPE AND SCREEN   PREVIOUS HISTORY   CBC AND  DIFFERENTIAL    Narrative:     The following orders were created for panel order CBC & Differential.  Procedure                               Abnormality         Status                     ---------                               -----------         ------                     CBC Auto Differential[[id number]]        Abnormal            Final result                 Please view results for these tests on the individual orders.   LIGHT BLUE TOP   GREEN TOP   LAVENDER TOP   GOLD TOP - SST     Xr Chest 2 View    Result Date: 11/14/2019  Narrative: Exam: AP lateral chest INDICATION: Hematemesis FINDINGS: AP lateral chest. The bony structures are intact. The cardiomediastinal silhouette is unremarkable. There is a small hiatal hernia. Lungs are clear. No pneumothorax or pleural effusion.     Impression: No acute cardiopulmonary abnormality. Electronically signed by:  Ancelmo Guerrero MD  11/14/2019 9:36 PM CST Workstation: [id number]    Ct Abdomen Pelvis With Contrast    Result Date: 11/14/2019  Narrative: PROCEDURE:  CT ABDOMEN PELVIS W CONTRAST CLINICAL HISTORY:  64 years  Female  abd pain, N/V, R11.2 Nausea with vomiting, unspecified K92.0 Hematemesis  TECHNIQUE: Contiguous axial images obtained through the abdomen and pelvis following intravenous administration of 95 mL Isovue-300.  Coronal and sagittal reformatted images provided.  This CT exam was performed according to our departmental dose-optimization program, which includes one or more of the following dose reduction techniques: automated exposure control, adjustment of the mA and/or kV according to patient size, and/or use of iterative reconstruction technique. COMPARISON:  10/25/2016 FINDINGS: Again seen is a small to moderate hiatal hernia. This hernia appears mildly inflamed. There is no other bowel inflammation. There is no bowel obstruction, pneumatosis, free intraperitoneal air, abscess, or ascites. Normal appendix. Gallstones without evidence of acute  cholecystitis or biliary dilatation. The liver, pancreas, spleen, adrenal glands, kidneys, uterus, ovaries, urinary bladder, and osseous structures are normal.     Impression: Small to moderate hiatal hernia again seen. It appears mildly inflamed on the prior exam. No other bowel inflammation. No bowel obstruction or perforation. Gallstones without evidence of acute cholecystitis or biliary dilatation. Electronically signed by:  Diana Phipps MD  11/14/2019 9:20 PM CST Workstation: [id number]          [facility]    Final diagnoses:   Nausea and vomiting, intractability of vomiting not specified, unspecified vomiting type   Hematemesis with nausea              Fady Smalls DO  11/14/19 2149      Electronically signed by Fady Smalls DO at 11/14/19 2149     Debora Stoll RN at 11/14/19 2144        Dr Neumann and Dr Smalls notified of emesis.      Debora Stoll RN  11/14/19 2153      Electronically signed by Debora Stoll RN at 11/14/19 2153     Jackie Hernadez RN at 11/14/19 2227        Dr Fernandez and stated that we will hold up on the NG tube unless pt vomits again and call her then      Jackie Hernadez RN  11/14/19 2227      Electronically signed by Jackie Hernadez RN at 11/14/19 2227     Debora Stoll RN at 11/15/19 0118        Spoke with Dr Neumann about patient having an episode of vomiting, orders received.      Debora Stoll RN  11/15/19 0119      Electronically signed by Debora Stoll RN at 11/15/19 0119     Debora Stoll RN at 11/15/19 0122        Spoke with Dr Ricci, notified of vomiting blood and zofran orders from [facility]. No orders for nasogastric tube insertion.      Debora Stoll RN  11/15/19 0123      Electronically signed by Debora Stoll RN at 11/15/19 0123       Hospital Medications (all)       Dose Frequency Start End    acetaminophen (TYLENOL) tablet 650 mg 650 mg Every 6 Hours PRN 11/16/2019     Sig - Route: Take 2 tablets by mouth Every 6  (Six) Hours As Needed for Mild Pain , Moderate Pain  or Headache. - Oral    busPIRone (BUSPAR) tablet 10 mg 10 mg Daily 11/15/2019     Sig - Route: Take 1 tablet by mouth Daily. - Oral    Cosign for Ordering: Accepted by Roverto Babb MD on 11/15/2019 12:46 PM    citalopram (CeleXA) tablet 40 mg 40 mg Daily 11/15/2019     Sig - Route: Take 1 tablet by mouth Daily. - Oral    Cosign for Ordering: Accepted by Roverto Babb MD on 11/15/2019 12:46 PM    Influenza Vac Subunit Quad (FLUCELVAX) injection 0.5 mL 0.5 mL During Hospitalization 11/15/2019     Sig - Route: Inject 0.5 mL into the appropriate muscle as directed by prescriber During Hospitalization for Immunization. - Intramuscular    iopamidol (ISOVUE-300) 61 % injection 100 mL 100 mL Once in Imaging 11/14/2019 11/14/2019    Sig - Route: Infuse 100 mL into a venous catheter Once. - Intravenous    lisinopril (PRINIVIL,ZESTRIL) tablet 10 mg 10 mg Daily 11/15/2019     Sig - Route: Take 1 tablet by mouth Daily. - Oral    Cosign for Ordering: Accepted by Roverto Babb MD on 11/15/2019 12:46 PM    morphine injection 2 mg 2 mg Every 2 Hours PRN 11/15/2019 11/25/2019    Sig - Route: Infuse 1 mL into a venous catheter Every 2 (Two) Hours As Needed for Mild Pain , Moderate Pain  or Severe Pain  (hold if SPB < 100). - Intravenous    Cosign for Ordering: Accepted by Matheus Neumann MD on 11/15/2019  1:02 PM    morphine injection 4 mg 4 mg Once 11/14/2019 11/14/2019    Sig - Route: Infuse 1 mL into a venous catheter 1 (One) Time. - Intravenous    ondansetron (ZOFRAN) injection 4 mg 4 mg Once 11/14/2019 11/14/2019    Sig - Route: Infuse 2 mL into a venous catheter 1 (One) Time. - Intravenous    ondansetron (ZOFRAN) injection 4 mg 4 mg Every 6 Hours 11/15/2019     Sig - Route: Infuse 2 mL into a venous catheter Every 6 (Six) Hours. - Intravenous    Notes to Pharmacy: Scheduled per GI to prevent vomiting and worsening or sachin-estrada tear    Cosign for Ordering:  "Accepted by oRverto Babb MD on 11/15/2019 12:46 PM    pantoprazole (PROTONIX) 40 mg/100 mL (0.4 mg/mL) in 0.9% NS IVPB 8 mg/hr Continuous 11/15/2019     Sig - Route: Infuse 8 mg/hr into a venous catheter Continuous. - Intravenous    pantoprazole (PROTONIX) injection 80 mg 80 mg Once 11/14/2019 11/14/2019    Sig - Route: Infuse 20 mL into a venous catheter 1 (One) Time. - Intravenous    potassium chloride (KLOR-CON) packet 40 mEq 40 mEq As Needed 11/17/2019     Sig - Route: Take 40 mEq by mouth As Needed (potassium replacement, see admin instructions). - Oral    Linked Group 1:  \"Or\" Linked Group Details        potassium chloride (MICRO-K) CR capsule 40 mEq 40 mEq As Needed 11/17/2019     Sig - Route: Take 4 capsules by mouth As Needed (Potassium Replacement.  See Admin Instructions). - Oral    Linked Group 1:  \"Or\" Linked Group Details        potassium chloride 10 mEq in 100 mL IVPB 10 mEq Once 11/14/2019 11/14/2019    Sig - Route: Infuse 100 mL into a venous catheter 1 (One) Time. - Intravenous    potassium chloride 10 mEq in 100 mL IVPB 10 mEq Every 1 Hour PRN 11/17/2019     Sig - Route: Infuse 100 mL into a venous catheter Every 1 (One) Hour As Needed (Potassium Replacement - See Admin Instructions). - Intravenous    Linked Group 1:  \"Or\" Linked Group Details        promethazine (PHENERGAN) injection 12.5 mg 12.5 mg Every 6 Hours PRN 11/15/2019     Sig - Route: Infuse 0.5 mL into a venous catheter Every 6 (Six) Hours As Needed for Nausea or Vomiting. - Intravenous    Cosign for Ordering: Accepted by Roverto Babb MD on 11/15/2019 12:46 PM    QUEtiapine (SEROquel) tablet 100 mg 100 mg Nightly 11/15/2019     Sig - Route: Take 1 tablet by mouth Every Night. - Oral    Cosign for Ordering: Accepted by Roverto Babb MD on 11/15/2019 12:46 PM    sertraline (ZOLOFT) tablet 50 mg 50 mg Daily 11/15/2019     Sig - Route: Take 1 tablet by mouth Daily. - Oral    Cosign for Ordering: Accepted by Roverto Babb " MD LIBORIO on 11/15/2019 12:46 PM    sodium chloride 0.9 % flush 10 mL 10 mL As Needed 11/14/2019     Sig - Route: Infuse 10 mL into a venous catheter As Needed for Line Care. - Intravenous    Cosign for Ordering: Accepted by Fady Smalls DO on 11/14/2019  9:47 PM    sodium chloride 0.9 % flush 10 mL 10 mL Every 12 Hours Scheduled 11/14/2019     Sig - Route: Infuse 10 mL into a venous catheter Every 12 (Twelve) Hours. - Intravenous    sodium chloride 0.9 % flush 10 mL 10 mL As Needed 11/14/2019     Sig - Route: Infuse 10 mL into a venous catheter As Needed for Line Care. - Intravenous    sodium chloride 0.9 % infusion 75 mL/hr Continuous 11/15/2019     Sig - Route: Infuse 75 mL/hr into a venous catheter Continuous. - Intravenous    tiZANidine (ZANAFLEX) tablet 4 mg 4 mg 3 Times Daily 11/15/2019     Sig - Route: Take 1 tablet by mouth 3 (Three) Times a Day. - Oral    Cosign for Ordering: Accepted by Roverto Babb MD on 11/15/2019 12:46 PM    ondansetron (ZOFRAN) injection 4 mg (Discontinued) 4 mg Every 6 Hours PRN 11/15/2019 11/15/2019    Sig - Route: Infuse 2 mL into a venous catheter Every 6 (Six) Hours As Needed for Nausea or Vomiting. - Intravenous    potassium chloride (KLOR-CON) packet 40 mEq (Discontinued) 40 mEq As Needed 11/17/2019 11/17/2019    Sig - Route: Take 40 mEq by mouth As Needed (Potassium Replacement, See Admin Instructions). - Oral    Reason for Discontinue: Duplicate order    potassium chloride (MICRO-K) CR capsule 40 mEq (Discontinued) 40 mEq As Needed 11/17/2019 11/17/2019    Sig - Route: Take 4 capsules by mouth As Needed (Potassium Replacement.  See Admin Instructions). - Oral    Reason for Discontinue: Duplicate order    potassium chloride 10 mEq in 100 mL IVPB (Discontinued) 10 mEq Every 1 Hour PRN 11/15/2019 11/17/2019    Sig - Route: Infuse 100 mL into a venous catheter Every 1 (One) Hour As Needed (See Admin Instructions.). - Intravenous    Reason for Discontinue: Duplicate  order    Cosign for Ordering: Accepted by Roverto Babb MD on 11/15/2019 12:46 PM    sodium chloride 0.9 % infusion (Discontinued) 125 mL/hr Continuous 11/14/2019 11/15/2019    Sig - Route: Infuse 125 mL/hr into a venous catheter Continuous. - Intravenous          Lab Results (last 7 days)     Procedure Component Value Units Date/Time    Comprehensive Metabolic Panel [[id number]]  (Abnormal) Collected:  11/18/19 0758    Specimen:  Blood Updated:  11/18/19 0911     Glucose 119 mg/dL      BUN 4 mg/dL      Creatinine 0.94 mg/dL      Sodium 140 mmol/L      Potassium 4.5 mmol/L      Chloride 107 mmol/L      CO2 23.0 mmol/L      Calcium 9.0 mg/dL      Total Protein 6.3 g/dL      Albumin 3.60 g/dL      ALT (SGPT) 32 U/L      AST (SGOT) 48 U/L      Alkaline Phosphatase 137 U/L      Total Bilirubin 0.2 mg/dL      eGFR Non African Amer 60 mL/min/1.73      Globulin 2.7 gm/dL      A/G Ratio 1.3 g/dL      BUN/Creatinine Ratio 4.3     Anion Gap 10.0 mmol/L     Narrative:       GFR Normal >60  Chronic Kidney Disease <60  Kidney Failure <15    Extra Tubes [[id number]] Collected:  11/18/19 0758    Specimen:  Blood, Venous Line Updated:  11/18/19 0902    Narrative:       The following orders were created for panel order Extra Tubes.  Procedure                               Abnormality         Status                     ---------                               -----------         ------                     Gold Top - SST[[id number]]                                   Final result                 Please view results for these tests on the individual orders.    Gold Top - SST [[id number]] Collected:  11/18/19 0758    Specimen:  Blood Updated:  11/18/19 0902     Extra Tube Hold for add-ons.     Comment: Auto resulted.       CBC & Differential [[id number]] Collected:  11/18/19 0758    Specimen:  Blood Updated:  11/18/19 0851    Narrative:       The following orders were created for panel order CBC & Differential.  Procedure                                Abnormality         Status                     ---------                               -----------         ------                     CBC Auto Differential[[id number]]        Abnormal            Final result                 Please view results for these tests on the individual orders.    CBC Auto Differential [[id number]]  (Abnormal) Collected:  11/18/19 0758    Specimen:  Blood Updated:  11/18/19 0851     WBC 4.90 10*3/mm3      RBC 3.22 10*6/mm3      Hemoglobin 8.6 g/dL      Hematocrit 28.0 %      MCV 87.0 fL      MCH 26.7 pg      MCHC 30.7 g/dL      RDW 16.1 %      RDW-SD 50.9 fl      MPV 11.2 fL      Platelets 226 10*3/mm3      Neutrophil % 71.5 %      Lymphocyte % 22.4 %      Monocyte % 4.3 %      Eosinophil % 1.2 %      Basophil % 0.4 %      Immature Grans % 0.2 %      Neutrophils, Absolute 3.50 10*3/mm3      Lymphocytes, Absolute 1.10 10*3/mm3      Monocytes, Absolute 0.21 10*3/mm3      Eosinophils, Absolute 0.06 10*3/mm3      Basophils, Absolute 0.02 10*3/mm3      Immature Grans, Absolute 0.01 10*3/mm3      nRBC 0.0 /100 WBC     Magnesium [[id number]]  (Normal) Collected:  11/17/19 2028    Specimen:  Blood Updated:  11/17/19 2249     Magnesium 1.8 mg/dL     Potassium [[id number]]  (Abnormal) Collected:  11/17/19 2028    Specimen:  Blood Updated:  11/17/19 2109     Potassium 3.2 mmol/L     Comprehensive Metabolic Panel [[id number]]  (Abnormal) Collected:  11/17/19 0655    Specimen:  Blood Updated:  11/17/19 0739     Glucose 90 mg/dL      BUN 4 mg/dL      Creatinine 0.90 mg/dL      Sodium 141 mmol/L      Potassium 3.5 mmol/L      Chloride 105 mmol/L      CO2 26.0 mmol/L      Calcium 8.7 mg/dL      Total Protein 5.5 g/dL      Albumin 3.10 g/dL      ALT (SGPT) 11 U/L      AST (SGOT) 19 U/L      Alkaline Phosphatase 113 U/L      Total Bilirubin 0.3 mg/dL      eGFR Non African Amer 63 mL/min/1.73      Globulin 2.4 gm/dL      A/G Ratio 1.3 g/dL      BUN/Creatinine Ratio 4.4     Anion Gap 10.0 mmol/L      Narrative:       GFR Normal >60  Chronic Kidney Disease <60  Kidney Failure <15    CBC & Differential [[id number]] Collected:  11/17/19 0655    Specimen:  Blood Updated:  11/17/19 0724    Narrative:       The following orders were created for panel order CBC & Differential.  Procedure                               Abnormality         Status                     ---------                               -----------         ------                     CBC Auto Differential[[id number]]        Abnormal            Final result                 Please view results for these tests on the individual orders.    CBC Auto Differential [[id number]]  (Abnormal) Collected:  11/17/19 0655    Specimen:  Blood Updated:  11/17/19 0724     WBC 4.82 10*3/mm3      RBC 2.98 10*6/mm3      Hemoglobin 8.0 g/dL      Hematocrit 26.0 %      MCV 87.2 fL      MCH 26.8 pg      MCHC 30.8 g/dL      RDW 15.9 %      RDW-SD 49.8 fl      MPV 10.6 fL      Platelets 192 10*3/mm3      Neutrophil % 70.4 %      Lymphocyte % 22.4 %      Monocyte % 5.2 %      Eosinophil % 1.2 %      Basophil % 0.6 %      Immature Grans % 0.2 %      Neutrophils, Absolute 3.39 10*3/mm3      Lymphocytes, Absolute 1.08 10*3/mm3      Monocytes, Absolute 0.25 10*3/mm3      Eosinophils, Absolute 0.06 10*3/mm3      Basophils, Absolute 0.03 10*3/mm3      Immature Grans, Absolute 0.01 10*3/mm3      nRBC 0.0 /100 WBC     Comprehensive Metabolic Panel [[id number]]  (Abnormal) Collected:  11/16/19 0947    Specimen:  Blood Updated:  11/16/19 1046     Glucose 94 mg/dL      BUN 6 mg/dL      Creatinine 0.82 mg/dL      Sodium 141 mmol/L      Potassium 3.8 mmol/L      Chloride 107 mmol/L      CO2 26.0 mmol/L      Calcium 8.6 mg/dL      Total Protein 5.6 g/dL      Albumin 3.20 g/dL      ALT (SGPT) 9 U/L      AST (SGOT) 12 U/L      Alkaline Phosphatase 111 U/L      Total Bilirubin 0.2 mg/dL      eGFR Non African Amer 71 mL/min/1.73      Globulin 2.4 gm/dL      A/G Ratio 1.3 g/dL      BUN/Creatinine Ratio  7.3     Anion Gap 8.0 mmol/L     Narrative:       GFR Normal >60  Chronic Kidney Disease <60  Kidney Failure <15    Potassium [[id number]]  (Normal) Collected:  11/16/19 0947    Specimen:  Blood Updated:  11/16/19 1019     Potassium 3.8 mmol/L     CBC & Differential [[id number]] Collected:  11/16/19 0947    Specimen:  Blood Updated:  11/16/19 1010    Narrative:       The following orders were created for panel order CBC & Differential.  Procedure                               Abnormality         Status                     ---------                               -----------         ------                     CBC Auto Differential[[id number]]        Abnormal            Final result                 Please view results for these tests on the individual orders.    CBC Auto Differential [[id number]]  (Abnormal) Collected:  11/16/19 0947    Specimen:  Blood Updated:  11/16/19 1010     WBC 6.00 10*3/mm3      RBC 3.02 10*6/mm3      Hemoglobin 8.0 g/dL      Hematocrit 26.6 %      MCV 88.1 fL      MCH 26.5 pg      MCHC 30.1 g/dL      RDW 16.3 %      RDW-SD 52.8 fl      MPV 10.7 fL      Platelets 204 10*3/mm3      Neutrophil % 70.3 %      Lymphocyte % 24.0 %      Monocyte % 4.7 %      Eosinophil % 0.3 %      Basophil % 0.5 %      Immature Grans % 0.2 %      Neutrophils, Absolute 4.22 10*3/mm3      Lymphocytes, Absolute 1.44 10*3/mm3      Monocytes, Absolute 0.28 10*3/mm3      Eosinophils, Absolute 0.02 10*3/mm3      Basophils, Absolute 0.03 10*3/mm3      Immature Grans, Absolute 0.01 10*3/mm3      nRBC 0.0 /100 WBC     Magnesium [[id number]]  (Normal) Collected:  11/15/19 2055    Specimen:  Blood Updated:  11/15/19 2148     Magnesium 2.0 mg/dL     Potassium [[id number]]  (Abnormal) Collected:  11/15/19 2055    Specimen:  Blood Updated:  11/15/19 2112     Potassium 3.2 mmol/L     Tissue Pathology Exam [[id number]] Collected:  11/15/19 0854    Specimen:  Tissue from Gastric, Antrum Updated:  11/15/19 1152    Basic Metabolic Panel  [[id number]]  (Abnormal) Collected:  11/15/19 0341    Specimen:  Blood Updated:  11/15/19 0538     Glucose 104 mg/dL      BUN 15 mg/dL      Creatinine 0.69 mg/dL      Sodium 140 mmol/L      Potassium 3.3 mmol/L      Chloride 106 mmol/L      CO2 25.0 mmol/L      Calcium 8.6 mg/dL      eGFR Non African Amer 86 mL/min/1.73      BUN/Creatinine Ratio 21.7     Anion Gap 9.0 mmol/L     Narrative:       GFR Normal >60  Chronic Kidney Disease <60  Kidney Failure <15    CBC (No Diff) [[id number]]  (Abnormal) Collected:  11/15/19 0341    Specimen:  Blood Updated:  11/15/19 0515     WBC 12.47 10*3/mm3      RBC 3.38 10*6/mm3      Hemoglobin 9.1 g/dL      Hematocrit 29.0 %      MCV 85.8 fL      MCH 26.9 pg      MCHC 31.4 g/dL      RDW 15.9 %      RDW-SD 50.0 fl      MPV 11.3 fL      Platelets 273 10*3/mm3     [place] Draw [[id number]] Collected:  11/14/19 1923    Specimen:  Blood Updated:  11/14/19 2140    Narrative:       The following orders were created for panel order [place] Draw.  Procedure                               Abnormality         Status                     ---------                               -----------         ------                     Light Blue Top[[id number]]                                   Final result               Green Top (Gel)[[id number]]                                  Final result               Lavender Top[[id number]]                                     Final result               Gold Top - SST[[id number]]                                   Final result                 Please view results for these tests on the individual orders.    Lavender Top [[id number]] Collected:  11/14/19 1923    Specimen:  Blood Updated:  11/14/19 2140     Extra Tube hold for add-on     Comment: Auto resulted       Light Blue Top [[id number]] Collected:  11/14/19 1923    Specimen:  Blood Updated:  11/14/19 2140     Extra Tube hold for add-on     Comment: Auto resulted       Green Top (Gel) [[id number]] Collected:  11/14/19 1923     Specimen:  Blood Updated:  11/14/19 2140     Extra Tube Hold for add-ons.     Comment: Auto resulted.       Gold Top - [facility] [[id number]] Collected:  11/14/19 1923    Specimen:  Blood Updated:  11/14/19 2140     Extra Tube Hold for add-ons.     Comment: Auto resulted.       Comprehensive Metabolic Panel [[id number]]  (Abnormal) Collected:  11/14/19 1923    Specimen:  Blood Updated:  11/14/19 1949     Glucose 118 mg/dL      BUN 8 mg/dL      Creatinine 0.86 mg/dL      Sodium 138 mmol/L      Potassium 3.0 mmol/L      Chloride 99 mmol/L      CO2 25.0 mmol/L      Calcium 9.9 mg/dL      Total Protein 7.2 g/dL      Albumin 4.00 g/dL      ALT (SGPT) 12 U/L      AST (SGOT) 13 U/L      Alkaline Phosphatase 152 U/L      Total Bilirubin 0.2 mg/dL      eGFR Non African Amer 67 mL/min/1.73      Globulin 3.2 gm/dL      A/G Ratio 1.3 g/dL      BUN/Creatinine Ratio 9.3     Anion Gap 14.0 mmol/L     Narrative:       GFR Normal >60  Chronic Kidney Disease <60  Kidney Failure <15    CBC & Differential [[id number]] Collected:  11/14/19 1923    Specimen:  Blood Updated:  11/14/19 1929    Narrative:       The following orders were created for panel order CBC & Differential.  Procedure                               Abnormality         Status                     ---------                               -----------         ------                     CBC Auto Differential[[id number]]        Abnormal            Final result                 Please view results for these tests on the individual orders.    CBC Auto Differential [[id number]]  (Abnormal) Collected:  11/14/19 1923    Specimen:  Blood Updated:  11/14/19 1929     WBC 13.83 10*3/mm3      RBC 4.39 10*6/mm3      Hemoglobin 11.9 g/dL      Hematocrit 38.0 %      MCV 86.6 fL      MCH 27.1 pg      MCHC 31.3 g/dL      RDW 16.1 %      RDW-SD 50.3 fl      MPV 10.8 fL      Platelets 356 10*3/mm3      Neutrophil % 81.6 %      Lymphocyte % 12.7 %      Monocyte % 4.5 %      Eosinophil % 0.4 %       Basophil % 0.4 %      Immature Grans % 0.4 %      Neutrophils, Absolute 11.28 10*3/mm3      Lymphocytes, Absolute 1.76 10*3/mm3      Monocytes, Absolute 0.62 10*3/mm3      Eosinophils, Absolute 0.06 10*3/mm3      Basophils, Absolute 0.05 10*3/mm3      Immature Grans, Absolute 0.06 10*3/mm3      nRBC 0.0 /100 WBC           Imaging Results (Last 7 Days)     Procedure Component Value Units Date/Time    XR Chest PA & Lateral [[id number]] Collected:  11/16/19 1049     Updated:  11/16/19 1113    Narrative:       Chest x-ray PA and lateral.    CLINICAL INDICATION: Nausea vomiting, pain    COMPARISON: Chest November 14, 2019.    FINDINGS: Cardiac silhouette is normal in size. Pulmonary  vascularity is unremarkable.     No focal infiltrate or consolidation.  No pleural effusion.  No  pneumothorax.  No evidence of any pneumomediastinum.        Impression:       CONCLUSION: No evidence of active disease. Lung fields clear. No  evidence of pneumomediastinum.    Electronically signed by:  Saurav Adorno MD  11/16/2019 11:12 AM  CST Workstation: [id number]    XR Chest 2 View [[id number]] Collected:  11/14/19 2115     Updated:  11/14/19 2137    Narrative:       Exam: AP lateral chest    INDICATION: Hematemesis    FINDINGS: AP lateral chest. The bony structures are intact. The  cardiomediastinal silhouette is unremarkable. There is a small  hiatal hernia. Lungs are clear. No pneumothorax or pleural  effusion.      Impression:       No acute cardiopulmonary abnormality.    Electronically signed by:  Ancelmo Guerrero MD  11/14/2019 9:36 PM  CST Workstation: [id number]    CT Abdomen Pelvis With Contrast [[id number]] Collected:  11/14/19 2055     Updated:  11/14/19 2121    Narrative:       PROCEDURE:  CT ABDOMEN PELVIS W CONTRAST    CLINICAL HISTORY:  64 years  Female  abd pain, N/V, R11.2 Nausea  with vomiting, unspecified K92.0 Hematemesis      TECHNIQUE: Contiguous axial images obtained through the abdomen  and pelvis following intravenous  administration of 95 mL  Isovue-300.  Coronal and sagittal reformatted images provided.      This CT exam was performed according to our departmental  dose-optimization program, which includes one or more of the  following dose reduction techniques: automated exposure control,  adjustment of the mA and/or kV according to patient size, and/or  use of iterative reconstruction technique.    COMPARISON:  10/25/2016    FINDINGS:    Again seen is a small to moderate hiatal hernia. This hernia  appears mildly inflamed.    There is no other bowel inflammation. There is no bowel  obstruction, pneumatosis, free intraperitoneal air, abscess, or  ascites. Normal appendix.    Gallstones without evidence of acute cholecystitis or biliary  dilatation.    The liver, pancreas, spleen, adrenal glands, kidneys, uterus,  ovaries, urinary bladder, and osseous structures are normal.       Impression:         Small to moderate hiatal hernia again seen. It appears mildly  inflamed on the prior exam.    No other bowel inflammation. No bowel obstruction or perforation.    Gallstones without evidence of acute cholecystitis or biliary  dilatation.    Electronically signed by:  Diana Phipps MD  11/14/2019 9:20 PM CST  Workstation: [id number]        ECG/EMG Results (last 7 days)     ** No results found for the last 168 hours. **           Physician Progress Notes (last 7 days) (Notes from 11/11/19 1047 through 11/18/19 1047)      Niko Farfan DO at 11/17/19 1253           Niko Farfan DO,[facility]  Gastroenterology  Hepatology  Endoscopy  Board Certified in Internal Medicine and gastroenterology  [address], [address]  [place]. [address]  T- [phone]   F - [phone]     GASTROENTEROLOGY PROGRESS NOTE   NIKO FARFAN DO.         SUBJECTIVE:   11/17/2019  Chief Complaint:     Subjective  : No further episodes of any gastrointestinal bleeding.  The odynophagia and chest pain and upper abdominal pain has resolved.  Chest  x-ray shows no evidence of any pneumomediastinum         CURRENT MEDICATIONS/OBJECTIVE/VS/PE:     Current Medications:     Current Facility-Administered Medications   Medication Dose Route Frequency Provider Last Rate Last Dose   • acetaminophen (TYLENOL) tablet 650 mg  650 mg Oral Q6H PRN Gilbert Hearn PA   650 mg at 11/16/19 1811   • busPIRone (BUSPAR) tablet 10 mg  10 mg Oral Daily Gilbert Hearn PA   10 mg at 11/17/19 0911   • citalopram (CeleXA) tablet 40 mg  40 mg Oral Daily Gilbert Hearn PA   40 mg at 11/17/19 0911   • Influenza Vac Subunit Quad (FLUCELVAX) injection 0.5 mL  0.5 mL Intramuscular During Hospitalization Matheus Neumann MD       • lisinopril (PRINIVIL,ZESTRIL) tablet 10 mg  10 mg Oral Daily Gilbert Hearn PA   10 mg at 11/17/19 0911   • morphine injection 2 mg  2 mg Intravenous Q2H PRN Gilbert Hearn PA   2 mg at 11/17/19 1154   • ondansetron (ZOFRAN) injection 4 mg  4 mg Intravenous Q6H Gilbert Hearn PA   4 mg at 11/17/19 1154   • pantoprazole (PROTONIX) 40 mg/100 mL (0.4 mg/mL) in 0.9% NS IVPB  8 mg/hr Intravenous Continuous Matheus Neumann MD 20 mL/hr at 11/17/19 0907 8 mg/hr at 11/17/19 0907   • potassium chloride 10 mEq in 100 mL IVPB  10 mEq Intravenous Q1H PRN Gilbert Hearn  mL/hr at 11/17/19 1153 10 mEq at 11/17/19 1153   • promethazine (PHENERGAN) injection 12.5 mg  12.5 mg Intravenous Q6H PRN Gilbert Hearn PA   12.5 mg at 11/16/19 2047   • QUEtiapine (SEROquel) tablet 100 mg  100 mg Oral Nightly Gilbert Hearn PA   100 mg at 11/16/19 2047   • sertraline (ZOLOFT) tablet 50 mg  50 mg Oral Daily Gilbert Hearn PA   50 mg at 11/17/19 0911   • sodium chloride 0.9 % flush 10 mL  10 mL Intravenous PRN Fady Smalls, DO       • sodium chloride 0.9 % flush 10 mL  10 mL Intravenous Q12H Matheus Neumann MD   10 mL at 11/17/19 0911   • sodium chloride 0.9 % flush 10 mL  10 mL Intravenous PRN Matheus Neuamnn MD       • sodium chloride 0.9 %  infusion  75 mL/hr Intravenous Continuous Matheus Neumann MD 75 mL/hr at 11/17/19 0530 75 mL/hr at 11/17/19 0530   • tiZANidine (ZANAFLEX) tablet 4 mg  4 mg Oral TID Gilbert Hearn PA   4 mg at 11/17/19 0910       Objective     Physical Exam:   Temp:  [96 °F (35.6 °C)-99.7 °F (37.6 °C)] 99.7 °F (37.6 °C)  Heart Rate:  [71-87] 87  Resp:  [16-18] 18  BP: (102-154)/(61-96) 136/96     Physical Exam:  General Appearance:    Alert, cooperative, in no acute distress   Head:    Normocephalic, without obvious abnormality, atraumatic   Eyes:            Lids and lashes normal, conjunctivae and sclerae normal, no   icterus, no pallor, corneas clear, PERRLA   Ears:    Ears appear intact with no abnormalities noted   Throat:   No oral lesions, no thrush, oral mucosa moist   Neck:   No adenopathy, supple, trachea midline, no thyromegaly, no     carotid bruit, no JVD   Back:     No kyphosis present, no scoliosis present, no skin lesions,       erythema or scars, no tenderness to percussion or                   palpation,   range of motion normal   Lungs:     Clear to auscultation,respirations regular, even and                   unlabored    Heart:    Regular rhythm and normal rate, normal S1 and S2, no            murmur, no gallop, no rub, no click   Breast Exam:    Deferred   Abdomen:     Normal bowel sounds, no masses, no organomegaly, soft        non-tender, non-distended, no guarding, no rebound                 tenderness   Genitalia:    Deferred   Extremities:   Moves all extremities well, no edema, no cyanosis, no              redness   Pulses:   Pulses palpable and equal bilaterally   Skin:   No bleeding, bruising or rash   Lymph nodes:   No palpable adenopathy   Neurologic:   Cranial nerves 2 - 12 grossly intact, sensation intact, DTR        present and equal bilaterally      Results Review:     Lab Results (last 24 hours)     Procedure Component Value Units Date/Time    Comprehensive Metabolic Panel [[id number]]   (Abnormal) Collected:  11/17/19 0655    Specimen:  Blood Updated:  11/17/19 0739     Glucose 90 mg/dL      BUN 4 mg/dL      Creatinine 0.90 mg/dL      Sodium 141 mmol/L      Potassium 3.5 mmol/L      Chloride 105 mmol/L      CO2 26.0 mmol/L      Calcium 8.7 mg/dL      Total Protein 5.5 g/dL      Albumin 3.10 g/dL      ALT (SGPT) 11 U/L      AST (SGOT) 19 U/L      Alkaline Phosphatase 113 U/L      Total Bilirubin 0.3 mg/dL      eGFR Non African Amer 63 mL/min/1.73      Globulin 2.4 gm/dL      A/G Ratio 1.3 g/dL      BUN/Creatinine Ratio 4.4     Anion Gap 10.0 mmol/L     Narrative:       GFR Normal >60  Chronic Kidney Disease <60  Kidney Failure <15    CBC & Differential [[id number]] Collected:  11/17/19 0655    Specimen:  Blood Updated:  11/17/19 0724    Narrative:       The following orders were created for panel order CBC & Differential.  Procedure                               Abnormality         Status                     ---------                               -----------         ------                     CBC Auto Differential[[id number]]        Abnormal            Final result                 Please view results for these tests on the individual orders.    CBC Auto Differential [[id number]]  (Abnormal) Collected:  11/17/19 0655    Specimen:  Blood Updated:  11/17/19 0724     WBC 4.82 10*3/mm3      RBC 2.98 10*6/mm3      Hemoglobin 8.0 g/dL      Hematocrit 26.0 %      MCV 87.2 fL      MCH 26.8 pg      MCHC 30.8 g/dL      RDW 15.9 %      RDW-SD 49.8 fl      MPV 10.6 fL      Platelets 192 10*3/mm3      Neutrophil % 70.4 %      Lymphocyte % 22.4 %      Monocyte % 5.2 %      Eosinophil % 1.2 %      Basophil % 0.6 %      Immature Grans % 0.2 %      Neutrophils, Absolute 3.39 10*3/mm3      Lymphocytes, Absolute 1.08 10*3/mm3      Monocytes, Absolute 0.25 10*3/mm3      Eosinophils, Absolute 0.06 10*3/mm3      Basophils, Absolute 0.03 10*3/mm3      Immature Grans, Absolute 0.01 10*3/mm3      nRBC 0.0 /100 WBC          I  reviewed the patient's new clinical results.  I reviewed the patient's new imaging results and agree with the interpretation.     ASSESSMENT/PLAN:   ASSESSMENT:  1.  Deep Teetee-Zimmerman tear with bleeding without evidence of any pneumomediastinum    PLAN:  1.  Soft GI diet  2.  Dr. Fernandez to resume care tomorrow.     Jc Patel DO  11/17/19  12:53 PM       Electronically signed by Jc Patel DO at 11/17/19 1255     Gilbert Hearn PA at 11/17/19 1020     Attestation signed by Gustavo Santana MD at 11/17/19 1711    I personally evaluated and examined the patient in conjunction with LIO Kwon and agree with the assessment, treatment plan, and disposition of the patient as recorded.    General - awake alert  Respiratory - no tachypnea, no respiratory failure                        [facility] Medicine Services  INPATIENT PROGRESS NOTE    Length of Stay: 1  Date of Admission: 11/14/2019  Primary Care Physician: Ge Jean MD    Subjective   Please note that all previous progress notes, lab findings, radiographical findings, medication changes, and physical exam findings have been noted and updated as appropriate.    11/17/2019: No nausea, however continues to have some epigastric pain.  Chest x-ray shows no concerns for pneumomediastinum.  Will defer to gastroenterology for restarting of diet and any antiplatelet medications.  Globin has been stable at 8.0.    11/16/2019: Patient continues to complain of some epigastric discomfort especially with eructation.  Gastroenterology has recommended repeat chest x-ray to rule out any mediastinal involvement.  Continue with scheduled antiemetics and per GI, continue n.p.o. status except sips with meds.    Chief Complaint/HPI: This 64-year-old  female was admitted to hospital services secondary to nausea, abdominal pain, and hematemesis.  Patient underwent EGD with gastroenterology today  "and a large Teetee-Zimmerman tear was identified.  There was no active bleeding.  Per gastroenterology, Dr. Fernandez, patient should remain n.p.o. except sips with meds.  She recommended continuing to hold Plavix or any other antiplatelets.  Patient reports that she is on Plavix alone and is no longer on Brilinta.  Patient reports \"I have a lot of pain when I burp.\"  Will continue to monitor hemoglobin and proceed per gastroenterology recommendations.    Review of Systems   Constitutional: Negative for chills and fever.   Respiratory: Negative for shortness of breath.    Cardiovascular: Negative for chest pain.   Gastrointestinal: Positive for abdominal pain.        Epigastric discomfort    Genitourinary: Negative for difficulty urinating and dysuria.   Neurological: Negative for dizziness and light-headedness.   Psychiatric/Behavioral: Negative for confusion.      All pertinent negatives and positives are as above. All other systems have been reviewed and are negative unless otherwise stated.     Objective    Temp:  [96 °F (35.6 °C)-99.7 °F (37.6 °C)] 99.7 °F (37.6 °C)  Heart Rate:  [71-87] 87  Resp:  [16-18] 18  BP: (102-154)/(61-96) 136/96    Physical Exam   Constitutional: She is oriented to person, place, and time. She appears well-developed and well-nourished.   HENT:   Head: Normocephalic and atraumatic.   Cardiovascular: Normal rate and regular rhythm.   Pulmonary/Chest: Effort normal and breath sounds normal. No stridor. No respiratory distress.   Abdominal: Soft. Bowel sounds are normal. She exhibits no distension. There is tenderness.   Mild epigastric tenderness   Neurological: She is alert and oriented to person, place, and time.   Skin: Skin is warm and dry.   Psychiatric: She has a normal mood and affect. Her behavior is normal.     Results Review:  I have reviewed the labs, radiology results, and diagnostic studies.    Laboratory Data:   Results from last 7 days   Lab Units 11/17/19  0655 11/16/19  0947 " 11/15/19  2055 11/15/19  0341 11/14/19  1923   SODIUM mmol/L 141 141  --  140 138   POTASSIUM mmol/L 3.5 3.8  3.8 3.2* 3.3* 3.0*   CHLORIDE mmol/L 105 107  --  106 99   CO2 mmol/L 26.0 26.0  --  25.0 25.0   BUN mg/dL 4* 6*  --  15 8   CREATININE mg/dL 0.90 0.82  --  0.69 0.86   GLUCOSE mg/dL 90 94  --  104* 118*   CALCIUM mg/dL 8.7 8.6  --  8.6 9.9   BILIRUBIN mg/dL 0.3 0.2  --   --  0.2   ALK PHOS U/L 113 111  --   --  152*   ALT (SGPT) U/L 11 9  --   --  12   AST (SGOT) U/L 19 12  --   --  13   ANION GAP mmol/L 10.0 8.0  --  9.0 14.0     Estimated Creatinine Clearance: 63 mL/min (by C-G formula based on SCr of 0.9 mg/dL).  Results from last 7 days   Lab Units 11/15/19  2055   MAGNESIUM mg/dL 2.0         Results from last 7 days   Lab Units 11/17/19  0655 11/16/19  0947 11/15/19  0341 11/14/19  1923   WBC 10*3/mm3 4.82 6.00 12.47* 13.83*   HEMOGLOBIN g/dL 8.0* 8.0* 9.1* 11.9*   HEMATOCRIT % 26.0* 26.6* 29.0* 38.0   PLATELETS 10*3/mm3 192 204 273 356           Culture Data:   No results found for: BLOODCX  No results found for: URINECX  No results found for: RESPCX  No results found for: WOUNDCX  No results found for: STOOLCX  No components found for: BODYFLD    Radiology Data:   Imaging Results (Last 24 Hours)     Procedure Component Value Units Date/Time    XR Chest PA & Lateral [[id number]] Collected:  11/16/19 1049     Updated:  11/16/19 1113    Narrative:       Chest x-ray PA and lateral.    CLINICAL INDICATION: Nausea vomiting, pain    COMPARISON: Chest November 14, 2019.    FINDINGS: Cardiac silhouette is normal in size. Pulmonary  vascularity is unremarkable.     No focal infiltrate or consolidation.  No pleural effusion.  No  pneumothorax.  No evidence of any pneumomediastinum.        Impression:       CONCLUSION: No evidence of active disease. Lung fields clear. No  evidence of pneumomediastinum.    Electronically signed by:  Saurav Adorno MD  11/16/2019 11:12 AM  CST Workstation: [id number]          I have  reviewed the patient's current medications.     Assessment/Plan     Active Hospital Problems    Diagnosis   • **Nausea and vomiting   • Upper GI bleeding   Teetee-Zimmerman tear  History of coronary artery disease  Anemia of acute blood loss  Hypokalemia      Plan: Continue N.p.o. except sips with meds, continue scheduled antiemetics per gastroenterology, hold Plavix, replete potassium IV, gastroenterology consult appreciated, continue to treat as hospital course dictates.                This document has been electronically signed by DERECK Galan on November 17, 2019 10:20 AM        Electronically signed by Gustavo Santana MD at 11/17/19 1711     Gilbert Hearn PA at 11/16/19 105     Attestation signed by Gustavo Santana MD at 11/16/19 3482    I personally evaluated and examined the patient in conjunction with LIO Kwon and agree with the assessment, treatment plan, and disposition of the patient as recorded.    General - awake alert  Respiratory -normal effort no tachypnea                          [facility] Medicine Services  INPATIENT PROGRESS NOTE    Length of Stay: 0  Date of Admission: 11/14/2019  Primary Care Physician: Ge Jean MD    Subjective   Please note that all previous progress notes, lab findings, radiographical findings, medication changes, and physical exam findings have been noted and updated as appropriate.    11/16/2019: Patient continues to complain of some epigastric discomfort especially with eructation.  Gastroneurology has recommended repeat chest x-ray to rule out any mediastinal involvement.  Continue with scheduled antiemetics and per GI, continue n.p.o. status except sips with meds.    Chief Complaint/HPI: This 64-year-old  female was admitted to hospital services secondary to nausea, abdominal pain, and hematemesis.  Patient underwent EGD with gastroenterology today and a large Teetee-Zimmerman tear  "was identified.  There was no active bleeding.  Per gastroenterology, Dr. Fernandez, patient should remain n.p.o. except sips with meds.  She recommended continuing to hold Plavix or any other antiplatelets.  Patient reports that she is on Plavix alone and is no longer on Brilinta.  Patient reports \"I have a lot of pain when I burp.\"  Will continue to monitor hemoglobin and proceed per gastroenterology recommendations.    Review of Systems   Constitutional: Negative for chills and fever.   Respiratory: Negative for shortness of breath.    Cardiovascular: Negative for chest pain.   Gastrointestinal: Positive for abdominal pain.        Epigastric discomfort    Genitourinary: Negative for difficulty urinating and dysuria.   Neurological: Negative for dizziness and light-headedness.   Psychiatric/Behavioral: Negative for confusion.      All pertinent negatives and positives are as above. All other systems have been reviewed and are negative unless otherwise stated.     Objective    Temp:  [97.6 °F (36.4 °C)-98.3 °F (36.8 °C)] 98.3 °F (36.8 °C)  Heart Rate:  [75-91] 87  Resp:  [16-18] 16  BP: ()/(48-82) 139/66    Physical Exam   Constitutional: She is oriented to person, place, and time. She appears well-developed and well-nourished.   HENT:   Head: Normocephalic and atraumatic.   Cardiovascular: Normal rate and regular rhythm.   Pulmonary/Chest: Effort normal and breath sounds normal. No stridor. No respiratory distress.   Abdominal: Soft. Bowel sounds are normal. She exhibits no distension. There is tenderness.   Mild epigastric tenderness   Neurological: She is alert and oriented to person, place, and time.   Skin: Skin is warm and dry.   Psychiatric: She has a normal mood and affect. Her behavior is normal.     Results Review:  I have reviewed the labs, radiology results, and diagnostic studies.    Laboratory Data:   Results from last 7 days   Lab Units 11/16/19  0947 11/15/19  2055 11/15/19  0341 11/14/19  1923 "   SODIUM mmol/L 141  --  140 138   POTASSIUM mmol/L 3.8  3.8 3.2* 3.3* 3.0*   CHLORIDE mmol/L 107  --  106 99   CO2 mmol/L 26.0  --  25.0 25.0   BUN mg/dL 6*  --  15 8   CREATININE mg/dL 0.82  --  0.69 0.86   GLUCOSE mg/dL 94  --  104* 118*   CALCIUM mg/dL 8.6  --  8.6 9.9   BILIRUBIN mg/dL 0.2  --   --  0.2   ALK PHOS U/L 111  --   --  152*   ALT (SGPT) U/L 9  --   --  12   AST (SGOT) U/L 12  --   --  13   ANION GAP mmol/L 8.0  --  9.0 14.0     Estimated Creatinine Clearance: 69 mL/min (by C-G formula based on SCr of 0.82 mg/dL).  Results from last 7 days   Lab Units 11/15/19  2055   MAGNESIUM mg/dL 2.0         Results from last 7 days   Lab Units 11/16/19  0947 11/15/19  0341 11/14/19  1923   WBC 10*3/mm3 6.00 12.47* 13.83*   HEMOGLOBIN g/dL 8.0* 9.1* 11.9*   HEMATOCRIT % 26.6* 29.0* 38.0   PLATELETS 10*3/mm3 204 273 356           Culture Data:   No results found for: BLOODCX  No results found for: URINECX  No results found for: RESPCX  No results found for: WOUNDCX  No results found for: STOOLCX  No components found for: BODYFLD    Radiology Data:   Imaging Results (Last 24 Hours)     Procedure Component Value Units Date/Time    XR Chest PA & Lateral [[id number]] Updated:  11/16/19 1054          I have reviewed the patient's current medications.     Assessment/Plan     Active Hospital Problems    Diagnosis   • **Nausea and vomiting   • Upper GI bleeding   Teetee-Zimmerman tear  History of coronary artery disease  Anemia of acute blood loss  Hypokalemia      Plan: Continue N.p.o. except sips with meds, chest xray per GI, continue scheduled antiemetics per gastroenterology, hold Plavix, replete potassium IV, continue to treat as hospital course dictates.                This document has been electronically signed by DERECK Galan on November 16, 2019 10:57 AM        Electronically signed by Gustavo Santana MD at 11/16/19 1843     Jc Patel DO at 11/16/19 0946           Jc Patel,  DO,PSC  Gastroenterology  Hepatology  Endoscopy  Board Certified in Internal Medicine and gastroenterology  [address], [address]  [place]. [address]  T- [phone]   F - [phone]     GASTROENTEROLOGY PROGRESS NOTE   NIKO FARFAN DO.         SUBJECTIVE:   11/16/2019  Chief Complaint:     Subjective  : Still having moderately severe chest discomfort.  This happens with any type of reflux or nausea that does occur.  No fevers or chills.  No shortness of air.  Deep Teetee-Zimmerman tear was noted.  Asked to see per Dr. Fernandez         CURRENT MEDICATIONS/OBJECTIVE/VS/PE:     Current Medications:     Current Facility-Administered Medications   Medication Dose Route Frequency Provider Last Rate Last Dose   • busPIRone (BUSPAR) tablet 10 mg  10 mg Oral Daily Gilbert Hearn PA   10 mg at 11/15/19 1107   • citalopram (CeleXA) tablet 40 mg  40 mg Oral Daily Gilbert Hearn PA   40 mg at 11/15/19 1107   • Influenza Vac Subunit Quad (FLUCELVAX) injection 0.5 mL  0.5 mL Intramuscular During Hospitalization Matheus Neumann MD       • lisinopril (PRINIVIL,ZESTRIL) tablet 10 mg  10 mg Oral Daily Gilbert Hearn PA   10 mg at 11/15/19 1108   • morphine injection 2 mg  2 mg Intravenous Q2H PRN Gilbert Hearn PA   2 mg at 11/15/19 2047   • ondansetron (ZOFRAN) injection 4 mg  4 mg Intravenous Q6H Gilbert Hearn PA   4 mg at 11/16/19 0559   • pantoprazole (PROTONIX) 40 mg/100 mL (0.4 mg/mL) in 0.9% NS IVPB  8 mg/hr Intravenous Continuous Matheus Neumann MD 20 mL/hr at 11/16/19 0559 8 mg/hr at 11/16/19 0559   • potassium chloride 10 mEq in 100 mL IVPB  10 mEq Intravenous Q1H PRN Gilbert Hearn PA   Stopped at 11/16/19 0515   • promethazine (PHENERGAN) injection 12.5 mg  12.5 mg Intravenous Q6H PRN Gilbert Hearn PA   12.5 mg at 11/15/19 2048   • QUEtiapine (SEROquel) tablet 100 mg  100 mg Oral Nightly Gilbert Hearn PA   100 mg at 11/15/19 2047   • sertraline (ZOLOFT) tablet 50 mg  50  mg Oral Daily Gilbert Hearn PA   50 mg at 11/15/19 1108   • sodium chloride 0.9 % flush 10 mL  10 mL Intravenous PRN Fady Smalls DO       • sodium chloride 0.9 % flush 10 mL  10 mL Intravenous Q12H Matheus Neumann MD   10 mL at 11/15/19 2048   • sodium chloride 0.9 % flush 10 mL  10 mL Intravenous PRN Matheus Neumann MD       • sodium chloride 0.9 % infusion  75 mL/hr Intravenous Continuous Matheus Neumann MD 75 mL/hr at 11/16/19 0559 75 mL/hr at 11/16/19 0559   • tiZANidine (ZANAFLEX) tablet 4 mg  4 mg Oral TID Gilbert Hearn PA   4 mg at 11/15/19 2047       Objective     Physical Exam:   Temp:  [97.6 °F (36.4 °C)-99 °F (37.2 °C)] 97.9 °F (36.6 °C)  Heart Rate:  [75-91] 91  Resp:  [16-18] 16  BP: ()/(48-93) 110/73     Physical Exam:  General Appearance:    Alert, cooperative, in moderate distress   Head:    Normocephalic, without obvious abnormality, atraumatic   Eyes:            Lids and lashes normal, conjunctivae and sclerae normal, no   icterus, no pallor, corneas clear, PERRLA   Ears:    Ears appear intact with no abnormalities noted   Throat:   No oral lesions, no thrush, oral mucosa moist   Neck:   No adenopathy, supple, trachea midline, no thyromegaly, no     carotid bruit, no JVD   Back:     No kyphosis present, no scoliosis present, no skin lesions,       erythema or scars, no tenderness to percussion or                   palpation,   range of motion normal   Lungs:     Clear to auscultation,respirations regular, even and                   unlabored    Heart:    Regular rhythm and normal rate, normal S1 and S2, no            murmur, no gallop, no rub, no click   Breast Exam:    Deferred   Abdomen:     Normal bowel sounds, no masses, no organomegaly, soft        non-tender, non-distended, no guarding, no rebound                 tenderness   Genitalia:    Deferred   Extremities:   Moves all extremities well, no edema, no cyanosis, no              redness   Pulses:   Pulses palpable  and equal bilaterally   Skin:   No bleeding, bruising or rash   Lymph nodes:   No palpable adenopathy   Neurologic:   Cranial nerves 2 - 12 grossly intact, sensation intact, DTR        present and equal bilaterally      Results Review:     Lab Results (last 24 hours)     Procedure Component Value Units Date/Time    Magnesium [[id number]]  (Normal) Collected:  11/15/19 2055    Specimen:  Blood Updated:  11/15/19 2148     Magnesium 2.0 mg/dL     Potassium [[id number]]  (Abnormal) Collected:  11/15/19 2055    Specimen:  Blood Updated:  11/15/19 2112     Potassium 3.2 mmol/L     Tissue Pathology Exam [[id number]] Collected:  11/15/19 0854    Specimen:  Tissue from Gastric, Antrum Updated:  11/15/19 1152         I reviewed the patient's new clinical results.  I reviewed the patient's new imaging results and agree with the interpretation.     ASSESSMENT/PLAN:   ASSESSMENT:  1.  Upper GI bleeding secondary to Teetee-Zimmerman tear  2.  Odynophagia secondary to Teetee-Zimmerman tear  3.  Chest pain syndrome secondary to Teetee-Zimmerman tear.  Ensure that there is no evidence of any mediastinal air  4.  Gallstones without cholecystitis    PLAN:  1.  Chest x-ray to exclude the possibility of mediastinal air.  Low threshold for doing CT of the chest  2.  Remain n.p.o.     Jc Patel DO  11/16/19  9:46 AM       Electronically signed by Jc Patel DO at 11/16/19 0949     Gilbert Hearn PA at 11/15/19 1132              [facility] Medicine Services  INPATIENT PROGRESS NOTE    Length of Stay: 0  Date of Admission: 11/14/2019  Primary Care Physician: Ge Jean MD    Subjective   Chief Complaint/HPI: This 64-year-old  female was admitted to hospital services secondary to nausea, abdominal pain, and hematemesis.  Patient underwent EGD with gastroenterology today and a large Teetee-Zimmerman tear was identified.  There was no active bleeding.  Per Dr. tiffanie  "Satoor, patient should remain n.p.o. except sips with meds.  She recommended continuing to hold Plavix or any other antiplatelets.  Patient reports that she is on Plavix alone and is no longer on Brilinta.  Patient reports \"I have a lot of pain when I burp.\"  Will continue to monitor hemoglobin and proceed per gastroenterology recommendations.    Review of Systems   Constitutional: Negative for chills and fever.   Respiratory: Negative for shortness of breath.    Cardiovascular: Negative for chest pain.   Gastrointestinal: Positive for abdominal pain.        Epigastric discomfort    Genitourinary: Negative for difficulty urinating and dysuria.   Neurological: Negative for dizziness and light-headedness.   Psychiatric/Behavioral: Negative for confusion.      All pertinent negatives and positives are as above. All other systems have been reviewed and are negative unless otherwise stated.     Objective    Temp:  [97.6 °F (36.4 °C)-99 °F (37.2 °C)] 99 °F (37.2 °C)  Heart Rate:  [] 77  Resp:  [16-22] 18  BP: (138-195)/() 169/75    Physical Exam   Constitutional: She is oriented to person, place, and time. She appears well-developed and well-nourished.   HENT:   Head: Normocephalic and atraumatic.   Cardiovascular: Normal rate and regular rhythm.   Pulmonary/Chest: Effort normal and breath sounds normal. No stridor. No respiratory distress.   Abdominal: Soft. Bowel sounds are normal. She exhibits no distension. There is tenderness.   Mild epigastric tenderness   Neurological: She is alert and oriented to person, place, and time.   Skin: Skin is warm and dry.   Psychiatric: She has a normal mood and affect. Her behavior is normal.     Results Review:  I have reviewed the labs, radiology results, and diagnostic studies.    Laboratory Data:   Results from last 7 days   Lab Units 11/15/19  0341 11/14/19  1923   SODIUM mmol/L 140 138   POTASSIUM mmol/L 3.3* 3.0*   CHLORIDE mmol/L 106 99   CO2 mmol/L 25.0 25.0   BUN " mg/dL 15 8   CREATININE mg/dL 0.69 0.86   GLUCOSE mg/dL 104* 118*   CALCIUM mg/dL 8.6 9.9   BILIRUBIN mg/dL  --  0.2   ALK PHOS U/L  --  152*   ALT (SGPT) U/L  --  12   AST (SGOT) U/L  --  13   ANION GAP mmol/L 9.0 14.0     Estimated Creatinine Clearance: 82.1 mL/min (by C-G formula based on SCr of 0.69 mg/dL).          Results from last 7 days   Lab Units 11/15/19  0341 11/14/19  1923   WBC 10*3/mm3 12.47* 13.83*   HEMOGLOBIN g/dL 9.1* 11.9*   HEMATOCRIT % 29.0* 38.0   PLATELETS 10*3/mm3 273 356           Culture Data:   No results found for: BLOODCX  No results found for: URINECX  No results found for: RESPCX  No results found for: WOUNDCX  No results found for: STOOLCX  No components found for: BODYFLD    Radiology Data:   Imaging Results (Last 24 Hours)     Procedure Component Value Units Date/Time    XR Chest 2 View [[id number]] Collected:  11/14/19 2115     Updated:  11/14/19 2137    Narrative:       Exam: AP lateral chest    INDICATION: Hematemesis    FINDINGS: AP lateral chest. The bony structures are intact. The  cardiomediastinal silhouette is unremarkable. There is a small  hiatal hernia. Lungs are clear. No pneumothorax or pleural  effusion.      Impression:       No acute cardiopulmonary abnormality.    Electronically signed by:  Ancelmo Guerrero MD  11/14/2019 9:36 PM  CST Workstation: [id number]    CT Abdomen Pelvis With Contrast [[id number]] Collected:  11/14/19 2055     Updated:  11/14/19 2121    Narrative:       PROCEDURE:  CT ABDOMEN PELVIS W CONTRAST    CLINICAL HISTORY:  64 years  Female  abd pain, N/V, R11.2 Nausea  with vomiting, unspecified K92.0 Hematemesis      TECHNIQUE: Contiguous axial images obtained through the abdomen  and pelvis following intravenous administration of 95 mL  Isovue-300.  Coronal and sagittal reformatted images provided.      This CT exam was performed according to our departmental  dose-optimization program, which includes one or more of the  following dose reduction  techniques: automated exposure control,  adjustment of the mA and/or kV according to patient size, and/or  use of iterative reconstruction technique.    COMPARISON:  10/25/2016    FINDINGS:    Again seen is a small to moderate hiatal hernia. This hernia  appears mildly inflamed.    There is no other bowel inflammation. There is no bowel  obstruction, pneumatosis, free intraperitoneal air, abscess, or  ascites. Normal appendix.    Gallstones without evidence of acute cholecystitis or biliary  dilatation.    The liver, pancreas, spleen, adrenal glands, kidneys, uterus,  ovaries, urinary bladder, and osseous structures are normal.       Impression:         Small to moderate hiatal hernia again seen. It appears mildly  inflamed on the prior exam.    No other bowel inflammation. No bowel obstruction or perforation.    Gallstones without evidence of acute cholecystitis or biliary  dilatation.    Electronically signed by:  Diana Phipps MD  11/14/2019 9:20 PM CST  Workstation: [id number]          I have reviewed the patient's current medications.     Assessment/Plan     Active Hospital Problems    Diagnosis   • **Nausea and vomiting   • Upper GI bleeding   Teetee-Zimmerman tear  History of coronary artery disease  Anemia of acute blood loss  Hypokalemia      Plan: N.p.o. except sips with meds, scheduled antiemetics per gastroenterology, hold Plavix, replete potassium IV, continue to treat as hospital course dictates.                This document has been electronically signed by DERECK Galan on November 15, 2019 11:37 AM        Electronically signed by Gilbetr Hearn PA at 11/15/19 1138          Consult Notes (last 7 days) (Notes from 11/11/19 through 11/18/19)      Sean Fernandez MD at 11/15/19 0847      Consult Orders    1. Inpatient Gastroenterology Consult [[id number]] ordered by Matheus Neumann MD at 11/14/19 9641                SUBJECTIVE:   11/15/2019    Name: Mary Nelson  DOD: 1955    REASON  FOR CONSULT: hematemesis    Chief Complaint:     Chief Complaint   Patient presents with   • Nausea   • Vomiting Blood   • Abdominal Pain       Subjective     Patient is 64 y.o. female with past medical history of coronary artery disease, COPD, chronic bronchitis, GERD, hiatal hernia, hypercholesterolemia, hypertension presents with hematemesis since 5 PM yesterday evening.  She had 2 bouts of vomiting last night in the emergency room and subsequently had one small emesis this morning at 1 AM.  Has not had any emesis since then.  Had abdominal pain prior to the onset of emesis.  Denied diarrhea, constipation, rectal bleeding, dysphagia or melena.  She takes aspirin and Plavix and daily basis.  Denied other NSAID usage.  No history of similar symptoms in the past     ROS/HISTORY/ CURRENT MEDICATIONS/OBJECTIVE/VS/PE:   Review of Systems:   Review of Systems   Constitutional: Negative for chills, fatigue, fever and unexpected weight change.   HENT: Negative for congestion, ear discharge, hearing loss, nosebleeds and sore throat.    Eyes: Negative for pain, discharge and redness.   Respiratory: Negative for cough, chest tightness, shortness of breath and wheezing.    Cardiovascular: Negative for chest pain and palpitations.   Gastrointestinal: Positive for abdominal pain, nausea and vomiting. Negative for abdominal distention, blood in stool, constipation and diarrhea.   Endocrine: Negative for cold intolerance, polydipsia, polyphagia and polyuria.   Genitourinary: Negative for dysuria, flank pain, frequency, hematuria and urgency.   Musculoskeletal: Negative for arthralgias, back pain, joint swelling and myalgias.   Skin: Negative for color change, pallor and rash.   Neurological: Negative for tremors, seizures, syncope, weakness and headaches.   Hematological: Negative for adenopathy. Does not bruise/bleed easily.   Psychiatric/Behavioral: Negative for behavioral problems, confusion, dysphoric mood, hallucinations and  suicidal ideas. The patient is not nervous/anxious.      Had hematemesis.  History:     Past Medical History:   Diagnosis Date   • Anxiety    • Chronic bronchitis (CMS/HCC)    • COPD (chronic obstructive pulmonary disease) (CMS/HCC)    • Coronary artery disease    • Depression    • Gastritis    • GERD (gastroesophageal reflux disease)    • Hiatal hernia    • Hypercholesteremia    • Hypertension    • Osteoarthritis      Past Surgical History:   Procedure Laterality Date   • CARDIAC CATHETERIZATION N/A 4/19/2017   • CARDIAC CATHETERIZATION  4/19/2017   • CARDIAC CATHETERIZATION N/A 7/6/2017   • CARDIAC CATHETERIZATION N/A 3/9/2019   • ENDOSCOPY AND COLONOSCOPY     • HERNIA REPAIR     • JOINT REPLACEMENT     • NISSEN FUNDOPLICATION N/A 2/1/2017   • SD RT/LT HEART CATHETERS N/A 4/19/2017   • TOTAL KNEE ARTHROPLASTY Left 12/27/2018   • TRANSESOPHAGEAL ECHOCARDIOGRAM (JOAO)       Family History   Problem Relation Age of Onset   • Hypertension Father      Social History     Tobacco Use   • Smoking status: Current Every Day Smoker     Packs/day: 0.25     Years: 43.00     Pack years: 10.75     Types: Cigarettes   • Smokeless tobacco: Never Used   Substance Use Topics   • Alcohol use: No   • Drug use: No     Medications Prior to Admission   Medication Sig Dispense Refill Last Dose   • busPIRone (BUSPAR) 10 MG tablet Take 10 mg by mouth Daily.   11/15/2019 at Unknown time   • clopidogrel (PLAVIX) 75 MG tablet Take 75 mg by mouth Daily.   11/15/2019 at Unknown time   • diltiaZEM CD (CARDIZEM CD) 180 MG 24 hr capsule Take 1 capsule by mouth Daily. 30 capsule 6 11/15/2019 at Unknown time   • ezetimibe (ZETIA) 10 MG tablet Take 1 tablet by mouth Daily. 30 tablet 11 11/15/2019 at Unknown time   • lisinopril (PRINIVIL,ZESTRIL) 10 MG tablet Take 10 mg by mouth Daily.   11/15/2019 at Unknown time   • QUEtiapine (SEROquel) 100 MG tablet Take 100 mg by mouth Every Night.   11/14/2019 at Unknown time   • sertraline (ZOLOFT) 50 MG tablet  Take 50 mg by mouth Daily.   11/15/2019 at Unknown time   • tiZANidine (ZANAFLEX) 4 MG tablet Take 4 mg by mouth 3 (Three) Times a Day.   11/15/2019 at Unknown time   • amitriptyline (ELAVIL) 50 MG tablet Take 50 mg by mouth Every Night.   Taking   • aspirin 81 MG chewable tablet Chew 1 tablet Daily. (Patient not taking: Reported on 11/15/2019) 30 tablet 1 Not Taking at Unknown time   • atorvastatin (LIPITOR) 20 MG tablet Take 1 tablet by mouth Daily. (Patient not taking: Reported on 11/15/2019) 90 tablet 3 Not Taking at Unknown time   • citalopram (CELEXA) 40 MG tablet Take 40 mg by mouth Daily.   Taking   • HYDROcodone-acetaminophen (NORCO) 7.5-325 MG per tablet Take 1 tablet by mouth Every 6 (Six) Hours As Needed for Moderate Pain . (Patient not taking: Reported on 11/15/2019) 80 tablet 0 Not Taking at Unknown time   • hydrOXYzine (VISTARIL) 25 MG capsule Take 25 mg by mouth 3 (Three) Times a Day As Needed for Anxiety. Not taking medication   Taking   • nitroglycerin (NITROSTAT) 0.4 MG SL tablet Place 1 tablet under the tongue Every 5 (Five) Minutes As Needed for Chest Pain. Take no more than 3 doses in 15 minutes. 30 tablet 1 Taking   • omeprazole (PRILOSEC) 40 MG capsule Take 40 mg by mouth Daily. Not taking medication   Taking   • ondansetron (ZOFRAN) 4 MG tablet Take 1 tablet by mouth Every 8 (Eight) Hours As Needed for Nausea or Vomiting. 30 tablet 0 Unknown at Unknown time   • ticagrelor (BRILINTA) 60 MG tablet tablet Take 1 tablet by mouth 2 (Two) Times a Day. (Patient not taking: Reported on 11/15/2019) 60 tablet 12 Not Taking at Unknown time     Allergies:  Codeine    I have reviewed the patient's medical history, surgical history and family history in the available medical record system.     Current Medications:     Current Facility-Administered Medications   Medication Dose Route Frequency Provider Last Rate Last Dose   • Influenza Vac Subunit Quad (FLUCELVAX) injection 0.5 mL  0.5 mL Intramuscular  During Hospitalization Matheus Neumann MD       • ondansetron (ZOFRAN) injection 4 mg  4 mg Intravenous Q6H PRN Matheus Neumann MD   4 mg at 11/15/19 0134   • pantoprazole (PROTONIX) 40 mg/100 mL (0.4 mg/mL) in 0.9% NS IVPB  8 mg/hr Intravenous Continuous Matheus Neumann MD 20 mL/hr at 11/15/19 0411 8 mg/hr at 11/15/19 0411   • sodium chloride 0.9 % flush 10 mL  10 mL Intravenous PRN Fady Smalls, DO       • sodium chloride 0.9 % flush 10 mL  10 mL Intravenous Q12H Matheus Neumann MD   10 mL at 11/15/19 0036   • sodium chloride 0.9 % flush 10 mL  10 mL Intravenous PRN Matheus Neumann MD       • sodium chloride 0.9 % infusion  75 mL/hr Intravenous Continuous Matheus Neumann MD 75 mL/hr at 11/15/19 0300 75 mL/hr at 11/15/19 0300       Objective     Physical Exam:   Temp:  [97.6 °F (36.4 °C)-97.8 °F (36.6 °C)] 97.6 °F (36.4 °C)  Heart Rate:  [] 86  Resp:  [16-22] 16  BP: (138-195)/() 155/85    Physical Exam:  General Appearance:    Alert, cooperative, in no acute distress   Head:    Normocephalic, without obvious abnormality, atraumatic   Eyes:            Lids and lashes normal, conjunctivae and sclerae normal, no   icterus, no pallor, corneas clear, PERRLA   Ears:    Ears appear intact with no abnormalities noted   Throat:   No oral lesions, no thrush, oral mucosa moist   Neck:   No adenopathy, supple, trachea midline, no thyromegaly, no     carotid bruit, no JVD   Back:     No kyphosis present, no scoliosis present, no skin lesions,       erythema or scars, no tenderness to percussion or                   palpation,   range of motion normal   Lungs:     Clear to auscultation,respirations regular, even and                   unlabored    Heart:    Regular rhythm and normal rate, normal S1 and S2, no            murmur, no gallop, no rub, no click   Breast Exam:    Deferred   Abdomen:     Normal bowel sounds, no masses, no organomegaly, soft        non-tender, non-distended, no guarding, no rebound                  tenderness   Genitalia:    Deferred   Extremities:   Moves all extremities well, no edema, no cyanosis, no              redness   Pulses:   Pulses palpable and equal bilaterally   Skin:   No bleeding, bruising or rash   Lymph nodes:   No palpable adenopathy   Neurologic:   Cranial nerves 2 - 12 grossly intact, sensation intact, DTR        present and equal bilaterally      Results Review:     Lab Results   Component Value Date    WBC 12.47 (H) 11/15/2019    WBC 13.83 (H) 11/14/2019    WBC 9.87 06/24/2019    HGB 9.1 (L) 11/15/2019    HGB 11.9 (L) 11/14/2019    HGB 11.4 (L) 06/24/2019    HCT 29.0 (L) 11/15/2019    HCT 38.0 11/14/2019    HCT 35.9 06/24/2019     11/15/2019     11/14/2019     06/24/2019     Results from last 7 days   Lab Units 11/14/19  1923   ALK PHOS U/L 152*   ALT (SGPT) U/L 12   AST (SGOT) U/L 13     Results from last 7 days   Lab Units 11/14/19  1923   BILIRUBIN mg/dL 0.2   ALK PHOS U/L 152*     No results found for: LIPASE  Lab Results   Component Value Date    INR 0.87 06/24/2019    INR 1.01 03/09/2019    INR 0.95 03/09/2019         Radiology Review:  Imaging Results (Last 72 Hours)     Procedure Component Value Units Date/Time    XR Chest 2 View [[id number]] Collected:  11/14/19 2115     Updated:  11/14/19 2137    Narrative:       Exam: AP lateral chest    INDICATION: Hematemesis    FINDINGS: AP lateral chest. The bony structures are intact. The  cardiomediastinal silhouette is unremarkable. There is a small  hiatal hernia. Lungs are clear. No pneumothorax or pleural  effusion.      Impression:       No acute cardiopulmonary abnormality.    Electronically signed by:  Ancelmo Guerrero MD  11/14/2019 9:36 PM  CST Workstation: [id number]    CT Abdomen Pelvis With Contrast [[id number]] Collected:  11/14/19 2055     Updated:  11/14/19 2121    Narrative:       PROCEDURE:  CT ABDOMEN PELVIS W CONTRAST    CLINICAL HISTORY:  64 years  Female  abd pain, N/V, R11.2 Nausea  with  vomiting, unspecified K92.0 Hematemesis      TECHNIQUE: Contiguous axial images obtained through the abdomen  and pelvis following intravenous administration of 95 mL  Isovue-300.  Coronal and sagittal reformatted images provided.      This CT exam was performed according to our departmental  dose-optimization program, which includes one or more of the  following dose reduction techniques: automated exposure control,  adjustment of the mA and/or kV according to patient size, and/or  use of iterative reconstruction technique.    COMPARISON:  10/25/2016    FINDINGS:    Again seen is a small to moderate hiatal hernia. This hernia  appears mildly inflamed.    There is no other bowel inflammation. There is no bowel  obstruction, pneumatosis, free intraperitoneal air, abscess, or  ascites. Normal appendix.    Gallstones without evidence of acute cholecystitis or biliary  dilatation.    The liver, pancreas, spleen, adrenal glands, kidneys, uterus,  ovaries, urinary bladder, and osseous structures are normal.       Impression:         Small to moderate hiatal hernia again seen. It appears mildly  inflamed on the prior exam.    No other bowel inflammation. No bowel obstruction or perforation.    Gallstones without evidence of acute cholecystitis or biliary  dilatation.    Electronically signed by:  Diana Phipps MD  11/14/2019 9:20 PM CST  Workstation: [id number]          I reviewed the patient's new clinical results.    I reviewed the patient's new imaging results and agree with the interpretation.     ASSESSMENT/PLAN:   ASSESSMENT: Hematemesis with inflammation of hiatal hernia noted upon CT abdomen and pelvis most likely due to esophagitis or Teetee-Zimmerman tear.  Could also be due to peptic ulcer disease or gastritis.  Patient is stable clinically and hemodynamically    PLAN: Continue bowel rest, antiemetics, IV PPI therapy and pain management.  Follow H&H closely and transfuse as needed.  Hold aspirin and Plavix.  Proceed  with EGD today for further evaluation.  The risks, benefits, and alternatives of this procedure have been discussed with the patient or the responsible party. The patient understands and agrees to proceed.         Sean Fernandez MD  11/15/19  8:47 AM           Electronically signed by Sean Fernandez MD at 11/15/19 0982

## 2019-11-19 ENCOUNTER — READMISSION MANAGEMENT (OUTPATIENT)
Dept: CALL CENTER | Facility: HOSPITAL | Age: 64
End: 2019-11-19

## 2019-11-19 NOTE — PAYOR COMM NOTE
"Leeanne Bah  [facility]  (P)[phone]  (F)[phone]            Mary Nelson (64 y.o. Female)     Date of Birth Social Security Number Address Home Phone MRN    1955  [address] [facility] [address] [phone] [id number]    Confucianist Marital Status          Hoahaoism        Admission Date Admission Type Admitting Provider Attending Provider Department, Room/Bed    11/14/19 Emergency Gustavo Santana MD  [address], 416/1    Discharge Date Discharge Disposition Discharge Destination        11/18/2019 Home-Health Care Svc              Attending Provider:  (none)   Allergies:  Codeine    Isolation:  None   Infection:  None   Code Status:  Prior    Ht:  154.9 cm (61\")   Wt:  82.5 kg (181 lb 14.4 oz)    Admission Cmt:  None   Principal Problem:  Nausea and vomiting [R11.2]                 Active Insurance as of 11/14/2019     Primary Coverage     Payor Plan Insurance Group Employer/Plan Group    [employer] [id number]     Payor Plan Address Payor Plan Phone Number Payor Plan Fax Number Effective Dates    [address] [phone]  11/1/1997 - None Entered    [facility] [id number]       Subscriber Name Subscriber Birth Date Member ID       MARY NELSON 1955 [id number]                 Emergency Contacts      (Rel.) Home Phone Work Phone Mobile Phone    ORylee Pennington (Sister) [phone] -- [phone]    JEY NELSON (Daughter) [phone] -- [phone]               Discharge Summary      Gilbert Hearn PA at 11/18/19 [facility]              [facility]  DISCHARGE SUMMARY       Date of Admission: 11/14/2019  Date of Discharge:  11/18/2019  Primary Care Physician: Ge Jean MD    Presenting Problem/History of Present Illness:  Hematemesis with nausea [K92.0]  Nausea and vomiting, intractability of vomiting not specified, " unspecified vomiting type [R11.2]  Nausea and vomiting, intractability of vomiting not specified, unspecified vomiting type [R11.2]     Final Discharge Diagnoses:  Active Hospital Problems    Diagnosis   • **Nausea and vomiting   • Upper GI bleeding       Consults:   Consults     Date and Time Order Name Status Description    11/14/2019 2327 Inpatient Gastroenterology Consult Completed     11/14/2019 8734 Hospitalist (on-call MD unless specified)            Procedures Performed: Procedure(s):  ESOPHAGOGASTRODUODENOSCOPY                Pertinent Test Results:   Lab Results (most recent)     Procedure Component Value Units Date/Time    Comprehensive Metabolic Panel [[id number]]  (Abnormal) Collected:  11/18/19 0758    Specimen:  Blood Updated:  11/18/19 0911     Glucose 119 mg/dL      BUN 4 mg/dL      Creatinine 0.94 mg/dL      Sodium 140 mmol/L      Potassium 4.5 mmol/L      Chloride 107 mmol/L      CO2 23.0 mmol/L      Calcium 9.0 mg/dL      Total Protein 6.3 g/dL      Albumin 3.60 g/dL      ALT (SGPT) 32 U/L      AST (SGOT) 48 U/L      Alkaline Phosphatase 137 U/L      Total Bilirubin 0.2 mg/dL      eGFR Non African Amer 60 mL/min/1.73      Globulin 2.7 gm/dL      A/G Ratio 1.3 g/dL      BUN/Creatinine Ratio 4.3     Anion Gap 10.0 mmol/L     Narrative:       GFR Normal >60  Chronic Kidney Disease <60  Kidney Failure <15    Extra Tubes [[id number]] Collected:  11/18/19 0758    Specimen:  Blood, Venous Line Updated:  11/18/19 0902    Narrative:       The following orders were created for panel order Extra Tubes.  Procedure                               Abnormality         Status                     ---------                               -----------         ------                     Gold Top - SST[[id number]]                                   Final result                 Please view results for these tests on the individual orders.    [facility] Top - SST [[id number]] Collected:  11/18/19 0758    Specimen:  Blood Updated:   11/18/19 0902     Extra Tube Hold for add-ons.     Comment: Auto resulted.       CBC & Differential [[id number]] Collected:  11/18/19 0758    Specimen:  Blood Updated:  11/18/19 0851    Narrative:       The following orders were created for panel order CBC & Differential.  Procedure                               Abnormality         Status                     ---------                               -----------         ------                     CBC Auto Differential[[id number]]        Abnormal            Final result                 Please view results for these tests on the individual orders.    CBC Auto Differential [[id number]]  (Abnormal) Collected:  11/18/19 0758    Specimen:  Blood Updated:  11/18/19 0851     WBC 4.90 10*3/mm3      RBC 3.22 10*6/mm3      Hemoglobin 8.6 g/dL      Hematocrit 28.0 %      MCV 87.0 fL      MCH 26.7 pg      MCHC 30.7 g/dL      RDW 16.1 %      RDW-SD 50.9 fl      MPV 11.2 fL      Platelets 226 10*3/mm3      Neutrophil % 71.5 %      Lymphocyte % 22.4 %      Monocyte % 4.3 %      Eosinophil % 1.2 %      Basophil % 0.4 %      Immature Grans % 0.2 %      Neutrophils, Absolute 3.50 10*3/mm3      Lymphocytes, Absolute 1.10 10*3/mm3      Monocytes, Absolute 0.21 10*3/mm3      Eosinophils, Absolute 0.06 10*3/mm3      Basophils, Absolute 0.02 10*3/mm3      Immature Grans, Absolute 0.01 10*3/mm3      nRBC 0.0 /100 WBC     Magnesium [[id number]]  (Normal) Collected:  11/17/19 2028    Specimen:  Blood Updated:  11/17/19 2249     Magnesium 1.8 mg/dL     Potassium [[id number]]  (Abnormal) Collected:  11/17/19 2028    Specimen:  Blood Updated:  11/17/19 2109     Potassium 3.2 mmol/L     Comprehensive Metabolic Panel [[id number]]  (Abnormal) Collected:  11/17/19 0655    Specimen:  Blood Updated:  11/17/19 0739     Glucose 90 mg/dL      BUN 4 mg/dL      Creatinine 0.90 mg/dL      Sodium 141 mmol/L      Potassium 3.5 mmol/L      Chloride 105 mmol/L      CO2 26.0 mmol/L      Calcium 8.7 mg/dL      Total  Protein 5.5 g/dL      Albumin 3.10 g/dL      ALT (SGPT) 11 U/L      AST (SGOT) 19 U/L      Alkaline Phosphatase 113 U/L      Total Bilirubin 0.3 mg/dL      eGFR Non African Amer 63 mL/min/1.73      Globulin 2.4 gm/dL      A/G Ratio 1.3 g/dL      BUN/Creatinine Ratio 4.4     Anion Gap 10.0 mmol/L     Narrative:       GFR Normal >60  Chronic Kidney Disease <60  Kidney Failure <15    CBC & Differential [[id number]] Collected:  11/17/19 0655    Specimen:  Blood Updated:  11/17/19 0724    Narrative:       The following orders were created for panel order CBC & Differential.  Procedure                               Abnormality         Status                     ---------                               -----------         ------                     CBC Auto Differential[[id number]]        Abnormal            Final result                 Please view results for these tests on the individual orders.    CBC Auto Differential [[id number]]  (Abnormal) Collected:  11/17/19 0655    Specimen:  Blood Updated:  11/17/19 0724     WBC 4.82 10*3/mm3      RBC 2.98 10*6/mm3      Hemoglobin 8.0 g/dL      Hematocrit 26.0 %      MCV 87.2 fL      MCH 26.8 pg      MCHC 30.8 g/dL      RDW 15.9 %      RDW-SD 49.8 fl      MPV 10.6 fL      Platelets 192 10*3/mm3      Neutrophil % 70.4 %      Lymphocyte % 22.4 %      Monocyte % 5.2 %      Eosinophil % 1.2 %      Basophil % 0.6 %      Immature Grans % 0.2 %      Neutrophils, Absolute 3.39 10*3/mm3      Lymphocytes, Absolute 1.08 10*3/mm3      Monocytes, Absolute 0.25 10*3/mm3      Eosinophils, Absolute 0.06 10*3/mm3      Basophils, Absolute 0.03 10*3/mm3      Immature Grans, Absolute 0.01 10*3/mm3      nRBC 0.0 /100 WBC     Potassium [[id number]]  (Normal) Collected:  11/16/19 0947    Specimen:  Blood Updated:  11/16/19 1019     Potassium 3.8 mmol/L     Magnesium [[id number]]  (Normal) Collected:  11/15/19 2055    Specimen:  Blood Updated:  11/15/19 2148     Magnesium 2.0 mg/dL     Tissue Pathology Exam  [[id number]] Collected:  11/15/19 0854    Specimen:  Tissue from Gastric, Antrum Updated:  11/15/19 1152    Basic Metabolic Panel [[id number]]  (Abnormal) Collected:  11/15/19 0341    Specimen:  Blood Updated:  11/15/19 0538     Glucose 104 mg/dL      BUN 15 mg/dL      Creatinine 0.69 mg/dL      Sodium 140 mmol/L      Potassium 3.3 mmol/L      Chloride 106 mmol/L      CO2 25.0 mmol/L      Calcium 8.6 mg/dL      eGFR Non African Amer 86 mL/min/1.73      BUN/Creatinine Ratio 21.7     Anion Gap 9.0 mmol/L     Narrative:       GFR Normal >60  Chronic Kidney Disease <60  Kidney Failure <15    CBC (No Diff) [[id number]]  (Abnormal) Collected:  11/15/19 0341    Specimen:  Blood Updated:  11/15/19 0515     WBC 12.47 10*3/mm3      RBC 3.38 10*6/mm3      Hemoglobin 9.1 g/dL      Hematocrit 29.0 %      MCV 85.8 fL      MCH 26.9 pg      MCHC 31.4 g/dL      RDW 15.9 %      RDW-SD 50.0 fl      MPV 11.3 fL      Platelets 273 10*3/mm3     [place] Draw [[id number]] Collected:  11/14/19 1923    Specimen:  Blood Updated:  11/14/19 2140    Narrative:       The following orders were created for panel order [place] Draw.  Procedure                               Abnormality         Status                     ---------                               -----------         ------                     Light Blue Top[[id number]]                                   Final result               Green Top (Gel)[[id number]]                                  Final result               Lavender Top[[id number]]                                     Final result               Gold Top - SST[[id number]]                                   Final result                 Please view results for these tests on the individual orders.    [place]ender Top [[id number]] Collected:  11/14/19 1923    Specimen:  Blood Updated:  11/14/19 2140     Extra Tube hold for add-on     Comment: Auto resulted       Light Blue Top [[id number]] Collected:  11/14/19 1923    Specimen:  Blood Updated:   11/14/19 2140     Extra Tube hold for add-on     Comment: Auto resulted       Green Top (Gel) [[id number]] Collected:  11/14/19 1923    Specimen:  Blood Updated:  11/14/19 2140     Extra Tube Hold for add-ons.     Comment: Auto resulted.       Gold Top - SST [[id number]] Collected:  11/14/19 1923    Specimen:  Blood Updated:  11/14/19 2140     Extra Tube Hold for add-ons.     Comment: Auto resulted.           Imaging Results (Most Recent)     Procedure Component Value Units Date/Time    XR Chest PA & Lateral [[id number]] Collected:  11/16/19 1049     Updated:  11/16/19 1113    Narrative:       Chest x-ray PA and lateral.    CLINICAL INDICATION: Nausea vomiting, pain    COMPARISON: Chest November 14, 2019.    FINDINGS: Cardiac silhouette is normal in size. Pulmonary  vascularity is unremarkable.     No focal infiltrate or consolidation.  No pleural effusion.  No  pneumothorax.  No evidence of any pneumomediastinum.        Impression:       CONCLUSION: No evidence of active disease. Lung fields clear. No  evidence of pneumomediastinum.    Electronically signed by:  Saurav Adorno MD  11/16/2019 11:12 AM  CST Workstation: [id number]    XR Chest 2 View [[id number]] Collected:  11/14/19 2115     Updated:  11/14/19 2137    Narrative:       Exam: AP lateral chest    INDICATION: Hematemesis    FINDINGS: AP lateral chest. The bony structures are intact. The  cardiomediastinal silhouette is unremarkable. There is a small  hiatal hernia. Lungs are clear. No pneumothorax or pleural  effusion.      Impression:       No acute cardiopulmonary abnormality.    Electronically signed by:  Ancelmo Guerrero MD  11/14/2019 9:36 PM  CST Workstation: [id number]    CT Abdomen Pelvis With Contrast [[id number]] Collected:  11/14/19 2055     Updated:  11/14/19 2121    Narrative:       PROCEDURE:  CT ABDOMEN PELVIS W CONTRAST    CLINICAL HISTORY:  64 years  Female  abd pain, N/V, R11.2 Nausea  with vomiting, unspecified K92.0 Hematemesis      TECHNIQUE:  Contiguous axial images obtained through the abdomen  and pelvis following intravenous administration of 95 mL  Isovue-300.  Coronal and sagittal reformatted images provided.      This CT exam was performed according to our departmental  dose-optimization program, which includes one or more of the  following dose reduction techniques: automated exposure control,  adjustment of the mA and/or kV according to patient size, and/or  use of iterative reconstruction technique.    COMPARISON:  10/25/2016    FINDINGS:    Again seen is a small to moderate hiatal hernia. This hernia  appears mildly inflamed.    There is no other bowel inflammation. There is no bowel  obstruction, pneumatosis, free intraperitoneal air, abscess, or  ascites. Normal appendix.    Gallstones without evidence of acute cholecystitis or biliary  dilatation.    The liver, pancreas, spleen, adrenal glands, kidneys, uterus,  ovaries, urinary bladder, and osseous structures are normal.       Impression:         Small to moderate hiatal hernia again seen. It appears mildly  inflamed on the prior exam.    No other bowel inflammation. No bowel obstruction or perforation.    Gallstones without evidence of acute cholecystitis or biliary  dilatation.    Electronically signed by:  Diana Phipps MD  11/14/2019 9:20 PM CST  Workstation: [id number]        Hospital Course:  The patient is a 64 y.o. female who [facility], abdominal pain, and hematemesis.  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  There was no active bleeding.  Initially patient was kept n.p.o. for 2 days and then advance to a soft diet which she tolerated.  Gastroenterology recommended holding Plavix and all other antiplatelets at this time.  Patient will be referred back to their service and it will be deferred to gastroenterology when the patient can safely restart her antiplatelet.  Patient hemoglobin dropped  "to a low of 8 and is now increasing.  On the day of discharge was 8.6.  Gastroenterology cleared the patient for discharge and outpatient follow-up.  She will also be seen by her PCP and home health.  Patient was instructed to take nausea medicine if she had any nausea at all and to attempt avoiding any type of vomiting.  She was also told not to partake in any strenuous activity.  Patient verbalized understanding.  She was instructed to return with any concerning or worsening symptoms including nausea, vomiting, hematemesis, hematochezia, melena, or any other complaints.    Condition on Discharge: Stable, improved    Physical Exam on Discharge:  /79 (BP Location: Right arm, Patient Position: Lying)   Pulse 63   Temp 97.4 °F (36.3 °C) (Oral)   Resp 16   Ht 154.9 cm (61\")   Wt 82.5 kg (181 lb 14.4 oz)   SpO2 97%   BMI 34.37 kg/m²    Physical Exam   Constitutional: She is oriented to person, place, and time. She appears well-developed and well-nourished.   HENT:   Head: Normocephalic and atraumatic.   Cardiovascular: Normal rate and regular rhythm.   Pulmonary/Chest: Effort normal and breath sounds normal. No stridor. No respiratory distress.   Abdominal: Soft. Bowel sounds are normal. She exhibits no distension.   Neurological: She is alert and oriented to person, place, and time.   Skin: Skin is warm and dry. Capillary refill takes less than 2 seconds.   Psychiatric: She has a normal mood and affect. Her behavior is normal.     Discharge Disposition:  Home-Health Care [facility]    Discharge Medications:     Discharge Medications      New Medications      Instructions Start Date   pantoprazole 40 MG EC tablet  Commonly known as:  PROTONIX   40 mg, Oral, Daily      promethazine 12.5 MG tablet  Commonly known as:  PHENERGAN   12.5 mg, Oral, Every 6 Hours PRN      sucralfate 1 g tablet  Commonly known as:  CARAFATE   1 g, Oral, 4 Times Daily         Changes to Medications      Instructions Start Date "   ondansetron 4 MG tablet  Commonly known as:  ZOFRAN  What changed:  when to take this   4 mg, Oral, Every 6 Hours PRN         Continue These Medications      Instructions Start Date   amitriptyline 50 MG tablet  Commonly known as:  ELAVIL   50 mg, Oral, Nightly      atorvastatin 20 MG tablet  Commonly known as:  LIPITOR   20 mg, Oral, Daily      busPIRone 10 MG tablet  Commonly known as:  BUSPAR   10 mg, Oral, Daily      CELEXA 40 MG tablet  Generic drug:  citalopram   40 mg, Oral, Daily      dilTIAZem  MG 24 hr capsule  Commonly known as:  CARDIZEM CD   180 mg, Oral, Daily      hydrOXYzine pamoate 25 MG capsule  Commonly known as:  VISTARIL   25 mg, Oral, 3 Times Daily PRN, Not taking medication      lisinopril 10 MG tablet  Commonly known as:  PRINIVIL,ZESTRIL   10 mg, Oral, Daily      nitroglycerin 0.4 MG SL tablet  Commonly known as:  NITROSTAT   0.4 mg, Sublingual, Every 5 Minutes PRN, Take no more than 3 doses in 15 minutes.      QUEtiapine 100 MG tablet  Commonly known as:  SEROquel   100 mg, Oral, Nightly      sertraline 50 MG tablet  Commonly known as:  ZOLOFT   50 mg, Oral, Daily      ZANAFLEX 4 MG tablet  Generic drug:  tiZANidine   4 mg, Oral, 3 Times Daily         Stop These Medications    aspirin 81 MG chewable tablet     clopidogrel 75 MG tablet  Commonly known as:  PLAVIX     HYDROcodone-acetaminophen 7.5-325 MG per tablet  Commonly known as:  NORCO     PrilOSEC 40 MG capsule  Generic drug:  omeprazole     ticagrelor 60 MG tablet tablet  Commonly known as:  BRILINTA            Discharge Diet:   Diet Instructions     Diet: Cardiac, Soft Texture; Thin Liquids, No Restrictions; Ground; Thin      Discharge Diet:   Cardiac  Soft Texture       Fluid Consistency:  Thin Liquids, No Restrictions    Soft Options:  Ground    Fluid Consistency:  Thin          Activity at Discharge:   Activity Instructions     Other Activity Instructions      Activity Instructions: No heavy, pushing, bending, pulling, or  strenuous activity until cleared by GI.  Hold your plavix until GI clears you to take it again.          Discharge Care Plan/Instructions: Hold blood thinners and antiplatelets at this time per GI, will defer to GI as to when antiplatelets get restarted.  Home health.  GI and PCP follow up.  Return with any concerning or worsening symptoms.    Follow-up Appointments:   Future Appointments   Date Time Provider Department Center   11/20/2019  9:45 AM Emanuel Olmos MD [facility] None       Test Results Pending at Discharge:    Order Current Status    Tissue Pathology Exam In process          [unfilled]    Time: Please note the discharge planning summary exceeded 30 minutes              Electronically signed by Yudelka Nelson MD at 11/18/19 1839       Discharge Order (From admission, onward)    Start     Ordered    11/18/19 1108  Discharge patient  Once     Expected Discharge Date:  11/18/19    Discharge Disposition:  Home-Health Care [facility]    Physician of Record for Attribution - Please select from Treatment Team:  RICARDO REICH [[id number]]    Review needed by CMO to determine Physician of Record:  No       Question Answer Comment   Physician of Record for Attribution - Please select from Treatment Team RICARDO REICH    Review needed by CMO to determine Physician of Record No        11/18/19 5167

## 2019-11-20 ENCOUNTER — READMISSION MANAGEMENT (OUTPATIENT)
Dept: CALL CENTER | Facility: HOSPITAL | Age: 64
End: 2019-11-20

## 2019-11-20 NOTE — OUTREACH NOTE
Medical Week 1 Survey      Responses   Facility patient discharged from?  [place]   Does the patient have one of the following disease processes/diagnoses(primary or secondary)?  Other   Is there a successful TCM telephone encounter documented?  No   Week 1 attempt successful?  No   Unsuccessful attempts  Attempt 1          Cullen Hook RN

## 2019-11-20 NOTE — OUTREACH NOTE
Prep Survey      Responses   Facility patient discharged from?  [place]   Is patient eligible?  Yes   Discharge diagnosis  Nausea and vomiting   Does the patient have one of the following disease processes/diagnoses(primary or secondary)?  Other   Does the patient have Home health ordered?  No   Is there a DME ordered?  No   Prep survey completed?  Yes          Jemima Kaur RN

## 2019-11-21 ENCOUNTER — READMISSION MANAGEMENT (OUTPATIENT)
Dept: CALL CENTER | Facility: HOSPITAL | Age: 64
End: 2019-11-21

## 2019-11-21 NOTE — OUTREACH NOTE
Medical Week 1 Survey      Responses   Facility patient discharged from?  [place]   Does the patient have one of the following disease processes/diagnoses(primary or secondary)?  Other   Is there a successful TCM telephone encounter documented?  No   Week 1 attempt successful?  No   Unsuccessful attempts  Attempt 2          Lala Huffman RN

## 2019-11-25 ENCOUNTER — READMISSION MANAGEMENT (OUTPATIENT)
Dept: CALL CENTER | Facility: HOSPITAL | Age: 64
End: 2019-11-25

## 2019-11-25 NOTE — OUTREACH NOTE
Medical Week 2 Survey      Responses   Facility patient discharged from?  [place]   Does the patient have one of the following disease processes/diagnoses(primary or secondary)?  Other   Week 2 attempt successful?  No   Unsuccessful attempts  Attempt 1          Cullen Hook RN

## 2019-11-26 ENCOUNTER — READMISSION MANAGEMENT (OUTPATIENT)
Dept: CALL CENTER | Facility: HOSPITAL | Age: 64
End: 2019-11-26

## 2019-11-26 NOTE — OUTREACH NOTE
Medical Week 2 Survey      Responses   Facility patient discharged from?  [place]   Does the patient have one of the following disease processes/diagnoses(primary or secondary)?  Other   Week 2 attempt successful?  No   Call start time  -- [[id number]]   Unsuccessful attempts  Attempt 2   Discharge diagnosis  Nausea and vomiting          Deedee Lemon RN

## 2019-12-08 NOTE — OUTREACH NOTE
Total Joint Week 1 Survey      Responses   Facility patient discharged from?  [place]   Does the patient have one of the following disease processes/diagnoses(primary or secondary)?  Total Joint Replacement   Is there a successful TCM telephone encounter documented?  No   Joint surgery performed?  Knee   Week 1 attempt successful?  No   Unsuccessful attempts  Attempt 2          Lorie Colbert RN   Normal for race

## 2019-12-17 ENCOUNTER — OFFICE VISIT (OUTPATIENT)
Dept: GASTROENTEROLOGY | Facility: CLINIC | Age: 64
End: 2019-12-17

## 2019-12-17 VITALS
WEIGHT: 172.6 LBS | HEIGHT: 61 IN | DIASTOLIC BLOOD PRESSURE: 73 MMHG | BODY MASS INDEX: 32.59 KG/M2 | SYSTOLIC BLOOD PRESSURE: 108 MMHG | HEART RATE: 92 BPM

## 2019-12-17 DIAGNOSIS — R11.2 NON-INTRACTABLE VOMITING WITH NAUSEA, UNSPECIFIED VOMITING TYPE: Primary | ICD-10-CM

## 2019-12-17 DIAGNOSIS — D50.0 ANEMIA, BLOOD LOSS: ICD-10-CM

## 2019-12-17 DIAGNOSIS — R10.12 LUQ PAIN: ICD-10-CM

## 2019-12-17 PROBLEM — R11.10 NON-INTRACTABLE VOMITING: Status: ACTIVE | Noted: 2019-12-17

## 2019-12-17 PROCEDURE — 99214 OFFICE O/P EST MOD 30 MIN: CPT | Performed by: INTERNAL MEDICINE

## 2019-12-17 RX ORDER — LANSOPRAZOLE, AMOXICILLIN, CLARITHROMYCIN 30-500-500
KIT ORAL 2 TIMES DAILY
Qty: 14 KIT | Refills: 0 | Status: SHIPPED | OUTPATIENT
Start: 2019-12-17 | End: 2019-12-31

## 2019-12-17 RX ORDER — DEXTROSE AND SODIUM CHLORIDE 5; .45 G/100ML; G/100ML
30 INJECTION, SOLUTION INTRAVENOUS CONTINUOUS PRN
Status: CANCELLED | OUTPATIENT
Start: 2020-01-30

## 2019-12-17 RX ORDER — OMEPRAZOLE 40 MG/1
40 CAPSULE, DELAYED RELEASE ORAL DAILY
Qty: 30 CAPSULE | Refills: 11 | Status: SHIPPED | OUTPATIENT
Start: 2019-12-17 | End: 2020-04-08

## 2019-12-17 NOTE — PROGRESS NOTES
Chief Complaint   Patient presents with   • Vomiting Blood       Subjective    Mary Melina Nelson is a 64 y.o. female.    History of Present Illness    Ms. Nelson presented to GI clinic for follow-up visit today post hospital admission for GI bleed.  EGD during her hospital admission was consistent with long Teetee-Zimmerman tear.  Gastric pathology was positive for H. pylori infection.  Most recent hemoglobin was 8.6.  She has continued symptoms with nausea and vomiting on intermittent basis.  Left upper quadrant pain is improving.  Unable to obtain Protonix due to in insurance nonapproval.  Has chronic diarrhea.  Denied rectal bleeding, hematemesis or melena.  Also denied weight loss.     The following portions of the patient's history were reviewed and updated as appropriate:   Past Medical History:   Diagnosis Date   • Anxiety    • Chronic bronchitis (CMS/HCC)    • COPD (chronic obstructive pulmonary disease) (CMS/HCC)    • Coronary artery disease    • Depression    • Gastritis    • GERD (gastroesophageal reflux disease)    • Hiatal hernia    • Hypercholesteremia    • Hypertension    • Osteoarthritis      Past Surgical History:   Procedure Laterality Date   • CARDIAC CATHETERIZATION N/A 4/19/2017   • CARDIAC CATHETERIZATION  4/19/2017   • CARDIAC CATHETERIZATION N/A 7/6/2017   • CARDIAC CATHETERIZATION N/A 3/9/2019   • ENDOSCOPY N/A 11/15/2019    Procedure: ESOPHAGOGASTRODUODENOSCOPY;  Surgeon: Sean Fernandez MD;  Location: [facility] ENDOSCOPY;  Service: Gastroenterology   • ENDOSCOPY AND COLONOSCOPY     • HERNIA REPAIR     • JOINT REPLACEMENT     • NISSEN FUNDOPLICATION N/A 2/1/2017   • NM RT/LT HEART CATHETERS N/A 4/19/2017   • TOTAL KNEE ARTHROPLASTY Left 12/27/2018   • TRANSESOPHAGEAL ECHOCARDIOGRAM (JOAO)       Family History   Problem Relation Age of Onset   • Hypertension Father      OB History    None       Prior to Admission medications    Medication Sig Start Date End Date Taking? Authorizing Provider    amitriptyline (ELAVIL) 50 MG tablet Take 50 mg by mouth Every Night.   Yes Ryan Henning MD   atorvastatin (LIPITOR) 20 MG tablet Take 1 tablet by mouth Daily. 4/12/19  Yes Emanuel Olmos MD   busPIRone (BUSPAR) 10 MG tablet Take 10 mg by mouth Daily.   Yes Ryan Henning MD   citalopram (CELEXA) 40 MG tablet Take 40 mg by mouth Daily. 7/19/16  Yes Ryan Heninng MD   diltiaZEM CD (CARDIZEM CD) 180 MG 24 hr capsule Take 1 capsule by mouth Daily. 9/19/19  Yes Emanuel Olmos MD   hydrOXYzine (VISTARIL) 25 MG capsule Take 25 mg by mouth 3 (Three) Times a Day As Needed for Anxiety. Not taking medication 3/7/17  Yes Ryan Henning MD   lisinopril (PRINIVIL,ZESTRIL) 10 MG tablet Take 10 mg by mouth Daily.   Yes Ryan Henning MD   nitroglycerin (NITROSTAT) 0.4 MG SL tablet Place 1 tablet under the tongue Every 5 (Five) Minutes As Needed for Chest Pain. Take no more than 3 doses in 15 minutes. 3/9/19  Yes Ishan Alston MD   ondansetron (ZOFRAN) 4 MG tablet Take 1 tablet by mouth Every 6 (Six) Hours As Needed for Nausea or Vomiting. 11/18/19  Yes Gilbert Hearn PA   promethazine (PHENERGAN) 12.5 MG tablet Take 1 tablet by mouth Every 6 (Six) Hours As Needed for Nausea or Vomiting (If zofran ineffective). 11/18/19  Yes Gilbert Hearn PA   QUEtiapine (SEROquel) 100 MG tablet Take 100 mg by mouth Every Night.   Yes Ryan Henning MD   sertraline (ZOLOFT) 50 MG tablet Take 50 mg by mouth Daily.   Yes Ryan Henning MD   sucralfate (CARAFATE) 1 g tablet Take 1 tablet by mouth 4 (Four) Times a Day. 11/18/19  Yes Gilbert Hearn PA   tiZANidine (ZANAFLEX) 4 MG tablet Take 4 mg by mouth 3 (Three) Times a Day. 8/26/16  Yes Ryan Henning MD   amoxicillin-clarithromycin-lansoprazole (PREVPAC) combo pack Take  by mouth 2 (Two) Times a Day for 14 days. Follow package directions. 12/17/19 12/31/19  Sean Fernandez MD   omeprazole  (PrilOSEC) 40 MG capsule Take 1 capsule by mouth Daily. 12/17/19   Sean Fernandez MD   pantoprazole (PROTONIX) 40 MG EC tablet Take 1 tablet by mouth Daily. 11/18/19   Gilbert Hearn PA     Allergies   Allergen Reactions   • Codeine Swelling     Social History     Socioeconomic History   • Marital status:      Spouse name: Not on file   • Number of children: Not on file   • Years of education: Not on file   • Highest education level: Not on file   Tobacco Use   • Smoking status: Current Every Day Smoker     Packs/day: 0.25     Years: 43.00     Pack years: 10.75     Types: Cigarettes   • Smokeless tobacco: Never Used   Substance and Sexual Activity   • Alcohol use: No   • Drug use: No   • Sexual activity: Not Currently       Review of Systems  Review of Systems   Constitutional: Negative for chills, fatigue, fever and unexpected weight change.   HENT: Negative for congestion, ear discharge, hearing loss, nosebleeds and sore throat.    Eyes: Negative for pain, discharge and redness.   Respiratory: Negative for cough, chest tightness, shortness of breath and wheezing.    Cardiovascular: Negative for chest pain and palpitations.   Gastrointestinal: Positive for abdominal pain, diarrhea, nausea and vomiting. Negative for abdominal distention, blood in stool and constipation.   Endocrine: Negative for cold intolerance, polydipsia, polyphagia and polyuria.   Genitourinary: Negative for dysuria, flank pain, frequency, hematuria and urgency.   Musculoskeletal: Negative for arthralgias, back pain, joint swelling and myalgias.   Skin: Negative for color change, pallor and rash.   Neurological: Negative for tremors, seizures, syncope, weakness and headaches.   Hematological: Negative for adenopathy. Does not bruise/bleed easily.   Psychiatric/Behavioral: Negative for behavioral problems, confusion, dysphoric mood, hallucinations and suicidal ideas. The patient is not nervous/anxious.         /73 (BP  "Location: Left arm, Patient Position: Sitting)   Pulse 92   Ht 154.9 cm (61\")   Wt 78.3 kg (172 lb 9.6 oz)   BMI 32.61 kg/m²     Objective    Physical Exam   Constitutional: She is oriented to person, place, and time. She appears well-developed and well-nourished.   HENT:   Head: Normocephalic and atraumatic.   Mouth/Throat: Oropharynx is clear and moist.   Eyes: Pupils are equal, round, and reactive to light. Conjunctivae and EOM are normal.   Neck: Normal range of motion. Neck supple. No thyromegaly present.   Cardiovascular: Normal rate, regular rhythm and normal heart sounds.   No murmur heard.  Pulmonary/Chest: Effort normal and breath sounds normal. She has no wheezes.   Abdominal: Soft. Bowel sounds are normal. She exhibits no distension and no mass. There is no tenderness. No hernia.   Genitourinary:   Genitourinary Comments: No lesions noted   Musculoskeletal: Normal range of motion. She exhibits no edema or tenderness.   Lymphadenopathy:     She has no cervical adenopathy.   Neurological: She is alert and oriented to person, place, and time. No cranial nerve deficit.   Skin: Skin is warm and dry. No rash noted.   Psychiatric: She has a normal mood and affect. Thought content normal.     No results displayed because visit has over 200 results.        Assessment/Plan      1. Non-intractable vomiting with nausea, unspecified vomiting type    2. LUQ pain    3. Anemia, blood loss    1.  Left upper quadrant pain with nausea and vomiting likely due to H. pylori gastritis.  Could also be due to peptic ulcer disease and other pathology.  Gastric evaluation during last endoscopy was limited due to the presence of large amount of blood.  Add Prevpac and follow up with Prilosec 40 mg p.o. daily.  Repeat EGD reevaluation.  2.  Large Teetee-Zimmerman tear with bleeding, resolved.  Repeat CBC today.  Continue iron supplements.  3.  Chronic diarrhea, rule out IBD and colorectal neoplasia.  Add Bentyl.  Schedule " colonoscopy for further evaluation.  4.  Acute posthemorrhagic anemia, repeat CBC today continue iron supplements.  5.  Obesity, recommend exercise and diet control.      Orders placed during this encounter include:  Orders Placed This Encounter   Procedures   • CBC (No Diff)   • Follow Anesthesia Guidelines / Standing Orders     Standing Status:   Future   • Obtain Informed Consent     Standing Status:   Future     Order Specific Question:   Informed Consent Given For     Answer:   egd and colonoscopy       ESOPHAGOGASTRODUODENOSCOPY (N/A), COLONOSCOPY (N/A)    Review and/or summary of lab tests, radiology, procedures, medications. Review and summary of old records and obtaining of history. The risks and benefits of my recommendations, as well as other treatment options were discussed with the patient today. Questions were answered.    New Medications Ordered This Visit   Medications   • amoxicillin-clarithromycin-lansoprazole (PREVPAC) combo pack     Sig: Take  by mouth 2 (Two) Times a Day for 14 days. Follow package directions.     Dispense:  14 kit     Refill:  0   • omeprazole (PrilOSEC) 40 MG capsule     Sig: Take 1 capsule by mouth Daily.     Dispense:  30 capsule     Refill:  11       Follow-up: No follow-ups on file.               Results for orders placed or performed during the hospital encounter of 11/14/19   PREVIOUS HISTORY   Result Value Ref Range    Previous History Previous Record on File    Gold Top - SST   Result Value Ref Range    Extra Tube Hold for add-ons.    Gold Top - SST   Result Value Ref Range    Extra Tube Hold for add-ons.    Green Top (Gel)   Result Value Ref Range    Extra Tube Hold for add-ons.    CBC Auto Differential   Result Value Ref Range    WBC 4.90 3.40 - 10.80 10*3/mm3    RBC 3.22 (L) 3.77 - 5.28 10*6/mm3    Hemoglobin 8.6 (L) 12.0 - 15.9 g/dL    Hematocrit 28.0 (L) 34.0 - 46.6 %    MCV 87.0 79.0 - 97.0 fL    MCH 26.7 26.6 - 33.0 pg    MCHC 30.7 (L) 31.5 - 35.7 g/dL    RDW  16.1 (H) 12.3 - 15.4 %    RDW-SD 50.9 37.0 - 54.0 fl    MPV 11.2 6.0 - 12.0 fL    Platelets 226 140 - 450 10*3/mm3    Neutrophil % 71.5 42.7 - 76.0 %    Lymphocyte % 22.4 19.6 - 45.3 %    Monocyte % 4.3 (L) 5.0 - 12.0 %    Eosinophil % 1.2 0.3 - 6.2 %    Basophil % 0.4 0.0 - 1.5 %    Immature Grans % 0.2 0.0 - 0.5 %    Neutrophils, Absolute 3.50 1.70 - 7.00 10*3/mm3    Lymphocytes, Absolute 1.10 0.70 - 3.10 10*3/mm3    Monocytes, Absolute 0.21 0.10 - 0.90 10*3/mm3    Eosinophils, Absolute 0.06 0.00 - 0.40 10*3/mm3    Basophils, Absolute 0.02 0.00 - 0.20 10*3/mm3    Immature Grans, Absolute 0.01 0.00 - 0.05 10*3/mm3    nRBC 0.0 0.0 - 0.2 /100 WBC   CBC Auto Differential   Result Value Ref Range    WBC 4.82 3.40 - 10.80 10*3/mm3    RBC 2.98 (L) 3.77 - 5.28 10*6/mm3    Hemoglobin 8.0 (L) 12.0 - 15.9 g/dL    Hematocrit 26.0 (L) 34.0 - 46.6 %    MCV 87.2 79.0 - 97.0 fL    MCH 26.8 26.6 - 33.0 pg    MCHC 30.8 (L) 31.5 - 35.7 g/dL    RDW 15.9 (H) 12.3 - 15.4 %    RDW-SD 49.8 37.0 - 54.0 fl    MPV 10.6 6.0 - 12.0 fL    Platelets 192 140 - 450 10*3/mm3    Neutrophil % 70.4 42.7 - 76.0 %    Lymphocyte % 22.4 19.6 - 45.3 %    Monocyte % 5.2 5.0 - 12.0 %    Eosinophil % 1.2 0.3 - 6.2 %    Basophil % 0.6 0.0 - 1.5 %    Immature Grans % 0.2 0.0 - 0.5 %    Neutrophils, Absolute 3.39 1.70 - 7.00 10*3/mm3    Lymphocytes, Absolute 1.08 0.70 - 3.10 10*3/mm3    Monocytes, Absolute 0.25 0.10 - 0.90 10*3/mm3    Eosinophils, Absolute 0.06 0.00 - 0.40 10*3/mm3    Basophils, Absolute 0.03 0.00 - 0.20 10*3/mm3    Immature Grans, Absolute 0.01 0.00 - 0.05 10*3/mm3    nRBC 0.0 0.0 - 0.2 /100 WBC   CBC Auto Differential   Result Value Ref Range    WBC 6.00 3.40 - 10.80 10*3/mm3    RBC 3.02 (L) 3.77 - 5.28 10*6/mm3    Hemoglobin 8.0 (L) 12.0 - 15.9 g/dL    Hematocrit 26.6 (L) 34.0 - 46.6 %    MCV 88.1 79.0 - 97.0 fL    MCH 26.5 (L) 26.6 - 33.0 pg    MCHC 30.1 (L) 31.5 - 35.7 g/dL    RDW 16.3 (H) 12.3 - 15.4 %    RDW-SD 52.8 37.0 - 54.0 fl    MPV  10.7 6.0 - 12.0 fL    Platelets 204 140 - 450 10*3/mm3    Neutrophil % 70.3 42.7 - 76.0 %    Lymphocyte % 24.0 19.6 - 45.3 %    Monocyte % 4.7 (L) 5.0 - 12.0 %    Eosinophil % 0.3 0.3 - 6.2 %    Basophil % 0.5 0.0 - 1.5 %    Immature Grans % 0.2 0.0 - 0.5 %    Neutrophils, Absolute 4.22 1.70 - 7.00 10*3/mm3    Lymphocytes, Absolute 1.44 0.70 - 3.10 10*3/mm3    Monocytes, Absolute 0.28 0.10 - 0.90 10*3/mm3    Eosinophils, Absolute 0.02 0.00 - 0.40 10*3/mm3    Basophils, Absolute 0.03 0.00 - 0.20 10*3/mm3    Immature Grans, Absolute 0.01 0.00 - 0.05 10*3/mm3    nRBC 0.0 0.0 - 0.2 /100 WBC   CBC Auto Differential   Result Value Ref Range    WBC 13.83 (H) 3.40 - 10.80 10*3/mm3    RBC 4.39 3.77 - 5.28 10*6/mm3    Hemoglobin 11.9 (L) 12.0 - 15.9 g/dL    Hematocrit 38.0 34.0 - 46.6 %    MCV 86.6 79.0 - 97.0 fL    MCH 27.1 26.6 - 33.0 pg    MCHC 31.3 (L) 31.5 - 35.7 g/dL    RDW 16.1 (H) 12.3 - 15.4 %    RDW-SD 50.3 37.0 - 54.0 fl    MPV 10.8 6.0 - 12.0 fL    Platelets 356 140 - 450 10*3/mm3    Neutrophil % 81.6 (H) 42.7 - 76.0 %    Lymphocyte % 12.7 (L) 19.6 - 45.3 %    Monocyte % 4.5 (L) 5.0 - 12.0 %    Eosinophil % 0.4 0.3 - 6.2 %    Basophil % 0.4 0.0 - 1.5 %    Immature Grans % 0.4 0.0 - 0.5 %    Neutrophils, Absolute 11.28 (H) 1.70 - 7.00 10*3/mm3    Lymphocytes, Absolute 1.76 0.70 - 3.10 10*3/mm3    Monocytes, Absolute 0.62 0.10 - 0.90 10*3/mm3    Eosinophils, Absolute 0.06 0.00 - 0.40 10*3/mm3    Basophils, Absolute 0.05 0.00 - 0.20 10*3/mm3    Immature Grans, Absolute 0.06 (H) 0.00 - 0.05 10*3/mm3    nRBC 0.0 0.0 - 0.2 /100 WBC     *Note: Due to a large number of results and/or encounters for the requested time period, some results have not been displayed. A complete set of results can be found in Results Review.         This document has been electronically signed by Sean Fernandez MD on December 17, 2019 11:50 AM

## 2019-12-17 NOTE — PATIENT INSTRUCTIONS

## 2020-01-15 ENCOUNTER — TELEPHONE (OUTPATIENT)
Dept: CARDIOLOGY | Facility: CLINIC | Age: 65
End: 2020-01-15

## 2020-01-15 NOTE — TELEPHONE ENCOUNTER
Received a fax from [email or web address] for a PA that is needed for Ezetimibe 10MG tablets    Sent in PA. There was a place where the provider could sign but it was able to go in without a signature. We may need to sen it back in with a signature.  Dr Olmos was already gone for the day.

## 2020-01-17 NOTE — TELEPHONE ENCOUNTER
Received a fax from [email or web address] saying that there is currently an authorization on file for this request. It is approved from 04/15/2019 - 12/31/2039.    Called pharmacy and they said she picked it up the day before yesterday and that it was approved with no problems.

## 2020-01-23 RX ORDER — POTASSIUM CHLORIDE 750 MG/1
10 TABLET, FILM COATED, EXTENDED RELEASE ORAL DAILY
COMMUNITY

## 2020-01-23 RX ORDER — CLOPIDOGREL BISULFATE 75 MG/1
75 TABLET ORAL DAILY
COMMUNITY

## 2020-01-30 ENCOUNTER — HOSPITAL ENCOUNTER (OUTPATIENT)
Facility: HOSPITAL | Age: 65
Setting detail: HOSPITAL OUTPATIENT SURGERY
Discharge: HOME | End: 2020-01-30
Attending: INTERNAL MEDICINE | Admitting: INTERNAL MEDICINE

## 2020-01-30 ENCOUNTER — ANESTHESIA EVENT (OUTPATIENT)
Dept: GASTROENTEROLOGY | Facility: HOSPITAL | Age: 65
End: 2020-01-30

## 2020-01-30 ENCOUNTER — ANESTHESIA (OUTPATIENT)
Dept: GASTROENTEROLOGY | Facility: HOSPITAL | Age: 65
End: 2020-01-30

## 2020-01-30 VITALS
HEART RATE: 83 BPM | BODY MASS INDEX: 31.25 KG/M2 | DIASTOLIC BLOOD PRESSURE: 89 MMHG | HEIGHT: 61 IN | OXYGEN SATURATION: 98 % | RESPIRATION RATE: 18 BRPM | WEIGHT: 165.5 LBS | SYSTOLIC BLOOD PRESSURE: 165 MMHG | TEMPERATURE: 98.6 F

## 2020-01-30 DIAGNOSIS — R11.2 NON-INTRACTABLE VOMITING WITH NAUSEA, UNSPECIFIED VOMITING TYPE: ICD-10-CM

## 2020-01-30 DIAGNOSIS — R10.12 LUQ PAIN: ICD-10-CM

## 2020-01-30 DIAGNOSIS — D50.0 ANEMIA, BLOOD LOSS: ICD-10-CM

## 2020-01-30 PROCEDURE — 25010000002 PROPOFOL 10 MG/ML EMULSION: Performed by: NURSE ANESTHETIST, CERTIFIED REGISTERED

## 2020-01-30 PROCEDURE — 88305 TISSUE EXAM BY PATHOLOGIST: CPT | Performed by: INTERNAL MEDICINE

## 2020-01-30 PROCEDURE — 43239 EGD BIOPSY SINGLE/MULTIPLE: CPT | Performed by: INTERNAL MEDICINE

## 2020-01-30 PROCEDURE — 25010000002 FENTANYL CITRATE (PF) 100 MCG/2ML SOLUTION: Performed by: NURSE ANESTHETIST, CERTIFIED REGISTERED

## 2020-01-30 PROCEDURE — 45380 COLONOSCOPY AND BIOPSY: CPT | Performed by: INTERNAL MEDICINE

## 2020-01-30 PROCEDURE — 88305 TISSUE EXAM BY PATHOLOGIST: CPT | Performed by: PATHOLOGY

## 2020-01-30 RX ORDER — DEXTROSE AND SODIUM CHLORIDE 5; .45 G/100ML; G/100ML
30 INJECTION, SOLUTION INTRAVENOUS CONTINUOUS PRN
Status: DISCONTINUED | OUTPATIENT
Start: 2020-01-30 | End: 2020-01-30 | Stop reason: HOSPADM

## 2020-01-30 RX ORDER — LIDOCAINE HYDROCHLORIDE 20 MG/ML
INJECTION, SOLUTION INTRAVENOUS AS NEEDED
Status: DISCONTINUED | OUTPATIENT
Start: 2020-01-30 | End: 2020-01-30 | Stop reason: SURG

## 2020-01-30 RX ORDER — PROPOFOL 10 MG/ML
VIAL (ML) INTRAVENOUS AS NEEDED
Status: DISCONTINUED | OUTPATIENT
Start: 2020-01-30 | End: 2020-01-30 | Stop reason: SURG

## 2020-01-30 RX ORDER — FENTANYL CITRATE 50 UG/ML
INJECTION, SOLUTION INTRAMUSCULAR; INTRAVENOUS AS NEEDED
Status: DISCONTINUED | OUTPATIENT
Start: 2020-01-30 | End: 2020-01-30 | Stop reason: SURG

## 2020-01-30 RX ADMIN — PROPOFOL 20 MG: 10 INJECTION, EMULSION INTRAVENOUS at 11:20

## 2020-01-30 RX ADMIN — PROPOFOL 60 MG: 10 INJECTION, EMULSION INTRAVENOUS at 11:16

## 2020-01-30 RX ADMIN — PROPOFOL 20 MG: 10 INJECTION, EMULSION INTRAVENOUS at 11:22

## 2020-01-30 RX ADMIN — DEXTROSE AND SODIUM CHLORIDE 30 ML/HR: 5; 450 INJECTION, SOLUTION INTRAVENOUS at 10:07

## 2020-01-30 RX ADMIN — LIDOCAINE HYDROCHLORIDE 100 MG: 20 INJECTION, SOLUTION INTRAVENOUS at 11:16

## 2020-01-30 RX ADMIN — PROPOFOL 20 MG: 10 INJECTION, EMULSION INTRAVENOUS at 11:23

## 2020-01-30 RX ADMIN — PROPOFOL 20 MG: 10 INJECTION, EMULSION INTRAVENOUS at 11:29

## 2020-01-30 RX ADMIN — PROPOFOL 20 MG: 10 INJECTION, EMULSION INTRAVENOUS at 11:18

## 2020-01-30 RX ADMIN — PROPOFOL 10 MG: 10 INJECTION, EMULSION INTRAVENOUS at 11:25

## 2020-01-30 RX ADMIN — FENTANYL CITRATE 50 MCG: 50 INJECTION, SOLUTION INTRAMUSCULAR; INTRAVENOUS at 11:15

## 2020-01-30 RX ADMIN — PROPOFOL 10 MG: 10 INJECTION, EMULSION INTRAVENOUS at 11:32

## 2020-01-30 RX ADMIN — PROPOFOL 20 MG: 10 INJECTION, EMULSION INTRAVENOUS at 11:24

## 2020-01-30 NOTE — ANESTHESIA POSTPROCEDURE EVALUATION
Patient: Mary Nelson    Procedure Summary     Date:  01/30/20 Room / Location:  [facility] ENDOSCOPY 1 / [facility] ENDOSCOPY    Anesthesia Start:  1111 Anesthesia Stop:  1139    Procedures:       ESOPHAGOGASTRODUODENOSCOPY (N/A )      COLONOSCOPY (N/A ) Diagnosis:       Non-intractable vomiting with nausea, unspecified vomiting type      LUQ pain      Anemia, blood loss      (Non-intractable vomiting with nausea, unspecified vomiting type [R11.2])      (LUQ pain [R10.12])      (Anemia, blood loss [D50.0])    Surgeon:  Sean Fernandez MD Provider:  Deedee Hernandez CRNA    Anesthesia Type:  MAC ASA Status:  4          Anesthesia Type: MAC    Vitals  No vitals data found for the desired time range.          Post Anesthesia Care and Evaluation    Patient location during evaluation: PACU  Patient participation: complete - patient participated  Level of consciousness: sleepy but conscious  Pain score: 0  Pain management: adequate  Airway patency: patent  Anesthetic complications: No anesthetic complications  PONV Status: none  Cardiovascular status: acceptable and hemodynamically stable  Respiratory status: acceptable  Hydration status: acceptable  Post Neuraxial Block status: Motor and sensory function returned to baseline

## 2020-01-30 NOTE — ANESTHESIA PREPROCEDURE EVALUATION
Anesthesia Evaluation     Patient summary reviewed   NPO Solid Status: > 8 hours  NPO Liquid Status: > 2 hours           Airway   Mallampati: II  TM distance: >3 FB  Neck ROM: full  Possible difficult intubation  Dental    (+) poor dentition    Pulmonary    (+) a smoker Current Abstained day of surgery, COPD, shortness of breath, recent URI,   Cardiovascular   Exercise tolerance: good (4-7 METS)    PT is on anticoagulation therapy    (+) hypertension, valvular problems/murmurs MR, CAD, hyperlipidemia,     ROS comment: Stress Test 6/2019:  ·Findings consistent with an equivocal ECG stress test.  ·Left ventricular ejection fraction is hyperdynamic (Calculated EF > 70%).  ·Myocardial perfusion imaging indicates a normal myocardial perfusion study with no evidence of ischemia.  ·Impressions are consistent with a low risk study.    JOAO 6/2019:  ·Estimated EF = 61%.  ·Left ventricular systolic function is normal.  ·Left ventricular diastolic dysfunction (grade I) consistent with impaired relaxation.  ·Mild mitral valve regurgitation is present  ·Tricuspid valve is grossly normal. Trace to mild tricuspid valve regurgitation is present. Estimated right ventricular systolic pressure from tricuspid regurgitation is normal (<35 mmHg). No evidence of pulmonary hypertension is present.    Off Plavix x 1 week    Neuro/Psych  (+) psychiatric history Anxiety and Depression,     GI/Hepatic/Renal/Endo    (+)  hiatal hernia, GERD, GI bleeding ,     Musculoskeletal     (+) joint swelling,   Abdominal    Substance History      OB/GYN          Other   arthritis,                      Anesthesia Plan    ASA 4     MAC     intravenous induction     Anesthetic plan, all risks, benefits, and alternatives have been provided, discussed and informed consent has been obtained with: patient.    Plan discussed with CRNA.

## 2020-01-31 LAB
LAB AP CASE REPORT: NORMAL
PATH REPORT.FINAL DX SPEC: NORMAL
PATH REPORT.GROSS SPEC: NORMAL

## 2020-04-08 ENCOUNTER — OFFICE VISIT (OUTPATIENT)
Dept: GASTROENTEROLOGY | Facility: CLINIC | Age: 65
End: 2020-04-08

## 2020-04-08 VITALS — HEIGHT: 61 IN | BODY MASS INDEX: 31.27 KG/M2

## 2020-04-08 DIAGNOSIS — D12.5 BENIGN NEOPLASM OF SIGMOID COLON: ICD-10-CM

## 2020-04-08 DIAGNOSIS — K57.90 DIVERTICULOSIS: ICD-10-CM

## 2020-04-08 DIAGNOSIS — K21.00 GASTROESOPHAGEAL REFLUX DISEASE WITH ESOPHAGITIS: ICD-10-CM

## 2020-04-08 DIAGNOSIS — R11.0 NAUSEA: Primary | ICD-10-CM

## 2020-04-08 DIAGNOSIS — K29.50 CHRONIC GASTRITIS WITHOUT BLEEDING, UNSPECIFIED GASTRITIS TYPE: ICD-10-CM

## 2020-04-08 PROCEDURE — 99213 OFFICE O/P EST LOW 20 MIN: CPT | Performed by: NURSE PRACTITIONER

## 2020-04-08 RX ORDER — SUCRALFATE 1 G/1
1 TABLET ORAL 4 TIMES DAILY
Qty: 120 TABLET | Refills: 2 | Status: SHIPPED | OUTPATIENT
Start: 2020-04-08 | End: 2020-07-21

## 2020-04-08 RX ORDER — ONDANSETRON 8 MG/1
8 TABLET, ORALLY DISINTEGRATING ORAL EVERY 8 HOURS PRN
Qty: 25 TABLET | Refills: 2 | Status: SHIPPED | OUTPATIENT
Start: 2020-04-08

## 2020-04-08 RX ORDER — PANTOPRAZOLE SODIUM 40 MG/1
40 TABLET, DELAYED RELEASE ORAL DAILY
Qty: 30 TABLET | Refills: 5 | Status: SHIPPED | OUTPATIENT
Start: 2020-04-08

## 2020-04-08 NOTE — PATIENT INSTRUCTIONS

## 2020-04-08 NOTE — PROGRESS NOTES
Chief Complaint   Patient presents with   • Vomiting Blood       Subjective    Mary Melina Nelson is a 64 y.o. female. she is contacted office via telephone for follow-up visit.    64-year-old female would like to discuss GERD and EGD and colonoscopy results.  She was hospitalized underwent EGD which noted Teetee-Zimmerman tear in December she had follow-up EGD and colonoscopy 1/30/2020 and has been unable to make follow-up until this time.  She underwent treatment for H. pylori in December.  States overall abdominal pain is improved but still reports frequent episodes of GERD symptoms described as burning.  She has been able to take Protonix but never took Carafate.  Denies any changes in bowel habits or blood within her stool.  Reports her appetite has been well    Heartburn   She complains of abdominal pain (Gerd), early satiety, heartburn and nausea. She reports no belching, no chest pain, no choking, no coughing, no dysphagia, no globus sensation, no hoarse voice or no sore throat. This is a chronic problem. The problem occurs frequently. The problem has been gradually worsening. The heartburn duration is less than a minute. The heartburn is of moderate intensity. The symptoms are aggravated by certain foods. Associated symptoms include fatigue. She has tried a PPI for the symptoms. Past procedures include an EGD.     EGD noted a medium size hiatal hernia, Savary Florence grade 2 esophagitis, gastritis and normal duodenum.  Duodenal biopsy noted mild chronic inflammation.  Antrum biopsy noted mild antral chronic antral gastritis negative for H. pylori.  Esophagogastric junction biopsy noted segments of mildly inflamed stratified squamous epithelium.  Colonoscopy had adequate prep noted diverticulosis in the sigmoid colon, 5 mm polyp was removed from sigmoid colon, nonbleeding internal hemorrhoids were noted terminal ileum appeared normal biopsies were taken.  Terminal ileum biopsies showed no significant pathologic  diagnosis.  Random colonic biopsy noted no significant pathologic diagnosis.  Sigmoid colon polyp noted no significant pathologic diagnosis.       The following portions of the patient's history were reviewed and updated as appropriate:   Past Medical History:   Diagnosis Date   • Anxiety    • Chronic bronchitis (CMS/HCC)    • COPD (chronic obstructive pulmonary disease) (CMS/HCC)    • Coronary artery disease    • Depression    • Gastritis    • GERD (gastroesophageal reflux disease)    • Hiatal hernia    • Hypercholesteremia    • Hypertension    • Osteoarthritis      Past Surgical History:   Procedure Laterality Date   • CARDIAC CATHETERIZATION N/A 4/19/2017   • CARDIAC CATHETERIZATION  4/19/2017   • CARDIAC CATHETERIZATION N/A 7/6/2017   • CARDIAC CATHETERIZATION N/A 3/9/2019   • COLONOSCOPY N/A 1/30/2020    Procedure: COLONOSCOPY;  Surgeon: Sean Fernandez MD;  Location: [facility] ENDOSCOPY;  Service: Gastroenterology   • ENDOSCOPY N/A 11/15/2019    Procedure: ESOPHAGOGASTRODUODENOSCOPY;  Surgeon: Sean Fernandez MD;  Location: [facility] ENDOSCOPY;  Service: Gastroenterology   • ENDOSCOPY N/A 1/30/2020    Procedure: ESOPHAGOGASTRODUODENOSCOPY;  Surgeon: Sean Fernandez MD;  Location: [facility] ENDOSCOPY;  Service: Gastroenterology   • ENDOSCOPY AND COLONOSCOPY     • HERNIA REPAIR     • JOINT REPLACEMENT     • NISSEN FUNDOPLICATION N/A 2/1/2017   • CO RT/LT HEART CATHETERS N/A 4/19/2017   • TOTAL KNEE ARTHROPLASTY Left 12/27/2018   • TRANSESOPHAGEAL ECHOCARDIOGRAM (JOAO)       Family History   Problem Relation Age of Onset   • Hypertension Father      OB History    None       Prior to Admission medications    Medication Sig Start Date End Date Taking? Authorizing Provider   amitriptyline (ELAVIL) 50 MG tablet Take 50 mg by mouth Every Night.   Yes Provider, MD Ryan   atorvastatin (LIPITOR) 20 MG tablet Take 1 tablet by mouth Daily. 4/12/19  Yes Emanuel Olmos MD   busPIRone (BUSPAR) 10 MG tablet  Take 10 mg by mouth Daily.   Yes Ryan Henning MD   citalopram (CELEXA) 40 MG tablet Take 40 mg by mouth Daily. 7/19/16  Yes Ryan Henning MD   clopidogrel (PLAVIX) 75 MG tablet Take 75 mg by mouth Daily.   Yes Ryan Henning MD   diltiaZEM CD (CARDIZEM CD) 180 MG 24 hr capsule Take 1 capsule by mouth Daily. 9/19/19  Yes Emanuel Olmos MD   hydrOXYzine (VISTARIL) 25 MG capsule Take 25 mg by mouth 3 (Three) Times a Day As Needed for Anxiety. Not taking medication 3/7/17  Yes Ryan Henning MD   lisinopril (PRINIVIL,ZESTRIL) 10 MG tablet Take 10 mg by mouth Daily.   Yes Ryan Henning MD   nitroglycerin (NITROSTAT) 0.4 MG SL tablet Place 1 tablet under the tongue Every 5 (Five) Minutes As Needed for Chest Pain. Take no more than 3 doses in 15 minutes. 3/9/19  Yes Ishan Alston MD   omeprazole (PrilOSEC) 40 MG capsule Take 1 capsule by mouth Daily. 12/17/19  Yes Sean Fernandez MD   pantoprazole (PROTONIX) 40 MG EC tablet Take 1 tablet by mouth Daily. 11/18/19  Yes Gilbert Hearn PA   polyethylene glycol (GoLYTELY) 236 g solution Starting at noon on day prior to procedure, drink 8 ounces every 30 minutes until all gone or stools are clear. May add flavor packet. 1/7/20  Yes Sean Fernandez MD   potassium chloride (K-DUR,KLOR-CON) 10 MEQ ER tablet Take 10 mEq by mouth Daily.   Yes Ryan Henning MD   QUEtiapine (SEROquel) 100 MG tablet Take 100 mg by mouth Every Night.   Yes Ryan Henning MD   sertraline (ZOLOFT) 50 MG tablet Take 50 mg by mouth Daily.   Yes Ryan Henning MD   sucralfate (CARAFATE) 1 g tablet Take 1 tablet by mouth 4 (Four) Times a Day. 11/18/19  Yes Gilbert Hearn PA   tiZANidine (ZANAFLEX) 4 MG tablet Take 4 mg by mouth 3 (Three) Times a Day. 8/26/16  Yes Provider, Historical, MD     Allergies   Allergen Reactions   • Codeine Swelling     Social History     Socioeconomic History   • Marital status:       "Spouse name: Not on file   • Number of children: Not on file   • Years of education: Not on file   • Highest education level: Not on file   Tobacco Use   • Smoking status: Current Every Day Smoker     Packs/day: 0.25     Years: 43.00     Pack years: 10.75     Types: Cigarettes   • Smokeless tobacco: Never Used   Substance and Sexual Activity   • Alcohol use: No   • Drug use: No   • Sexual activity: Not Currently       Review of Systems  Review of Systems   Constitutional: Positive for fatigue. Negative for activity change, appetite change, chills, diaphoresis, fever and unexpected weight change.   HENT: Negative for hoarse voice, sore throat and trouble swallowing.    Respiratory: Negative for cough, choking and shortness of breath.    Cardiovascular: Negative for chest pain.   Gastrointestinal: Positive for abdominal pain (Gerd), heartburn, nausea and vomiting (with intake ). Negative for abdominal distention, anal bleeding, blood in stool, constipation, diarrhea, dysphagia and rectal pain.   Musculoskeletal: Negative for arthralgias.   Skin: Negative for pallor.   Neurological: Negative for light-headedness.        Ht 154.9 cm (61\")   BMI 31.27 kg/m²     Objective    Physical Exam   Psychiatric: She has a normal mood and affect. Her speech is normal.     Admission on 01/30/2020, Discharged on 01/30/2020   Component Date Value Ref Range Status   • Case Report 01/30/2020    Final                    Value:Surgical Pathology Report                         Case: [id number]                                  Authorizing Provider:  Sean Fernandez MD      Collected:           01/30/2020 11:22 AM          Ordering Location:     [facility]             Received:            01/30/2020 02:20 PM                                 [place] [facility]                                                     Pathologist:           Isaac Sutherland MD                                                        Specimens:   1) - " Small Intestine, Duodenum                                                                      2) - Gastric, Antrum                                                                                3) - Esophagus, eg junction                                                                         4) - Small Intestine, Ileum, terminal ileum                                                         5) - Large Intestine, colonic mucosa                                                                                          6) - Large Intestine, Sigmoid Colon, polyp                                                • Final Diagnosis 01/30/2020    Final                    Value:This result contains rich text formatting which cannot be displayed here.   • Gross Description 01/30/2020    Final                    Value:This result contains rich text formatting which cannot be displayed here.     Assessment/Plan      1. Nausea    2. Gastroesophageal reflux disease with esophagitis    3. Chronic gastritis without bleeding, unspecified gastritis type    4. Diverticulosis    5. Benign neoplasm of sigmoid colon    .   We will add Zofran as needed for nausea  Continue Protonix will add Carafate before meals and bedtime cautioned to not take her on other medications and notify if she is unable to tolerate any new medication.  Recommend fiber supplementation daily she will obtain over-the-counter.  Repeat colonoscopy in 3 years for screening purposes  Orders placed during this encounter include:  No orders of the defined types were placed in this encounter.  This visit has been rescheduled as a phone visit to comply with patient safety concerns in accordance with CDC recommendations. Total time of discussion was 15 minutes.    * Surgery not found *    Review and/or summary of lab tests, radiology, procedures, medications. Review and summary of old records and obtaining of history. The risks and benefits of my recommendations, as well as  other treatment options were discussed with the patient today. Questions were answered.    New Medications Ordered This Visit   Medications   • pantoprazole (PROTONIX) 40 MG EC tablet     Sig: Take 1 tablet by mouth Daily.     Dispense:  30 tablet     Refill:  5   • sucralfate (Carafate) 1 g tablet     Sig: Take 1 tablet by mouth 4 (Four) Times a Day.     Dispense:  120 tablet     Refill:  2   • ondansetron ODT (Zofran ODT) 8 MG disintegrating tablet     Sig: Place 1 tablet on the tongue Every 8 (Eight) Hours As Needed for Nausea or Vomiting.     Dispense:  25 tablet     Refill:  2       Follow-up: Return for Recheck, Next scheduled follow up.          This document has been electronically signed by VILMA Land on April 8, 2020 14:20             Results for orders placed or performed during the hospital encounter of 01/30/20   Tissue Pathology Exam   Result Value Ref Range    Case Report       Surgical Pathology Report                         Case: [id number]                                  Authorizing Provider:  Sean Fernandez MD      Collected:           01/30/2020 11:22 AM          Ordering Location:     [facility]             Received:            01/30/2020 02:20 PM                                 [place] [facility]                                                     Pathologist:           Isaac Sutherland MD                                                        Specimens:   1) - Small Intestine, Duodenum                                                                      2) - Gastric, Antrum                                                                                3) - Esophagus, eg junction                                                                         4) - Small Intestine, Ileum, terminal ileum                                                         5) - Large Intestine, colonic mucosa                                                                 6) - Large Intestine,  Sigmoid Colon, polyp                                                 Final Diagnosis       1.  DUODENUM, MUCOSAL BIOPSY:  MILD CHRONIC INFLAMMATION.    2.  GASTRIC ANTRUM, MUCOSAL BIOPSY:  CHRONIC ANTRAL GASTRITIS.  NO EVIDENCE OF HELICOBACTER PYLORI.    3.  ESOPHAGOGASTRIC JUNCTION, MUCOSAL BIOPSY:  SEGMENTS OF MILDLY INFLAMED STRATIFIED SQUAMOUS EPITHELIUM.    4.  TERMINAL ILEUM, MUCOSAL BIOPSY:  NO SIGNIFICANT PATHOLOGIC DIAGNOSIS.    5.  COLONIC MUCOSAL BIOPSY, RANDOM:  NO SIGNIFICANT PATHOLOGIC DIAGNOSIS.    6.  SIGMOID COLON, MUCOSAL BIOPSY:  NO SIGNIFICANT PATHOLOGIC DIAGNOSIS.        Gross Description       In 6 containers, each of these show mucosal biopsies measuring up to 0.3 cm in greatest dimension.  Embedded accordingly.  1A duodenum; 2A antrum; 3A esophagogastric junction; 4A terminal ileum; 5A colonic mucosa; 6A polyp, sigmoid colon.     Results for orders placed or performed during the hospital encounter of 11/14/19   PREVIOUS HISTORY   Result Value Ref Range    Previous History Previous Record on File    Gold Top - SST   Result Value Ref Range    Extra Tube Hold for add-ons.    Gold Top - SST   Result Value Ref Range    Extra Tube Hold for add-ons.    Green Top (Gel)   Result Value Ref Range    Extra Tube Hold for add-ons.    CBC Auto Differential   Result Value Ref Range    WBC 4.90 3.40 - 10.80 10*3/mm3    RBC 3.22 (L) 3.77 - 5.28 10*6/mm3    Hemoglobin 8.6 (L) 12.0 - 15.9 g/dL    Hematocrit 28.0 (L) 34.0 - 46.6 %    MCV 87.0 79.0 - 97.0 fL    MCH 26.7 26.6 - 33.0 pg    MCHC 30.7 (L) 31.5 - 35.7 g/dL    RDW 16.1 (H) 12.3 - 15.4 %    RDW-SD 50.9 37.0 - 54.0 fl    MPV 11.2 6.0 - 12.0 fL    Platelets 226 140 - 450 10*3/mm3    Neutrophil % 71.5 42.7 - 76.0 %    Lymphocyte % 22.4 19.6 - 45.3 %    Monocyte % 4.3 (L) 5.0 - 12.0 %    Eosinophil % 1.2 0.3 - 6.2 %    Basophil % 0.4 0.0 - 1.5 %    Immature Grans % 0.2 0.0 - 0.5 %    Neutrophils, Absolute 3.50 1.70 - 7.00 10*3/mm3    Lymphocytes, Absolute  1.10 0.70 - 3.10 10*3/mm3    Monocytes, Absolute 0.21 0.10 - 0.90 10*3/mm3    Eosinophils, Absolute 0.06 0.00 - 0.40 10*3/mm3    Basophils, Absolute 0.02 0.00 - 0.20 10*3/mm3    Immature Grans, Absolute 0.01 0.00 - 0.05 10*3/mm3    nRBC 0.0 0.0 - 0.2 /100 WBC   CBC Auto Differential   Result Value Ref Range    WBC 4.82 3.40 - 10.80 10*3/mm3    RBC 2.98 (L) 3.77 - 5.28 10*6/mm3    Hemoglobin 8.0 (L) 12.0 - 15.9 g/dL    Hematocrit 26.0 (L) 34.0 - 46.6 %    MCV 87.2 79.0 - 97.0 fL    MCH 26.8 26.6 - 33.0 pg    MCHC 30.8 (L) 31.5 - 35.7 g/dL    RDW 15.9 (H) 12.3 - 15.4 %    RDW-SD 49.8 37.0 - 54.0 fl    MPV 10.6 6.0 - 12.0 fL    Platelets 192 140 - 450 10*3/mm3    Neutrophil % 70.4 42.7 - 76.0 %    Lymphocyte % 22.4 19.6 - 45.3 %    Monocyte % 5.2 5.0 - 12.0 %    Eosinophil % 1.2 0.3 - 6.2 %    Basophil % 0.6 0.0 - 1.5 %    Immature Grans % 0.2 0.0 - 0.5 %    Neutrophils, Absolute 3.39 1.70 - 7.00 10*3/mm3    Lymphocytes, Absolute 1.08 0.70 - 3.10 10*3/mm3    Monocytes, Absolute 0.25 0.10 - 0.90 10*3/mm3    Eosinophils, Absolute 0.06 0.00 - 0.40 10*3/mm3    Basophils, Absolute 0.03 0.00 - 0.20 10*3/mm3    Immature Grans, Absolute 0.01 0.00 - 0.05 10*3/mm3    nRBC 0.0 0.0 - 0.2 /100 WBC   CBC Auto Differential   Result Value Ref Range    WBC 6.00 3.40 - 10.80 10*3/mm3    RBC 3.02 (L) 3.77 - 5.28 10*6/mm3    Hemoglobin 8.0 (L) 12.0 - 15.9 g/dL    Hematocrit 26.6 (L) 34.0 - 46.6 %    MCV 88.1 79.0 - 97.0 fL    MCH 26.5 (L) 26.6 - 33.0 pg    MCHC 30.1 (L) 31.5 - 35.7 g/dL    RDW 16.3 (H) 12.3 - 15.4 %    RDW-SD 52.8 37.0 - 54.0 fl    MPV 10.7 6.0 - 12.0 fL    Platelets 204 140 - 450 10*3/mm3    Neutrophil % 70.3 42.7 - 76.0 %    Lymphocyte % 24.0 19.6 - 45.3 %    Monocyte % 4.7 (L) 5.0 - 12.0 %    Eosinophil % 0.3 0.3 - 6.2 %    Basophil % 0.5 0.0 - 1.5 %    Immature Grans % 0.2 0.0 - 0.5 %    Neutrophils, Absolute 4.22 1.70 - 7.00 10*3/mm3    Lymphocytes, Absolute 1.44 0.70 - 3.10 10*3/mm3    Monocytes, Absolute 0.28 0.10  - 0.90 10*3/mm3    Eosinophils, Absolute 0.02 0.00 - 0.40 10*3/mm3    Basophils, Absolute 0.03 0.00 - 0.20 10*3/mm3    Immature Grans, Absolute 0.01 0.00 - 0.05 10*3/mm3    nRBC 0.0 0.0 - 0.2 /100 WBC   CBC Auto Differential   Result Value Ref Range    WBC 13.83 (H) 3.40 - 10.80 10*3/mm3    RBC 4.39 3.77 - 5.28 10*6/mm3    Hemoglobin 11.9 (L) 12.0 - 15.9 g/dL    Hematocrit 38.0 34.0 - 46.6 %    MCV 86.6 79.0 - 97.0 fL    MCH 27.1 26.6 - 33.0 pg    MCHC 31.3 (L) 31.5 - 35.7 g/dL    RDW 16.1 (H) 12.3 - 15.4 %    RDW-SD 50.3 37.0 - 54.0 fl    MPV 10.8 6.0 - 12.0 fL    Platelets 356 140 - 450 10*3/mm3    Neutrophil % 81.6 (H) 42.7 - 76.0 %    Lymphocyte % 12.7 (L) 19.6 - 45.3 %    Monocyte % 4.5 (L) 5.0 - 12.0 %    Eosinophil % 0.4 0.3 - 6.2 %    Basophil % 0.4 0.0 - 1.5 %    Immature Grans % 0.4 0.0 - 0.5 %    Neutrophils, Absolute 11.28 (H) 1.70 - 7.00 10*3/mm3    Lymphocytes, Absolute 1.76 0.70 - 3.10 10*3/mm3    Monocytes, Absolute 0.62 0.10 - 0.90 10*3/mm3    Eosinophils, Absolute 0.06 0.00 - 0.40 10*3/mm3    Basophils, Absolute 0.05 0.00 - 0.20 10*3/mm3    Immature Grans, Absolute 0.06 (H) 0.00 - 0.05 10*3/mm3    nRBC 0.0 0.0 - 0.2 /100 WBC     *Note: Due to a large number of results and/or encounters for the requested time period, some results have not been displayed. A complete set of results can be found in Results Review.

## 2020-04-10 ENCOUNTER — TELEPHONE (OUTPATIENT)
Dept: ORTHOPEDIC SURGERY | Facility: CLINIC | Age: 65
End: 2020-04-10

## 2020-04-10 NOTE — TELEPHONE ENCOUNTER
DR CHILDERS.  PATIENT CALLED REQUESTING PAIN MEDICATION FOR HER LEFT KNEE.  SHE SAW DR TRIMBLE BEFORE HE LEFT LAST AUGUST, 2019 AND SHE DID NOT KEEP THE LAST APPOINTMENT WITH YOU.

## 2020-07-21 RX ORDER — SUCRALFATE 1 G/1
TABLET ORAL
Qty: 120 TABLET | Refills: 2 | Status: SHIPPED | OUTPATIENT
Start: 2020-07-21 | End: 2022-10-11

## 2020-07-22 ENCOUNTER — LAB (OUTPATIENT)
Dept: LAB | Facility: HOSPITAL | Age: 65
End: 2020-07-22

## 2020-07-22 ENCOUNTER — OFFICE VISIT (OUTPATIENT)
Dept: CARDIOLOGY | Facility: CLINIC | Age: 65
End: 2020-07-22

## 2020-07-22 VITALS
DIASTOLIC BLOOD PRESSURE: 90 MMHG | WEIGHT: 168 LBS | HEIGHT: 61 IN | BODY MASS INDEX: 31.72 KG/M2 | SYSTOLIC BLOOD PRESSURE: 130 MMHG | OXYGEN SATURATION: 100 % | HEART RATE: 103 BPM

## 2020-07-22 DIAGNOSIS — E78.2 MIXED HYPERLIPIDEMIA: ICD-10-CM

## 2020-07-22 DIAGNOSIS — I10 ESSENTIAL HYPERTENSION: ICD-10-CM

## 2020-07-22 DIAGNOSIS — I25.110 CORONARY ARTERY DISEASE INVOLVING NATIVE CORONARY ARTERY OF NATIVE HEART WITH UNSTABLE ANGINA PECTORIS (HCC): Primary | ICD-10-CM

## 2020-07-22 DIAGNOSIS — R07.9 CHEST PAIN, UNSPECIFIED TYPE: ICD-10-CM

## 2020-07-22 DIAGNOSIS — I25.110 CORONARY ARTERY DISEASE INVOLVING NATIVE CORONARY ARTERY OF NATIVE HEART WITH UNSTABLE ANGINA PECTORIS (HCC): ICD-10-CM

## 2020-07-22 LAB
ALBUMIN SERPL-MCNC: 4.5 G/DL (ref 3.5–5.2)
ALBUMIN/GLOB SERPL: 1.8 G/DL
ALP SERPL-CCNC: 131 U/L (ref 39–117)
ALT SERPL W P-5'-P-CCNC: 17 U/L (ref 1–33)
ANION GAP SERPL CALCULATED.3IONS-SCNC: 12 MMOL/L (ref 5–15)
AST SERPL-CCNC: 17 U/L (ref 1–32)
BASOPHILS # BLD AUTO: 0.04 10*3/MM3 (ref 0–0.2)
BASOPHILS NFR BLD AUTO: 0.6 % (ref 0–1.5)
BILIRUB SERPL-MCNC: 0.2 MG/DL (ref 0–1.2)
BUN SERPL-MCNC: 10 MG/DL (ref 8–23)
BUN/CREAT SERPL: 11.6 (ref 7–25)
CALCIUM SPEC-SCNC: 9.9 MG/DL (ref 8.6–10.5)
CHLORIDE SERPL-SCNC: 102 MMOL/L (ref 98–107)
CHOLEST SERPL-MCNC: 369 MG/DL (ref 0–200)
CO2 SERPL-SCNC: 24 MMOL/L (ref 22–29)
CREAT SERPL-MCNC: 0.86 MG/DL (ref 0.57–1)
DEPRECATED RDW RBC AUTO: 49.4 FL (ref 37–54)
EOSINOPHIL # BLD AUTO: 0.07 10*3/MM3 (ref 0–0.4)
EOSINOPHIL NFR BLD AUTO: 1 % (ref 0.3–6.2)
ERYTHROCYTE [DISTWIDTH] IN BLOOD BY AUTOMATED COUNT: 17.4 % (ref 12.3–15.4)
GFR SERPL CREATININE-BSD FRML MDRD: 67 ML/MIN/1.73
GLOBULIN UR ELPH-MCNC: 2.5 GM/DL
GLUCOSE SERPL-MCNC: 102 MG/DL (ref 65–99)
HCT VFR BLD AUTO: 35.4 % (ref 34–46.6)
HDLC SERPL-MCNC: 53 MG/DL (ref 40–60)
HGB BLD-MCNC: 10.8 G/DL (ref 12–15.9)
IMM GRANULOCYTES # BLD AUTO: 0.02 10*3/MM3 (ref 0–0.05)
IMM GRANULOCYTES NFR BLD AUTO: 0.3 % (ref 0–0.5)
LDLC SERPL CALC-MCNC: 251 MG/DL (ref 0–100)
LDLC/HDLC SERPL: 4.74 {RATIO}
LYMPHOCYTES # BLD AUTO: 1.66 10*3/MM3 (ref 0.7–3.1)
LYMPHOCYTES NFR BLD AUTO: 23.6 % (ref 19.6–45.3)
MCH RBC QN AUTO: 23.8 PG (ref 26.6–33)
MCHC RBC AUTO-ENTMCNC: 30.5 G/DL (ref 31.5–35.7)
MCV RBC AUTO: 78.1 FL (ref 79–97)
MONOCYTES # BLD AUTO: 0.37 10*3/MM3 (ref 0.1–0.9)
MONOCYTES NFR BLD AUTO: 5.3 % (ref 5–12)
NEUTROPHILS NFR BLD AUTO: 4.88 10*3/MM3 (ref 1.7–7)
NEUTROPHILS NFR BLD AUTO: 69.2 % (ref 42.7–76)
NRBC BLD AUTO-RTO: 0 /100 WBC (ref 0–0.2)
PLATELET # BLD AUTO: 221 10*3/MM3 (ref 140–450)
PMV BLD AUTO: 10.7 FL (ref 6–12)
POTASSIUM SERPL-SCNC: 3.8 MMOL/L (ref 3.5–5.2)
PROT SERPL-MCNC: 7 G/DL (ref 6–8.5)
RBC # BLD AUTO: 4.53 10*6/MM3 (ref 3.77–5.28)
SODIUM SERPL-SCNC: 138 MMOL/L (ref 136–145)
TRIGL SERPL-MCNC: 325 MG/DL (ref 0–150)
VLDLC SERPL-MCNC: 65 MG/DL
WBC # BLD AUTO: 7.04 10*3/MM3 (ref 3.4–10.8)

## 2020-07-22 PROCEDURE — 99214 OFFICE O/P EST MOD 30 MIN: CPT | Performed by: INTERNAL MEDICINE

## 2020-07-22 PROCEDURE — 80053 COMPREHEN METABOLIC PANEL: CPT | Performed by: INTERNAL MEDICINE

## 2020-07-22 PROCEDURE — 36415 COLL VENOUS BLD VENIPUNCTURE: CPT | Performed by: INTERNAL MEDICINE

## 2020-07-22 PROCEDURE — 93000 ELECTROCARDIOGRAM COMPLETE: CPT | Performed by: INTERNAL MEDICINE

## 2020-07-22 PROCEDURE — 85025 COMPLETE CBC W/AUTO DIFF WBC: CPT | Performed by: INTERNAL MEDICINE

## 2020-07-22 PROCEDURE — 80061 LIPID PANEL: CPT

## 2020-07-22 RX ORDER — ROSUVASTATIN CALCIUM 20 MG/1
20 TABLET, COATED ORAL DAILY
Qty: 30 TABLET | Refills: 6 | Status: SHIPPED | OUTPATIENT
Start: 2020-07-22 | End: 2020-07-22

## 2020-07-22 NOTE — TELEPHONE ENCOUNTER
Pt seen Dr Del Real today, he wanted to start her on Crestor but insurance is not wanting to pay for it. So he's going to just increase her Lipitor to 40mg, I called pt and Pharmacy.

## 2020-07-22 NOTE — PROGRESS NOTES
Mary Nelson  65 y.o. female    1. Coronary artery disease involving native coronary artery of native heart with unstable angina pectoris (CMS/HCC)    2. Essential hypertension    3. Mixed hyperlipidemia        History of Present Illness  Mary Nelson is a 65 yr old  female who is being seen after a long interval.  She has history of CAD with cardiac catheterization in April 2007 showing stenosis in the left anterior descending artery with subsequent FFR evaluation and PTCA and stenting of the left anterior descending artery with a 2.5 x 23 mm Xience Alpine stent.    She subsequently underwent cardiac catheterization by Dr. Chaparro following an abnormal CT angiogram of the coronary arteries in 3/19 which showed the following findings.  No critical lesions were identified.  Impression   1.  Sustained patency in the left anterior descending artery site of previous angioplasty and stenting with 50% stenosis proximal to the previously placed stent with a mild tortuous course.  2.  No evidence of any obstructive disease noted in the circumflex artery.  3.  Nondominant right coronary artery with luminal irregularity.  4.  Preserved left ventricular systolic function with an ejection fraction of 55%    He has done reasonably well during the past year with no chest pain.  She unfortunately continues to smoke and we had a discussion about smoking cessation.  Her lipid profiles are markedly abnormal though she claims to be compliant with her statin therapy.    EKG today shows sinus rhythm with heart rate of 82 bpm.  Nonspecific T wave changes.  Early transition.  EKG unchanged from previous studies.    SUBJECTIVE    Allergies   Allergen Reactions   • Codeine Swelling         Past Medical History:   Diagnosis Date   • Anxiety    • Chronic bronchitis (CMS/HCC)    • COPD (chronic obstructive pulmonary disease) (CMS/HCC)    • Coronary artery disease    • Depression    • Gastritis    • GERD (gastroesophageal  reflux disease)    • Hiatal hernia    • Hypercholesteremia    • Hypertension    • Osteoarthritis          Past Surgical History:   Procedure Laterality Date   • CARDIAC CATHETERIZATION N/A 4/19/2017   • CARDIAC CATHETERIZATION  4/19/2017   • CARDIAC CATHETERIZATION N/A 7/6/2017   • CARDIAC CATHETERIZATION N/A 3/9/2019   • COLONOSCOPY N/A 1/30/2020    Procedure: COLONOSCOPY;  Surgeon: Sean Fernandez MD;  Location: [facility] ENDOSCOPY;  Service: Gastroenterology   • ENDOSCOPY N/A 11/15/2019    Procedure: ESOPHAGOGASTRODUODENOSCOPY;  Surgeon: Sean Fernandez MD;  Location: [facility] ENDOSCOPY;  Service: Gastroenterology   • ENDOSCOPY N/A 1/30/2020    Procedure: ESOPHAGOGASTRODUODENOSCOPY;  Surgeon: Sean Fernandez MD;  Location: [facility] ENDOSCOPY;  Service: Gastroenterology   • ENDOSCOPY AND COLONOSCOPY     • HERNIA REPAIR     • JOINT REPLACEMENT     • NISSEN FUNDOPLICATION N/A 2/1/2017   • NE RT/LT HEART CATHETERS N/A 4/19/2017   • TOTAL KNEE ARTHROPLASTY Left 12/27/2018   • TRANSESOPHAGEAL ECHOCARDIOGRAM (JOAO)           Family History   Problem Relation Age of Onset   • Hypertension Father          Social History     Socioeconomic History   • Marital status:      Spouse name: Not on file   • Number of children: Not on file   • Years of education: Not on file   • Highest education level: Not on file   Tobacco Use   • Smoking status: Current Every Day Smoker     Packs/day: 0.25     Years: 43.00     Pack years: 10.75     Types: Cigarettes   • Smokeless tobacco: Never Used   Substance and Sexual Activity   • Alcohol use: No   • Drug use: No   • Sexual activity: Not Currently         Current Outpatient Medications   Medication Sig Dispense Refill   • atorvastatin (LIPITOR) 20 MG tablet Take 1 tablet by mouth Daily. 90 tablet 3   • busPIRone (BUSPAR) 10 MG tablet Take 10 mg by mouth Daily.     • citalopram (CELEXA) 40 MG tablet Take 40 mg by mouth Daily.     • clopidogrel (PLAVIX) 75 MG tablet Take 75 mg by mouth  "Daily.     • diltiaZEM CD (CARDIZEM CD) 180 MG 24 hr capsule Take 1 capsule by mouth Daily. 30 capsule 6   • hydrOXYzine (VISTARIL) 25 MG capsule Take 25 mg by mouth 3 (Three) Times a Day As Needed for Anxiety. Not taking medication     • lisinopril (PRINIVIL,ZESTRIL) 10 MG tablet Take 10 mg by mouth Daily.     • nitroglycerin (NITROSTAT) 0.4 MG SL tablet Place 1 tablet under the tongue Every 5 (Five) Minutes As Needed for Chest Pain. Take no more than 3 doses in 15 minutes. 30 tablet 1   • ondansetron ODT (Zofran ODT) 8 MG disintegrating tablet Place 1 tablet on the tongue Every 8 (Eight) Hours As Needed for Nausea or Vomiting. 25 tablet 2   • pantoprazole (PROTONIX) 40 MG EC tablet Take 1 tablet by mouth Daily. 30 tablet 5   • polyethylene glycol (GoLYTELY) 236 g solution Starting at noon on day prior to procedure, drink 8 ounces every 30 minutes until all gone or stools are clear. May add flavor packet. 4000 mL 0   • potassium chloride (K-DUR,KLOR-CON) 10 MEQ ER tablet Take 10 mEq by mouth Daily.     • QUEtiapine (SEROquel) 100 MG tablet Take 100 mg by mouth Every Night.     • sertraline (ZOLOFT) 50 MG tablet Take 50 mg by mouth Daily.     • sucralfate (CARAFATE) 1 g tablet TAKE ONE TABLET BY MOUTH 4 TIMES DAILY 120 tablet 2   • tiZANidine (ZANAFLEX) 4 MG tablet Take 4 mg by mouth 3 (Three) Times a Day.       No current facility-administered medications for this visit.          OBJECTIVE    /90 (BP Location: Right arm, Patient Position: Sitting, Cuff Size: Adult)   Pulse 103   Ht 154.9 cm (61\")   Wt 76.2 kg (168 lb)   SpO2 100%   BMI 31.74 kg/m²         Review of Systems     Constitutional:  Denies recent weight loss, weight gain, fever or chills, no change in exercise tolerance     HENT:  Denies any hearing loss, epistaxis, hoarseness, or difficulty speaking.     Eyes: Wears eyeglasses or contact lenses     Respiratory:  Denies dyspnea with exertion,no cough, wheezing, or hemoptysis. "     Cardiovascular: Negative for palpations, chest pain, orthopnea, PND    Gastrointestinal:  Denies change in bowel habits, dyspepsia, ulcer disease, hematochezia, or melena.     Endocrine: Negative for cold intolerance, heat intolerance, polydipsia, polyphagia and polyuria.     Genitourinary: Negative.      Musculoskeletal: DJD    Neurological:  Denies any history of recurrent headaches, strokes, TIA, or seizure disorder.     Hematological: Denies any food allergies, seasonal allergies, bleeding disorders, or lymphadenopathy.     Psychiatric/Behavioral:  history of depression      Physical Exam     Constitutional: Cooperative, alert and oriented, in no acute distress.     HENT:   Head: Normocephalic, normal hair patterns, no masses or tenderness.  Ears, Nose, and Throat: No gross abnormalities. No pallor or cyanosis.   Eyes: EOMS intact, PERRL, conjunctivae and lids unremarkable. Fundoscopic exam and visual fields not performed.   Neck: No palpable masses or adenopathy, no thyromegaly, no JVD, carotid pulses are full and equal bilaterally and without  Bruits.     Cardiovascular: Regular rhythm, S1 and S2 normal, no S3 or S4.  No murmurs, gallops, or rubs detected.     Pulmonary/Chest: Chest: normal symmetry,  normal respiratory excursion, no intercostal retraction, no use of accessory muscles.            Pulmonary: Normal breath sounds. No rales or ronchi.    Abdominal: Abdomen soft, bowel sounds normoactive, no masses, no hepatosplenomegaly, non-tender, no bruits.     Musculoskeletal: No deformities, clubbing, cyanosis, erythema, or edema observed.     Neurological: No gross motor or sensory deficits noted, affect appropriate, oriented to time, person, place.     Skin: Warm and dry to the touch, no apparent skin lesions or masses noted.     Psychiatric: She has a normal mood and affect. Her behavior is normal. Judgment and thought content normal.         Procedures      Lab Results   Component Value Date    WBC  4.90 11/18/2019    HGB 8.6 (L) 11/18/2019    HCT 28.0 (L) 11/18/2019    MCV 87.0 11/18/2019     11/18/2019     Lab Results   Component Value Date    GLUCOSE 119 (H) 11/18/2019    BUN 4 (L) 11/18/2019    CREATININE 0.94 11/18/2019    EGFRIFNONA 60 (L) 11/18/2019    BCR 4.3 (L) 11/18/2019    CO2 23.0 11/18/2019    CALCIUM 9.0 11/18/2019    ALBUMIN 3.60 11/18/2019    AST 48 (H) 11/18/2019    ALT 32 11/18/2019     Lab Results   Component Value Date    CHOL 298 (H) 01/15/2020    CHOL 280 (H) 06/24/2019    CHOL 368 (H) 03/27/2019     Lab Results   Component Value Date    TRIG 220 (H) 01/15/2020    TRIG 132 06/24/2019    TRIG 282 (H) 03/27/2019     Lab Results   Component Value Date    HDL 41 01/15/2020    HDL 60 06/24/2019    HDL 42 03/27/2019     No components found for: LDLCALC  Lab Results   Component Value Date     (H) 01/15/2020     (H) 06/24/2019     (H) 03/27/2019     No results found for: HDLLDLRATIO  No components found for: CHOLHDL  Lab Results   Component Value Date    HGBA1C 5.9 01/15/2020     Lab Results   Component Value Date    TSH 4.300 (H) 06/24/2019           ASSESSMENT AND PLAN  Ms. Nelson has multiple medical issues as discussed in the history of present illness but fortunately stable at this time with no definite evidence of angina, arrhythmia or congestive heart failure.  Aggressive risk factor modification with optimization of lipid profile and smoking cessation has been stressed.  A lipid profile being checked today and if the LDL is markedly elevated I will consider PCSK9 inhibitors.  The patient had not brought her medication list but I am unsure about exactly what she is taking.  I have continued antiplatelet therapy with Plavix, antihypertensive therapy with Cardizem CD lisinopril and lipid-lowering therapy with atorvastatin 40 mg daily.    Mary was seen today for follow-up.    Diagnoses and all orders for this visit:    Coronary artery disease involving native  coronary artery of native heart with unstable angina pectoris (CMS/HCC)    Essential hypertension    Mixed hyperlipidemia        Patient's Body mass index is 31.74 kg/m². BMI is above normal parameters. Recommendations include: exercise counseling and nutrition counseling.      I advised Mary of the risks of continuing to use tobacco, and I provided her with tobacco cessation educational materials in the After Visit Summary.     During this visit, I spent 3 minutes counseling the patient regarding tobacco cessation.      Emanuel Olmos MD  7/22/2020  11:13

## 2020-08-03 ENCOUNTER — DOCUMENTATION (OUTPATIENT)
Dept: CARDIOLOGY | Facility: CLINIC | Age: 65
End: 2020-08-03

## 2020-08-07 DIAGNOSIS — E78.2 MIXED HYPERLIPIDEMIA: Primary | ICD-10-CM

## 2020-08-07 RX ORDER — EZETIMIBE 10 MG/1
10 TABLET ORAL DAILY
Qty: 30 TABLET | Refills: 6 | Status: SHIPPED | OUTPATIENT
Start: 2020-08-07 | End: 2021-01-25

## 2020-09-29 ENCOUNTER — LAB (OUTPATIENT)
Dept: LAB | Facility: HOSPITAL | Age: 65
End: 2020-09-29

## 2020-09-29 DIAGNOSIS — Z96.652 PRESENCE OF TOTAL KNEE JOINT PROSTHESIS, LEFT: ICD-10-CM

## 2020-09-29 DIAGNOSIS — M25.562 CHRONIC PAIN OF LEFT KNEE: Primary | ICD-10-CM

## 2020-09-29 DIAGNOSIS — E78.2 MIXED HYPERLIPIDEMIA: ICD-10-CM

## 2020-09-29 DIAGNOSIS — G89.29 CHRONIC PAIN OF LEFT KNEE: Primary | ICD-10-CM

## 2020-09-29 LAB
CHOLEST SERPL-MCNC: 191 MG/DL (ref 0–200)
HDLC SERPL-MCNC: 52 MG/DL (ref 40–60)
LDLC SERPL CALC-MCNC: 103 MG/DL (ref 0–100)
LDLC/HDLC SERPL: 1.99 {RATIO}
TRIGL SERPL-MCNC: 178 MG/DL (ref 0–150)
VLDLC SERPL-MCNC: 35.6 MG/DL (ref 5–40)

## 2020-09-29 PROCEDURE — 36415 COLL VENOUS BLD VENIPUNCTURE: CPT

## 2020-09-29 PROCEDURE — 80061 LIPID PANEL: CPT

## 2020-09-30 ENCOUNTER — OFFICE VISIT (OUTPATIENT)
Dept: ORTHOPEDIC SURGERY | Facility: CLINIC | Age: 65
End: 2020-09-30

## 2020-09-30 VITALS — BODY MASS INDEX: 31.72 KG/M2 | WEIGHT: 168 LBS | HEIGHT: 61 IN

## 2020-09-30 DIAGNOSIS — M17.0 PRIMARY OSTEOARTHRITIS OF BOTH KNEES: ICD-10-CM

## 2020-09-30 DIAGNOSIS — M25.562 CHRONIC PAIN OF LEFT KNEE: Primary | ICD-10-CM

## 2020-09-30 DIAGNOSIS — M25.462 SWELLING OF KNEE JOINT, LEFT: ICD-10-CM

## 2020-09-30 DIAGNOSIS — G89.29 CHRONIC PAIN OF LEFT KNEE: Primary | ICD-10-CM

## 2020-09-30 DIAGNOSIS — Z96.652 PRESENCE OF TOTAL KNEE JOINT PROSTHESIS, LEFT: ICD-10-CM

## 2020-09-30 PROCEDURE — 99214 OFFICE O/P EST MOD 30 MIN: CPT | Performed by: ORTHOPAEDIC SURGERY

## 2020-09-30 PROCEDURE — 96372 THER/PROPH/DIAG INJ SC/IM: CPT | Performed by: ORTHOPAEDIC SURGERY

## 2020-09-30 RX ORDER — METHYLPREDNISOLONE ACETATE 40 MG/ML
80 INJECTION, SUSPENSION INTRA-ARTICULAR; INTRALESIONAL; INTRAMUSCULAR; SOFT TISSUE ONCE
Status: COMPLETED | OUTPATIENT
Start: 2020-09-30 | End: 2020-09-30

## 2020-09-30 RX ADMIN — METHYLPREDNISOLONE ACETATE 80 MG: 40 INJECTION, SUSPENSION INTRA-ARTICULAR; INTRALESIONAL; INTRAMUSCULAR; SOFT TISSUE at 09:00

## 2020-09-30 NOTE — PROGRESS NOTES
Mary Nelson is a 65 y.o. female returns for     Chief Complaint   Patient presents with   • Left Knee - Follow-up       HISTORY OF PRESENT ILLNESS: Patient being seen for left knee follow up. X-rays done today. Left TKA performed by Dr Raymond 12/27/2018.  She never really did great with her knee replacement.  But got worse when she fell and hyperflexed her knee in February 2020.  Is been painful ever since and has symptoms of instability.       CONCURRENT MEDICAL HISTORY:    Past Medical History:   Diagnosis Date   • Anxiety    • Chronic bronchitis (CMS/HCC)    • COPD (chronic obstructive pulmonary disease) (CMS/HCC)    • Coronary artery disease    • Depression    • Gastritis    • GERD (gastroesophageal reflux disease)    • Hiatal hernia    • Hypercholesteremia    • Hypertension    • Osteoarthritis        Allergies   Allergen Reactions   • Codeine Swelling         Current Outpatient Medications:   •  atorvastatin (LIPITOR) 40 MG tablet, Take 40 mg by mouth Daily., Disp: , Rfl:   •  busPIRone (BUSPAR) 10 MG tablet, Take 10 mg by mouth Daily., Disp: , Rfl:   •  clopidogrel (PLAVIX) 75 MG tablet, Take 75 mg by mouth Daily., Disp: , Rfl:   •  diltiaZEM CD (CARDIZEM CD) 180 MG 24 hr capsule, Take 1 capsule by mouth Daily., Disp: 30 capsule, Rfl: 6  •  ezetimibe (ZETIA) 10 MG tablet, Take 1 tablet by mouth Daily., Disp: 30 tablet, Rfl: 6  •  hydrOXYzine (VISTARIL) 25 MG capsule, Take 25 mg by mouth 3 (Three) Times a Day As Needed for Anxiety. Not taking medication, Disp: , Rfl:   •  lisinopril (PRINIVIL,ZESTRIL) 10 MG tablet, Take 10 mg by mouth Daily., Disp: , Rfl:   •  nitroglycerin (NITROSTAT) 0.4 MG SL tablet, Place 1 tablet under the tongue Every 5 (Five) Minutes As Needed for Chest Pain. Take no more than 3 doses in 15 minutes., Disp: 30 tablet, Rfl: 1  •  ondansetron ODT (Zofran ODT) 8 MG disintegrating tablet, Place 1 tablet on the tongue Every 8 (Eight) Hours As Needed for Nausea or Vomiting., Disp: 25  "tablet, Rfl: 2  •  pantoprazole (PROTONIX) 40 MG EC tablet, Take 1 tablet by mouth Daily., Disp: 30 tablet, Rfl: 5  •  potassium chloride (K-DUR,KLOR-CON) 10 MEQ ER tablet, Take 10 mEq by mouth Daily., Disp: , Rfl:   •  QUEtiapine (SEROquel) 100 MG tablet, Take 100 mg by mouth Every Night., Disp: , Rfl:   •  sertraline (ZOLOFT) 50 MG tablet, Take 50 mg by mouth Daily., Disp: , Rfl:   •  sucralfate (CARAFATE) 1 g tablet, TAKE ONE TABLET BY MOUTH 4 TIMES DAILY, Disp: 120 tablet, Rfl: 2  •  tiZANidine (ZANAFLEX) 4 MG tablet, Take 4 mg by mouth 3 (Three) Times a Day., Disp: , Rfl:   No current facility-administered medications for this visit.     Past Surgical History:   Procedure Laterality Date   • CARDIAC CATHETERIZATION N/A 4/19/2017   • CARDIAC CATHETERIZATION  4/19/2017   • CARDIAC CATHETERIZATION N/A 7/6/2017   • CARDIAC CATHETERIZATION N/A 3/9/2019   • COLONOSCOPY N/A 1/30/2020    Procedure: COLONOSCOPY;  Surgeon: Sean Fernandez MD;  Location: [facility] ENDOSCOPY;  Service: Gastroenterology   • ENDOSCOPY N/A 11/15/2019    Procedure: ESOPHAGOGASTRODUODENOSCOPY;  Surgeon: Sean Fernandez MD;  Location: [facility] ENDOSCOPY;  Service: Gastroenterology   • ENDOSCOPY N/A 1/30/2020    Procedure: ESOPHAGOGASTRODUODENOSCOPY;  Surgeon: Sean Fernandez MD;  Location: [facility] ENDOSCOPY;  Service: Gastroenterology   • ENDOSCOPY AND COLONOSCOPY     • HERNIA REPAIR     • JOINT REPLACEMENT     • NISSEN FUNDOPLICATION N/A 2/1/2017   • NC RT/LT HEART CATHETERS N/A 4/19/2017   • TOTAL KNEE ARTHROPLASTY Left 12/27/2018   • TRANSESOPHAGEAL ECHOCARDIOGRAM (JOAO)             ROS: Review of systems has been updated as of today's date.  All other systems are negative except as noted previously.    PHYSICAL EXAMINATION:       Ht 154.9 cm (61\")   Wt 76.2 kg (168 lb)   BMI 31.74 kg/m²     Physical Exam  Constitutional:       Appearance: She is well-developed.   HENT:      Head: Normocephalic and atraumatic.   Eyes:      Pupils: Pupils " are equal, round, and reactive to light.   Neck:      Musculoskeletal: Neck supple.   Pulmonary:      Effort: Pulmonary effort is normal.   Musculoskeletal:         General: Swelling and tenderness present. No deformity.   Skin:     General: Skin is warm and dry.   Neurological:      Mental Status: She is alert and oriented to person, place, and time.         GAIT:     []  Normal  [x]  Antalgic    Assistive device: [x]  None  []  Walker     []  Crutches  []  Cane     []  Wheelchair  []  Stretcher    Ortho Exam  Wound is well-healed.  Minimal swelling there is mild tenderness about the patellofemoral joint tender medially.  She has pain with varus stressing but no real instability noted.  Neurologically intact.  Calf is negative neurovascular intact  No results found.    X-ray views were taken today which revealed no obvious loosening overall alignment is good there is a bit of pat[place] which is unchanged.      ASSESSMENT:    Diagnoses and all orders for this visit:    Chronic pain of left knee  -     Ambulatory Referral to Physical Therapy Evaluate and treat  -     methylPREDNISolone acetate (DEPO-medrol) injection 80 mg    Presence of total knee joint prosthesis, left  -     Ambulatory Referral to Physical Therapy Evaluate and treat  -     methylPREDNISolone acetate (DEPO-medrol) injection 80 mg    Swelling of knee joint, left  -     Ambulatory Referral to Physical Therapy Evaluate and treat  -     methylPREDNISolone acetate (DEPO-medrol) injection 80 mg    Primary osteoarthritis of both knees  -     Ambulatory Referral to Physical Therapy Evaluate and treat  -     methylPREDNISolone acetate (DEPO-medrol) injection 80 mg          PLAN at this point we are going to trial shot of Depo-Medrol, put her on a hinged knee brace.  Muscular try some physical therapy check in a month to see if she is improved.    Patient's Body mass index is 31.74 kg/m². BMI is above normal parameters. Recommendations include: exercise  counseling and nutrition counseling.      No follow-ups on file.      This document has been electronically signed by Jarrell Wallace MD on September 30, 2020 12:53 CDT

## 2020-10-06 ENCOUNTER — DOCUMENTATION (OUTPATIENT)
Dept: CARDIOLOGY | Facility: CLINIC | Age: 65
End: 2020-10-06

## 2020-10-30 DIAGNOSIS — I10 ESSENTIAL HYPERTENSION: Primary | ICD-10-CM

## 2020-10-30 RX ORDER — DILTIAZEM HYDROCHLORIDE 180 MG/1
CAPSULE, EXTENDED RELEASE ORAL
Qty: 30 CAPSULE | Refills: 6 | Status: SHIPPED | OUTPATIENT
Start: 2020-10-30 | End: 2021-01-25

## 2020-11-02 RX ORDER — PANTOPRAZOLE SODIUM 40 MG/1
TABLET, DELAYED RELEASE ORAL
Qty: 30 TABLET | Refills: 5 | OUTPATIENT
Start: 2020-11-02

## 2020-11-02 RX ORDER — SUCRALFATE 1 G/1
TABLET ORAL
Qty: 120 TABLET | Refills: 2 | OUTPATIENT
Start: 2020-11-02

## 2020-12-28 DIAGNOSIS — I25.110 CORONARY ARTERY DISEASE INVOLVING NATIVE CORONARY ARTERY OF NATIVE HEART WITH UNSTABLE ANGINA PECTORIS (HCC): Primary | ICD-10-CM

## 2020-12-28 RX ORDER — ATORVASTATIN CALCIUM 40 MG/1
TABLET, FILM COATED ORAL
Qty: 30 TABLET | Refills: 5 | Status: SHIPPED | OUTPATIENT
Start: 2020-12-28 | End: 2021-04-09

## 2021-01-25 DIAGNOSIS — I10 ESSENTIAL HYPERTENSION: ICD-10-CM

## 2021-01-25 RX ORDER — DILTIAZEM HYDROCHLORIDE 180 MG/1
CAPSULE, EXTENDED RELEASE ORAL
Qty: 30 CAPSULE | Refills: 11 | Status: SHIPPED | OUTPATIENT
Start: 2021-01-25 | End: 2021-12-06

## 2021-01-25 RX ORDER — EZETIMIBE 10 MG/1
TABLET ORAL
Qty: 30 TABLET | Refills: 11 | Status: SHIPPED | OUTPATIENT
Start: 2021-01-25 | End: 2023-01-13

## 2021-03-31 NOTE — PROGRESS NOTES
Mary Nelson is a 63 y.o. female is s/p       Chief Complaint   Patient presents with   • Left Knee - Post-op   • Suture / Staple Removal       HISTORY OF PRESENT ILLNESS: Left TOTAL KNEE ARTHROPLASTY done on 12/27/2018, xrays done today. Patient released  From homehealth on 1/23/2019 is needing order for outpatient therapy.      63 y pain somewhat improved, daughter states she is not doing physical therapy at home as instructed  + swelling of left lower leg.  She is on brilinta      Allergies   Allergen Reactions   • Codeine Swelling         Current Outpatient Medications:   •  acetaminophen (TYLENOL) 500 MG tablet, Take 500 mg by mouth Every 6 (Six) Hours As Needed for Mild Pain ., Disp: , Rfl:   •  albuterol (VENTOLIN HFA) 108 (90 BASE) MCG/ACT inhaler, Inhale 2 puffs 4 (Four) Times a Day., Disp: , Rfl:   •  amitriptyline (ELAVIL) 50 MG tablet, Take 50 mg by mouth Every Night., Disp: , Rfl:   •  atorvastatin (LIPITOR) 40 MG tablet, TAKE 1 TABLET BY MOUTH EVERY NIGHT., Disp: 30 tablet, Rfl: 6  •  citalopram (CELEXA) 40 MG tablet, Take 40 mg by mouth Daily., Disp: , Rfl:   •  diltiaZEM CD (CARDIZEM CD) 180 MG 24 hr capsule, Take 180 mg by mouth Daily., Disp: , Rfl:   •  docusate sodium 100 MG capsule, Take 1 capsule by mouth 2 (Two) Times a Day., Disp: 30 each, Rfl: 0  •  hydrOXYzine (VISTARIL) 25 MG capsule, Take 25 mg by mouth 3 (Three) Times a Day As Needed for Anxiety., Disp: , Rfl:   •  lisinopril (PRINIVIL,ZESTRIL) 10 MG tablet, Take 10 mg by mouth Daily., Disp: , Rfl:   •  omeprazole (PRILOSEC) 40 MG capsule, Take 40 mg by mouth Daily., Disp: , Rfl:   •  ticagrelor (BRILINTA) 60 MG tablet tablet, Take 1 tablet by mouth 2 (Two) Times a Day., Disp: 60 tablet, Rfl: 12  •  tiZANidine (ZANAFLEX) 4 MG tablet, Take 4 mg by mouth Every 6 (Six) Hours As Needed., Disp: , Rfl:   •  oxyCODONE-acetaminophen (PERCOCET) 5-325 MG per tablet, Take 1 tablet by mouth Every 6 (Six) Hours As Needed for Moderate Pain .,  Disp: 80 tablet, Rfl: 0    No fevers or chills.  No nausea or vomiting.      PHYSICAL EXAMINATION:       Mary Nelson is a 63 y.o. female    Patient is awake and alert, answers questions appropriately and is in no apparent distress.    GAIT:     []  Normal  []  Antalgic    Assistive device: []  None  [x]  Walker     []  Crutches  []  Cane     []  Wheelchair  []  Stretcher    Ortho Exam    Ambulating with walker  Knee flexion 100  Extension -20  No erythema, no drainage  + swelling of lower leg is moderate.  Brisk capillary refill.            ASSESSMENT:    Diagnoses and all orders for this visit:    Presence of total knee joint prosthesis, left  -     Ambulatory Referral to Physical Therapy Evaluate and treat, Ortho; Stretching, ROM, Strengthening; 3; 4; 4; Left; Full weight bearing    Swelling of knee joint, left  -     US Venous Doppler Lower Extremity Bilateral (duplex); Future    Other orders  -     oxyCODONE-acetaminophen (PERCOCET) 5-325 MG per tablet; Take 1 tablet by mouth Every 6 (Six) Hours As Needed for Moderate Pain .          PLAN    I instructed her for progressive physical therapy, I stressed to her that physical therapy is important to obtain a good clinical result.  Order for physical therapy is given.  I gave her a prescription of percocet, I cautioned her that narcotic medicines can be addictive, I explained to her abuse of narcotic medicines can cause death or coma  Ordered duplex ultrasound of lower leg to assess dvt.       Grover Raymond MD     Unknown if ever smoked

## 2021-04-09 DIAGNOSIS — I25.110 CORONARY ARTERY DISEASE INVOLVING NATIVE CORONARY ARTERY OF NATIVE HEART WITH UNSTABLE ANGINA PECTORIS (HCC): ICD-10-CM

## 2021-04-09 RX ORDER — ATORVASTATIN CALCIUM 40 MG/1
TABLET, FILM COATED ORAL
Qty: 90 TABLET | Refills: 1 | Status: SHIPPED | OUTPATIENT
Start: 2021-04-09 | End: 2022-06-29

## 2021-12-06 DIAGNOSIS — I10 ESSENTIAL HYPERTENSION: ICD-10-CM

## 2021-12-06 RX ORDER — DILTIAZEM HYDROCHLORIDE 180 MG/1
CAPSULE, EXTENDED RELEASE ORAL
Qty: 90 CAPSULE | Refills: 3 | Status: SHIPPED | OUTPATIENT
Start: 2021-12-06 | End: 2022-06-28 | Stop reason: SDUPTHER

## 2022-06-28 ENCOUNTER — OFFICE VISIT (OUTPATIENT)
Dept: CARDIOLOGY | Facility: CLINIC | Age: 67
End: 2022-06-28

## 2022-06-28 ENCOUNTER — LAB (OUTPATIENT)
Dept: LAB | Facility: HOSPITAL | Age: 67
End: 2022-06-28

## 2022-06-28 ENCOUNTER — APPOINTMENT (OUTPATIENT)
Dept: LAB | Facility: HOSPITAL | Age: 67
End: 2022-06-28

## 2022-06-28 VITALS
WEIGHT: 179 LBS | TEMPERATURE: 98 F | DIASTOLIC BLOOD PRESSURE: 78 MMHG | OXYGEN SATURATION: 98 % | HEART RATE: 84 BPM | SYSTOLIC BLOOD PRESSURE: 138 MMHG | HEIGHT: 61 IN | BODY MASS INDEX: 33.79 KG/M2

## 2022-06-28 DIAGNOSIS — I25.10 CORONARY ARTERY DISEASE INVOLVING NATIVE CORONARY ARTERY OF NATIVE HEART WITHOUT ANGINA PECTORIS: ICD-10-CM

## 2022-06-28 DIAGNOSIS — E78.2 MIXED HYPERLIPIDEMIA: ICD-10-CM

## 2022-06-28 DIAGNOSIS — R07.2 PRECORDIAL PAIN: ICD-10-CM

## 2022-06-28 DIAGNOSIS — I10 ESSENTIAL HYPERTENSION: Primary | ICD-10-CM

## 2022-06-28 DIAGNOSIS — I10 ESSENTIAL HYPERTENSION: ICD-10-CM

## 2022-06-28 LAB
CHOLEST SERPL-MCNC: 205 MG/DL (ref 0–200)
HDLC SERPL-MCNC: 58 MG/DL (ref 40–60)
LDLC SERPL CALC-MCNC: 120 MG/DL (ref 0–100)
LDLC/HDLC SERPL: 2.01 {RATIO}
QT INTERVAL: 358 MS
QTC INTERVAL: 423 MS
TRIGL SERPL-MCNC: 152 MG/DL (ref 0–150)
VLDLC SERPL-MCNC: 27 MG/DL (ref 5–40)

## 2022-06-28 PROCEDURE — 99214 OFFICE O/P EST MOD 30 MIN: CPT | Performed by: INTERNAL MEDICINE

## 2022-06-28 PROCEDURE — 93000 ELECTROCARDIOGRAM COMPLETE: CPT | Performed by: INTERNAL MEDICINE

## 2022-06-28 PROCEDURE — 36415 COLL VENOUS BLD VENIPUNCTURE: CPT

## 2022-06-28 PROCEDURE — 80061 LIPID PANEL: CPT

## 2022-06-28 RX ORDER — TRAZODONE HYDROCHLORIDE 50 MG/1
50 TABLET ORAL EVERY EVENING
COMMUNITY
Start: 2022-06-06

## 2022-06-28 RX ORDER — LEVOTHYROXINE SODIUM 0.03 MG/1
25 TABLET ORAL DAILY
COMMUNITY
Start: 2022-02-08 | End: 2023-06-04

## 2022-06-28 RX ORDER — DILTIAZEM HYDROCHLORIDE 180 MG/1
180 CAPSULE, EXTENDED RELEASE ORAL DAILY
COMMUNITY
Start: 2022-02-08

## 2022-06-28 RX ORDER — FUROSEMIDE 20 MG/1
20 TABLET ORAL EVERY MORNING
COMMUNITY
Start: 2022-06-20

## 2022-06-28 RX ORDER — CETIRIZINE HYDROCHLORIDE 10 MG/1
10 TABLET ORAL DAILY
COMMUNITY
Start: 2022-06-20 | End: 2023-01-13

## 2022-06-28 RX ORDER — CYCLOBENZAPRINE HCL 10 MG
10 TABLET ORAL 3 TIMES DAILY PRN
COMMUNITY
Start: 2022-06-06

## 2022-06-28 RX ORDER — ALBUTEROL SULFATE 90 UG/1
2 AEROSOL, METERED RESPIRATORY (INHALATION) EVERY 4 HOURS PRN
COMMUNITY
Start: 2022-02-08

## 2022-06-28 RX ORDER — MELOXICAM 15 MG/1
15 TABLET ORAL DAILY
COMMUNITY
Start: 2022-06-06 | End: 2023-01-13

## 2022-06-28 RX ORDER — ESCITALOPRAM OXALATE 20 MG/1
20 TABLET ORAL DAILY
COMMUNITY
Start: 2022-06-06

## 2022-06-28 RX ORDER — SERTRALINE HYDROCHLORIDE 100 MG/1
100 TABLET, FILM COATED ORAL DAILY
COMMUNITY
Start: 2022-02-08

## 2022-06-28 RX ORDER — SUMATRIPTAN 50 MG/1
TABLET, FILM COATED ORAL
COMMUNITY
Start: 2022-06-06 | End: 2023-01-13

## 2022-06-28 NOTE — PROGRESS NOTES
Mary Nelson  66 y.o. female    1. Essential hypertension    2. Coronary artery disease involving native coronary artery of native heart without angina pectoris    3. Mixed hyperlipidemia        History of Present Illness  Mary Nelson is a 66 yr old  female who is being seen after a long interval.  She has history of CAD with cardiac catheterization in April 2007 showing stenosis in the left anterior descending artery with subsequent FFR evaluation and PTCA and stenting of the left anterior descending artery with a 2.5 x 23 mm Xience Alpine stent.    She subsequently underwent cardiac catheterization by Dr. Chaparro following an abnormal CT angiogram of the coronary arteries in 3/19 which showed the following findings.  No critical lesions were identified.  Impression   1.  Sustained patency in the left anterior descending artery site of previous angioplasty and stenting with 50% stenosis proximal to the previously placed stent with a mild tortuous course.  2.  No evidence of any obstructive disease noted in the circumflex artery.  3.  Nondominant right coronary artery with luminal irregularity.  4.  Preserved left ventricular systolic function with an ejection fraction of 55%    The patient does report intermittent episodes of chest pain in the precordium which occurs at different times and not always related to exertion.  At times it is sharp and at times pressure-like.  There is no radiation.  It is relieved spontaneously.  He does have some chest wall tenderness as well.  She has been compliant with the medication.  She is noted to have elevated LDL of 166 in July last year.  She admits to not following regularly with primary care as well, secondary to transportation issues.  Her history was reviewed in detail.    EKG today showed sinus rhythm with heart rate of 84 bpm.  Old inferioro posterior myocardial infarction.  QTc interval 423 ms.  Left axis deviation.    Allergies   Allergen Reactions    • Codeine Swelling         Past Medical History:   Diagnosis Date   • Anxiety    • Chronic bronchitis (HCC)    • COPD (chronic obstructive pulmonary disease) (HCC)    • Coronary artery disease    • Depression    • Gastritis    • GERD (gastroesophageal reflux disease)    • Hiatal hernia    • Hypercholesteremia    • Hypertension    • Osteoarthritis          Past Surgical History:   Procedure Laterality Date   • CARDIAC CATHETERIZATION N/A 2017   • CARDIAC CATHETERIZATION  2017   • CARDIAC CATHETERIZATION N/A 2017   • CARDIAC CATHETERIZATION N/A 3/9/2019   • COLONOSCOPY N/A 2020    Procedure: COLONOSCOPY;  Surgeon: Sean Fernandez MD;  Location: [facility] ENDOSCOPY;  Service: Gastroenterology   • ENDOSCOPY N/A 11/15/2019    Procedure: ESOPHAGOGASTRODUODENOSCOPY;  Surgeon: Sean Fernandez MD;  Location: [facility] ENDOSCOPY;  Service: Gastroenterology   • ENDOSCOPY N/A 2020    Procedure: ESOPHAGOGASTRODUODENOSCOPY;  Surgeon: Sean Fernandez MD;  Location: [facility] ENDOSCOPY;  Service: Gastroenterology   • ENDOSCOPY AND COLONOSCOPY     • HERNIA REPAIR     • JOINT REPLACEMENT     • NISSEN FUNDOPLICATION N/A 2017   • TN RT/LT HEART CATHETERS N/A 2017   • TOTAL KNEE ARTHROPLASTY Left 2018   • TRANSESOPHAGEAL ECHOCARDIOGRAM (JOAO)           Family History   Problem Relation Age of Onset   • Hypertension Father          Social History     Socioeconomic History   • Marital status:    Tobacco Use   • Smoking status: Former Smoker     Packs/day: 0.25     Years: 43.00     Pack years: 10.75     Types: Cigarettes     Start date: 1973     Quit date: 2020     Years since quittin.0   • Smokeless tobacco: Never Used   Substance and Sexual Activity   • Alcohol use: No   • Drug use: No   • Sexual activity: Not Currently         Current Outpatient Medications   Medication Sig Dispense Refill   • albuterol sulfate  (90 Base) MCG/ACT inhaler Inhale 1 puff.     •  atorvastatin (LIPITOR) 40 MG tablet TAKE ONE TABLET BY MOUTH ONCE DAILY  90 tablet 1   • busPIRone (BUSPAR) 10 MG tablet Take 10 mg by mouth Daily.     • cetirizine (zyrTEC) 10 MG tablet Take 10 mg by mouth Daily.     • clopidogrel (PLAVIX) 75 MG tablet Take 75 mg by mouth Daily.     • cyclobenzaprine (FLEXERIL) 10 MG tablet TAKE 1 TABLET BY MOUTH 3  TIMES DAILY AS NEEDED FOR MUSCLE SPASMS.     • Diclofenac Sodium (VOLTAREN) 1 % gel gel APPLY 1 GRAM  TO AFFECTED AREA TWICE A DAY     • dilTIAZem (TIAZAC) 180 MG 24 hr capsule Take 180 mg by mouth.     • escitalopram (LEXAPRO) 20 MG tablet TAKE 1 TABLET (20 MG TOTAL) BY MOUTH DAILY. *STOP SERTRALINE & BUPROPION*     • ezetimibe (ZETIA) 10 MG tablet TAKE ONE TABLET BY MOUTH ONCE DAILY  30 tablet 11   • furosemide (LASIX) 20 MG tablet Take 20 mg by mouth Every Morning.     • hydrOXYzine (VISTARIL) 25 MG capsule Take 25 mg by mouth 3 (Three) Times a Day As Needed for Anxiety. Not taking medication     • levothyroxine (SYNTHROID, LEVOTHROID) 25 MCG tablet Take 25 mcg by mouth.     • lisinopril (PRINIVIL,ZESTRIL) 10 MG tablet Take 10 mg by mouth Daily.     • meloxicam (MOBIC) 15 MG tablet TAKE ONE TABLET BY MOUTH ONCE DAILY *DISCONTINUE DICLOFENAC PER MD     • nitroglycerin (NITROSTAT) 0.4 MG SL tablet Place 1 tablet under the tongue Every 5 (Five) Minutes As Needed for Chest Pain. Take no more than 3 doses in 15 minutes. 30 tablet 1   • ondansetron ODT (Zofran ODT) 8 MG disintegrating tablet Place 1 tablet on the tongue Every 8 (Eight) Hours As Needed for Nausea or Vomiting. 25 tablet 2   • pantoprazole (PROTONIX) 40 MG EC tablet Take 1 tablet by mouth Daily. 30 tablet 5   • potassium chloride (K-DUR,KLOR-CON) 10 MEQ ER tablet Take 10 mEq by mouth Daily.     • QUEtiapine (SEROquel) 100 MG tablet Take 100 mg by mouth Every Night.     • sertraline (ZOLOFT) 100 MG tablet Take 100 mg by mouth.     • sucralfate (CARAFATE) 1 g tablet TAKE ONE TABLET BY MOUTH 4 TIMES DAILY 120  "tablet 2   • SUMAtriptan (IMITREX) 50 MG tablet TAKE 1 TABLET (50 MG TOTAL) BY MOUTH EVERY 2 (TWO) HOURS AS NEEDED FOR MIGRAINE.     • tiZANidine (ZANAFLEX) 4 MG tablet Take 4 mg by mouth 3 (Three) Times a Day.     • traZODone (DESYREL) 50 MG tablet Take 50 mg by mouth Every Evening.       No current facility-administered medications for this visit.         OBJECTIVE    /78 (BP Location: Left arm, Patient Position: Sitting, Cuff Size: Adult)   Temp 98 °F (36.7 °C)   Ht 154.9 cm (61\")   Wt 81.2 kg (179 lb)   SpO2 98%   BMI 33.82 kg/m²         Review of Systems : The following systems were reviewed and changes noted below and under history of present illness    Constitutional:  Denies recent weight loss, weight gain, fever or chills, no change in exercise tolerance     HENT:  Denies any hearing loss, epistaxis, hoarseness, or difficulty speaking.     Eyes: Wears eyeglasses or contact lenses     Respiratory:  Denies dyspnea with exertion,no cough, wheezing, or hemoptysis.     Cardiovascular: Intermittent chest pain.  No palpitation.    Gastrointestinal:  Denies change in bowel habits, dyspepsia, ulcer disease, hematochezia, or melena.     Endocrine: Negative for cold intolerance, heat intolerance, polydipsia, polyphagia and polyuria.     Genitourinary: Negative.      Musculoskeletal: DJD    Neurological:  Denies any history of recurrent headaches, strokes, TIA, or seizure disorder.     Hematological: Denies any food allergies, seasonal allergies, bleeding disorders, or lymphadenopathy.     Psychiatric/Behavioral:  history of depression      Physical Exam: The following systems were reviewed and no changes noted     Constitutional: Cooperative, alert and oriented, in no acute distress.     HENT:   Head: Normocephalic, normal hair patterns, no masses or tenderness.  Ears, Nose, and Throat: No gross abnormalities. No pallor or cyanosis.   Eyes: EOMS intact, PERRL, conjunctivae and lids unremarkable. Fundoscopic " exam and visual fields not performed.   Neck: No palpable masses or adenopathy, no thyromegaly, no JVD, carotid pulses are full and equal bilaterally and without  Bruits.     Cardiovascular: Regular rhythm, S1 and S2 normal, no S3 or S4.  No murmurs, gallops, or rubs detected.     Pulmonary/Chest: Chest: normal symmetry,  normal respiratory excursion, no intercostal retraction, no use of accessory muscles.            Pulmonary: Normal breath sounds. No rales or ronchi.    Abdominal: Abdomen soft, bowel sounds normoactive, no masses, no hepatosplenomegaly, non-tender, no bruits.     Musculoskeletal: No deformities, clubbing, cyanosis, erythema, or edema observed.     Neurological: No gross motor or sensory deficits noted, affect appropriate, oriented to time, person, place.     Skin: Warm and dry to the touch, no apparent skin lesions or masses noted.     Psychiatric: She has a normal mood and affect. Her behavior is normal. Judgment and thought content normal.         Procedures      Lab Results   Component Value Date    WBC 7.04 07/22/2020    HGB 10.8 (L) 07/22/2020    HCT 35.4 07/22/2020    MCV 78.1 (L) 07/22/2020     07/22/2020     Lab Results   Component Value Date    GLUCOSE 102 (H) 07/22/2020    BUN 8 03/22/2022    CREATININE 1.1 03/22/2022    EGFRIFNONA 67 07/22/2020    EGFRIFAFRI 68 07/09/2021    BCR 11.6 07/22/2020    CO2 25 03/22/2022    CALCIUM 8.6 03/22/2022    ALBUMIN 3.9 03/22/2022    AST 10 (L) 03/22/2022    ALT 10 03/22/2022     Lab Results   Component Value Date    CHOL 191 09/29/2020    CHOL 369 (H) 07/22/2020    CHOL 298 (H) 01/15/2020     Lab Results   Component Value Date    TRIG 179 (H) 07/09/2021    TRIG 178 (H) 09/29/2020    TRIG 325 (H) 07/22/2020     Lab Results   Component Value Date    HDL 56 07/09/2021    HDL 52 09/29/2020    HDL 53 07/22/2020     No components found for: LDLCALC  Lab Results   Component Value Date     07/09/2021     (H) 09/29/2020     (H)  07/22/2020     No results found for: HDLLDLRATIO  No components found for: CHOLHDL  Lab Results   Component Value Date    HGBA1C 5.9 01/15/2020     Lab Results   Component Value Date    TSH 2.49 03/22/2022           ASSESSMENT AND PLAN  Ms. Nelson has multiple medical issues as discussed in the history of present illness and does have intermittent episodes of chest pain.  Though her chest pain has atypical features, progression of coronary artery disease cannot entirely be ruled out.  To further evaluate her symptoms, the plan will be to proceed with a Lexiscan Cardiolite stress test to assess myocardial perfusion and an echocardiogram to assess left ventricular and valvular function has been arranged.    The patient does not smoke anymore.  Her lipid profile is being checked today.  PCSK9 inhibitors could be considered if her LDL is still elevated.  She is on a combination of atorvastatin and Zetia at the present time.      I have continued antiplatelet therapy with Plavix, antihypertensive therapy with Cardizem CD lisinopril and lipid-lowering therapy with atorvastatin 40 mg daily and Zetia for now.  Further recommendations will follow.    Diagnoses and all orders for this visit:    1. Essential hypertension (Primary)  -     ECG 12 Lead    2. Coronary artery disease involving native coronary artery of native heart without angina pectoris    3. Mixed hyperlipidemia        Patient's Body mass index is 33.82 kg/m². BMI is above normal parameters. Recommendations include: exercise counseling and nutrition counseling.  Patient is a non-smoker.    Addendum: The patient was scheduled for a stress test (Lexiscan Cardiolite stress test after her office visit on 6/28/2022 but the patient rescheduled the test.  She did continue to have intermittent episodes of chest pain which did have atypical features.  To further evaluate this a Lexiscan Cardiolite stress test will be arranged.  All risks and benefits have been explained  to her.      Emanuel Olmos MD  6/28/2022  10:10 CDT

## 2022-06-29 ENCOUNTER — TELEPHONE (OUTPATIENT)
Dept: CARDIOLOGY | Facility: CLINIC | Age: 67
End: 2022-06-29

## 2022-06-29 RX ORDER — ROSUVASTATIN CALCIUM 20 MG/1
20 TABLET, COATED ORAL DAILY
Qty: 90 TABLET | Refills: 3 | Status: SHIPPED | OUTPATIENT
Start: 2022-06-29 | End: 2023-02-06 | Stop reason: SDUPTHER

## 2022-06-29 NOTE — TELEPHONE ENCOUNTER
LDL elevated.  Stop Lipitor 40 mg and start Crestor 20 mg daily.  We can give her a prescription for 90 tablets with 3 refills   Per Dr Del Real  Patient notified and understood  New medication was sent to pharmacy

## 2022-07-06 ENCOUNTER — APPOINTMENT (OUTPATIENT)
Dept: NUCLEAR MEDICINE | Facility: HOSPITAL | Age: 67
End: 2022-07-06

## 2022-07-06 ENCOUNTER — APPOINTMENT (OUTPATIENT)
Dept: CARDIOLOGY | Facility: HOSPITAL | Age: 67
End: 2022-07-06

## 2022-07-12 ENCOUNTER — APPOINTMENT (OUTPATIENT)
Dept: CARDIOLOGY | Facility: HOSPITAL | Age: 67
End: 2022-07-12

## 2022-07-12 ENCOUNTER — APPOINTMENT (OUTPATIENT)
Dept: NUCLEAR MEDICINE | Facility: HOSPITAL | Age: 67
End: 2022-07-12

## 2022-07-14 ENCOUNTER — TELEPHONE (OUTPATIENT)
Dept: CARDIOLOGY | Facility: CLINIC | Age: 67
End: 2022-07-14

## 2022-07-14 NOTE — TELEPHONE ENCOUNTER
callled pt to left her know stress test was reschedule for 8/8 at 7 am   No answer left vm to call the office back

## 2022-07-27 ENCOUNTER — TRANSCRIBE ORDERS (OUTPATIENT)
Dept: CARDIOLOGY | Facility: CLINIC | Age: 67
End: 2022-07-27

## 2022-07-27 DIAGNOSIS — M79.661 PAIN OF RIGHT LOWER LEG: Primary | ICD-10-CM

## 2022-08-08 ENCOUNTER — HOSPITAL ENCOUNTER (OUTPATIENT)
Dept: NUCLEAR MEDICINE | Facility: HOSPITAL | Age: 67
Discharge: HOME | End: 2022-08-08

## 2022-08-08 ENCOUNTER — HOSPITAL ENCOUNTER (OUTPATIENT)
Dept: CARDIOLOGY | Facility: HOSPITAL | Age: 67
Discharge: HOME | End: 2022-08-08

## 2022-08-08 ENCOUNTER — TELEPHONE (OUTPATIENT)
Dept: CARDIOLOGY | Facility: CLINIC | Age: 67
End: 2022-08-08

## 2022-08-08 DIAGNOSIS — R07.2 PRECORDIAL PAIN: ICD-10-CM

## 2022-08-08 DIAGNOSIS — E78.2 MIXED HYPERLIPIDEMIA: ICD-10-CM

## 2022-08-08 DIAGNOSIS — I25.10 CORONARY ARTERY DISEASE INVOLVING NATIVE CORONARY ARTERY OF NATIVE HEART WITHOUT ANGINA PECTORIS: ICD-10-CM

## 2022-08-08 DIAGNOSIS — I10 ESSENTIAL HYPERTENSION: ICD-10-CM

## 2022-08-08 LAB
BH CV REST NUCLEAR ISOTOPE DOSE: 10.7 MCI
BH CV STRESS BP STAGE 1: NORMAL
BH CV STRESS COMMENTS STAGE 1: NORMAL
BH CV STRESS DOSE REGADENOSON STAGE 1: 0.4
BH CV STRESS DURATION MIN STAGE 1: 0
BH CV STRESS DURATION SEC STAGE 1: 10
BH CV STRESS HR STAGE 1: 66
BH CV STRESS NUCLEAR ISOTOPE DOSE: 33.9 MCI
BH CV STRESS PROTOCOL 1: NORMAL
BH CV STRESS RECOVERY BP: NORMAL MMHG
BH CV STRESS RECOVERY HR: 82 BPM
BH CV STRESS STAGE 1: 1
LV EF NUC BP: 86 %
MAXIMAL PREDICTED HEART RATE: 155 BPM
PERCENT MAX PREDICTED HR: 61.94 %
STRESS BASELINE BP: NORMAL MMHG
STRESS BASELINE HR: 62 BPM
STRESS PERCENT HR: 73 %
STRESS POST ESTIMATED WORKLOAD: 1 METS
STRESS POST PEAK BP: NORMAL MMHG
STRESS POST PEAK HR: 96 BPM
STRESS TARGET HR: 132 BPM

## 2022-08-08 PROCEDURE — 93017 CV STRESS TEST TRACING ONLY: CPT

## 2022-08-08 PROCEDURE — A9500 TC99M SESTAMIBI: HCPCS | Performed by: INTERNAL MEDICINE

## 2022-08-08 PROCEDURE — 78452 HT MUSCLE IMAGE SPECT MULT: CPT | Performed by: INTERNAL MEDICINE

## 2022-08-08 PROCEDURE — 78452 HT MUSCLE IMAGE SPECT MULT: CPT

## 2022-08-08 PROCEDURE — 25010000002 REGADENOSON 0.4 MG/5ML SOLUTION: Performed by: INTERNAL MEDICINE

## 2022-08-08 PROCEDURE — 93018 CV STRESS TEST I&R ONLY: CPT | Performed by: INTERNAL MEDICINE

## 2022-08-08 PROCEDURE — 0 TECHNETIUM SESTAMIBI: Performed by: INTERNAL MEDICINE

## 2022-08-08 RX ORDER — SODIUM CHLORIDE 0.9 % (FLUSH) 0.9 %
10 SYRINGE (ML) INJECTION ONCE
Status: COMPLETED | OUTPATIENT
Start: 2022-08-08 | End: 2022-08-08

## 2022-08-08 RX ADMIN — Medication 10 ML: at 08:48

## 2022-08-08 RX ADMIN — TECHNETIUM TC 99M SESTAMIBI 1 DOSE: 1 INJECTION INTRAVENOUS at 08:49

## 2022-08-08 RX ADMIN — TECHNETIUM TC 99M SESTAMIBI 1 DOSE: 1 INJECTION INTRAVENOUS at 07:34

## 2022-08-08 RX ADMIN — REGADENOSON 0.4 MG: 0.08 INJECTION, SOLUTION INTRAVENOUS at 08:48

## 2022-08-09 ENCOUNTER — TELEPHONE (OUTPATIENT)
Dept: CARDIOLOGY | Facility: CLINIC | Age: 67
End: 2022-08-09

## 2022-08-09 NOTE — TELEPHONE ENCOUNTER
Called to give pt results, no answer, left message for pt to call office back.    ----- Message from Mary Landeros MA sent at 8/9/2022  9:36 AM CDT -----    ----- Message -----  From: Emanuel Olmos MD  Sent: 8/9/2022   8:08 AM CDT  To: Mary Landeros MA    Stress test normal  ----- Message -----  From: Emanuel Olmos MD  Sent: 8/8/2022   7:17 PM CDT  To: Emanuel Olmos MD

## 2022-08-09 NOTE — TELEPHONE ENCOUNTER
Returned pt's call and relayed results.    Pt voiced verbal understanding.    ----- Message from Efe Overton sent at 8/9/2022 12:12 PM CDT -----  Regarding: Callback  Contact: [phone]  Mary Nelson stated she was returning your call from this morning regarding her results. She would like a callback at [phone] to talk about this. Thanks

## 2022-09-30 DIAGNOSIS — I10 ESSENTIAL HYPERTENSION: ICD-10-CM

## 2022-09-30 RX ORDER — DILTIAZEM HYDROCHLORIDE 180 MG/1
CAPSULE, COATED, EXTENDED RELEASE ORAL
Qty: 90 CAPSULE | Refills: 0 | Status: SHIPPED | OUTPATIENT
Start: 2022-09-30 | End: 2022-10-11

## 2022-10-11 ENCOUNTER — OFFICE VISIT (OUTPATIENT)
Dept: GASTROENTEROLOGY | Facility: CLINIC | Age: 67
End: 2022-10-11

## 2022-10-11 VITALS
WEIGHT: 181.6 LBS | DIASTOLIC BLOOD PRESSURE: 86 MMHG | BODY MASS INDEX: 34.29 KG/M2 | SYSTOLIC BLOOD PRESSURE: 150 MMHG | HEIGHT: 61 IN | HEART RATE: 93 BPM

## 2022-10-11 DIAGNOSIS — R13.19 ESOPHAGEAL DYSPHAGIA: ICD-10-CM

## 2022-10-11 DIAGNOSIS — K21.00 GASTROESOPHAGEAL REFLUX DISEASE WITH ESOPHAGITIS WITHOUT HEMORRHAGE: ICD-10-CM

## 2022-10-11 DIAGNOSIS — K44.9 HIATAL HERNIA: Primary | ICD-10-CM

## 2022-10-11 PROBLEM — E03.9 HYPOTHYROIDISM: Status: ACTIVE | Noted: 2022-02-08

## 2022-10-11 PROBLEM — F33.9 RECURRENT MAJOR DEPRESSIVE DISORDER (HCC): Status: ACTIVE | Noted: 2021-07-09

## 2022-10-11 PROBLEM — I25.810 CORONARY ARTERY DISEASE INVOLVING AUTOLOGOUS ARTERY CORONARY BYPASS GRAFT WITHOUT ANGINA PECTORIS: Status: ACTIVE | Noted: 2017-07-05

## 2022-10-11 PROCEDURE — 99213 OFFICE O/P EST LOW 20 MIN: CPT | Performed by: NURSE PRACTITIONER

## 2022-10-11 RX ORDER — CARBAMAZEPINE 100 MG/1
100 TABLET, EXTENDED RELEASE ORAL 2 TIMES DAILY
COMMUNITY
Start: 2022-08-29 | End: 2023-01-13

## 2022-10-11 RX ORDER — DEXTROSE AND SODIUM CHLORIDE 5; .45 G/100ML; G/100ML
30 INJECTION, SOLUTION INTRAVENOUS CONTINUOUS PRN
Status: CANCELLED | OUTPATIENT
Start: 2022-10-17

## 2022-10-11 NOTE — PROGRESS NOTES
[facility]              Chief Complaint   Patient presents with   • Heartburn       Subjective    Mary Melina Nelson is a 67 y.o. female. she is here today for follow-up.                                                                  Assessment & Plan                                       1. Hiatal hernia    2. Gastroesophageal reflux disease with esophagitis without hemorrhage    3. Esophageal dysphagia      Plan; schedule patient for EGD with dilation if indicated will obtain biopsy rule out H. pylori gastritis or Curry's esophagus continue Protonix daily follow-up after test return office sooner if needed    Follow-up: Return in about 4 weeks (around 11/8/2022) for Recheck, After test.     HPI  67-year-old female presents for recheck due to worsening GERD and dysphagia.  She was seen in 2020 after hospitalized had Teetee-Zimmerman tear follow-up EGD noted hiatal hernia esophagitis and gastritis.  She took full course of Carafate and symptoms improved.  Reports about 2 months ago she began having nausea early satiety and constant GERD symptoms.  She has been taking Protonix daily states anytime she eats it feels like it sits in her mid chest and has to vomit it up or drinks a lot for her to eventually go down.  Denies any change in bowel movements or blood in stool states since eating less she has been more constipated.  Colonoscopy completed in 2020 with repeat recommended in 3 years for surveillance.      Review of Systems  Review of Systems   Constitutional: Negative for activity change, appetite change, chills, diaphoresis, fatigue, fever and unexpected weight change.   HENT: Negative for sore throat and trouble swallowing.    Respiratory: Negative for shortness of breath.    Gastrointestinal: Positive for abdominal pain and constipation. Negative for abdominal distention,  "anal bleeding, blood in stool, diarrhea, nausea, rectal pain and vomiting.   Musculoskeletal: Negative for arthralgias.   Skin: Negative for pallor.   Neurological: Negative for light-headedness.       /86 (BP Location: Left arm)   Pulse 93   Ht 154.9 cm (61\")   Wt 82.4 kg (181 lb 9.6 oz)   BMI 34.31 kg/m²     Objective      Physical Exam  Constitutional:       General: She is not in acute distress.     Appearance: Normal appearance. She is normal weight. She is not ill-appearing.   HENT:      Head: Normocephalic and atraumatic.   Pulmonary:      Effort: Pulmonary effort is normal.   Abdominal:      General: Abdomen is flat. Bowel sounds are normal. There is no distension.      Palpations: Abdomen is soft. There is no mass.      Tenderness: There is abdominal tenderness in the epigastric area.   Neurological:      Mental Status: She is alert.               The following portions of the patient's history were reviewed and updated as appropriate:   Past Medical History:   Diagnosis Date   • Anxiety    • Chronic bronchitis (HCC)    • COPD (chronic obstructive pulmonary disease) (HCC)    • Coronary artery disease    • Depression    • Gastritis    • GERD (gastroesophageal reflux disease)    • Hiatal hernia    • Hypercholesteremia    • Hypertension    • Osteoarthritis      Past Surgical History:   Procedure Laterality Date   • CARDIAC CATHETERIZATION N/A 4/19/2017   • CARDIAC CATHETERIZATION  4/19/2017   • CARDIAC CATHETERIZATION N/A 7/6/2017   • CARDIAC CATHETERIZATION N/A 3/9/2019   • COLONOSCOPY N/A 1/30/2020    Procedure: COLONOSCOPY;  Surgeon: Sean Fernandez MD;  Location: [facility] ENDOSCOPY;  Service: Gastroenterology   • ENDOSCOPY N/A 11/15/2019    Procedure: ESOPHAGOGASTRODUODENOSCOPY;  Surgeon: Sean Fernandez MD;  Location: [facility] ENDOSCOPY;  Service: Gastroenterology   • ENDOSCOPY N/A 1/30/2020    Procedure: ESOPHAGOGASTRODUODENOSCOPY;  Surgeon: Sean Fernandez MD;  Location: [facility] ENDOSCOPY; "  Service: Gastroenterology   • ENDOSCOPY AND COLONOSCOPY     • HERNIA REPAIR     • JOINT REPLACEMENT     • NISSEN FUNDOPLICATION N/A 2017   • HI RT/LT HEART CATHETERS N/A 2017   • TOTAL KNEE ARTHROPLASTY Left 2018   • TRANSESOPHAGEAL ECHOCARDIOGRAM (JOAO)       Family History   Problem Relation Age of Onset   • Hypertension Father      OB History    No obstetric history on file.       Allergies   Allergen Reactions   • Codeine Swelling     Social History     Socioeconomic History   • Marital status:    Tobacco Use   • Smoking status: Former     Packs/day: 0.25     Years: 43.00     Pack years: 10.75     Types: Cigarettes     Start date: 1973     Quit date: 2020     Years since quittin.3   • Smokeless tobacco: Never   Substance and Sexual Activity   • Alcohol use: No   • Drug use: No   • Sexual activity: Not Currently     Current Medications:  Prior to Admission medications    Medication Sig Start Date End Date Taking? Authorizing Provider   albuterol sulfate  (90 Base) MCG/ACT inhaler Inhale 1 puff. 22  Yes ProviderRyan MD   busPIRone (BUSPAR) 10 MG tablet Take 10 mg by mouth Daily.   Yes Provider, MD Ryan   carBAMazepine XR (TEGretol  XR) 100 MG 12 hr tablet Take 1 tablet by mouth 2 (Two) Times a Day. 22  Yes ProviderRyan MD   cetirizine (zyrTEC) 10 MG tablet Take 10 mg by mouth Daily. 22  Yes ProviderRyan MD   clopidogrel (PLAVIX) 75 MG tablet Take 75 mg by mouth Daily.   Yes ProviderRyan MD   cyclobenzaprine (FLEXERIL) 10 MG tablet TAKE 1 TABLET BY MOUTH 3  TIMES DAILY AS NEEDED FOR MUSCLE SPASMS. 22  Yes ProviderRyan MD   Diclofenac Sodium (VOLTAREN) 1 % gel gel APPLY 1 GRAM  TO AFFECTED AREA TWICE A DAY 5/10/22  Yes ProviderRyan MD   dilTIAZem (TIAZAC) 180 MG 24 hr capsule Take 180 mg by mouth. 22  Yes Ryan Henning MD   escitalopram (LEXAPRO) 20 MG tablet TAKE 1 TABLET (20 MG  TOTAL) BY MOUTH DAILY. *STOP SERTRALINE & BUPROPION* 6/6/22  Yes Ryan Henning MD   ezetimibe (ZETIA) 10 MG tablet TAKE ONE TABLET BY MOUTH ONCE DAILY  1/25/21  Yes Emanuel Olmos MD   furosemide (LASIX) 20 MG tablet Take 20 mg by mouth Every Morning. 6/20/22  Yes Ryan Henning MD   hydrOXYzine (VISTARIL) 25 MG capsule Take 25 mg by mouth 3 (Three) Times a Day As Needed for Anxiety. Not taking medication 3/7/17  Yes Ryan Henning MD   levothyroxine (SYNTHROID, LEVOTHROID) 25 MCG tablet Take 25 mcg by mouth. 2/8/22 6/4/23 Yes Ryan Henning MD   lisinopril (PRINIVIL,ZESTRIL) 10 MG tablet Take 10 mg by mouth Daily.   Yes Ryan Henning MD   meloxicam (MOBIC) 15 MG tablet TAKE ONE TABLET BY MOUTH ONCE DAILY *DISCONTINUE DICLOFENAC PER MD 6/6/22  Yes ProviderRyan MD   nitroglycerin (NITROSTAT) 0.4 MG SL tablet Place 1 tablet under the tongue Every 5 (Five) Minutes As Needed for Chest Pain. Take no more than 3 doses in 15 minutes. 3/9/19  Yes Ishan Alston MD   ondansetron ODT (Zofran ODT) 8 MG disintegrating tablet Place 1 tablet on the tongue Every 8 (Eight) Hours As Needed for Nausea or Vomiting. 4/8/20  Yes Emelia Turcios APRN   pantoprazole (PROTONIX) 40 MG EC tablet Take 1 tablet by mouth Daily. 4/8/20  Yes Emelia Turcios APRN   potassium chloride (K-DUR,KLOR-CON) 10 MEQ ER tablet Take 10 mEq by mouth Daily.   Yes ProviderRyan MD   QUEtiapine (SEROquel) 100 MG tablet Take 100 mg by mouth Every Night.   Yes Ryan Henning MD   rosuvastatin (CRESTOR) 20 MG tablet Take 1 tablet by mouth Daily. 6/29/22  Yes Emanuel Olmos MD   sertraline (ZOLOFT) 100 MG tablet Take 100 mg by mouth. 2/8/22  Yes Ryan Henning MD   sucralfate (CARAFATE) 1 g tablet TAKE ONE TABLET BY MOUTH 4 TIMES DAILY 7/21/20  Yes Emelia Turcios APRN   SUMAtriptan (IMITREX) 50 MG tablet TAKE 1 TABLET (50 MG TOTAL) BY MOUTH EVERY 2 (TWO) HOURS AS NEEDED FOR  MIGRAINE. 6/6/22  Yes Provider, MD Ryan   tiZANidine (ZANAFLEX) 4 MG tablet Take 4 mg by mouth 3 (Three) Times a Day. 8/26/16  Yes ProviderRyan MD   traZODone (DESYREL) 50 MG tablet Take 50 mg by mouth Every Evening. 6/6/22  Yes ProviderRyan MD   dilTIAZem CD (CARDIZEM CD) 180 MG 24 hr capsule TAKE ONE CAPSULE BY MOUTH ONCE DAILY 9/30/22 10/11/22  Emanuel Olmos MD     Orders placed during this encounter include:  Orders Placed This Encounter   Procedures   • Follow Anesthesia Guidelines / Standing Orders     Standing Status:   Future   • Obtain Informed Consent     Standing Status:   Future     Order Specific Question:   Informed Consent Given For     Answer:   ESOPHAGOGASTRODUODENOSCOPY possible dilation     ESOPHAGOGASTRODUODENOSCOPY possible dilation (N/A)  No orders of the defined types were placed in this encounter.        Review and/or summary of lab tests, radiology, procedures, medications. Review and summary of old records and obtaining of history. The risks and benefits of my recommendations, as well as other treatment options were discussed . Any questions/concerned were answered. Patient voiced understanding and agreement.          This document has been electronically signed by VILMA Land on October 11, 2022 10:04 CDT                                               Results for orders placed or performed during the hospital encounter of 08/10/22   Doppler Ankle Brachial Index Single Level CAR   Result Value Ref Range    Target HR (85%) 132 bpm    Max. Pred. HR (100%) 155 bpm    RIGHT DORSALIS PEDIS SYS      RIGHT POST TIBIAL SYS      RIGHT JUN RATIO 1.05     LEFT DORSALIS PEDIS SYS      LEFT POST TIBIAL SYS      LEFT JUN RATIO 1.02     Upper arterial right arm brachial sys max 164 mmHg    Upper arterial left arm brachial sys max 157 mmHg   Results for orders placed or performed during the hospital encounter of 08/08/22   Stress Test  With Myocardial Perfusion One Day   Result Value Ref Range    Target HR (85%) 132 bpm    Max. Pred. HR (100%) 155 bpm     CV STRESS PROTOCOL 1 Pharmacologic     Stage 1 1     HR Stage 1 66     BP Stage 1 151/82     Duration Min Stage 1 0     Duration Sec Stage 1 10     Stress Dose Regadenoson Stage 1 0.4     Stress Comments Stage 1 10 sec bolus injection     Baseline HR 62 bpm    Baseline /83 mmHg    Peak HR 96 bpm    Percent Max Pred HR 61.94 %    Percent Target HR 73 %    Peak /82 mmHg    Recovery HR 82 bpm    Recovery /82 mmHg    Estimated workload 1.0 METS    BH CV REST NUCLEAR ISOTOPE DOSE 10.7 mCi    BH CV STRESS NUCLEAR ISOTOPE DOSE 33.9 mCi    Nuc Stress EF 86 %   Results for orders placed or performed in visit on 08/04/22   Adult Transthoracic Echo Complete w/ Color, Spectral and Contrast if Necessary Per Protocol   Result Value Ref Range    Target HR (85%) 132 bpm    Max. Pred. HR (100%) 155 bpm    RV Base 3.8 cm    RV Mid 2.8 cm    Sinus 2.8 cm    LA ESV Index (BP) 19.1 ml/m2    Avg E/e' ratio 10.09     ACS 1.34 cm    Ao root diam 2.7 cm    Ao pk abelardo 159.6 cm/sec    Ao V2 VTI 34.6 cm    EDV(cubed) 60.4 ml    EDV(MOD-sp2) 52.1 ml    EDV(MOD-sp4) 49.2 ml    EF(MOD-sp2) 62.8 %    EF(MOD-sp4) 61.4 %    EPSS 0.88 cm    ESV(cubed) 18.4 ml    ESV(MOD-sp2) 19.4 ml    ESV(MOD-sp4) 19.0 ml    IVS/LVPW 0.98 cm    Lat Peak E' Abelardo 8.9 cm/sec    LV mass(C)d 133.5 grams    LVPWd 1.07 cm    Med Peak E' Abelardo 12.3 cm/sec    MR max .5 mmHg    MV dec slope 392.2 cm/sec2    MV dec time 0.27 msec    MV V2 VTI 44.6 cm    PA V2 max 119.9 cm/sec    RAP systole 3.0 mmHg    RV V1 max PG 3.7 mmHg    RV V1 max 96.7 cm/sec    RV V1 VTI 18.9 cm    RVIDd 3.0 cm    RVSP(TR) 23.8 mmHg    SI(MOD-sp2) 18.1 ml/m2    SI(MOD-sp4) 16.8 ml/m2    SV(MOD-sp2) 32.7 ml    SV(MOD-sp4) 30.2 ml    TR max PG 20.8 mmHg    Ao max PG 10.2 mmHg    Ao mean PG 5.5 mmHg    FS 32.8 %    IVSd 1.05 cm    LA dimension (2D)  3.6 cm    LVIDd  3.9 cm    LVIDs 2.6 cm    LVOT area 2.6 cm2    LVOT diam 1.82 cm    MV E/A 0.79     MV max PG 8.3 mmHg    MV mean PG 3.2 mmHg    MR max eliu 575.5 cm/sec    MV A max eliu 135.3 cm/sec    MV E max eliu 107.0 cm/sec    TR max eliu 228.0 cm/sec    LV Givens Vol (BSA corrected) 27.3 cm2    LV Sys Vol (BSA corrected) 10.5 cm2   Results for orders placed or performed in visit on 06/28/22   Lipid Panel    Specimen: Blood   Result Value Ref Range    Total Cholesterol 205 (H) 0 - 200 mg/dL    Triglycerides 152 (H) 0 - 150 mg/dL    HDL Cholesterol 58 40 - 60 mg/dL    LDL Cholesterol  120 (H) 0 - 100 mg/dL    VLDL Cholesterol 27 5 - 40 mg/dL    LDL/HDL Ratio 2.01    Results for orders placed or performed in visit on 06/28/22   ECG 12 Lead   Result Value Ref Range    QT Interval 358 ms    QTC Interval 423 ms   Results for orders placed or performed in visit on 09/29/20   Lipid Panel    Specimen: Blood   Result Value Ref Range    Total Cholesterol 191 0 - 200 mg/dL    Triglycerides 178 (H) 0 - 150 mg/dL    HDL Cholesterol 52 40 - 60 mg/dL    LDL Cholesterol  103 (H) 0 - 100 mg/dL    VLDL Cholesterol 35.6 5 - 40 mg/dL    LDL/HDL Ratio 1.99      *Note: Due to a large number of results and/or encounters for the requested time period, some results have not been displayed. A complete set of results can be found in Results Review.

## 2022-10-11 NOTE — H&P (VIEW-ONLY)
[facility]              Chief Complaint   Patient presents with   • Heartburn       Subjective    Mary Melina Nelson is a 67 y.o. female. she is here today for follow-up.                                                                  Assessment & Plan                                       1. Hiatal hernia    2. Gastroesophageal reflux disease with esophagitis without hemorrhage    3. Esophageal dysphagia      Plan; schedule patient for EGD with dilation if indicated will obtain biopsy rule out H. pylori gastritis or Curry's esophagus continue Protonix daily follow-up after test return office sooner if needed    Follow-up: Return in about 4 weeks (around 11/8/2022) for Recheck, After test.     HPI  67-year-old female presents for recheck due to worsening GERD and dysphagia.  She was seen in 2020 after hospitalized had Teetee-Zimmerman tear follow-up EGD noted hiatal hernia esophagitis and gastritis.  She took full course of Carafate and symptoms improved.  Reports about 2 months ago she began having nausea early satiety and constant GERD symptoms.  She has been taking Protonix daily states anytime she eats it feels like it sits in her mid chest and has to vomit it up or drinks a lot for her to eventually go down.  Denies any change in bowel movements or blood in stool states since eating less she has been more constipated.  Colonoscopy completed in 2020 with repeat recommended in 3 years for surveillance.      Review of Systems  Review of Systems   Constitutional: Negative for activity change, appetite change, chills, diaphoresis, fatigue, fever and unexpected weight change.   HENT: Negative for sore throat and trouble swallowing.    Respiratory: Negative for shortness of breath.    Gastrointestinal: Positive for abdominal pain and constipation. Negative for abdominal distention,  "anal bleeding, blood in stool, diarrhea, nausea, rectal pain and vomiting.   Musculoskeletal: Negative for arthralgias.   Skin: Negative for pallor.   Neurological: Negative for light-headedness.       /86 (BP Location: Left arm)   Pulse 93   Ht 154.9 cm (61\")   Wt 82.4 kg (181 lb 9.6 oz)   BMI 34.31 kg/m²     Objective      Physical Exam  Constitutional:       General: She is not in acute distress.     Appearance: Normal appearance. She is normal weight. She is not ill-appearing.   HENT:      Head: Normocephalic and atraumatic.   Pulmonary:      Effort: Pulmonary effort is normal.   Abdominal:      General: Abdomen is flat. Bowel sounds are normal. There is no distension.      Palpations: Abdomen is soft. There is no mass.      Tenderness: There is abdominal tenderness in the epigastric area.   Neurological:      Mental Status: She is alert.               The following portions of the patient's history were reviewed and updated as appropriate:   Past Medical History:   Diagnosis Date   • Anxiety    • Chronic bronchitis (HCC)    • COPD (chronic obstructive pulmonary disease) (HCC)    • Coronary artery disease    • Depression    • Gastritis    • GERD (gastroesophageal reflux disease)    • Hiatal hernia    • Hypercholesteremia    • Hypertension    • Osteoarthritis      Past Surgical History:   Procedure Laterality Date   • CARDIAC CATHETERIZATION N/A 4/19/2017   • CARDIAC CATHETERIZATION  4/19/2017   • CARDIAC CATHETERIZATION N/A 7/6/2017   • CARDIAC CATHETERIZATION N/A 3/9/2019   • COLONOSCOPY N/A 1/30/2020    Procedure: COLONOSCOPY;  Surgeon: Sean Fernandez MD;  Location: [facility] ENDOSCOPY;  Service: Gastroenterology   • ENDOSCOPY N/A 11/15/2019    Procedure: ESOPHAGOGASTRODUODENOSCOPY;  Surgeon: Sean Fernandez MD;  Location: [facility] ENDOSCOPY;  Service: Gastroenterology   • ENDOSCOPY N/A 1/30/2020    Procedure: ESOPHAGOGASTRODUODENOSCOPY;  Surgeon: Sean Fernandez MD;  Location: [facility] ENDOSCOPY; "  Service: Gastroenterology   • ENDOSCOPY AND COLONOSCOPY     • HERNIA REPAIR     • JOINT REPLACEMENT     • NISSEN FUNDOPLICATION N/A 2017   • NM RT/LT HEART CATHETERS N/A 2017   • TOTAL KNEE ARTHROPLASTY Left 2018   • TRANSESOPHAGEAL ECHOCARDIOGRAM (JOAO)       Family History   Problem Relation Age of Onset   • Hypertension Father      OB History    No obstetric history on file.       Allergies   Allergen Reactions   • Codeine Swelling     Social History     Socioeconomic History   • Marital status:    Tobacco Use   • Smoking status: Former     Packs/day: 0.25     Years: 43.00     Pack years: 10.75     Types: Cigarettes     Start date: 1973     Quit date: 2020     Years since quittin.3   • Smokeless tobacco: Never   Substance and Sexual Activity   • Alcohol use: No   • Drug use: No   • Sexual activity: Not Currently     Current Medications:  Prior to Admission medications    Medication Sig Start Date End Date Taking? Authorizing Provider   albuterol sulfate  (90 Base) MCG/ACT inhaler Inhale 1 puff. 22  Yes ProviderRyan MD   busPIRone (BUSPAR) 10 MG tablet Take 10 mg by mouth Daily.   Yes Provider, MD Ryan   carBAMazepine XR (TEGretol  XR) 100 MG 12 hr tablet Take 1 tablet by mouth 2 (Two) Times a Day. 22  Yes ProviderRyan MD   cetirizine (zyrTEC) 10 MG tablet Take 10 mg by mouth Daily. 22  Yes ProviderRyan MD   clopidogrel (PLAVIX) 75 MG tablet Take 75 mg by mouth Daily.   Yes ProviderRyan MD   cyclobenzaprine (FLEXERIL) 10 MG tablet TAKE 1 TABLET BY MOUTH 3  TIMES DAILY AS NEEDED FOR MUSCLE SPASMS. 22  Yes ProviderRyan MD   Diclofenac Sodium (VOLTAREN) 1 % gel gel APPLY 1 GRAM  TO AFFECTED AREA TWICE A DAY 5/10/22  Yes ProviderRyan MD   dilTIAZem (TIAZAC) 180 MG 24 hr capsule Take 180 mg by mouth. 22  Yes Ryan Henning MD   escitalopram (LEXAPRO) 20 MG tablet TAKE 1 TABLET (20 MG  TOTAL) BY MOUTH DAILY. *STOP SERTRALINE & BUPROPION* 6/6/22  Yes Ryan Henning MD   ezetimibe (ZETIA) 10 MG tablet TAKE ONE TABLET BY MOUTH ONCE DAILY  1/25/21  Yes Emanuel Olmos MD   furosemide (LASIX) 20 MG tablet Take 20 mg by mouth Every Morning. 6/20/22  Yes Ryan Henning MD   hydrOXYzine (VISTARIL) 25 MG capsule Take 25 mg by mouth 3 (Three) Times a Day As Needed for Anxiety. Not taking medication 3/7/17  Yes Ryan Henning MD   levothyroxine (SYNTHROID, LEVOTHROID) 25 MCG tablet Take 25 mcg by mouth. 2/8/22 6/4/23 Yes Ryan Henning MD   lisinopril (PRINIVIL,ZESTRIL) 10 MG tablet Take 10 mg by mouth Daily.   Yes Ryan Henning MD   meloxicam (MOBIC) 15 MG tablet TAKE ONE TABLET BY MOUTH ONCE DAILY *DISCONTINUE DICLOFENAC PER MD 6/6/22  Yes ProviderRyan MD   nitroglycerin (NITROSTAT) 0.4 MG SL tablet Place 1 tablet under the tongue Every 5 (Five) Minutes As Needed for Chest Pain. Take no more than 3 doses in 15 minutes. 3/9/19  Yes Ishan Alston MD   ondansetron ODT (Zofran ODT) 8 MG disintegrating tablet Place 1 tablet on the tongue Every 8 (Eight) Hours As Needed for Nausea or Vomiting. 4/8/20  Yes Emelia Turcios APRN   pantoprazole (PROTONIX) 40 MG EC tablet Take 1 tablet by mouth Daily. 4/8/20  Yes Emelia Turcios APRN   potassium chloride (K-DUR,KLOR-CON) 10 MEQ ER tablet Take 10 mEq by mouth Daily.   Yes ProviderRyan MD   QUEtiapine (SEROquel) 100 MG tablet Take 100 mg by mouth Every Night.   Yes Ryan Henning MD   rosuvastatin (CRESTOR) 20 MG tablet Take 1 tablet by mouth Daily. 6/29/22  Yes Emanuel Olmos MD   sertraline (ZOLOFT) 100 MG tablet Take 100 mg by mouth. 2/8/22  Yes Ryan Henning MD   sucralfate (CARAFATE) 1 g tablet TAKE ONE TABLET BY MOUTH 4 TIMES DAILY 7/21/20  Yes Emelia Turcios APRN   SUMAtriptan (IMITREX) 50 MG tablet TAKE 1 TABLET (50 MG TOTAL) BY MOUTH EVERY 2 (TWO) HOURS AS NEEDED FOR  MIGRAINE. 6/6/22  Yes Provider, MD Ryan   tiZANidine (ZANAFLEX) 4 MG tablet Take 4 mg by mouth 3 (Three) Times a Day. 8/26/16  Yes ProviderRyan MD   traZODone (DESYREL) 50 MG tablet Take 50 mg by mouth Every Evening. 6/6/22  Yes ProviderRyan MD   dilTIAZem CD (CARDIZEM CD) 180 MG 24 hr capsule TAKE ONE CAPSULE BY MOUTH ONCE DAILY 9/30/22 10/11/22  Emanuel Olmos MD     Orders placed during this encounter include:  Orders Placed This Encounter   Procedures   • Follow Anesthesia Guidelines / Standing Orders     Standing Status:   Future   • Obtain Informed Consent     Standing Status:   Future     Order Specific Question:   Informed Consent Given For     Answer:   ESOPHAGOGASTRODUODENOSCOPY possible dilation     ESOPHAGOGASTRODUODENOSCOPY possible dilation (N/A)  No orders of the defined types were placed in this encounter.        Review and/or summary of lab tests, radiology, procedures, medications. Review and summary of old records and obtaining of history. The risks and benefits of my recommendations, as well as other treatment options were discussed . Any questions/concerned were answered. Patient voiced understanding and agreement.          This document has been electronically signed by VILMA Land on October 11, 2022 10:04 CDT                                               Results for orders placed or performed during the hospital encounter of 08/10/22   Doppler Ankle Brachial Index Single Level CAR   Result Value Ref Range    Target HR (85%) 132 bpm    Max. Pred. HR (100%) 155 bpm    RIGHT DORSALIS PEDIS SYS      RIGHT POST TIBIAL SYS      RIGHT JUN RATIO 1.05     LEFT DORSALIS PEDIS SYS      LEFT POST TIBIAL SYS      LEFT JUN RATIO 1.02     Upper arterial right arm brachial sys max 164 mmHg    Upper arterial left arm brachial sys max 157 mmHg   Results for orders placed or performed during the hospital encounter of 08/08/22   Stress Test  With Myocardial Perfusion One Day   Result Value Ref Range    Target HR (85%) 132 bpm    Max. Pred. HR (100%) 155 bpm     CV STRESS PROTOCOL 1 Pharmacologic     Stage 1 1     HR Stage 1 66     BP Stage 1 151/82     Duration Min Stage 1 0     Duration Sec Stage 1 10     Stress Dose Regadenoson Stage 1 0.4     Stress Comments Stage 1 10 sec bolus injection     Baseline HR 62 bpm    Baseline /83 mmHg    Peak HR 96 bpm    Percent Max Pred HR 61.94 %    Percent Target HR 73 %    Peak /82 mmHg    Recovery HR 82 bpm    Recovery /82 mmHg    Estimated workload 1.0 METS    BH CV REST NUCLEAR ISOTOPE DOSE 10.7 mCi    BH CV STRESS NUCLEAR ISOTOPE DOSE 33.9 mCi    Nuc Stress EF 86 %   Results for orders placed or performed in visit on 08/04/22   Adult Transthoracic Echo Complete w/ Color, Spectral and Contrast if Necessary Per Protocol   Result Value Ref Range    Target HR (85%) 132 bpm    Max. Pred. HR (100%) 155 bpm    RV Base 3.8 cm    RV Mid 2.8 cm    Sinus 2.8 cm    LA ESV Index (BP) 19.1 ml/m2    Avg E/e' ratio 10.09     ACS 1.34 cm    Ao root diam 2.7 cm    Ao pk abelardo 159.6 cm/sec    Ao V2 VTI 34.6 cm    EDV(cubed) 60.4 ml    EDV(MOD-sp2) 52.1 ml    EDV(MOD-sp4) 49.2 ml    EF(MOD-sp2) 62.8 %    EF(MOD-sp4) 61.4 %    EPSS 0.88 cm    ESV(cubed) 18.4 ml    ESV(MOD-sp2) 19.4 ml    ESV(MOD-sp4) 19.0 ml    IVS/LVPW 0.98 cm    Lat Peak E' Abelardo 8.9 cm/sec    LV mass(C)d 133.5 grams    LVPWd 1.07 cm    Med Peak E' Abelardo 12.3 cm/sec    MR max .5 mmHg    MV dec slope 392.2 cm/sec2    MV dec time 0.27 msec    MV V2 VTI 44.6 cm    PA V2 max 119.9 cm/sec    RAP systole 3.0 mmHg    RV V1 max PG 3.7 mmHg    RV V1 max 96.7 cm/sec    RV V1 VTI 18.9 cm    RVIDd 3.0 cm    RVSP(TR) 23.8 mmHg    SI(MOD-sp2) 18.1 ml/m2    SI(MOD-sp4) 16.8 ml/m2    SV(MOD-sp2) 32.7 ml    SV(MOD-sp4) 30.2 ml    TR max PG 20.8 mmHg    Ao max PG 10.2 mmHg    Ao mean PG 5.5 mmHg    FS 32.8 %    IVSd 1.05 cm    LA dimension (2D)  3.6 cm    LVIDd  3.9 cm    LVIDs 2.6 cm    LVOT area 2.6 cm2    LVOT diam 1.82 cm    MV E/A 0.79     MV max PG 8.3 mmHg    MV mean PG 3.2 mmHg    MR max eliu 575.5 cm/sec    MV A max eliu 135.3 cm/sec    MV E max eliu 107.0 cm/sec    TR max eliu 228.0 cm/sec    LV Givens Vol (BSA corrected) 27.3 cm2    LV Sys Vol (BSA corrected) 10.5 cm2   Results for orders placed or performed in visit on 06/28/22   Lipid Panel    Specimen: Blood   Result Value Ref Range    Total Cholesterol 205 (H) 0 - 200 mg/dL    Triglycerides 152 (H) 0 - 150 mg/dL    HDL Cholesterol 58 40 - 60 mg/dL    LDL Cholesterol  120 (H) 0 - 100 mg/dL    VLDL Cholesterol 27 5 - 40 mg/dL    LDL/HDL Ratio 2.01    Results for orders placed or performed in visit on 06/28/22   ECG 12 Lead   Result Value Ref Range    QT Interval 358 ms    QTC Interval 423 ms   Results for orders placed or performed in visit on 09/29/20   Lipid Panel    Specimen: Blood   Result Value Ref Range    Total Cholesterol 191 0 - 200 mg/dL    Triglycerides 178 (H) 0 - 150 mg/dL    HDL Cholesterol 52 40 - 60 mg/dL    LDL Cholesterol  103 (H) 0 - 100 mg/dL    VLDL Cholesterol 35.6 5 - 40 mg/dL    LDL/HDL Ratio 1.99      *Note: Due to a large number of results and/or encounters for the requested time period, some results have not been displayed. A complete set of results can be found in Results Review.

## 2022-10-17 ENCOUNTER — HOSPITAL ENCOUNTER (OUTPATIENT)
Facility: HOSPITAL | Age: 67
Setting detail: HOSPITAL OUTPATIENT SURGERY
Discharge: HOME | End: 2022-10-17
Attending: INTERNAL MEDICINE | Admitting: INTERNAL MEDICINE

## 2022-10-17 ENCOUNTER — ANESTHESIA (OUTPATIENT)
Dept: GASTROENTEROLOGY | Facility: HOSPITAL | Age: 67
End: 2022-10-17

## 2022-10-17 ENCOUNTER — ANESTHESIA EVENT (OUTPATIENT)
Dept: GASTROENTEROLOGY | Facility: HOSPITAL | Age: 67
End: 2022-10-17

## 2022-10-17 VITALS
WEIGHT: 178 LBS | OXYGEN SATURATION: 97 % | RESPIRATION RATE: 16 BRPM | SYSTOLIC BLOOD PRESSURE: 126 MMHG | HEART RATE: 66 BPM | BODY MASS INDEX: 33.61 KG/M2 | TEMPERATURE: 96.8 F | HEIGHT: 61 IN | DIASTOLIC BLOOD PRESSURE: 65 MMHG

## 2022-10-17 DIAGNOSIS — K44.9 HIATAL HERNIA: ICD-10-CM

## 2022-10-17 DIAGNOSIS — R13.19 ESOPHAGEAL DYSPHAGIA: ICD-10-CM

## 2022-10-17 DIAGNOSIS — K21.00 GASTROESOPHAGEAL REFLUX DISEASE WITH ESOPHAGITIS WITHOUT HEMORRHAGE: ICD-10-CM

## 2022-10-17 PROCEDURE — 43239 EGD BIOPSY SINGLE/MULTIPLE: CPT | Performed by: INTERNAL MEDICINE

## 2022-10-17 PROCEDURE — 25010000002 PROPOFOL 10 MG/ML EMULSION: Performed by: NURSE ANESTHETIST, CERTIFIED REGISTERED

## 2022-10-17 PROCEDURE — 88305 TISSUE EXAM BY PATHOLOGIST: CPT

## 2022-10-17 RX ORDER — LIDOCAINE HYDROCHLORIDE 20 MG/ML
INJECTION, SOLUTION EPIDURAL; INFILTRATION; INTRACAUDAL; PERINEURAL AS NEEDED
Status: DISCONTINUED | OUTPATIENT
Start: 2022-10-17 | End: 2022-10-17 | Stop reason: SURG

## 2022-10-17 RX ORDER — PROPOFOL 10 MG/ML
VIAL (ML) INTRAVENOUS AS NEEDED
Status: DISCONTINUED | OUTPATIENT
Start: 2022-10-17 | End: 2022-10-17 | Stop reason: SURG

## 2022-10-17 RX ORDER — DEXTROSE AND SODIUM CHLORIDE 5; .45 G/100ML; G/100ML
30 INJECTION, SOLUTION INTRAVENOUS CONTINUOUS PRN
Status: DISCONTINUED | OUTPATIENT
Start: 2022-10-17 | End: 2022-10-17 | Stop reason: HOSPADM

## 2022-10-17 RX ADMIN — PROPOFOL 20 MG: 10 INJECTION, EMULSION INTRAVENOUS at 09:43

## 2022-10-17 RX ADMIN — LIDOCAINE HYDROCHLORIDE 80 MG: 20 INJECTION, SOLUTION EPIDURAL; INFILTRATION; INTRACAUDAL; PERINEURAL at 09:38

## 2022-10-17 RX ADMIN — PROPOFOL 100 MG: 10 INJECTION, EMULSION INTRAVENOUS at 09:38

## 2022-10-17 RX ADMIN — DEXTROSE AND SODIUM CHLORIDE 30 ML/HR: 5; 450 INJECTION, SOLUTION INTRAVENOUS at 09:22

## 2022-10-17 RX ADMIN — PROPOFOL 30 MG: 10 INJECTION, EMULSION INTRAVENOUS at 09:41

## 2022-10-17 NOTE — ANESTHESIA PREPROCEDURE EVALUATION
Anesthesia Evaluation     Patient summary reviewed and Nursing notes reviewed   NPO Solid Status: > 8 hours  NPO Liquid Status: > 2 hours           Airway   Mallampati: II  TM distance: >3 FB  Neck ROM: full  Possible difficult intubation  Dental    (+) poor dentition    Pulmonary - normal exam   (+) a smoker Current Abstained day of surgery, COPD, shortness of breath, recent URI,     ROS comment: Panlobular emphysema   Cardiovascular - normal exam  Exercise tolerance: good (4-7 METS)    PT is on anticoagulation therapy    (+) hypertension, valvular problems/murmurs MR, CAD, angina, hyperlipidemia,     ROS comment: Stress Test 6/2019:  ·Findings consistent with an equivocal ECG stress test.  ·Left ventricular ejection fraction is hyperdynamic (Calculated EF > 70%).  ·Myocardial perfusion imaging indicates a normal myocardial perfusion study with no evidence of ischemia.  ·Impressions are consistent with a low risk study.    JOAO 6/2019:  ·Estimated EF = 61%.  ·Left ventricular systolic function is normal.  ·Left ventricular diastolic dysfunction (grade I) consistent with impaired relaxation.  ·Mild mitral valve regurgitation is present  ·Tricuspid valve is grossly normal. Trace to mild tricuspid valve regurgitation is present. Estimated right ventricular systolic pressure from tricuspid regurgitation is normal (<35 mmHg). No evidence of pulmonary hypertension is present.    Off Plavix x 1 week    Neuro/Psych  (+) headaches, psychiatric history Anxiety and Depression,    GI/Hepatic/Renal/Endo    (+) obesity,  hiatal hernia, GERD, GI bleeding , thyroid problem hypothyroidism    Musculoskeletal     (+) back pain, joint swelling,   Abdominal   (+) obese,    Substance History      OB/GYN          Other   arthritis,                        Anesthesia Plan    ASA 4     general   total IV anesthesia  intravenous induction     Anesthetic plan, risks, benefits, and alternatives have been provided, discussed and informed  consent has been obtained with: patient.    Plan discussed with CRNA.

## 2022-10-17 NOTE — ANESTHESIA POSTPROCEDURE EVALUATION
Patient: Mary Nelson    Procedure Summary     Date: 10/17/22 Room / Location: [facility] ENDOSCOPY 1 / [facility] ENDOSCOPY    Anesthesia Start: 0935 Anesthesia Stop: 0944    Procedure: ESOPHAGOGASTRODUODENOSCOPY possible dilation Diagnosis:       Hiatal hernia      Gastroesophageal reflux disease with esophagitis without hemorrhage      Esophageal dysphagia      (Hiatal hernia [K44.9])      (Gastroesophageal reflux disease with esophagitis without hemorrhage [K21.00])      (Esophageal dysphagia [R13.19])    Surgeons: Loco Martinez MD Provider: Lisa Sultana CRNA    Anesthesia Type: general ASA Status: 4          Anesthesia Type: general    Vitals  No vitals data found for the desired time range.          Post Anesthesia Care and Evaluation    Patient location during evaluation: bedside  Patient participation: complete - patient participated  Level of consciousness: awake and awake and alert  Pain score: 0  Pain management: adequate    Airway patency: patent  Anesthetic complications: No anesthetic complications  PONV Status: none  Cardiovascular status: acceptable and stable  Respiratory status: acceptable, room air and spontaneous ventilation  Hydration status: acceptable    Comments: HR: 79  BP: 155/89  RR: 15  O2: 99

## 2022-10-18 LAB — REF LAB TEST METHOD: NORMAL

## 2023-01-13 ENCOUNTER — OFFICE VISIT (OUTPATIENT)
Dept: CARDIOLOGY | Facility: CLINIC | Age: 68
End: 2023-01-13
Payer: MEDICARE

## 2023-01-13 VITALS
HEIGHT: 61 IN | DIASTOLIC BLOOD PRESSURE: 72 MMHG | TEMPERATURE: 97.3 F | BODY MASS INDEX: 34.74 KG/M2 | SYSTOLIC BLOOD PRESSURE: 130 MMHG | OXYGEN SATURATION: 97 % | WEIGHT: 184 LBS | HEART RATE: 89 BPM

## 2023-01-13 DIAGNOSIS — I25.10 CORONARY ARTERY DISEASE INVOLVING NATIVE CORONARY ARTERY OF NATIVE HEART WITHOUT ANGINA PECTORIS: ICD-10-CM

## 2023-01-13 DIAGNOSIS — E78.2 MIXED HYPERLIPIDEMIA: ICD-10-CM

## 2023-01-13 DIAGNOSIS — I10 BENIGN ESSENTIAL HTN: ICD-10-CM

## 2023-01-13 DIAGNOSIS — R00.2 PALPITATIONS: Primary | ICD-10-CM

## 2023-01-13 PROCEDURE — 93000 ELECTROCARDIOGRAM COMPLETE: CPT | Performed by: INTERNAL MEDICINE

## 2023-01-13 PROCEDURE — 99214 OFFICE O/P EST MOD 30 MIN: CPT | Performed by: INTERNAL MEDICINE

## 2023-01-13 NOTE — PROGRESS NOTES
Mary Nelson  67 y.o. female    1. Palpitations    2. Benign essential HTN    3. Mixed hyperlipidemia    4. Coronary artery disease involving native coronary artery of native heart without angina pectoris        History of Present Illness  Mary Nelson is a 67 yr old who is here for follow-up of her above-stated problems. She has history of CAD with cardiac catheterization in April 2007 showing stenosis in the left anterior descending artery with subsequent FFR evaluation and PTCA and stenting of the left anterior descending artery with a 2.5 x 23 mm Xience Alpine stent.    She subsequently underwent cardiac catheterization by Dr. Chaparro following an abnormal CT angiogram of the coronary arteries in 3/19 which showed the following findings.  No critical lesions were identified.  Impression   1.  Sustained patency in the left anterior descending artery site of previous angioplasty and stenting with 50% stenosis proximal to the previously placed stent with a mild tortuous course.  2.  No evidence of any obstructive disease noted in the circumflex artery.  3.  Nondominant right coronary artery with luminal irregularity.  4.  Preserved left ventricular systolic function with an ejection fraction of 55%    Echocardiogram in August 2022 showed:  • Left ventricular ejection fraction appears to be 61 - 65%. Left ventricular systolic function is normal.  • Left ventricular diastolic function is consistent with (grade I) impaired relaxation.  • Estimated right ventricular systolic pressure from tricuspid regurgitation is normal (<35 mmHg).    Lexiscan Cardiolite stress test in August 2022 showed 4  • Findings consistent with a normal component of the ECG stress test.  • Left ventricular ejection fraction is hyperdynamic (Calculated EF > 70%). .  • Myocardial perfusion imaging indicates a normal myocardial perfusion study with no evidence of ischemia.  • Impressions are consistent with a low risk study.    She has  progressed reasonably well and denied any chest pain or shortness of breath at this time.  She does have occasional spells of anxiety which seems to be her major issue at this time.    EKG today showed sinus rhythm with heart rate of 89 bpm.  Old inferioro posterior myocardial infarction.  QTc interval 433 ms.  Left axis deviation.    Allergies   Allergen Reactions   • Codeine Swelling         Past Medical History:   Diagnosis Date   • Anxiety    • Chronic bronchitis (HCC)    • COPD (chronic obstructive pulmonary disease) (HCC)    • Coronary artery disease     1 stent   is followed by tanvir   • Depression    • Disease of thyroid gland    • Gastritis    • GERD (gastroesophageal reflux disease)    • Hiatal hernia    • Hypercholesteremia    • Hypertension    • Osteoarthritis          Past Surgical History:   Procedure Laterality Date   • CARDIAC CATHETERIZATION N/A 04/19/2017   • CARDIAC CATHETERIZATION  04/19/2017   • CARDIAC CATHETERIZATION N/A 07/06/2017   • CARDIAC CATHETERIZATION N/A 03/09/2019   • COLONOSCOPY N/A 01/30/2020    Procedure: COLONOSCOPY;  Surgeon: Sean Fernandez MD;  Location: [facility] ENDOSCOPY;  Service: Gastroenterology   • ENDOSCOPY N/A 11/15/2019    Procedure: ESOPHAGOGASTRODUODENOSCOPY;  Surgeon: Sean Fernandez MD;  Location: [facility] ENDOSCOPY;  Service: Gastroenterology   • ENDOSCOPY N/A 01/30/2020    Procedure: ESOPHAGOGASTRODUODENOSCOPY;  Surgeon: Sean Fernandez MD;  Location: [facility] ENDOSCOPY;  Service: Gastroenterology   • ENDOSCOPY N/A 10/17/2022    Procedure: ESOPHAGOGASTRODUODENOSCOPY possible dilation;  Surgeon: Loco Martinez MD;  Location: [facility] ENDOSCOPY;  Service: Gastroenterology;  Laterality: N/A;   • ENDOSCOPY AND COLONOSCOPY     • NISSEN FUNDOPLICATION N/A 02/01/2017   • [place] RT/LT HEART CATHETERS N/A 04/19/2017   • TOTAL KNEE ARTHROPLASTY Left 12/27/2018   • TRANSESOPHAGEAL ECHOCARDIOGRAM (JOAO)           Family History   Problem Relation Age of Onset   •  Hypertension Father          Social History     Socioeconomic History   • Marital status:    Tobacco Use   • Smoking status: Former     Packs/day: 0.25     Years: 43.00     Pack years: 10.75     Types: Cigarettes     Start date: 1973     Quit date: 2020     Years since quittin.5   • Smokeless tobacco: Never   Vaping Use   • Vaping Use: Never used   Substance and Sexual Activity   • Alcohol use: No   • Drug use: No   • Sexual activity: Defer         Current Outpatient Medications   Medication Sig Dispense Refill   • albuterol sulfate  (90 Base) MCG/ACT inhaler Inhale 2 puffs Every 4 (Four) Hours As Needed.     • clopidogrel (PLAVIX) 75 MG tablet Take 75 mg by mouth Daily.     • cyclobenzaprine (FLEXERIL) 10 MG tablet Take 1 tablet by mouth 3 (Three) Times a Day As Needed.     • dilTIAZem (TIAZAC) 180 MG 24 hr capsule Take 1 capsule by mouth Daily.     • escitalopram (LEXAPRO) 20 MG tablet Take 1 tablet by mouth Daily.     • furosemide (LASIX) 20 MG tablet Take 20 mg by mouth Every Morning.     • levothyroxine (SYNTHROID, LEVOTHROID) 25 MCG tablet Take 1 tablet by mouth Daily.     • lisinopril (PRINIVIL,ZESTRIL) 10 MG tablet Take 10 mg by mouth Daily.     • nitroglycerin (NITROSTAT) 0.4 MG SL tablet Place 1 tablet under the tongue Every 5 (Five) Minutes As Needed for Chest Pain. Take no more than 3 doses in 15 minutes. 30 tablet 1   • ondansetron ODT (Zofran ODT) 8 MG disintegrating tablet Place 1 tablet on the tongue Every 8 (Eight) Hours As Needed for Nausea or Vomiting. 25 tablet 2   • pantoprazole (PROTONIX) 40 MG EC tablet Take 1 tablet by mouth Daily. 30 tablet 5   • potassium chloride (K-DUR,KLOR-CON) 10 MEQ ER tablet Take 10 mEq by mouth Daily.     • QUEtiapine (SEROquel) 100 MG tablet Take 100 mg by mouth Every Night.     • rosuvastatin (CRESTOR) 20 MG tablet Take 1 tablet by mouth Daily. 90 tablet 3   • sertraline (ZOLOFT) 100 MG tablet Take 1 tablet by mouth Daily.     •  "traZODone (DESYREL) 50 MG tablet Take 50 mg by mouth Every Evening.       No current facility-administered medications for this visit.         OBJECTIVE    /72 (BP Location: Left arm, Patient Position: Sitting, Cuff Size: Adult)   Pulse 89   Temp 97.3 °F (36.3 °C)   Ht 154.9 cm (61\")   Wt 83.5 kg (184 lb)   SpO2 97%   BMI 34.77 kg/m²         Review of Systems : The following systems were reviewed and changes noted below and under history of present illness    Constitutional:  Denies recent weight loss, weight gain, fever or chills, no change in exercise tolerance     HENT:  Denies any hearing loss, epistaxis, hoarseness, or difficulty speaking.     Eyes: Wears eyeglasses or contact lenses     Respiratory:  Denies dyspnea with exertion,no cough, wheezing, or hemoptysis.     Cardiovascular: No chest pain or palpitation at this time.    Gastrointestinal:  Denies change in bowel habits, dyspepsia, ulcer disease, hematochezia, or melena.     Endocrine: Negative for cold intolerance, heat intolerance, polydipsia, polyphagia and polyuria.     Genitourinary: Negative.      Musculoskeletal: DJD    Neurological:  Denies any history of recurrent headaches, strokes, TIA, or seizure disorder.     Hematological: Denies any food allergies, seasonal allergies, bleeding disorders, or lymphadenopathy.     Psychiatric/Behavioral:  history of depression/anxiety      Physical Exam: The following systems were reviewed and no changes noted     Constitutional: Cooperative, alert and oriented, in no acute distress.     HENT:   Head: Normocephalic, normal hair patterns, no masses or tenderness.  Ears, Nose, and Throat: No gross abnormalities. No pallor or cyanosis.   Eyes: EOMS intact, PERRL, conjunctivae and lids unremarkable. Fundoscopic exam and visual fields not performed.   Neck: No palpable masses or adenopathy, no thyromegaly, no JVD, carotid pulses are full and equal bilaterally and without  Bruits.     Cardiovascular: " Regular rhythm, S1 and S2 normal, no S3 or S4.  No murmurs, gallops, or rubs detected.     Pulmonary/Chest: Chest: normal symmetry,  normal respiratory excursion, no intercostal retraction, no use of accessory muscles.            Pulmonary: Normal breath sounds. No rales or ronchi.    Abdominal: Abdomen soft, bowel sounds normoactive, no masses, no hepatosplenomegaly, non-tender, no bruits.     Musculoskeletal: No deformities, clubbing, cyanosis, erythema, or edema observed.     Neurological: No gross motor or sensory deficits noted, affect appropriate, oriented to time, person, place.     Skin: Warm and dry to the touch, no apparent skin lesions or masses noted.     Psychiatric: She has a normal mood and affect. Her behavior is normal. Judgment and thought content normal.         Procedures      Lab Results   Component Value Date    WBC 7.04 07/22/2020    HGB 10.8 (L) 07/22/2020    HCT 35.4 07/22/2020    MCV 78.1 (L) 07/22/2020     07/22/2020     Lab Results   Component Value Date    GLUCOSE 102 (H) 07/22/2020    BUN 8 03/22/2022    CREATININE 1.1 03/22/2022    EGFRIFNONA 67 07/22/2020    EGFRIFAFRI 68 07/09/2021    BCR 11.6 07/22/2020    CO2 25 03/22/2022    CALCIUM 8.6 03/22/2022    ALBUMIN 3.9 03/22/2022    AST 10 (L) 03/22/2022    ALT 10 03/22/2022     Lab Results   Component Value Date    CHOL 205 (H) 06/28/2022    CHOL 191 09/29/2020    CHOL 369 (H) 07/22/2020     Lab Results   Component Value Date    TRIG 152 (H) 06/28/2022    TRIG 179 (H) 07/09/2021    TRIG 178 (H) 09/29/2020     Lab Results   Component Value Date    HDL 58 06/28/2022    HDL 56 07/09/2021    HDL 52 09/29/2020     No components found for: LDLCALC  Lab Results   Component Value Date     (H) 06/28/2022     07/09/2021     (H) 09/29/2020     No results found for: HDLLDLRATIO  No components found for: CHOLHDL  Lab Results   Component Value Date    HGBA1C 5.9 01/15/2020     Lab Results   Component Value Date    TSH 2.49  03/22/2022           ASSESSMENT AND PLAN  Ms. Nelson has multiple medical issues as discussed in the history of present illness and stable at this time with no clinical evidence of angina, arrhythmia or congestive heart failure.  The patient does not smoke anymore.     I have continued antiplatelet therapy with Plavix, antihypertensive therapy with Cardizem CD lisinopril and lipid-lowering therapy with Crestor.  She will be seeing Dr. Jean in the near future and will have lab work done at that time.    Diagnoses and all orders for this visit:    1. Palpitations (Primary)  -     ECG 12 Lead    2. Benign essential HTN    3. Mixed hyperlipidemia    4. Coronary artery disease involving native coronary artery of native heart without angina pectoris        Patient's Body mass index is 34.77 kg/m². BMI is above normal parameters. Recommendations include: exercise counseling and nutrition counseling.  Patient is a non-smoker.      Emanuel Olmos MD  1/13/2023  09:46 CST

## 2023-01-15 LAB
QT INTERVAL: 356 MS
QTC INTERVAL: 433 MS

## 2023-01-27 ENCOUNTER — TRANSCRIBE ORDERS (OUTPATIENT)
Dept: ORTHOPEDIC SURGERY | Facility: CLINIC | Age: 68
End: 2023-01-27
Payer: MEDICARE

## 2023-01-27 DIAGNOSIS — G56.03 BILATERAL CARPAL TUNNEL SYNDROME: Primary | ICD-10-CM

## 2023-02-06 RX ORDER — ROSUVASTATIN CALCIUM 20 MG/1
20 TABLET, COATED ORAL DAILY
Qty: 90 TABLET | Refills: 3 | Status: SHIPPED | OUTPATIENT
Start: 2023-02-06

## 2023-07-10 NOTE — PLAN OF CARE
[facility]mar[facility] Problem: Patient Care Overview  Goal: Plan of Care Review  Outcome: Ongoing (interventions implemented as appropriate)   06/24/19 1145   Coping/Psychosocial   Plan of Care Reviewed With patient   Plan of Care Review   Progress no change   OTHER   Outcome Summary VS stable; pt vomited this am and had pause on rhythm- physicians aware; cont to monitor; echo done today     Goal: Individualization and Mutuality  Outcome: Ongoing (interventions implemented as appropriate)    Goal: Discharge Needs Assessment  Outcome: Ongoing (interventions implemented as appropriate)    Goal: Interprofessional Rounds/Family Conf  Outcome: Ongoing (interventions implemented as appropriate)      Problem: Cardiac: ACS (Acute Coronary Syndrome) (Adult)  Goal: Signs and Symptoms of Listed Potential Problems Will be Absent, Minimized or Managed (Cardiac: ACS)  Outcome: Ongoing (interventions implemented as appropriate)

## (undated) DEVICE — SINGLE-USE BIOPSY FORCEPS: Brand: RADIAL JAW 4

## (undated) DEVICE — ANGIO-SEAL EVOLUTION VASCULAR CLOSURE DEVICE: Brand: ANGIO-SEAL

## (undated) DEVICE — KT INTRO MINISTICK MAX W/GW NITNL/TUNG ECHO 4F 21G 7CM

## (undated) DEVICE — NO-SCRATCH ™ SMALL WHITNEY CURETTE ™ IS A SINGLE-USE, PLASTIC CURETTE FOR QUICKLY APPLYING, MANIPULATING AND REMOVING BONE CEMENT DURING HIP AND KNEE REPLACEMENT SURGERY. THE PLASTIC IS SOFTER THAN STEEL INSTRUMENTS, REDUCING THE RISK OF DAMAGING THE PROSTHESIS WITH METAL INSTRUMENTS.  THE CURETTE’S 6MM TIP REMOVES EXCESS CEMENT FROM REPLACEMENT HIPS AND KNEES. EASY-TO-MANEUVER, THE SMALL BLUE CURETTE LETS YOU REMOVE CEMENT FROM ALL EDGES OF THE PROSTHESIS.NO-SCRATCH WHITNEY SMALL CURETTE FEATURES:SAFER THAN STEEL- MADE OF PLASTIC - STURDY YET SOFTER THAN SURGICAL STEEL.HANDIER- EACH TOOL HAS A MOLDED-IN THUMB INDENTATION INSTANTLY ORIENTING THE TOOL.- EASIER TO MANEUVER IN HARD TO SEE PLACES.- COLOR-CODED FOR EASY IDENTIFICATION.FASTER- COMES INDIVIDUALLY PACKAGED IN STERILE, PEEL OPEN POUCH, READY TO GO.- APPLIES, MANIPULATES, OR REMOVES CEMENT WITH FINGERTIP PRECISION.ECONOMICAL- THE COST OF A SINGLE REVISION DWARFS THE COST OF A SINGLE-USE CURETTE. - DISPOSABLE – THERE’S NO NEED TO WASTE TIME REMOVING HARDENED CEMENT OR RE-STERILIZING TOOLS.- LESS EXPENSIVE TO BUY AND INVENTORY - ORDER ONLY THE TOOL YOU USE.- PACKAGED 25 INDIVIDUALLY WRAPPED TOOLS TO A CARTON FOR CONVENIENT SHELF STORAGE.: Brand: WHITNEY NO-SCRATCH CURETTE (SMALL)

## (undated) DEVICE — GLV SURG TRIUMPH LT PF LTX 7.5 STRL

## (undated) DEVICE — SUT VIC 2 CP 27IN UD VCP195H

## (undated) DEVICE — PDS II VLT 0 107CM AG ST3: Brand: ENDOLOOP

## (undated) DEVICE — VSI MICRO-INTRODUCER KITS ARE INTENDED FOR USE IN PERCUTANEOUS INTRODUCTION OF UP TO A 0.018 INCH OR 0.038 INCH GUIDEWIRE OR CATHETER INTO THE VASCULAR SYSTEM FOLLOWING A SMALL GAUGE NEEDLE STICK.: Brand: VSI MICRO-INTRODUCER KIT

## (undated) DEVICE — GOWN,AURORA,NOREINF,RAGLAN,XL,STERILE: Brand: MEDLINE

## (undated) DEVICE — 3M™ BAIR HUGGER® UPPER BODY BLANKET, 10 PER CASE 52200: Brand: BAIR HUGGER™

## (undated) DEVICE — GW PERIPH GUIDERIGHT STD/J/TP PTFE/PCOAT SS 0.038IN 5X150CM

## (undated) DEVICE — UNDRPD BREATH 23X36 BG/10

## (undated) DEVICE — ELECTRODE,RT,STRESS,FOAM,50PK: Brand: MEDLINE

## (undated) DEVICE — SOL IRRIG H2O 1000ML STRL

## (undated) DEVICE — ARTERIAL NEEDLE: Brand: UNBRANDED

## (undated) DEVICE — SPNG DRN AMD EXCILON 6PLY 4X4IN PK/2

## (undated) DEVICE — LUBE JELLY PK/2.75GM STRL BX/144

## (undated) DEVICE — CANN SMPL SOFTECH BIFLO ETCO2 A/M 7FT

## (undated) DEVICE — INTRO SHEATH ART/FEM ENGAGE .038 6F12CM

## (undated) DEVICE — GLV SURG SENSICARE GREEN W/ALOE PF LF 8 STRL

## (undated) DEVICE — GW PRESSUREWIRE X WIRELESS FFR 175CM

## (undated) DEVICE — A2000 MULTI-USE SYRINGE KIT, P/N 701277-003KIT CONTENTS: 100ML CONTRAST RESERVOIR AND TUBING WITH CONTRAST SPIKE AND CLAMP: Brand: A2000 MULTI-USE SYRINGE KIT

## (undated) DEVICE — SYR LL TP 10ML STRL

## (undated) DEVICE — ENDOPATH XCEL BLADELESS TROCARS WITH STABILITY SLEEVES: Brand: ENDOPATH XCEL

## (undated) DEVICE — SUT VIC 0 CT1 36IN J946H

## (undated) DEVICE — HARMONIC ACE +7 LAPAROSCOPIC SHEARS ADVANCED HEMOSTASIS 5MM DIAMETER 36CM SHAFT LENGTH  FOR USE WITH GRAY HAND PIECE ONLY: Brand: HARMONIC ACE

## (undated) DEVICE — PAD,ABDOMINAL,8"X10",ST,LF: Brand: MEDLINE

## (undated) DEVICE — CAUTERY TIP POLISHER: Brand: DEVON

## (undated) DEVICE — APPL CHLORAPREP W/TINT 26ML ORNG

## (undated) DEVICE — TOWEL,OR,DSP,ST,BLUE,DLX,8/PK,10PK/CS: Brand: MEDLINE

## (undated) DEVICE — PK CATH LAB 60

## (undated) DEVICE — SPNG LAP 18X18IN LF STRL PK/5

## (undated) DEVICE — ST CVR PROB PULLUP ULTRASND 5X48IN

## (undated) DEVICE — MODEL AT P65, P/N 701554-001KIT CONTENTS: HAND CONTROLLER, 3-WAY HIGH-PRESSURE STOPCOCK WITH ROTATING END AND PREMIUM HIGH-PRESSURE TUBING: Brand: ANGIOTOUCH® KIT

## (undated) DEVICE — MONOPOLAR METZENBAUM SCISSOR TIP, DISPOSABLE: Brand: MONOPOLAR METZENBAUM SCISSOR TIP, DISPOSABLE

## (undated) DEVICE — PK LAP CHOLE LF 60

## (undated) DEVICE — GLV SURG SENSICARE GREEN W/ALOE PF LF 6.5 STRL

## (undated) DEVICE — NC TREK CORONARY DILATATION CATHETER 2.5 MM X 20 MM / RAPID-EXCHANGE: Brand: NC TREK

## (undated) DEVICE — GLV SURG SENSICARE MICRO PF LF 7.5 STRL

## (undated) DEVICE — 3 BONE CEMENT MIXER: Brand: MIXEVAC

## (undated) DEVICE — DRAPE,U/ SHT,SPLIT,PLAS,STERIL: Brand: MEDLINE

## (undated) DEVICE — BITEBLOCK ENDO W/STRAP 60F A/ LF DISP

## (undated) DEVICE — SKIN AFFIX SURG ADHESIVE 72/CS 0.55ML: Brand: MEDLINE

## (undated) DEVICE — STRYKER PERFORMANCE SERIES SAGITTAL BLADE: Brand: STRYKER PERFORMANCE SERIES

## (undated) DEVICE — Device

## (undated) DEVICE — GLV SURG NEOLON 2G PF LF 6.5 STRL

## (undated) DEVICE — MODEL BT2000 P/N 700287-012KIT CONTENTS: MANIFOLD WITH SALINE AND CONTRAST PORTS, SALINE TUBING WITH SPIKE AND HAND SYRINGE, TRANSDUCER: Brand: BT2000 AUTOMATED MANIFOLD KIT

## (undated) DEVICE — BNDG ELAS ELITE V/CLOSE 6IN 5YD LF STRL

## (undated) DEVICE — COPILOT KIT INCLUDES BLEEDBACK CONTROL VALVE / GUIDE WIRE INTRODUCER / TORQUE DEVICE: Brand: ACCESSORIES

## (undated) DEVICE — SOL IRR NACL 0.9PCT 3000ML

## (undated) DEVICE — TOTAL TRAY, 16FR 10ML SIL FOLEY, URN: Brand: MEDLINE

## (undated) DEVICE — CATH DIAG EXPO M/ PK 6FR FL4/FR4 PIG 3PK

## (undated) DEVICE — TB CULTURETT2 LIQ STUARTS RED

## (undated) DEVICE — HANDPIECE SET WITH COAXIAL HIGH FLOW TIP AND SUCTION TUBE: Brand: INTERPULSE

## (undated) DEVICE — DISPOSABLE TOURNIQUET CUFF SINGLE BLADDER, DUAL PORT AND QUICK CONNECT CONNECTOR: Brand: COLOR CUFF

## (undated) DEVICE — MSK ENDO PORT O2 POM ELITE CURAPLEX A/

## (undated) DEVICE — GLV SURG SENSICARE PI PF LF 7 GRN STRL

## (undated) DEVICE — STPLR SKIN VISISTAT WD 35CT

## (undated) DEVICE — NDL HYPO PRECISIONGLIDE/REG 18G 1IN PNK

## (undated) DEVICE — GLV SURG TRIUMPH ORTHO W/ALOE PF LTX 7.0

## (undated) DEVICE — GLV SURG SENSICARE GREEN W/ALOE PF LF 8.5 STRL

## (undated) DEVICE — SUT VICRYL 0 TIES J112T

## (undated) DEVICE — DEV LAP LIGASURE BLNT DBL 180D 5MM 37CM 1P/U

## (undated) DEVICE — GLV SURG TRIUMPH LT PF LTX 6.5 STRL

## (undated) DEVICE — SOL IRR NACL 0.9PCT BT 1000ML

## (undated) DEVICE — ANTIBACTERIAL UNDYED BRAIDED (POLYGLACTIN 910), SYNTHETIC ABSORBABLE SUTURE: Brand: COATED VICRYL

## (undated) DEVICE — POLYESTER SINGLE STITCH RELOAD: Brand: SURGIDAC

## (undated) DEVICE — PK KN TOTL LF 60

## (undated) DEVICE — GLV SURG SENSICARE ALOE LF PF SZ7.5 GRN

## (undated) DEVICE — PEEL-AWAY TOGA, 2X LARGE: Brand: FLYTE

## (undated) DEVICE — MINI TREK CORONARY DILATATION CATHETER 2.0 MM X 20 MM / RAPID-EXCHANGE: Brand: MINI TREK

## (undated) DEVICE — MYNXGRIP 6F/7F: Brand: MYNXGRIP

## (undated) DEVICE — BNDG ESMARK 6INX9FT STRL

## (undated) DEVICE — DRSNG WND BORDR/ADHS NONADHR/GZ LF 4X10IN STRL

## (undated) DEVICE — DRN PENRS 1/2X18IN LTX

## (undated) DEVICE — PAD UNDERCAST WYTEX 6IN 4YD LF STRL

## (undated) DEVICE — GLV SURG SENSICARE POLYISPRN W/ALOE PF LF 6.5 GRN STRL

## (undated) DEVICE — SUT VIC 0/0 UR6 27IN DYED J603H

## (undated) DEVICE — 6F .070 XB LAD 3.5 100CM: Brand: VISTA BRITE TIP

## (undated) DEVICE — 3M™ DURAPORE™ SURGICAL TAPE 1538-3, 3 INCH X 10 YARD (7,5CM X 9,1M), 4 ROLLS/BOX: Brand: 3M™ DURAPORE™

## (undated) DEVICE — GLV SURG TRIUMPH LT PF LTX 8 STRL

## (undated) DEVICE — GOWN,NON-REINFORCED,4XL: Brand: MEDLINE

## (undated) DEVICE — HOOD, PEEL-AWAY: Brand: FLYTE

## (undated) DEVICE — KT DRN EVAC WND PVC PCH WTROC RND 19F400

## (undated) DEVICE — SUTURING DEVICE: Brand: ENDO STITCH

## (undated) DEVICE — GLV SURG SENSICARE GREEN W/ALOE PF LF 7 STRL